# Patient Record
Sex: MALE | Race: WHITE | Employment: OTHER | URBAN - METROPOLITAN AREA
[De-identification: names, ages, dates, MRNs, and addresses within clinical notes are randomized per-mention and may not be internally consistent; named-entity substitution may affect disease eponyms.]

---
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
## 2017-01-04 ENCOUNTER — OFFICE VISIT (OUTPATIENT)
Dept: FAMILY MEDICINE | Facility: OTHER | Age: 47
End: 2017-01-04
Payer: MEDICARE

## 2017-01-04 VITALS
BODY MASS INDEX: 26.57 KG/M2 | WEIGHT: 179.4 LBS | SYSTOLIC BLOOD PRESSURE: 116 MMHG | OXYGEN SATURATION: 96 % | TEMPERATURE: 99.2 F | HEIGHT: 69 IN | HEART RATE: 97 BPM | DIASTOLIC BLOOD PRESSURE: 68 MMHG | RESPIRATION RATE: 16 BRPM

## 2017-01-04 DIAGNOSIS — E55.9 HYPOVITAMINOSIS D: ICD-10-CM

## 2017-01-04 DIAGNOSIS — J96.21 ACUTE ON CHRONIC RESPIRATORY FAILURE WITH HYPOXIA (H): Primary | ICD-10-CM

## 2017-01-04 DIAGNOSIS — B35.3 TINEA PEDIS OF BOTH FEET: ICD-10-CM

## 2017-01-04 DIAGNOSIS — E53.8 VITAMIN B12 DEFICIENCY (NON ANEMIC): ICD-10-CM

## 2017-01-04 DIAGNOSIS — B20 AIDS (H): ICD-10-CM

## 2017-01-04 DIAGNOSIS — F19.10 DRUG ABUSE (H): ICD-10-CM

## 2017-01-04 DIAGNOSIS — F41.9 ANXIETY: ICD-10-CM

## 2017-01-04 PROCEDURE — 99214 OFFICE O/P EST MOD 30 MIN: CPT | Performed by: NURSE PRACTITIONER

## 2017-01-04 RX ORDER — CLOTRIMAZOLE 1 %
CREAM (GRAM) TOPICAL 2 TIMES DAILY
Qty: 14 G | Refills: 3 | Status: SHIPPED | OUTPATIENT
Start: 2017-01-04 | End: 2017-03-07

## 2017-01-04 RX ORDER — ALPRAZOLAM 0.5 MG
TABLET ORAL
Qty: 30 TABLET | Refills: 0 | Status: SHIPPED | OUTPATIENT
Start: 2017-01-04 | End: 2017-03-07

## 2017-01-04 ASSESSMENT — PAIN SCALES - GENERAL: PAINLEVEL: MODERATE PAIN (5)

## 2017-01-04 NOTE — NURSING NOTE
"Chief Complaint   Patient presents with     Anxiety     Panel Management     lipids, phq, hf action plan, phq, urine screen       Initial /68 mmHg  Pulse 97  Temp(Src) 99.2  F (37.3  C) (Temporal)  Resp 16  Ht 5' 8.7\" (1.745 m)  Wt 179 lb 6.4 oz (81.375 kg)  BMI 26.72 kg/m2  SpO2 96% Estimated body mass index is 26.72 kg/(m^2) as calculated from the following:    Height as of this encounter: 5' 8.7\" (1.745 m).    Weight as of this encounter: 179 lb 6.4 oz (81.375 kg).  BP completed using cuff size: regular    "

## 2017-01-12 DIAGNOSIS — B20 HUMAN IMMUNODEFICIENCY VIRUS (HIV) DISEASE (H): Primary | ICD-10-CM

## 2017-01-23 ENCOUNTER — TELEPHONE (OUTPATIENT)
Dept: FAMILY MEDICINE | Facility: OTHER | Age: 47
End: 2017-01-23

## 2017-01-23 DIAGNOSIS — J18.9 PNEUMONIA OF BOTH LUNGS DUE TO INFECTIOUS ORGANISM, UNSPECIFIED PART OF LUNG: ICD-10-CM

## 2017-01-23 DIAGNOSIS — J96.21 ACUTE AND CHRONIC RESPIRATORY FAILURE WITH HYPOXIA (H): Primary | ICD-10-CM

## 2017-01-23 DIAGNOSIS — J98.01 ACUTE BRONCHOSPASM: ICD-10-CM

## 2017-01-23 NOTE — TELEPHONE ENCOUNTER
tiotropium (SPIRIVA RESPIMAT) 2.5 MCG/ACT inhalation       Last Written Prescription Date: 12/23/16  Last Fill Quantity: 4 g, # refills: 0    Last Office Visit with G, P or [facility] prescribing provider:  1/4/17 White   Future Office Visit:       Date of Last Asthma Action Plan Letter:   There are no preventive care reminders to display for this patient.   Asthma Control Test: No flowsheet data found.    Date of Last Spirometry Test:   No results found for this or any previous visit.

## 2017-01-24 NOTE — TELEPHONE ENCOUNTER
Patient informed he states he isn't going to follow up for an inhaler and he's going to report that and hung up on me.

## 2017-01-24 NOTE — TELEPHONE ENCOUNTER
Medication is being filled for 1 time refill only due to:  Patient needs to be seen because per last OV note on 01/04/2017, follow up in 1-2 months.  Please contact patient to schedule follow up in February.  RX sent to [facility] pharmacy  Margaret Webb RN

## 2017-01-25 DIAGNOSIS — Z21 HIV (HUMAN IMMUNODEFICIENCY VIRUS INFECTION) (H): Primary | ICD-10-CM

## 2017-01-25 RX ORDER — EMTRICITABINE AND TENOFOVIR DISOPROXIL FUMARATE 200; 300 MG/1; MG/1
1 TABLET, FILM COATED ORAL DAILY
Qty: 30 TABLET | Refills: 5 | Status: SHIPPED | OUTPATIENT
Start: 2017-01-25 | End: 2017-06-26

## 2017-02-06 ENCOUNTER — TELEPHONE (OUTPATIENT)
Dept: FAMILY MEDICINE | Facility: OTHER | Age: 47
End: 2017-02-06

## 2017-02-06 DIAGNOSIS — J31.0 CHRONIC RHINITIS: Primary | ICD-10-CM

## 2017-02-06 NOTE — TELEPHONE ENCOUNTER
Pt calling. He is getting a bill from [facility] that he states Medicare should be paying for. He was told by them to have you call they so they can get the adequate info to have Medicare cover this. Please call them at [phone]. He did now know what specific info they needed. His customer ID is [id number].   Thank you,  Ara Alan- Pt Rep.

## 2017-02-06 NOTE — TELEPHONE ENCOUNTER
Also, the Allegra that was called in for him was supposed to be Allegra D. He would like new Rx sent to [facility] in [place].   Thank you,  Ara Alan- Pt Rep.

## 2017-02-07 RX ORDER — FEXOFENADINE HCL AND PSEUDOEPHEDRINE HCI 60; 120 MG/1; MG/1
1 TABLET, EXTENDED RELEASE ORAL 2 TIMES DAILY
Qty: 28 TABLET | Refills: 1 | Status: SHIPPED | OUTPATIENT
Start: 2017-02-07 | End: 2017-03-07

## 2017-02-10 DIAGNOSIS — F41.9 ANXIETY: ICD-10-CM

## 2017-02-10 DIAGNOSIS — R11.0 NAUSEA: Primary | ICD-10-CM

## 2017-02-10 NOTE — TELEPHONE ENCOUNTER
Lidocaine      Last Written Prescription Date:  08/17/15  Last Fill Quantity: 30 patches,   # refills: 8  Last Office Visit with [facility], [facility] or [facility] prescribing provider: 01/02/17  Future Office visit:       Routing refill request to provider for review/approval because:  Drug not on the [facility], [facility] or [facility] refill protocol or controlled substance    Patients insurance is requesting that patient be placed on a SHANKAR due to using ventolin more than 2 times weekly. The request is for one of the following:   Qvar 40 1-2 puffs BID,   Flovent HFA 44 2 puffs BID,   Asmanex 220 1puff QD,   Pulmicort 90 1 puff QD,   Flovent Discus 100 1 puff BID

## 2017-02-14 RX ORDER — LIDOCAINE 50 MG/G
PATCH TOPICAL
Qty: 30 PATCH | Refills: 8 | Status: SHIPPED | OUTPATIENT
Start: 2017-02-14 | End: 2017-03-07

## 2017-03-01 ENCOUNTER — TELEPHONE (OUTPATIENT)
Dept: FAMILY MEDICINE | Facility: OTHER | Age: 47
End: 2017-03-01

## 2017-03-01 DIAGNOSIS — J96.21 ACUTE ON CHRONIC RESPIRATORY FAILURE WITH HYPOXIA (H): Primary | ICD-10-CM

## 2017-03-01 NOTE — TELEPHONE ENCOUNTER
"Per fax from [facility]  \"THIRD REQUEST-- Tommy's insurance company wants Tommy on a SHANKAR since he is using his ventolin more than twice a week. Will you please prescribe one of the following to help him get adequate control?  Qvar 40 1-2 puffs BID  Flovent HFA 44 2 puffs BID  Asmanex 220 1 qd  Pulmicort 90 1qd  Flovent Diskus 100 1 BID    Please advise, thanks\"    "

## 2017-03-02 ENCOUNTER — OFFICE VISIT (OUTPATIENT)
Dept: URGENT CARE | Facility: RETAIL CLINIC | Age: 47
End: 2017-03-02
Payer: MEDICARE

## 2017-03-02 ENCOUNTER — TELEPHONE (OUTPATIENT)
Dept: FAMILY MEDICINE | Facility: OTHER | Age: 47
End: 2017-03-02

## 2017-03-02 VITALS
HEART RATE: 100 BPM | OXYGEN SATURATION: 96 % | SYSTOLIC BLOOD PRESSURE: 110 MMHG | TEMPERATURE: 98.3 F | DIASTOLIC BLOOD PRESSURE: 68 MMHG

## 2017-03-02 DIAGNOSIS — J20.9 ACUTE BRONCHITIS WITH COEXISTING CONDITION REQUIRING PROPHYLACTIC TREATMENT: Primary | ICD-10-CM

## 2017-03-02 DIAGNOSIS — R05.9 COUGH: ICD-10-CM

## 2017-03-02 PROCEDURE — 99213 OFFICE O/P EST LOW 20 MIN: CPT | Performed by: NURSE PRACTITIONER

## 2017-03-02 RX ORDER — LEVOFLOXACIN 750 MG/1
750 TABLET, FILM COATED ORAL DAILY
Qty: 14 TABLET | Refills: 0 | Status: ON HOLD | OUTPATIENT
Start: 2017-03-02 | End: 2017-04-24

## 2017-03-02 ASSESSMENT — PAIN SCALES - GENERAL: PAINLEVEL: SEVERE PAIN (7)

## 2017-03-02 NOTE — TELEPHONE ENCOUNTER
LM for pt to return call.   Wondering why pt is requesting medication?   Chu Collins MA  March 2, 2017

## 2017-03-02 NOTE — TELEPHONE ENCOUNTER
Reason for call:  Symptom  Reason for call:  Patient reporting a symptom    Symptom or request: sinus pressure, headaches, fever, chills, cough    Duration (how long have symptoms been present): 3-4 days    Have you been treated for this before? Yes    Additional comments:     Phone Number patient can be reached at:  Home number on file [phone] (home)    Best Time:  asap    Can we leave a detailed message on this number:  YES    Call taken on 3/2/2017 at 11:44 AM by Minal Paredes

## 2017-03-02 NOTE — TELEPHONE ENCOUNTER
Patient returned call to clinic. I am not sure if patient is confused on what medications they need or are looking for. Patient was going through a number of different medications but unsure what ones they really need. Patient is looking for a return call to go over theses.    Mare Bob  Reception/ Scheduling

## 2017-03-02 NOTE — MR AVS SNAPSHOT
"              After Visit Summary   3/2/2017    Tommy Ross    MRN: [id number]           Patient Information     Date Of Birth          1970        Visit Information        Provider Department      3/2/2017 5:50 PM Seferino Cho APRN CNP [facility]        Today's Diagnoses     Acute bronchitis with coexisting condition requiring prophylactic treatment    -  1    Cough           Follow-ups after your visit        Your next 10 appointments already scheduled     2017 11:30 AM CDT   (Arrive by 11:15 AM)   Return Visit with Carlos Enrique Lay MD   [facility] ([facility])    [address] [place] [id number] [phone]              Who to contact     You can reach your care team any time of the day by calling [phone].  Notification of test results:  If you have an abnormal lab result, we will notify you by phone as soon as possible.         Additional Information About Your Visit        MyChart Information     [facility] lets you send messages to your doctor, view your test results, renew your prescriptions, schedule appointments and more. To sign up, go to [email or web address] . Click on \"Log in\" on the left side of the screen, which will take you to the Welcome page. Then click on \"Sign up Now\" on the right side of the page.     You will be asked to enter the access code listed below, as well as some personal information. Please follow the directions to create your username and password.     Your access code is: [email or web address]  Expires: 2017  6:49 PM     Your access code will  in 90 days. If you need help or a new code, please call your [place] clinic or [phone].        Care EveryWhere ID     This is your Care EveryWhere ID. This could be used by other organizations to access your [place] medical records  [id number]        Your Vitals Were     Pulse Temperature " Pulse Oximetry             100 98.3  F (36.8  C) (Oral) 96%          Blood Pressure from Last 3 Encounters:   03/02/17 110/68   01/04/17 116/68   12/21/16 126/66    Weight from Last 3 Encounters:   01/04/17 179 lb 6.4 oz (81.4 kg)   12/21/16 173 lb (78.5 kg)   11/26/16 163 lb 2.3 oz (74 kg)              Today, you had the following     No orders found for display         Today's Medication Changes          These changes are accurate as of: 3/2/17  6:49 PM.  If you have any questions, ask your nurse or doctor.               Start taking these medicines.        Dose/Directions    levofloxacin 750 MG tablet   Commonly known as:  LEVAQUIN   Used for:  Acute bronchitis with coexisting condition requiring prophylactic treatment   Started by:  Seferino Cho APRN CNP        Dose:  750 mg   Take 1 tablet (750 mg) by mouth daily   Quantity:  14 tablet   Refills:  0            Where to get your medicines      These medications were sent [address]  [address] [facility] [address]     Phone:  [phone]     levofloxacin 750 MG tablet                Primary Care Provider Office Phone # Fax #    Antonella Obrien [phone]       [facility] [address] [facility] [address]        Goals        General    I want more information on palliative card/Start Date 4/14/2014 (pt-stated)     Notes - Note created  4/15/2014 10:07 AM by Tania Vasquez MSW    As of today's date 4/15/2014 goal is met at 0 - 25%.   Goal Status:  Active        I want to feel normal again, and be able to address my pain/Start Date 4/14/2014 (pt-stated)     Notes - Note created  4/15/2014 10:06 AM by Tania Vasquez MSW    As of today's date 4/15/2014 goal is met at 0 - 25%.   Goal Status:  Active        I will take my nirto as directed for chest pain. (pt-stated)     I will weigh myself daily and keep a record (pt-stated)       Thank you!     Thank you for choosing [facility]  for your care. Our goal is always to provide you with excellent care. Hearing back from our patients is one way we can continue to improve our services. Please take a few minutes to complete the written survey that you may receive in the mail after your visit with us. Thank you!             Your Updated Medication List - Protect others around you: Learn how to safely use, store and throw away your medicines at www.disposemymeds.org.          This list is accurate as of: 3/2/17  6:49 PM.  Always use your most recent med list.                   Brand Name Dispense Instructions for use    albuterol 108 (90 BASE) MCG/ACT Inhaler    PROAIR HFA/PROVENTIL HFA/VENTOLIN HFA    1 Inhaler    Inhale 2 puffs into the lungs every 4 hours as needed for shortness of breath / dyspnea or wheezing       ALPRAZolam 0.5 MG tablet    XANAX    30 tablet    One or two tabs daily PRN anxiety       beclomethasone 40 MCG/ACT Inhaler    QVAR    1 Inhaler    Inhale 2 puffs into the lungs 2 times daily       BOOST     12 Can    fax # to the county worker fax#[phone] Attn:MIDLRED       * cholecalciferol 400 UNIT Tabs tablet    vitamin D    30 each    Take 1 tablet (400 Units) by mouth daily       * D3-50 05263 UNITS capsule   Generic drug:  cholecalciferol          * cholecalciferol 1000 UNIT tablet    vitamin D    100 tablet    Take 2 tablets (2,000 Units) by mouth daily       clotrimazole 1 % cream    LOTRIMIN    14 g    Apply topically 2 times daily       clotrimazole 10 MG nathalie     70 Nathalie    Place 1 Nathalie (10 mg) inside cheek 5 times daily       cyanocobalamin 1000 MCG Tabs    CVS vitamin  B12    30 tablet    Take 1 tablet by mouth daily       docusate sodium 100 MG tablet    COLACE    60 tablet    Take 100 mg by mouth daily       dolutegravir 50 MG tablet    TIVICAY    30 tablet    Take 1 tablet (50 mg) by mouth daily       dronabinol 2.5 MG capsule    MARINOL         emtricitabine-tenofovir 200-300 MG per  tablet    TRUVADA    30 tablet    Take 1 tablet by mouth daily       fexofenadine 180 MG tablet    ALLEGRA    30 tablet    Take 1 tablet (180 mg) by mouth daily       fexofenadine-pseudoePHEDrine  MG per 12 hr tablet    ALLEGRA-D    28 tablet    Take 1 tablet by mouth 2 times daily       FLUoxetine 20 MG capsule    PROzac    30 capsule    Take 1 capsule (20 mg) by mouth daily Along with 40 mg of fluoxetine       fluticasone 50 MCG/ACT spray    FLONASE    16 g    Spray 1-2 sprays into both nostrils daily       levofloxacin 750 MG tablet    LEVAQUIN    14 tablet    Take 1 tablet (750 mg) by mouth daily       lidocaine 5 % Patch    LIDODERM    30 patch    Apply up to 3 patches to painful area at once for up to 12 h within a 24 h period.  Remove after 12 hours.       loratadine 10 MG tablet    CLARITIN    30 tablet    Take 1 tablet (10 mg) by mouth daily       MORPHINE SULFATE PO      Take 15 mg by mouth       nicotine 14 MG/24HR 24 hr patch    NICODERM CQ    30 patch    Place 1 patch onto the skin every 24 hours       nitroglycerin 0.4 MG sublingual tablet    NITROSTAT    30 tablet    Place 1 tablet (0.4 mg) under the tongue every 5 minutes as needed for chest pain If you are still having symptoms after 3 doses (15 minutes) call 911.       ondansetron 4 MG ODT tab    ZOFRAN ODT    40 tablet    Take 1-2 tablets (4-8 mg) by mouth every 8 hours as needed for nausea       * order for DME     1 Device    Equipment being ordered: Oxygen at 5 liters       * order for DME     1 each    Equipment being ordered: empty Oxygen tanks, oxygen concentrator and oxygen travel concentrator for portability       order for DME     1 Device    Equipment being ordered:  Portable Oxygen       oxyCODONE-acetaminophen  MG per tablet    PERCOCET    30 tablet    Take 1 tablet by mouth every 4 hours as needed for moderate to severe pain (Max of 6 a day.)       pantoprazole 40 MG EC tablet    PROTONIX    30 tablet    Take 1 tablet (40  mg) by mouth daily Take 30-60 minutes before a meal.       predniSONE 20 MG tablet    DELTASONE    6 tablet    Take 1 tablet (20 mg) by mouth daily       Simethicone 125 MG Caps     28 capsule    Take 125 mg by mouth daily as needed       SUMAtriptan 50 MG tablet    IMITREX    30 tablet    Take 1 tablet (50 mg) by mouth at onset of headache for migraine       tiotropium 2.5 MCG/ACT inhalation aerosol    SPIRIVA RESPIMAT    4 g    Inhale 2 puffs into the lungs daily       * Notice:  This list has 5 medication(s) that are the same as other medications prescribed for you. Read the directions carefully, and ask your doctor or other care provider to review them with you.

## 2017-03-02 NOTE — TELEPHONE ENCOUNTER
"Patient has extensive health history.   I spoke with SNEHA Whitt on file.   Patient spiked fever last night - currently 101 orally.   He had tylenol at 10 am.   They are requesting Levaquin ASAP \"if he gets the Levaquin within the first 24-48 hours it usually prevents him from going on to life support\".  \"There should be a plan of care in his chart stating to get Levaquin when he gets a fever. He does so much better if it's started ASAP\".   He is currently on 2L of o2 and does have some shortness of breath.   He has developed a headache, sinus pressure, and cough as well.     Also requesting refill of Xanax    RECOMMENDED DISPOSITION:  To ED due to extensive history. Requested provider review first. Alternate RN to huddle with Heather Rapp in PCP's absence - recommend immediate evaluation in ED.   Will comply with recommendation: yes  If further questions/concerns or if Sx do not improve, worsen or new Sx develop, call your PCP or [facility] as soon as possible.    NOTES:  Disposition was determined by the first positive assessment question, therefore all previous assessment questions were negative.     Guideline used:  RN triage    Sakina Hickman RN, BSN      "

## 2017-03-02 NOTE — TELEPHONE ENCOUNTER
Amoxicillin 875 mg      Last Written Prescription Date:   Last Fill Quantity: ,  # refills:    Last Office Visit with G, P or [facility] prescribing provider: 01/4/2017

## 2017-03-03 ENCOUNTER — TELEPHONE (OUTPATIENT)
Dept: FAMILY MEDICINE | Facility: OTHER | Age: 47
End: 2017-03-03

## 2017-03-03 DIAGNOSIS — J96.21 ACUTE AND CHRONIC RESPIRATORY FAILURE WITH HYPOXIA (H): ICD-10-CM

## 2017-03-03 DIAGNOSIS — J98.01 ACUTE BRONCHOSPASM: ICD-10-CM

## 2017-03-03 DIAGNOSIS — J18.9 PNEUMONIA OF BOTH LUNGS DUE TO INFECTIOUS ORGANISM, UNSPECIFIED PART OF LUNG: ICD-10-CM

## 2017-03-03 NOTE — TELEPHONE ENCOUNTER
Reason for call:  Medication  Reason for Call:  Medication or medication refill:    Do you use a [facility]?  Name of the pharmacy and phone number for the current request:  [facility] - [phone]    Name of the medication requested: Alprazolam    Other request: pt states he is all out of this medication     Can we leave a detailed message on this number? YES    Phone number patient can be reached at: Home number on file [phone] (home)    Best Time: asap    Call taken on 3/3/2017 at 3:57 PM by Minal Paredes

## 2017-03-03 NOTE — TELEPHONE ENCOUNTER
Spiriva 2.5 MCG       Last Written Prescription Date: 01/24/2017  Last Fill Quantity: 4g, # refills: 0    Last Office Visit with G, P or [facility] prescribing provider:  01/04/2017   Future Office Visit:       Date of Last Asthma Action Plan Letter:   There are no preventive care reminders to display for this patient.   Asthma Control Test: No flowsheet data found.    Date of Last Spirometry Test:   No results found for this or any previous visit.

## 2017-03-03 NOTE — TELEPHONE ENCOUNTER
Spoke with patient stated that he takes the amoxicillin for sinus infections while on vacation. He also stated that he needs refills on his xanax, I instructed him to call his pharmacy about that. Stefani Ambriz CMA ([facility])
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
## 2017-03-03 NOTE — TELEPHONE ENCOUNTER
WEN for pt to return our call, will try again in morning. Stefani Ambriz CMA ([facility])

## 2017-03-03 NOTE — TELEPHONE ENCOUNTER
Alprazolam      Last Written Prescription Date: 01/04/2017  Last Fill Quantity: 30,  # refills: 0   Last Office Visit with G, P or [facility] prescribing provider: 01/04/2017-Chiara Daly    Note from OV:  Anxiety  Discussed with risks of xanax and chronic narcotics. Will fill half regular dose until regular provider returns. Did check  database.                                              Margaret Webb RN

## 2017-03-06 NOTE — TELEPHONE ENCOUNTER
Routing refill request to provider for review/approval because:  Medication was prescribed for acute illness.  Please advise if you would like to continue medication  Patient is schedule for OV with KW tomorrow.  Margaret Webb RN
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
## 2017-03-06 NOTE — TELEPHONE ENCOUNTER
Chiara    I called and spoke with patient. Tomorrow he has an appt with Chiara SMITH. He has several questions for Chiara regarding his medications: new inhaler and xanax. Would like to know if occasionally he could  possibly use alternative type visits for medication checks such as phone visits. We talked a bit about these types of visits informing patient the not all things are appropriate for a phone or E-Visit but that he could discuss these options with Chiara. He has difficulty getting to appts due to either health or transportation. I explained the importance of having face to face provider visits as well  - he understands this importance. Sounds like he may be a good candidate for Care Coordination/( previous CC involvement in 2/2015).   Patient agrees to speak with Chiara about his medication concerns tomorrow at the appt.    Neena Ji RN  Patient Care Supervisor  [facility]  Office:[phone]

## 2017-03-06 NOTE — TELEPHONE ENCOUNTER
Called patient a let him know that he will need a OV for his anxiety refill. Patient made OV tomorrow with Chiara Daly.  Ayla Rodrigues MA

## 2017-03-07 ENCOUNTER — OFFICE VISIT (OUTPATIENT)
Dept: FAMILY MEDICINE | Facility: OTHER | Age: 47
End: 2017-03-07
Payer: MEDICARE

## 2017-03-07 VITALS
SYSTOLIC BLOOD PRESSURE: 108 MMHG | OXYGEN SATURATION: 100 % | HEART RATE: 98 BPM | TEMPERATURE: 99.5 F | HEIGHT: 69 IN | BODY MASS INDEX: 25.06 KG/M2 | DIASTOLIC BLOOD PRESSURE: 64 MMHG | WEIGHT: 169.2 LBS | RESPIRATION RATE: 16 BRPM

## 2017-03-07 DIAGNOSIS — B35.3 TINEA PEDIS OF BOTH FEET: ICD-10-CM

## 2017-03-07 DIAGNOSIS — F41.9 ANXIETY: Primary | ICD-10-CM

## 2017-03-07 DIAGNOSIS — J31.0 CHRONIC RHINITIS: ICD-10-CM

## 2017-03-07 DIAGNOSIS — J18.9 PNEUMONIA OF LEFT LOWER LOBE DUE TO INFECTIOUS ORGANISM: ICD-10-CM

## 2017-03-07 DIAGNOSIS — B20 AIDS (H): ICD-10-CM

## 2017-03-07 DIAGNOSIS — F19.20 POLYSUBSTANCE DEPENDENCE (H): ICD-10-CM

## 2017-03-07 PROCEDURE — 99214 OFFICE O/P EST MOD 30 MIN: CPT | Performed by: NURSE PRACTITIONER

## 2017-03-07 RX ORDER — FEXOFENADINE HCL AND PSEUDOEPHEDRINE HCI 60; 120 MG/1; MG/1
1 TABLET, EXTENDED RELEASE ORAL 2 TIMES DAILY
Qty: 28 TABLET | Refills: 1 | Status: ON HOLD | OUTPATIENT
Start: 2017-03-07 | End: 2017-04-24

## 2017-03-07 RX ORDER — CLOTRIMAZOLE 1 %
CREAM (GRAM) TOPICAL 2 TIMES DAILY
Qty: 14 G | Refills: 3 | Status: SHIPPED | OUTPATIENT
Start: 2017-03-07 | End: 2017-07-12

## 2017-03-07 RX ORDER — ALPRAZOLAM 0.5 MG
TABLET ORAL
Qty: 30 TABLET | Refills: 0 | Status: SHIPPED | OUTPATIENT
Start: 2017-03-07 | End: 2017-07-28

## 2017-03-07 RX ORDER — LIDOCAINE 50 MG/G
PATCH TOPICAL
Qty: 30 PATCH | Refills: 8 | Status: ON HOLD | OUTPATIENT
Start: 2017-03-07 | End: 2017-04-24

## 2017-03-07 ASSESSMENT — PAIN SCALES - GENERAL: PAINLEVEL: MODERATE PAIN (4)

## 2017-03-07 NOTE — MR AVS SNAPSHOT
"              After Visit Summary   3/7/2017    Tommy Ross    MRN: [id number]           Patient Information     Date Of Birth          1970        Visit Information        Provider Department      3/7/2017 4:15 PM Chiara Daly NP [facility]        Today's Diagnoses     AIDS (H)    -  1    Nausea        Anxiety        Tinea pedis of both feet        Chronic rhinitis        Polysubstance dependence (H)           Follow-ups after your visit        Your next 10 appointments already scheduled     Apr 07, 2017 11:30 AM CDT   (Arrive by 11:15 AM)   Return Visit with Carlos Enrique Lay MD   [facility] ([facility] [place])    [address] [place] [address]  3rd Floor  [facility] [address] [phone]              Who to contact     If you have questions or need follow up information about today's clinic visit or your schedule please contact [facility] directly at [phone].  Normal or non-critical lab and imaging results will be communicated to you by MyChart, letter or phone within 4 business days after the clinic has received the results. If you do not hear from us within 7 days, please contact the clinic through [employer] or phone. If you have a critical or abnormal lab result, we will notify you by phone as soon as possible.  Submit refill requests through [employer] or call your pharmacy and they will forward the refill request to us. Please allow 3 business days for your refill to be completed.          Additional Information About Your Visit        [employer] Information     [employer] lets you send messages to your doctor, view your test results, renew your prescriptions, schedule appointments and more. To sign up, go to [email or web address] . Click on \"Log in\" on the left side of the screen, which will take you to the Welcome page. Then click on \"Sign up Now\" on the right side of the page.     You will be asked to enter " "the access code listed below, as well as some personal information. Please follow the directions to create your username and password.     Your access code is: [email or web address]  Expires: 2017  6:49 PM     Your access code will  in 90 days. If you need help or a new code, please call your [facility] or [phone].        Care EveryWhere ID     This is your Care EveryWhere ID. This could be used by other organizations to access your [place] medical records  [id number]        Your Vitals Were     Pulse Temperature Respirations Height Pulse Oximetry BMI (Body Mass Index)    98 99.5  F (37.5  C) (Temporal) 16 5' 9\" (1.753 m) 100% 24.99 kg/m2       Blood Pressure from Last 3 Encounters:   17 108/64   17 110/68   17 116/68    Weight from Last 3 Encounters:   17 169 lb 3.2 oz (76.7 kg)   17 179 lb 6.4 oz (81.4 kg)   16 173 lb (78.5 kg)              Today, you had the following     No orders found for display         Where to get your medicines      These medications were sent to [id number] - [place], [place]  [address] [facility]   [address], [facility] [address]     Phone:  [phone]     clotrimazole 1 % cream    lidocaine 5 % Patch         Some of these will need a paper prescription and others can be bought over the counter.  Ask your nurse if you have questions.     Bring a paper prescription for each of these medications     ALPRAZolam 0.5 MG tablet    fexofenadine-pseudoePHEDrine  MG per 12 hr tablet          Primary Care Provider Office Phone # Fax #    Antonella Obrien [phone]       [facility] [address] [facility] [address]        Goals        General    I want more information on palliative card/Start Date 2014 (pt-stated)     Notes - Note created  4/15/2014 10:07 AM by Tania Vasquez MSW    As of today's date 4/15/2014 goal is met at 0 - 25%.   Goal Status:  Active        I want to feel normal again, " and be able to address my pain/Start Date 4/14/2014 (pt-stated)     Notes - Note created  4/15/2014 10:06 AM by Tania Vasquez MSW    As of today's date 4/15/2014 goal is met at 0 - 25%.   Goal Status:  Active        I will take my nirto as directed for chest pain. (pt-stated)     I will weigh myself daily and keep a record (pt-stated)       Thank you!     Thank you for choosing [facility]  for your care. Our goal is always to provide you with excellent care. Hearing back from our patients is one way we can continue to improve our services. Please take a few minutes to complete the written survey that you may receive in the mail after your visit with us. Thank you!             Your Updated Medication List - Protect others around you: Learn how to safely use, store and throw away your medicines at www.disposemymeds.org.          This list is accurate as of: 3/7/17 11:59 PM.  Always use your most recent med list.                   Brand Name Dispense Instructions for use    albuterol 108 (90 BASE) MCG/ACT Inhaler    PROAIR HFA/PROVENTIL HFA/VENTOLIN HFA    1 Inhaler    Inhale 2 puffs into the lungs every 4 hours as needed for shortness of breath / dyspnea or wheezing       ALPRAZolam 0.5 MG tablet    XANAX    30 tablet    One or two tabs daily PRN anxiety       beclomethasone 40 MCG/ACT Inhaler    QVAR    1 Inhaler    Inhale 2 puffs into the lungs 2 times daily       BOOST     12 Can    fax # to the [facility] worker fax#[phone] Attn:MILDRED       * cholecalciferol 400 UNIT Tabs tablet    vitamin D    30 each    Take 400 Units by mouth daily Reported on 3/7/2017       * D3-50 92302 UNITS capsule   Generic drug:  cholecalciferol          * cholecalciferol 1000 UNIT tablet    vitamin D    100 tablet    Take 2 tablets (2,000 Units) by mouth daily       clotrimazole 1 % cream    LOTRIMIN    14 g    Apply topically 2 times daily       clotrimazole 10 MG nathalie     70 Nathalie    Place 1 Nathalie (10 mg) inside  cheek 5 times daily       cyanocobalamin 1000 MCG Tabs    [facility] vitamin  B12    30 tablet    Take 1 tablet by mouth daily       docusate sodium 100 MG tablet    COLACE    60 tablet    Take 100 mg by mouth daily       dolutegravir 50 MG tablet    TIVICAY    30 tablet    Take 1 tablet (50 mg) by mouth daily       dronabinol 2.5 MG capsule    MARINOL     Reported on 3/7/2017       emtricitabine-tenofovir 200-300 MG per tablet    TRUVADA    30 tablet    Take 1 tablet by mouth daily       fexofenadine 180 MG tablet    ALLEGRA    30 tablet    Take 1 tablet (180 mg) by mouth daily       fexofenadine-pseudoePHEDrine  MG per 12 hr tablet    ALLEGRA-D    28 tablet    Take 1 tablet by mouth 2 times daily       FLUoxetine 20 MG capsule    PROzac    30 capsule    Take 1 capsule (20 mg) by mouth daily Along with 40 mg of fluoxetine       fluticasone 50 MCG/ACT spray    FLONASE    16 g    Spray 1-2 sprays into both nostrils daily       levofloxacin 750 MG tablet    LEVAQUIN    14 tablet    Take 1 tablet (750 mg) by mouth daily       lidocaine 5 % Patch    LIDODERM    30 patch    Apply up to 3 patches to painful area at once for up to 12 h within a 24 h period.  Remove after 12 hours.       loratadine 10 MG tablet    CLARITIN    30 tablet    Take 1 tablet (10 mg) by mouth daily       MORPHINE SULFATE PO      Take 15 mg by mouth Reported on 3/7/2017       nicotine 14 MG/24HR 24 hr patch    NICODERM CQ    30 patch    Place 1 patch onto the skin every 24 hours       nitroglycerin 0.4 MG sublingual tablet    NITROSTAT    30 tablet    Place 1 tablet (0.4 mg) under the tongue every 5 minutes as needed for chest pain If you are still having symptoms after 3 doses (15 minutes) call 911.       ondansetron 4 MG ODT tab    ZOFRAN ODT    40 tablet    Take 1-2 tablets (4-8 mg) by mouth every 8 hours as needed for nausea       * order for DME     1 Device    Equipment being ordered: Oxygen at 5 liters       * order for DME     1 each     Equipment being ordered: empty Oxygen tanks, oxygen concentrator and oxygen travel concentrator for portability       order for DME     1 Device    Equipment being ordered:  Portable Oxygen       oxyCODONE-acetaminophen  MG per tablet    PERCOCET    30 tablet    Take 1 tablet by mouth every 4 hours as needed for moderate to severe pain (Max of 6 a day.)       pantoprazole 40 MG EC tablet    PROTONIX    30 tablet    Take 1 tablet (40 mg) by mouth daily Take 30-60 minutes before a meal.       predniSONE 20 MG tablet    DELTASONE    6 tablet    Take 1 tablet (20 mg) by mouth daily       Simethicone 125 MG Caps     28 capsule    Take 125 mg by mouth daily as needed       SUMAtriptan 50 MG tablet    IMITREX    30 tablet    Take 1 tablet (50 mg) by mouth at onset of headache for migraine       tiotropium 2.5 MCG/ACT inhalation aerosol    SPIRIVA RESPIMAT    4 g    Inhale 2 puffs into the lungs daily       * Notice:  This list has 5 medication(s) that are the same as other medications prescribed for you. Read the directions carefully, and ask your doctor or other care provider to review them with you.

## 2017-03-07 NOTE — NURSING NOTE
"Chief Complaint   Patient presents with     Anxiety     Panel Management     HF action plan, LDL, CSA, urine drug screen        Initial /64 (BP Location: Left arm, Patient Position: Chair, Cuff Size: Adult Regular)  Pulse 98  Temp 99.5  F (37.5  C) (Temporal)  Resp 16  Ht 5' 9\" (1.753 m)  Wt 169 lb 3.2 oz (76.7 kg)  SpO2 100%  BMI 24.99 kg/m2 Estimated body mass index is 24.99 kg/(m^2) as calculated from the following:    Height as of this encounter: 5' 9\" (1.753 m).    Weight as of this encounter: 169 lb 3.2 oz (76.7 kg).  Medication Reconciliation: complete    "

## 2017-03-11 ASSESSMENT — PATIENT HEALTH QUESTIONNAIRE - PHQ9: SUM OF ALL RESPONSES TO PHQ QUESTIONS 1-9: 0

## 2017-03-31 ENCOUNTER — OFFICE VISIT (OUTPATIENT)
Dept: URGENT CARE | Facility: RETAIL CLINIC | Age: 47
End: 2017-03-31
Payer: MEDICARE

## 2017-03-31 VITALS
OXYGEN SATURATION: 97 % | BODY MASS INDEX: 25.52 KG/M2 | WEIGHT: 172.8 LBS | HEART RATE: 94 BPM | DIASTOLIC BLOOD PRESSURE: 74 MMHG | SYSTOLIC BLOOD PRESSURE: 129 MMHG | TEMPERATURE: 97.9 F

## 2017-03-31 DIAGNOSIS — A60.02 GENITAL HERPES IN MEN: Primary | ICD-10-CM

## 2017-03-31 PROCEDURE — 99213 OFFICE O/P EST LOW 20 MIN: CPT | Performed by: NURSE PRACTITIONER

## 2017-03-31 RX ORDER — VALACYCLOVIR HYDROCHLORIDE 1 G/1
1000 TABLET, FILM COATED ORAL 2 TIMES DAILY
Qty: 10 TABLET | Refills: 0 | Status: SHIPPED | OUTPATIENT
Start: 2017-03-31 | End: 2019-08-09

## 2017-03-31 RX ORDER — VALACYCLOVIR HYDROCHLORIDE 500 MG/1
500 TABLET, FILM COATED ORAL 2 TIMES DAILY
Qty: 60 TABLET | Refills: 3 | Status: ON HOLD | OUTPATIENT
Start: 2017-03-31 | End: 2017-04-24
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
## 2017-03-31 NOTE — NURSING NOTE
"No chief complaint on file.      Initial /74 (BP Location: Right arm, Patient Position: Chair, Cuff Size: Adult Regular)  Pulse 94  Temp 97.9  F (36.6  C) (Tympanic)  Wt 172 lb 12.8 oz (78.4 kg)  SpO2 97%  BMI 25.52 kg/m2 Estimated body mass index is 25.52 kg/(m^2) as calculated from the following:    Height as of 3/7/17: 5' 9\" (1.753 m).    Weight as of this encounter: 172 lb 12.8 oz (78.4 kg).  Medication Reconciliation: complete   Beth Alatorre CMA (AAMA)      "

## 2017-03-31 NOTE — MR AVS SNAPSHOT
"              After Visit Summary   3/31/2017    Tommy Ross    MRN: [id number]           Patient Information     Date Of Birth          1970        Visit Information        Provider Department      3/31/2017 12:40 PM Seferino Cho APRN [facility]        Today's Diagnoses     Genital herpes in men    -  1       Follow-ups after your visit        Your next 10 appointments already scheduled     2017 11:30 AM CDT   (Arrive by 11:15 AM)   Return Visit with Carlos Enrique Lay MD   [facility] and Infectious Diseases ([facility])    [address] [place] [address]  3rd [facility] [address] [phone]              Who to contact     You can reach your care team any time of the day by calling [phone].  Notification of test results:  If you have an abnormal lab result, we will notify you by phone as soon as possible.         Additional Information About Your Visit        MyChart Information     [facility] [employer] lets you send messages to your doctor, view your test results, renew your prescriptions, schedule appointments and more. To sign up, go to [email or web address] . Click on \"Log in\" on the left side of the screen, which will take you to the Welcome page. Then click on \"Sign up Now\" on the right side of the page.     You will be asked to enter the access code listed below, as well as some personal information. Please follow the directions to create your username and password.     Your access code is: [email or web address]  Expires: 2017  7:49 PM     Your access code will  in 90 days. If you need help or a new code, please call your [place] clinic or [phone].        Care EveryWhere ID     This is your Care EveryWhere ID. This could be used by other organizations to access your [place] medical records  [id number]        Your Vitals Were     Pulse Temperature Pulse Oximetry BMI (Body Mass Index)          94 97.9  F (36.6  C) " (Tympanic) 97% 25.52 kg/m2         Blood Pressure from Last 3 Encounters:   03/31/17 129/74   03/07/17 108/64   03/02/17 110/68    Weight from Last 3 Encounters:   03/31/17 172 lb 12.8 oz (78.4 kg)   03/07/17 169 lb 3.2 oz (76.7 kg)   01/04/17 179 lb 6.4 oz (81.4 kg)              Today, you had the following     No orders found for display         Today's Medication Changes          These changes are accurate as of: 3/31/17  1:05 PM.  If you have any questions, ask your nurse or doctor.               Start taking these medicines.        Dose/Directions    * valACYclovir 1000 mg tablet   Commonly known as:  VALTREX   Used for:  Genital herpes in men   Started by:  Seferino Cho APRN CNP        Dose:  1000 mg   Take 1 tablet (1,000 mg) by mouth 2 times daily for 5 days   Quantity:  10 tablet   Refills:  0       * valACYclovir 500 MG tablet   Commonly known as:  VALTREX   Used for:  Genital herpes in men   Started by:  Seferino Cho APRN CNP        Dose:  500 mg   Take 1 tablet (500 mg) by mouth 2 times daily   Quantity:  60 tablet   Refills:  3       * Notice:  This list has 2 medication(s) that are the same as other medications prescribed for you. Read the directions carefully, and ask your doctor or other care provider to review them with you.         Where to get your medicines      These medications were sent to [address]  [address] [facility] [address]     Phone:  [phone]     valACYclovir 1000 mg tablet    valACYclovir 500 MG tablet                Primary Care Provider Office Phone # Fax #    Antonella Obrien [phone]       [facility] [address] [facility] [address]        Goals        General    I want more information on palliative card/Start Date 4/14/2014 (pt-stated)     Notes - Note created  4/15/2014 10:07 AM by Tania Vasquez MSW    As of today's date 4/15/2014 goal is met at 0 - 25%.   Goal Status:  Active         I want to feel normal again, and be able to address my pain/Start Date 4/14/2014 (pt-stated)     Notes - Note created  4/15/2014 10:06 AM by Tania Vasquez MSW    As of today's date 4/15/2014 goal is met at 0 - 25%.   Goal Status:  Active        I will take my nirto as directed for chest pain. (pt-stated)     I will weigh myself daily and keep a record (pt-stated)       Thank you!     Thank you for choosing [facility]  for your care. Our goal is always to provide you with excellent care. Hearing back from our patients is one way we can continue to improve our services. Please take a few minutes to complete the written survey that you may receive in the mail after your visit with us. Thank you!             Your Updated Medication List - Protect others around you: Learn how to safely use, store and throw away your medicines at www.disposemymeds.org.          This list is accurate as of: 3/31/17  1:05 PM.  Always use your most recent med list.                   Brand Name Dispense Instructions for use    albuterol 108 (90 BASE) MCG/ACT Inhaler    PROAIR HFA/PROVENTIL HFA/VENTOLIN HFA    1 Inhaler    Inhale 2 puffs into the lungs every 4 hours as needed for shortness of breath / dyspnea or wheezing       ALPRAZolam 0.5 MG tablet    XANAX    30 tablet    One or two tabs daily PRN anxiety       beclomethasone 40 MCG/ACT Inhaler    QVAR    1 Inhaler    Inhale 2 puffs into the lungs 2 times daily       BOOST     12 Can    fax # [facility] fax#[phone] Attn:MILDRED       * cholecalciferol 400 UNIT Tabs tablet    vitamin D    30 each    Take 400 Units by mouth daily Reported on 3/7/2017       * D3-50 06430 UNITS capsule   Generic drug:  cholecalciferol          * cholecalciferol 1000 UNIT tablet    vitamin D    100 tablet    Take 2 tablets (2,000 Units) by mouth daily       clotrimazole 1 % cream    LOTRIMIN    14 g    Apply topically 2 times daily       clotrimazole 10 MG jonathan     70  Nathalie    Place 1 Nathalie (10 mg) inside cheek 5 times daily       cyanocobalamin 1000 MCG Tabs    [facility] vitamin  B12    30 tablet    Take 1 tablet by mouth daily       docusate sodium 100 MG tablet    COLACE    60 tablet    Take 100 mg by mouth daily       dolutegravir 50 MG tablet    TIVICAY    30 tablet    Take 1 tablet (50 mg) by mouth daily       dronabinol 2.5 MG capsule    MARINOL     Reported on 3/7/2017       emtricitabine-tenofovir 200-300 MG per tablet    TRUVADA    30 tablet    Take 1 tablet by mouth daily       fexofenadine 180 MG tablet    ALLEGRA    30 tablet    Take 1 tablet (180 mg) by mouth daily       fexofenadine-pseudoePHEDrine  MG per 12 hr tablet    ALLEGRA-D    28 tablet    Take 1 tablet by mouth 2 times daily       FLUoxetine 20 MG capsule    PROzac    30 capsule    Take 1 capsule (20 mg) by mouth daily Along with 40 mg of fluoxetine       fluticasone 50 MCG/ACT spray    FLONASE    16 g    Spray 1-2 sprays into both nostrils daily       levofloxacin 750 MG tablet    LEVAQUIN    14 tablet    Take 1 tablet (750 mg) by mouth daily       lidocaine 5 % Patch    LIDODERM    30 patch    Apply up to 3 patches to painful area at once for up to 12 h within a 24 h period.  Remove after 12 hours.       loratadine 10 MG tablet    CLARITIN    30 tablet    Take 1 tablet (10 mg) by mouth daily       MORPHINE SULFATE PO      Take 15 mg by mouth Reported on 3/7/2017       nicotine 14 MG/24HR 24 hr patch    NICODERM CQ    30 patch    Place 1 patch onto the skin every 24 hours       nitroglycerin 0.4 MG sublingual tablet    NITROSTAT    30 tablet    Place 1 tablet (0.4 mg) under the tongue every 5 minutes as needed for chest pain If you are still having symptoms after 3 doses (15 minutes) call 911.       ondansetron 4 MG ODT tab    ZOFRAN ODT    40 tablet    Take 1-2 tablets (4-8 mg) by mouth every 8 hours as needed for nausea       * order for DME     1 Device    Equipment being ordered: Oxygen at 5 liters        * order for DME     1 each    Equipment being ordered: empty Oxygen tanks, oxygen concentrator and oxygen travel concentrator for portability       order for DME     1 Device    Equipment being ordered:  Portable Oxygen       oxyCODONE-acetaminophen  MG per tablet    PERCOCET    30 tablet    Take 1 tablet by mouth every 4 hours as needed for moderate to severe pain (Max of 6 a day.)       pantoprazole 40 MG EC tablet    PROTONIX    30 tablet    Take 1 tablet (40 mg) by mouth daily Take 30-60 minutes before a meal.       predniSONE 20 MG tablet    DELTASONE    6 tablet    Take 1 tablet (20 mg) by mouth daily       Simethicone 125 MG Caps     28 capsule    Take 125 mg by mouth daily as needed       SUMAtriptan 50 MG tablet    IMITREX    30 tablet    Take 1 tablet (50 mg) by mouth at onset of headache for migraine       tiotropium 2.5 MCG/ACT inhalation aerosol    SPIRIVA RESPIMAT    4 g    Inhale 2 puffs into the lungs daily       * valACYclovir 1000 mg tablet    VALTREX    10 tablet    Take 1 tablet (1,000 mg) by mouth 2 times daily for 5 days       * valACYclovir 500 MG tablet    VALTREX    60 tablet    Take 1 tablet (500 mg) by mouth 2 times daily       * Notice:  This list has 7 medication(s) that are the same as other medications prescribed for you. Read the directions carefully, and ask your doctor or other care provider to review them with you.

## 2017-04-03 ENCOUNTER — TELEPHONE (OUTPATIENT)
Dept: FAMILY MEDICINE | Facility: OTHER | Age: 47
End: 2017-04-03

## 2017-04-04 DIAGNOSIS — J98.01 ACUTE BRONCHOSPASM: ICD-10-CM

## 2017-04-04 DIAGNOSIS — J96.21 ACUTE AND CHRONIC RESPIRATORY FAILURE WITH HYPOXIA (H): ICD-10-CM

## 2017-04-04 DIAGNOSIS — J18.9 PNEUMONIA OF BOTH LUNGS DUE TO INFECTIOUS ORGANISM, UNSPECIFIED PART OF LUNG: ICD-10-CM

## 2017-04-04 NOTE — TELEPHONE ENCOUNTER
josh       Last Written Prescription Date: 03/06/17  Last Fill Quantity: 4g, # refills: 0    Last Office Visit with G, P or [facility] prescribing provider:  03/07/17   Future Office Visit:  NA     Date of Last Asthma Action Plan Letter:   There are no preventive care reminders to display for this patient.   Asthma Control Test: No flowsheet data found.    Date of Last Spirometry Test:   No results found for this or any previous visit.

## 2017-04-06 RX ORDER — TIOTROPIUM BROMIDE INHALATION SPRAY 3.12 UG/1
SPRAY, METERED RESPIRATORY (INHALATION)
Qty: 4 G | Refills: 0 | Status: SHIPPED | OUTPATIENT
Start: 2017-04-06 | End: 2018-02-19

## 2017-04-06 NOTE — TELEPHONE ENCOUNTER
"Routing refill request to provider for review/approval because:  Medication prescribed for \"acute and chronic respiratory failure\" and pneumonia.  Please advise plan  Margaret Webb RN        "

## 2017-04-19 DIAGNOSIS — F41.9 ANXIETY: ICD-10-CM

## 2017-04-19 NOTE — TELEPHONE ENCOUNTER
Fluoxetine 20 mg     Last Written Prescription Date: 04/07/2016  Last Fill Quantity: 30, # refills: 6  Last Office Visit with [facility] primary care provider:  3/7/2017        Last PHQ-9 score on record=   PHQ-9 SCORE 3/10/2017   Total Score -   Total Score 0

## 2017-04-20 NOTE — TELEPHONE ENCOUNTER
Routing refill request to provider for review/approval because:  Medication is reported/historical    Janusz Harmon RN

## 2017-04-23 ENCOUNTER — APPOINTMENT (OUTPATIENT)
Dept: GENERAL RADIOLOGY | Facility: CLINIC | Age: 47
End: 2017-04-23
Attending: FAMILY MEDICINE
Payer: MEDICARE

## 2017-04-23 ENCOUNTER — HOSPITAL ENCOUNTER (OUTPATIENT)
Facility: CLINIC | Age: 47
Setting detail: OBSERVATION
Discharge: HOME | End: 2017-04-24
Attending: FAMILY MEDICINE | Admitting: INTERNAL MEDICINE
Payer: MEDICARE

## 2017-04-23 DIAGNOSIS — B20 HUMAN IMMUNODEFICIENCY VIRUS (HIV) DISEASE (H): ICD-10-CM

## 2017-04-23 DIAGNOSIS — R91.8 PULMONARY INFILTRATE IN RIGHT LUNG ON CHEST X-RAY: ICD-10-CM

## 2017-04-23 DIAGNOSIS — J96.01 ACUTE RESPIRATORY FAILURE WITH HYPOXIA (H): ICD-10-CM

## 2017-04-23 DIAGNOSIS — J31.0 CHRONIC RHINITIS: ICD-10-CM

## 2017-04-23 DIAGNOSIS — J01.01 ACUTE RECURRENT MAXILLARY SINUSITIS: ICD-10-CM

## 2017-04-23 DIAGNOSIS — J18.9 PNEUMONIA OF RIGHT LOWER LOBE DUE TO INFECTIOUS ORGANISM: ICD-10-CM

## 2017-04-23 DIAGNOSIS — F41.9 ANXIETY: ICD-10-CM

## 2017-04-23 DIAGNOSIS — A60.02 GENITAL HERPES IN MEN: ICD-10-CM

## 2017-04-23 DIAGNOSIS — T78.40XA ALLERGIC REACTION, INITIAL ENCOUNTER: ICD-10-CM

## 2017-04-23 LAB
ALBUMIN UR-MCNC: NEGATIVE MG/DL
ANION GAP SERPL CALCULATED.3IONS-SCNC: 10 MMOL/L (ref 3–14)
APPEARANCE UR: CLEAR
BASOPHILS # BLD AUTO: 0 10E9/L (ref 0–0.2)
BASOPHILS NFR BLD AUTO: 0.1 %
BILIRUB UR QL STRIP: NEGATIVE
BUN SERPL-MCNC: 22 MG/DL (ref 7–30)
CALCIUM SERPL-MCNC: 8.6 MG/DL (ref 8.5–10.1)
CHLORIDE SERPL-SCNC: 108 MMOL/L (ref 94–109)
CO2 SERPL-SCNC: 28 MMOL/L (ref 20–32)
COLOR UR AUTO: YELLOW
CREAT SERPL-MCNC: 1.52 MG/DL (ref 0.66–1.25)
DIFFERENTIAL METHOD BLD: ABNORMAL
EOSINOPHIL # BLD AUTO: 0 10E9/L (ref 0–0.7)
EOSINOPHIL NFR BLD AUTO: 0.1 %
ERYTHROCYTE [DISTWIDTH] IN BLOOD BY AUTOMATED COUNT: 14.2 % (ref 10–15)
FLUAV+FLUBV AG SPEC QL: NEGATIVE
FLUAV+FLUBV AG SPEC QL: NORMAL
GFR SERPL CREATININE-BSD FRML MDRD: 49 ML/MIN/1.7M2
GLUCOSE SERPL-MCNC: 100 MG/DL (ref 70–99)
GLUCOSE UR STRIP-MCNC: NEGATIVE MG/DL
HCT VFR BLD AUTO: 50 % (ref 40–53)
HGB BLD-MCNC: 17 G/DL (ref 13.3–17.7)
HGB UR QL STRIP: NEGATIVE
IMM GRANULOCYTES # BLD: 0 10E9/L (ref 0–0.4)
IMM GRANULOCYTES NFR BLD: 0.1 %
KETONES UR STRIP-MCNC: NEGATIVE MG/DL
LACTATE BLD-SCNC: 1 MMOL/L (ref 0.7–2.1)
LEUKOCYTE ESTERASE UR QL STRIP: NEGATIVE
LYMPHOCYTES # BLD AUTO: 2.7 10E9/L (ref 0.8–5.3)
LYMPHOCYTES NFR BLD AUTO: 11.3 %
MCH RBC QN AUTO: 30 PG (ref 26.5–33)
MCHC RBC AUTO-ENTMCNC: 34 G/DL (ref 31.5–36.5)
MCV RBC AUTO: 88 FL (ref 78–100)
MONOCYTES # BLD AUTO: 1.2 10E9/L (ref 0–1.3)
MONOCYTES NFR BLD AUTO: 5.2 %
MUCOUS THREADS #/AREA URNS LPF: PRESENT /LPF
NEUTROPHILS # BLD AUTO: 19.7 10E9/L (ref 1.6–8.3)
NEUTROPHILS NFR BLD AUTO: 83.2 %
NITRATE UR QL: NEGATIVE
PH UR STRIP: 7 PH (ref 5–7)
PLATELET # BLD AUTO: 160 10E9/L (ref 150–450)
POTASSIUM SERPL-SCNC: 3.8 MMOL/L (ref 3.4–5.3)
RBC # BLD AUTO: 5.67 10E12/L (ref 4.4–5.9)
RBC #/AREA URNS AUTO: 6 /HPF (ref 0–2)
SODIUM SERPL-SCNC: 146 MMOL/L (ref 133–144)
SP GR UR STRIP: 1.02 (ref 1–1.03)
SPECIMEN SOURCE: NORMAL
SQUAMOUS #/AREA URNS AUTO: <1 /HPF (ref 0–1)
URN SPEC COLLECT METH UR: ABNORMAL
UROBILINOGEN UR STRIP-MCNC: 2 MG/DL (ref 0–2)
WBC # BLD AUTO: 23.7 10E9/L (ref 4–11)
WBC #/AREA URNS AUTO: 1 /HPF (ref 0–2)

## 2017-04-23 PROCEDURE — A9270 NON-COVERED ITEM OR SERVICE: HCPCS | Mod: GY | Performed by: FAMILY MEDICINE

## 2017-04-23 PROCEDURE — 25000128 H RX IP 250 OP 636: Performed by: FAMILY MEDICINE

## 2017-04-23 PROCEDURE — 87804 INFLUENZA ASSAY W/OPTIC: CPT | Performed by: FAMILY MEDICINE

## 2017-04-23 PROCEDURE — 85025 COMPLETE CBC W/AUTO DIFF WBC: CPT | Performed by: FAMILY MEDICINE

## 2017-04-23 PROCEDURE — 87899 AGENT NOS ASSAY W/OPTIC: CPT | Performed by: FAMILY MEDICINE

## 2017-04-23 PROCEDURE — 80048 BASIC METABOLIC PNL TOTAL CA: CPT | Performed by: FAMILY MEDICINE

## 2017-04-23 PROCEDURE — 83605 ASSAY OF LACTIC ACID: CPT | Performed by: FAMILY MEDICINE

## 2017-04-23 PROCEDURE — 25000132 ZZH RX MED GY IP 250 OP 250 PS 637: Mod: GY | Performed by: FAMILY MEDICINE

## 2017-04-23 PROCEDURE — 96375 TX/PRO/DX INJ NEW DRUG ADDON: CPT | Performed by: FAMILY MEDICINE

## 2017-04-23 PROCEDURE — 83520 IMMUNOASSAY QUANT NOS NONAB: CPT | Performed by: FAMILY MEDICINE

## 2017-04-23 PROCEDURE — 71010 XR CHEST PORT 1 VW: CPT | Mod: TC

## 2017-04-23 PROCEDURE — 81001 URINALYSIS AUTO W/SCOPE: CPT | Performed by: FAMILY MEDICINE

## 2017-04-23 PROCEDURE — 99285 EMERGENCY DEPT VISIT HI MDM: CPT | Mod: Z6 | Performed by: FAMILY MEDICINE

## 2017-04-23 PROCEDURE — 25000125 ZZHC RX 250: Performed by: FAMILY MEDICINE

## 2017-04-23 PROCEDURE — 87040 BLOOD CULTURE FOR BACTERIA: CPT | Mod: 91 | Performed by: FAMILY MEDICINE

## 2017-04-23 PROCEDURE — 96372 THER/PROPH/DIAG INJ SC/IM: CPT | Performed by: FAMILY MEDICINE

## 2017-04-23 PROCEDURE — 87086 URINE CULTURE/COLONY COUNT: CPT | Performed by: FAMILY MEDICINE

## 2017-04-23 PROCEDURE — 99285 EMERGENCY DEPT VISIT HI MDM: CPT | Mod: 25 | Performed by: FAMILY MEDICINE

## 2017-04-23 RX ORDER — FENTANYL CITRATE 50 UG/ML
25-50 INJECTION, SOLUTION INTRAMUSCULAR; INTRAVENOUS
Status: DISCONTINUED | OUTPATIENT
Start: 2017-04-23 | End: 2017-04-24

## 2017-04-23 RX ORDER — CETIRIZINE HYDROCHLORIDE 10 MG/1
10 TABLET ORAL ONCE
Status: COMPLETED | OUTPATIENT
Start: 2017-04-23 | End: 2017-04-23

## 2017-04-23 RX ORDER — LIDOCAINE 40 MG/G
CREAM TOPICAL
Status: DISCONTINUED | OUTPATIENT
Start: 2017-04-23 | End: 2017-04-24

## 2017-04-23 RX ORDER — METHYLPREDNISOLONE SODIUM SUCCINATE 125 MG/2ML
125 INJECTION, POWDER, LYOPHILIZED, FOR SOLUTION INTRAMUSCULAR; INTRAVENOUS ONCE
Status: COMPLETED | OUTPATIENT
Start: 2017-04-23 | End: 2017-04-23

## 2017-04-23 RX ADMIN — FENTANYL CITRATE 50 MCG: 50 INJECTION, SOLUTION INTRAMUSCULAR; INTRAVENOUS at 22:04

## 2017-04-23 RX ADMIN — METHYLPREDNISOLONE SODIUM SUCCINATE 125 MG: 125 INJECTION, POWDER, FOR SOLUTION INTRAMUSCULAR; INTRAVENOUS at 21:11

## 2017-04-23 RX ADMIN — CETIRIZINE HYDROCHLORIDE 10 MG: 10 TABLET, FILM COATED ORAL at 23:17

## 2017-04-23 RX ADMIN — EPINEPHRINE 0.3 MG: 1 INJECTION INTRAMUSCULAR; INTRAVENOUS; SUBCUTANEOUS at 21:27

## 2017-04-23 RX ADMIN — SODIUM CHLORIDE 1000 ML: 9 INJECTION, SOLUTION INTRAVENOUS at 21:16

## 2017-04-23 NOTE — IP AVS SNAPSHOT
MRN:[id number]                      After Visit Summary   4/23/2017    Tommy Ross    MRN: [id number]           Thank you!     Thank you for choosing [facility] for your care. Our goal is always to provide you with excellent care. Hearing back from our patients is one way we can continue to improve our services. Please take a few minutes to complete the written survey that you may receive in the mail after you visit with us. Thank you!        Patient Information     Date Of Birth          1970        Designated Caregiver       Most Recent Value    Caregiver    Will someone help with your care after discharge? no      About your hospital stay     You were admitted on:  April 24, 2017 You last received care in the:  [address] [facility]    You were discharged on:  April 24, 2017        Reason for your hospital stay       Hospitalized for possible pneumonia and improved                  Who to Call     For medical emergencies, please call 911.  For non-urgent questions about your medical care, please call your primary care provider or clinic, [phone]          Attending Provider     Provider Specialty    Manny Mtz MD Emergency Medicine    Seferino Diaz MD Internal Medicine       Primary Care Provider Office Phone # Fax #    Antonella Obrien [phone]       [facility] [address] [facility] [address]        After Care Instructions     Activity       Your activity upon discharge: activity as tolerated            Diet       Follow this diet upon discharge: Orders Placed This Encounter      Combination Diet Regular Diet Adult                  Follow-up Appointments     Follow-up and recommended labs and tests        Follow up with primary care provider, Antonella Obrien, within 2 weeks, for hospital follow- up.                  Your next 10 appointments already scheduled     May 02, 2017 10:40 AM CDT   Office [facility] with Liat  [facility]    [address] [facility] [address] [phone]           Bring a current list of meds and any records pertaining to this visit.  For Physicals, please bring immunization records and any forms needing to be filled out.  Please arrive 10 minutes early to complete paperwork.              Pending Results     Date and Time Order Name Status Description    4/23/2017 2254 Strep Pneumoniae Agn 13 yrs and older In process     4/23/2017 2227 Blood culture ONE site Preliminary     4/23/2017 2227 Urine Culture Preliminary     4/23/2017 2126 Blood culture ONE site Preliminary     4/23/2017 2103 Tryptase In process             Statement of Approval     Ordered          04/24/17 6439  I have reviewed and agree with all the recommendations and orders detailed in this document.  EFFECTIVE NOW     Approved and electronically signed by:  Seferino Diaz MD             Admission Information     Date & Time Provider Department Dept. Phone    4/23/2017 Seferino Diaz MD [address] [phone]      Your Vitals Were     Blood Pressure Pulse Temperature Respirations Height Weight    126/76 (BP Location: Left arm) 98 96.5  F (35.8  C) (Oral) 20 1.753 m (5' 9\") 78.9 kg (174 lb)    Pulse Oximetry BMI (Body Mass Index)                95% 25.7 kg/m2          MyChart Information     [employer] lets you send messages to your doctor, view your test results, renew your prescriptions, schedule appointments and more. To sign up, go to [email or web address] . Click on \"Log in\" on the left side of the screen, which will take you to the Welcome page. Then click on \"Sign up Now\" on the right side of the page.     You will be asked to enter the access code listed below, as well as some personal information. Please follow the directions to create your username and password.     Your access code is: [email or web address]  Expires: 5/31/2017  " 7:49 PM     Your access code will  in 90 days. If you need help or a new code, please call your [place] clinic or [phone].        Care EveryWhere ID     This is your Care EveryWhere ID. This could be used by other organizations to access your [facility] medical records  [id number]           Review of your medicines      START taking        Dose / Directions    azithromycin 250 MG tablet   Commonly known as:  ZITHROMAX   Indication:  Community Acquired Pneumonia   Used for:  Pneumonia of right lower lobe due to infectious organism        Dose:  250 mg   Take 1 tablet (250 mg) by mouth daily for 4 days   Quantity:  4 tablet   Refills:  0       cefdinir 300 MG capsule   Commonly known as:  OMNICEF   Used for:  Pneumonia of right lower lobe due to infectious organism        Dose:  600 mg   Take 2 capsules (600 mg) by mouth daily for 7 days   Quantity:  14 capsule   Refills:  0         CONTINUE these medicines which may have CHANGED, or have new prescriptions. If we are uncertain of the size of tablets/capsules you have at home, strength may be listed as something that might have changed.        Dose / Directions    fexofenadine 180 MG tablet   Commonly known as:  ALLEGRA   This may have changed:    - when to take this  - reasons to take this   Used for:  Chronic rhinitis        Dose:  180 mg   Take 1 tablet (180 mg) by mouth daily as needed for allergies   Quantity:  30 tablet   Refills:  11       fexofenadine-pseudoePHEDrine  MG per 12 hr tablet   Commonly known as:  ALLEGRA-D   This may have changed:    - when to take this  - reasons to take this   Used for:  Chronic rhinitis        Dose:  1 tablet   Take 1 tablet by mouth 2 times daily as needed for allergies   Quantity:  28 tablet   Refills:  1       FLONASE 50 MCG/ACT spray   This may have changed:    - when to take this  - reasons to take this   Used for:  Acute recurrent maxillary sinusitis   Generic drug:  fluticasone        Dose:  1-2 spray    Spray 1-2 sprays into both nostrils daily as needed for rhinitis or allergies   Quantity:  16 g   Refills:  5       lidocaine 5 % Patch   Commonly known as:  LIDODERM   This may have changed:  additional instructions   Used for:  Anxiety        Apply up to 3 patches to painful area at once for up to 12 h within a 24 h period as needed.  Remove after 12 hours.   Quantity:  30 patch   Refills:  8       valACYclovir 500 MG tablet   Commonly known as:  VALTREX   This may have changed:    - when to take this  - reasons to take this  - additional instructions   Used for:  Genital herpes in men        Dose:  500 mg   Take 1 tablet (500 mg) by mouth 2 times daily as needed For shingles flare   Quantity:  60 tablet   Refills:  3         CONTINUE these medicines which have NOT CHANGED        Dose / Directions    albuterol 108 (90 BASE) MCG/ACT Inhaler   Commonly known as:  PROAIR HFA/PROVENTIL HFA/VENTOLIN HFA   Used for:  Acute bronchospasm        Dose:  2 puff   Inhale 2 puffs into the lungs every 4 hours as needed for shortness of breath / dyspnea or wheezing   Quantity:  1 Inhaler   Refills:  11       ALPRAZolam 0.5 MG tablet   Commonly known as:  XANAX   Used for:  Anxiety        One or two tabs daily PRN anxiety   Quantity:  30 tablet   Refills:  0       beclomethasone 40 MCG/ACT Inhaler   Commonly known as:  QVAR   Used for:  Acute on chronic respiratory failure with hypoxia (H)        Dose:  2 puff   Inhale 2 puffs into the lungs 2 times daily   Quantity:  1 Inhaler   Refills:  3       BOOST   Used for:  Nausea        fax # to the county worker fax#[phone] Attn:MILDRED   Quantity:  12 Can   Refills:  12       clotrimazole 1 % cream   Commonly known as:  LOTRIMIN   Used for:  Tinea pedis of both feet        Apply topically 2 times daily   Quantity:  14 g   Refills:  3       cyanocobalamin 1000 MCG Tabs   Commonly known as:  [employer] vitamin  B12   Used for:  Vitamin B12 deficiency (non anemic)        Dose:  1 tablet    Take 1 tablet by mouth daily   Quantity:  30 tablet   Refills:  3       docusate sodium 100 MG tablet   Commonly known as:  COLACE        Dose:  100 mg   Take 100 mg by mouth daily   Quantity:  60 tablet   Refills:  1       dolutegravir 50 MG tablet   Commonly known as:  TIVICAY   Used for:  Human immunodeficiency virus (HIV) disease (H)        Dose:  50 mg   Take 1 tablet (50 mg) by mouth daily   Quantity:  30 tablet   Refills:  5       dronabinol 2.5 MG capsule   Commonly known as:  MARINOL        Reported on 3/7/2017   Refills:  0       emtricitabine-tenofovir 200-300 MG per tablet   Commonly known as:  TRUVADA   Used for:  HIV (human immunodeficiency virus infection) (H)        Dose:  1 tablet   Take 1 tablet by mouth daily   Quantity:  30 tablet   Refills:  5       FLUoxetine 20 MG capsule   Commonly known as:  PROzac   Used for:  Anxiety        Dose:  20 mg   Take 1 capsule (20 mg) by mouth daily Along with 40 mg of fluoxetine   Quantity:  30 capsule   Refills:  6       MORPHINE SULFATE PO        Dose:  15 mg   Take 15 mg by mouth Reported on 3/7/2017   Refills:  0       nicotine 14 MG/24HR 24 hr patch   Commonly known as:  NICODERM CQ   Used for:  Tobacco use disorder        Dose:  1 patch   Place 1 patch onto the skin every 24 hours   Quantity:  30 patch   Refills:  1       nitroglycerin 0.4 MG sublingual tablet   Commonly known as:  NITROSTAT   Used for:  Acute diastolic CHF (congestive heart failure) (H)        Dose:  0.4 mg   Place 1 tablet (0.4 mg) under the tongue every 5 minutes as needed for chest pain If you are still having symptoms after 3 doses (15 minutes) call 911.   Quantity:  30 tablet   Refills:  0       ondansetron 4 MG ODT tab   Commonly known as:  ZOFRAN ODT   Used for:  Nausea        Dose:  4-8 mg   Take 1-2 tablets (4-8 mg) by mouth every 8 hours as needed for nausea   Quantity:  40 tablet   Refills:  7       * order for DME   Used for:  HIV (human immunodeficiency virus infection)  (H), Acute respiratory failure with hypoxia (H), Acute diastolic CHF (congestive heart failure) (H)        Equipment being ordered: Oxygen at 5 liters   Quantity:  1 Device   Refills:  0       * order for DME   Used for:  Pneumonia of both lungs due to infectious organism, unspecified part of lung, Acute respiratory failure with hypoxia (H)        Equipment being ordered: empty Oxygen tanks, oxygen concentrator and oxygen travel concentrator for portability   Quantity:  1 each   Refills:  0       order for DME   Used for:  Pneumonia of both lower lobes due to infectious organism        Equipment being ordered:  Portable Oxygen   Quantity:  1 Device   Refills:  0       oxyCODONE-acetaminophen  MG per tablet   Commonly known as:  PERCOCET   Used for:  Low back pain, Chronic pain        Dose:  1 tablet   Take 1 tablet by mouth every 4 hours as needed for moderate to severe pain (Max of 6 a day.)   Quantity:  30 tablet   Refills:  0       predniSONE 20 MG tablet   Commonly known as:  DELTASONE   Used for:  Pneumonia of right lower lobe due to infectious organism        Dose:  20 mg   Start taking on:  4/25/2017   Take 1 tablet (20 mg) by mouth daily for 7 days   Quantity:  7 tablet   Refills:  0       SPIRIVA RESPIMAT 2.5 MCG/ACT inhalation aerosol   Used for:  Acute and chronic respiratory failure with hypoxia (H), Acute bronchospasm, Pneumonia of both lungs due to infectious organism, unspecified part of lung   Generic drug:  tiotropium        INHALE 2 PUFFS INTO THE LUNGS DAILY   Quantity:  4 g   Refills:  0       SUMAtriptan 50 MG tablet   Commonly known as:  IMITREX   Used for:  Headache(784.0)        Dose:  50 mg   Take 1 tablet (50 mg) by mouth at onset of headache for migraine   Quantity:  30 tablet   Refills:  1       * Notice:  This list has 2 medication(s) that are the same as other medications prescribed for you. Read the directions carefully, and ask your doctor or other care provider to review them  with you.      STOP taking     cholecalciferol 1000 UNIT tablet   Commonly known as:  vitamin D           cholecalciferol 400 UNIT Tabs tablet   Commonly known as:  vitamin D           clotrimazole 10 MG jonathan           D3-50 87595 UNITS capsule   Generic drug:  cholecalciferol           levofloxacin 750 MG tablet   Commonly known as:  LEVAQUIN           loratadine 10 MG tablet   Commonly known as:  CLARITIN           pantoprazole 40 MG EC tablet   Commonly known as:  PROTONIX                Where to get your medicines      These medications were sent to [id number] - [place], [place] - [address] [facility] [address] [facility]   [address] , [facility] [address]     Phone:  [phone]     azithromycin 250 MG tablet    cefdinir 300 MG capsule    predniSONE 20 MG tablet                Protect others around you: Learn how to safely use, store and throw away your medicines at www.disposemymeds.org.             Medication List: This is a list of all your medications and when to take them. Check marks below indicate your daily home schedule. Keep this list as a reference.      Medications           Morning Afternoon Evening Bedtime As Needed    albuterol 108 (90 BASE) MCG/ACT Inhaler   Commonly known as:  PROAIR HFA/PROVENTIL HFA/VENTOLIN HFA   Inhale 2 puffs into the lungs every 4 hours as needed for shortness of breath / dyspnea or wheezing                                ALPRAZolam 0.5 MG tablet   Commonly known as:  XANAX   One or two tabs daily PRN anxiety                                azithromycin 250 MG tablet   Commonly known as:  ZITHROMAX   Take 1 tablet (250 mg) by mouth daily for 4 days                                beclomethasone 40 MCG/ACT Inhaler   Commonly known as:  QVAR   Inhale 2 puffs into the lungs 2 times daily                                BOOST   fax # to the [facility] worker fax#[phone] Attn:MILDRED                                cefdinir 300 MG capsule   Commonly known as:  OMNICEF   Take 2 capsules (600 mg)  by mouth daily for 7 days                                clotrimazole 1 % cream   Commonly known as:  LOTRIMIN   Apply topically 2 times daily                                cyanocobalamin 1000 MCG Tabs   Commonly known as:  [employer] vitamin  B12   Take 1 tablet by mouth daily                                docusate sodium 100 MG tablet   Commonly known as:  COLACE   Take 100 mg by mouth daily                                dolutegravir 50 MG tablet   Commonly known as:  TIVICAY   Take 1 tablet (50 mg) by mouth daily                                dronabinol 2.5 MG capsule   Commonly known as:  MARINOL   Reported on 3/7/2017                                emtricitabine-tenofovir 200-300 MG per tablet   Commonly known as:  TRUVADA   Take 1 tablet by mouth daily                                fexofenadine 180 MG tablet   Commonly known as:  ALLEGRA   Take 1 tablet (180 mg) by mouth daily as needed for allergies                                fexofenadine-pseudoePHEDrine  MG per 12 hr tablet   Commonly known as:  ALLEGRA-D   Take 1 tablet by mouth 2 times daily as needed for allergies                                FLONASE 50 MCG/ACT spray   Spray 1-2 sprays into both nostrils daily as needed for rhinitis or allergies   Generic drug:  fluticasone                                FLUoxetine 20 MG capsule   Commonly known as:  PROzac   Take 1 capsule (20 mg) by mouth daily Along with 40 mg of fluoxetine                                lidocaine 5 % Patch   Commonly known as:  LIDODERM   Apply up to 3 patches to painful area at once for up to 12 h within a 24 h period as needed.  Remove after 12 hours.                                MORPHINE SULFATE PO   Take 15 mg by mouth Reported on 3/7/2017                                nicotine 14 MG/24HR 24 hr patch   Commonly known as:  NICODERM CQ   Place 1 patch onto the skin every 24 hours   Last time this was given:  1 patch on 4/24/2017  8:22 AM                                 nitroglycerin 0.4 MG sublingual tablet   Commonly known as:  NITROSTAT   Place 1 tablet (0.4 mg) under the tongue every 5 minutes as needed for chest pain If you are still having symptoms after 3 doses (15 minutes) call 911.                                ondansetron 4 MG ODT tab   Commonly known as:  ZOFRAN ODT   Take 1-2 tablets (4-8 mg) by mouth every 8 hours as needed for nausea                                * order for DME   Equipment being ordered: Oxygen at 5 liters                                * order for DME   Equipment being ordered: empty Oxygen tanks, oxygen concentrator and oxygen travel concentrator for portability                                order for DME   Equipment being ordered:  Portable Oxygen                                oxyCODONE-acetaminophen  MG per tablet   Commonly known as:  PERCOCET   Take 1 tablet by mouth every 4 hours as needed for moderate to severe pain (Max of 6 a day.)                                predniSONE 20 MG tablet   Commonly known as:  DELTASONE   Take 1 tablet (20 mg) by mouth daily for 7 days   Start taking on:  4/25/2017   Last time this was given:  20 mg on 4/24/2017  8:23 AM                                SPIRIVA RESPIMAT 2.5 MCG/ACT inhalation aerosol   INHALE 2 PUFFS INTO THE LUNGS DAILY   Generic drug:  tiotropium                                SUMAtriptan 50 MG tablet   Commonly known as:  IMITREX   Take 1 tablet (50 mg) by mouth at onset of headache for migraine                                valACYclovir 500 MG tablet   Commonly known as:  VALTREX   Take 1 tablet (500 mg) by mouth 2 times daily as needed For shingles flare                                * Notice:  This list has 2 medication(s) that are the same as other medications prescribed for you. Read the directions carefully, and ask your doctor or other care provider to review them with you.

## 2017-04-23 NOTE — IP AVS SNAPSHOT
[address] [facility]    [address] [place] [address]    Phone:  [phone]                                       After Visit Summary   4/23/2017    Tommy Ross    MRN: [id number]           After Visit Summary Signature Page     I have received my discharge instructions, and my questions have been answered. I have discussed any challenges I see with this plan with the nurse or doctor.    ..........................................................................................................................................  Patient/Patient Representative Signature      ..........................................................................................................................................  Patient Representative Print Name and Relationship to Patient    ..................................................               ................................................  Date                                            Time    ..........................................................................................................................................  Reviewed by Signature/Title    ...................................................              ..............................................  Date                                                            Time

## 2017-04-24 VITALS
WEIGHT: 174 LBS | OXYGEN SATURATION: 95 % | HEART RATE: 98 BPM | DIASTOLIC BLOOD PRESSURE: 76 MMHG | BODY MASS INDEX: 25.77 KG/M2 | TEMPERATURE: 96.5 F | HEIGHT: 69 IN | SYSTOLIC BLOOD PRESSURE: 126 MMHG | RESPIRATION RATE: 20 BRPM

## 2017-04-24 PROBLEM — J18.9 COMMUNITY ACQUIRED PNEUMONIA: Status: ACTIVE | Noted: 2017-04-24

## 2017-04-24 PROBLEM — F19.91 HISTORY OF DRUG USE: Status: ACTIVE | Noted: 2017-04-24

## 2017-04-24 PROBLEM — N17.9 ACUTE KIDNEY INJURY (H): Status: ACTIVE | Noted: 2017-04-24

## 2017-04-24 PROBLEM — L50.9 URTICARIA: Status: ACTIVE | Noted: 2017-04-24

## 2017-04-24 LAB
ANION GAP SERPL CALCULATED.3IONS-SCNC: 7 MMOL/L (ref 3–14)
BUN SERPL-MCNC: 21 MG/DL (ref 7–30)
CALCIUM SERPL-MCNC: 8.3 MG/DL (ref 8.5–10.1)
CHLORIDE SERPL-SCNC: 112 MMOL/L (ref 94–109)
CO2 SERPL-SCNC: 23 MMOL/L (ref 20–32)
CREAT SERPL-MCNC: 1 MG/DL (ref 0.66–1.25)
ERYTHROCYTE [DISTWIDTH] IN BLOOD BY AUTOMATED COUNT: 14 % (ref 10–15)
GFR SERPL CREATININE-BSD FRML MDRD: 80 ML/MIN/1.7M2
GLUCOSE SERPL-MCNC: 154 MG/DL (ref 70–99)
HCT VFR BLD AUTO: 48.4 % (ref 40–53)
HGB BLD-MCNC: 16.2 G/DL (ref 13.3–17.7)
L PNEUMO1 AG UR QL IA: NORMAL
MCH RBC QN AUTO: 29.9 PG (ref 26.5–33)
MCHC RBC AUTO-ENTMCNC: 33.5 G/DL (ref 31.5–36.5)
MCV RBC AUTO: 89 FL (ref 78–100)
MICRO REPORT STATUS: NORMAL
PLATELET # BLD AUTO: 143 10E9/L (ref 150–450)
POTASSIUM SERPL-SCNC: 4.3 MMOL/L (ref 3.4–5.3)
RBC # BLD AUTO: 5.42 10E12/L (ref 4.4–5.9)
S PNEUM AG SPEC QL: NORMAL
S PNEUM AG SPEC QL: NORMAL
SODIUM SERPL-SCNC: 142 MMOL/L (ref 133–144)
SPECIMEN SOURCE: NORMAL
WBC # BLD AUTO: 12.8 10E9/L (ref 4–11)

## 2017-04-24 PROCEDURE — 36415 COLL VENOUS BLD VENIPUNCTURE: CPT | Performed by: PEDIATRICS

## 2017-04-24 PROCEDURE — 80048 BASIC METABOLIC PNL TOTAL CA: CPT | Performed by: PEDIATRICS

## 2017-04-24 PROCEDURE — 25000125 ZZHC RX 250: Performed by: INTERNAL MEDICINE

## 2017-04-24 PROCEDURE — 99220 ZZC INITIAL OBSERVATION CARE,LEVL III: CPT | Performed by: INTERNAL MEDICINE

## 2017-04-24 PROCEDURE — 25000132 ZZH RX MED GY IP 250 OP 250 PS 637: Mod: GY | Performed by: INTERNAL MEDICINE

## 2017-04-24 PROCEDURE — 96368 THER/DIAG CONCURRENT INF: CPT | Performed by: FAMILY MEDICINE

## 2017-04-24 PROCEDURE — 25000128 H RX IP 250 OP 636: Performed by: FAMILY MEDICINE

## 2017-04-24 PROCEDURE — 85027 COMPLETE CBC AUTOMATED: CPT | Performed by: PEDIATRICS

## 2017-04-24 PROCEDURE — A9270 NON-COVERED ITEM OR SERVICE: HCPCS | Mod: GY | Performed by: INTERNAL MEDICINE

## 2017-04-24 PROCEDURE — G0378 HOSPITAL OBSERVATION PER HR: HCPCS

## 2017-04-24 PROCEDURE — 96365 THER/PROPH/DIAG IV INF INIT: CPT | Performed by: FAMILY MEDICINE

## 2017-04-24 PROCEDURE — 25000128 H RX IP 250 OP 636: Performed by: INTERNAL MEDICINE

## 2017-04-24 RX ORDER — AZITHROMYCIN 250 MG/1
250 TABLET, FILM COATED ORAL DAILY
Qty: 4 TABLET | Refills: 0 | Status: ON HOLD | OUTPATIENT
Start: 2017-04-24 | End: 2017-05-02

## 2017-04-24 RX ORDER — CEFTRIAXONE 2 G/1
2 INJECTION, POWDER, FOR SOLUTION INTRAMUSCULAR; INTRAVENOUS DAILY
Status: DISCONTINUED | OUTPATIENT
Start: 2017-04-24 | End: 2017-04-24 | Stop reason: HOSPADM

## 2017-04-24 RX ORDER — NALOXONE HYDROCHLORIDE 0.4 MG/ML
.1-.4 INJECTION, SOLUTION INTRAMUSCULAR; INTRAVENOUS; SUBCUTANEOUS
Status: DISCONTINUED | OUTPATIENT
Start: 2017-04-24 | End: 2017-04-24 | Stop reason: HOSPADM

## 2017-04-24 RX ORDER — SODIUM CHLORIDE 9 MG/ML
INJECTION, SOLUTION INTRAVENOUS CONTINUOUS
Status: DISCONTINUED | OUTPATIENT
Start: 2017-04-24 | End: 2017-04-24 | Stop reason: HOSPADM

## 2017-04-24 RX ORDER — METOCLOPRAMIDE HYDROCHLORIDE 5 MG/ML
10 INJECTION INTRAMUSCULAR; INTRAVENOUS EVERY 6 HOURS PRN
Status: DISCONTINUED | OUTPATIENT
Start: 2017-04-24 | End: 2017-04-24 | Stop reason: HOSPADM

## 2017-04-24 RX ORDER — CEFDINIR 300 MG/1
600 CAPSULE ORAL DAILY
Qty: 14 CAPSULE | Refills: 0 | Status: ON HOLD | OUTPATIENT
Start: 2017-04-24 | End: 2017-05-02

## 2017-04-24 RX ORDER — AZITHROMYCIN 250 MG/1
250 TABLET, FILM COATED ORAL DAILY
Status: DISCONTINUED | OUTPATIENT
Start: 2017-04-24 | End: 2017-04-24 | Stop reason: HOSPADM

## 2017-04-24 RX ORDER — METOCLOPRAMIDE 5 MG/1
10 TABLET ORAL EVERY 6 HOURS PRN
Status: DISCONTINUED | OUTPATIENT
Start: 2017-04-24 | End: 2017-04-24 | Stop reason: HOSPADM

## 2017-04-24 RX ORDER — FLUTICASONE PROPIONATE 50 MCG
1-2 SPRAY, SUSPENSION (ML) NASAL DAILY PRN
Qty: 16 G | Refills: 5 | COMMUNITY
Start: 2017-04-24 | End: 2017-08-08

## 2017-04-24 RX ORDER — ONDANSETRON 4 MG/1
4 TABLET, ORALLY DISINTEGRATING ORAL EVERY 6 HOURS PRN
Status: DISCONTINUED | OUTPATIENT
Start: 2017-04-24 | End: 2017-04-24 | Stop reason: HOSPADM

## 2017-04-24 RX ORDER — FEXOFENADINE HCL 180 MG/1
180 TABLET ORAL DAILY PRN
Qty: 30 TABLET | Refills: 11 | COMMUNITY
Start: 2017-04-24 | End: 2017-05-08

## 2017-04-24 RX ORDER — FEXOFENADINE HCL AND PSEUDOEPHEDRINE HCI 60; 120 MG/1; MG/1
1 TABLET, EXTENDED RELEASE ORAL 2 TIMES DAILY PRN
Qty: 28 TABLET | Refills: 1 | COMMUNITY
Start: 2017-04-24 | End: 2017-06-20

## 2017-04-24 RX ORDER — NICOTINE 21 MG/24HR
1 PATCH, TRANSDERMAL 24 HOURS TRANSDERMAL DAILY
Status: DISCONTINUED | OUTPATIENT
Start: 2017-04-24 | End: 2017-04-24 | Stop reason: HOSPADM

## 2017-04-24 RX ORDER — EMTRICITABINE AND TENOFOVIR DISOPROXIL FUMARATE 200; 300 MG/1; MG/1
1 TABLET, FILM COATED ORAL DAILY
Status: DISCONTINUED | OUTPATIENT
Start: 2017-04-24 | End: 2017-04-24 | Stop reason: HOSPADM

## 2017-04-24 RX ORDER — CETIRIZINE HYDROCHLORIDE 10 MG/1
10 TABLET ORAL DAILY
Status: DISCONTINUED | OUTPATIENT
Start: 2017-04-24 | End: 2017-04-24 | Stop reason: HOSPADM

## 2017-04-24 RX ORDER — LIDOCAINE 50 MG/G
PATCH TOPICAL
Qty: 30 PATCH | Refills: 8 | Status: ON HOLD | COMMUNITY
Start: 2017-04-24 | End: 2017-05-02

## 2017-04-24 RX ORDER — ALBUTEROL SULFATE 90 UG/1
2 AEROSOL, METERED RESPIRATORY (INHALATION) EVERY 4 HOURS PRN
Status: DISCONTINUED | OUTPATIENT
Start: 2017-04-24 | End: 2017-04-24 | Stop reason: HOSPADM

## 2017-04-24 RX ORDER — PREDNISONE 20 MG/1
20 TABLET ORAL DAILY
Status: DISCONTINUED | OUTPATIENT
Start: 2017-04-24 | End: 2017-04-24 | Stop reason: HOSPADM

## 2017-04-24 RX ORDER — ONDANSETRON 2 MG/ML
4 INJECTION INTRAMUSCULAR; INTRAVENOUS EVERY 6 HOURS PRN
Status: DISCONTINUED | OUTPATIENT
Start: 2017-04-24 | End: 2017-04-24 | Stop reason: HOSPADM

## 2017-04-24 RX ORDER — VALACYCLOVIR HYDROCHLORIDE 500 MG/1
500 TABLET, FILM COATED ORAL 2 TIMES DAILY
Status: DISCONTINUED | OUTPATIENT
Start: 2017-04-24 | End: 2017-04-24

## 2017-04-24 RX ORDER — OXYCODONE HYDROCHLORIDE 5 MG/1
5-10 TABLET ORAL EVERY 4 HOURS PRN
Status: DISCONTINUED | OUTPATIENT
Start: 2017-04-24 | End: 2017-04-24 | Stop reason: HOSPADM

## 2017-04-24 RX ORDER — VALACYCLOVIR HYDROCHLORIDE 500 MG/1
500 TABLET, FILM COATED ORAL 2 TIMES DAILY PRN
Qty: 60 TABLET | Refills: 3 | COMMUNITY
Start: 2017-04-24 | End: 2019-07-04

## 2017-04-24 RX ORDER — ACETAMINOPHEN 325 MG/1
650 TABLET ORAL EVERY 4 HOURS PRN
Status: DISCONTINUED | OUTPATIENT
Start: 2017-04-24 | End: 2017-04-24 | Stop reason: HOSPADM

## 2017-04-24 RX ORDER — PANTOPRAZOLE SODIUM 40 MG/1
40 TABLET, DELAYED RELEASE ORAL
Status: DISCONTINUED | OUTPATIENT
Start: 2017-04-24 | End: 2017-04-24 | Stop reason: HOSPADM

## 2017-04-24 RX ORDER — PREDNISONE 20 MG/1
20 TABLET ORAL DAILY
Qty: 7 TABLET | Refills: 0 | Status: ON HOLD | OUTPATIENT
Start: 2017-04-25 | End: 2017-05-02

## 2017-04-24 RX ADMIN — NICOTINE 1 PATCH: 14 PATCH, EXTENDED RELEASE TRANSDERMAL at 08:22

## 2017-04-24 RX ADMIN — AZITHROMYCIN MONOHYDRATE 500 MG: 500 INJECTION, POWDER, LYOPHILIZED, FOR SOLUTION INTRAVENOUS at 00:24

## 2017-04-24 RX ADMIN — SODIUM CHLORIDE: 9 INJECTION, SOLUTION INTRAVENOUS at 04:26

## 2017-04-24 RX ADMIN — UMECLIDINIUM 1 PUFF: 62.5 AEROSOL, POWDER ORAL at 08:23

## 2017-04-24 RX ADMIN — CEFTRIAXONE 2 G: 2 INJECTION, POWDER, FOR SOLUTION INTRAMUSCULAR; INTRAVENOUS at 00:28

## 2017-04-24 RX ADMIN — PANTOPRAZOLE SODIUM 40 MG: 40 TABLET, DELAYED RELEASE ORAL at 06:51

## 2017-04-24 RX ADMIN — OXYCODONE HYDROCHLORIDE 5 MG: 5 TABLET ORAL at 04:27

## 2017-04-24 RX ADMIN — OXYCODONE HYDROCHLORIDE 10 MG: 5 TABLET ORAL at 10:50

## 2017-04-24 RX ADMIN — CETIRIZINE HYDROCHLORIDE 10 MG: 10 TABLET, FILM COATED ORAL at 08:23

## 2017-04-24 RX ADMIN — PREDNISONE 20 MG: 20 TABLET ORAL at 08:23

## 2017-04-24 NOTE — ED NOTES
Patient was working a garden today and noted a Fever of up to 102 F and systemic swelling of face, arms, feet, and hands.  Patient took Benedryl and Tylenol which provided some relief.  Patient denies SOB or throat swelling.
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
## 2017-04-24 NOTE — DISCHARGE SUMMARY
[facility] Observation Discharge Summary    Tommy Ross MRN# [id number]   Age: 46 year old YOB: 1970     Date of Observation:  4/23/2017  Date of Discharge:  4/24/2017   Initial Physician:  Prasanth Lenz MD  Discharge Physician:  Seferino Diaz MD     Home clinic: [facility]  Primary care provider: Antonella Obrien          Admission Diagnoses:   Human immunodeficiency virus (HIV) disease (H) [B20]  Pulmonary infiltrate in right lung on chest x-ray [R91.8]  Allergic reaction, initial encounter [T78.40XA]  Pneumonia of right lower lobe due to infectious organism [J18.9]          Discharge Diagnoses:   Principal diagnosis: Community acquired pneumonia    Secondary diagnoses:    Community acquired pneumonia    HIV (human immunodeficiency virus infection)- dx 2010    Sepsis (H)    Urticaria    Acute kidney injury (H)    History of drug use (H)    * No resolved hospital problems. *            Procedures:   No procedures performed during this hospital stay           Discharge Medications:     Outpatient Prescriptions Marked as Taking for the 4/23/17 encounter (Hospital Encounter)   Medication Sig     valACYclovir (VALTREX) 500 MG tablet Take 1 tablet (500 mg) by mouth 2 times daily as needed For shingles flare     [START ON 4/25/2017] predniSONE (DELTASONE) 20 MG tablet Take 1 tablet (20 mg) by mouth daily for 7 days     fexofenadine (ALLEGRA) 180 MG tablet Take 1 tablet (180 mg) by mouth daily as needed for allergies     fexofenadine-pseudoePHEDrine (ALLEGRA-D)  MG per 12 hr tablet Take 1 tablet by mouth 2 times daily as needed for allergies     fluticasone (FLONASE) 50 MCG/ACT spray Spray 1-2 sprays into both nostrils daily as needed for rhinitis or allergies     lidocaine (LIDODERM) 5 % Patch Apply up to 3 patches to painful area at once for up to 12 h within a 24 h period as needed.  Remove after 12 hours.     azithromycin (ZITHROMAX) 250 MG tablet Take 1 tablet  (250 mg) by mouth daily for 4 days     cefdinir (OMNICEF) 300 MG capsule Take 2 capsules (600 mg) by mouth daily for 7 days     SPIRIVA RESPIMAT 2.5 MCG/ACT inhalation aerosol INHALE 2 PUFFS INTO THE LUNGS DAILY     ALPRAZolam (XANAX) 0.5 MG tablet One or two tabs daily PRN anxiety     clotrimazole (LOTRIMIN) 1 % cream Apply topically 2 times daily     emtricitabine-tenofovir (TRUVADA) 200-300 MG per tablet Take 1 tablet by mouth daily     dolutegravir (TIVICAY) 50 MG tablet Take 1 tablet (50 mg) by mouth daily     cyanocobalamin ([employer] VITAMIN  B12) 1000 MCG TABS Take 1 tablet by mouth daily     albuterol (PROAIR HFA/PROVENTIL HFA/VENTOLIN HFA) 108 (90 BASE) MCG/ACT Inhaler Inhale 2 puffs into the lungs every 4 hours as needed for shortness of breath / dyspnea or wheezing     order for DME Equipment being ordered:  Portable Oxygen     order for DME Equipment being ordered: empty Oxygen tanks, oxygen concentrator and oxygen travel concentrator for portability     nicotine (NICODERM CQ) 14 MG/24HR patch 2h hr Place 1 patch onto the skin every 24 hours     FLUoxetine (PROZAC) 20 MG capsule Take 1 capsule (20 mg) by mouth daily Along with 40 mg of fluoxetine     ondansetron (ZOFRAN ODT) 4 MG disintegrating tablet Take 1-2 tablets (4-8 mg) by mouth every 8 hours as needed for nausea     dronabinol (MARINOL) 2.5 MG capsule Reported on 3/7/2017     docusate sodium (COLACE) 100 MG tablet Take 100 mg by mouth daily     MORPHINE SULFATE PO Take 15 mg by mouth Reported on 3/7/2017     oxyCODONE-acetaminophen (PERCOCET)  MG per tablet Take 1 tablet by mouth every 4 hours as needed for moderate to severe pain (Max of 6 a day.)     ORDER FOR DME Equipment being ordered: Oxygen at 5 liters       Current Discharge Medication List      START taking these medications    Details   azithromycin (ZITHROMAX) 250 MG tablet Take 1 tablet (250 mg) by mouth daily for 4 days  Qty: 4 tablet, Refills: 0    Associated Diagnoses: Pneumonia  of right lower lobe due to infectious organism      cefdinir (OMNICEF) 300 MG capsule Take 2 capsules (600 mg) by mouth daily for 7 days  Qty: 14 capsule, Refills: 0    Associated Diagnoses: Pneumonia of right lower lobe due to infectious organism         CONTINUE these medications which have CHANGED    Details   valACYclovir (VALTREX) 500 MG tablet Take 1 tablet (500 mg) by mouth 2 times daily as needed For shingles flare  Qty: 60 tablet, Refills: 3    Associated Diagnoses: Genital herpes in men      predniSONE (DELTASONE) 20 MG tablet Take 1 tablet (20 mg) by mouth daily for 7 days  Qty: 7 tablet, Refills: 0    Associated Diagnoses: Pneumonia of right lower lobe due to infectious organism      fexofenadine (ALLEGRA) 180 MG tablet Take 1 tablet (180 mg) by mouth daily as needed for allergies  Qty: 30 tablet, Refills: 11    Associated Diagnoses: Chronic rhinitis      fexofenadine-pseudoePHEDrine (ALLEGRA-D)  MG per 12 hr tablet Take 1 tablet by mouth 2 times daily as needed for allergies  Qty: 28 tablet, Refills: 1    Associated Diagnoses: Chronic rhinitis      fluticasone (FLONASE) 50 MCG/ACT spray Spray 1-2 sprays into both nostrils daily as needed for rhinitis or allergies  Qty: 16 g, Refills: 5    Associated Diagnoses: Acute recurrent maxillary sinusitis      lidocaine (LIDODERM) 5 % Patch Apply up to 3 patches to painful area at once for up to 12 h within a 24 h period as needed.  Remove after 12 hours.  Qty: 30 patch, Refills: 8    Associated Diagnoses: Anxiety         CONTINUE these medications which have NOT CHANGED    Details   SPIRIVA RESPIMAT 2.5 MCG/ACT inhalation aerosol INHALE 2 PUFFS INTO THE LUNGS DAILY  Qty: 4 g, Refills: 0    Associated Diagnoses: Acute and chronic respiratory failure with hypoxia (H); Acute bronchospasm; Pneumonia of both lungs due to infectious organism, unspecified part of lung      ALPRAZolam (XANAX) 0.5 MG tablet One or two tabs daily PRN anxiety  Qty: 30 tablet,  Refills: 0    Associated Diagnoses: Anxiety      clotrimazole (LOTRIMIN) 1 % cream Apply topically 2 times daily  Qty: 14 g, Refills: 3    Associated Diagnoses: Tinea pedis of both feet      emtricitabine-tenofovir (TRUVADA) 200-300 MG per tablet Take 1 tablet by mouth daily  Qty: 30 tablet, Refills: 5    Associated Diagnoses: HIV (human immunodeficiency virus infection) (H)      dolutegravir (TIVICAY) 50 MG tablet Take 1 tablet (50 mg) by mouth daily  Qty: 30 tablet, Refills: 5    Associated Diagnoses: Human immunodeficiency virus (HIV) disease (H)      cyanocobalamin ([employer] VITAMIN  B12) 1000 MCG TABS Take 1 tablet by mouth daily  Qty: 30 tablet, Refills: 3    Associated Diagnoses: Vitamin B12 deficiency (non anemic)      albuterol (PROAIR HFA/PROVENTIL HFA/VENTOLIN HFA) 108 (90 BASE) MCG/ACT Inhaler Inhale 2 puffs into the lungs every 4 hours as needed for shortness of breath / dyspnea or wheezing  Qty: 1 Inhaler, Refills: 11    Associated Diagnoses: Acute bronchospasm      !! order for DME Equipment being ordered:  Portable Oxygen  Qty: 1 Device, Refills: 0    Associated Diagnoses: Pneumonia of both lower lobes due to infectious organism      !! order for DME Equipment being ordered: empty Oxygen tanks, oxygen concentrator and oxygen travel concentrator for portability  Qty: 1 each, Refills: 0    Associated Diagnoses: Pneumonia of both lungs due to infectious organism, unspecified part of lung; Acute respiratory failure with hypoxia (H)      nicotine (NICODERM CQ) 14 MG/24HR patch 2h hr Place 1 patch onto the skin every 24 hours  Qty: 30 patch, Refills: 1    Associated Diagnoses: Tobacco use disorder      FLUoxetine (PROZAC) 20 MG capsule Take 1 capsule (20 mg) by mouth daily Along with 40 mg of fluoxetine  Qty: 30 capsule, Refills: 6    Associated Diagnoses: Anxiety      ondansetron (ZOFRAN ODT) 4 MG disintegrating tablet Take 1-2 tablets (4-8 mg) by mouth every 8 hours as needed for nausea  Qty: 40 tablet,  Refills: 7    Associated Diagnoses: Nausea      dronabinol (MARINOL) 2.5 MG capsule Reported on 3/7/2017      docusate sodium (COLACE) 100 MG tablet Take 100 mg by mouth daily  Qty: 60 tablet, Refills: 1      MORPHINE SULFATE PO Take 15 mg by mouth Reported on 3/7/2017      oxyCODONE-acetaminophen (PERCOCET)  MG per tablet Take 1 tablet by mouth every 4 hours as needed for moderate to severe pain (Max of 6 a day.)  Qty: 30 tablet, Refills: 0    Associated Diagnoses: Low back pain; Chronic pain      !! ORDER FOR DME Equipment being ordered: Oxygen at 5 liters  Qty: 1 Device, Refills: 0    Associated Diagnoses: HIV (human immunodeficiency virus infection) (H); Acute respiratory failure with hypoxia (H); Acute diastolic CHF (congestive heart failure) (H)      beclomethasone (QVAR) 40 MCG/ACT Inhaler Inhale 2 puffs into the lungs 2 times daily  Qty: 1 Inhaler, Refills: 3    Associated Diagnoses: Acute on chronic respiratory failure with hypoxia (H)      SUMAtriptan (IMITREX) 50 MG tablet Take 1 tablet (50 mg) by mouth at onset of headache for migraine  Qty: 30 tablet, Refills: 1    Associated Diagnoses: Headache(784.0)      nitroglycerin (NITROSTAT) 0.4 MG SL tablet Place 1 tablet (0.4 mg) under the tongue every 5 minutes as needed for chest pain If you are still having symptoms after 3 doses (15 minutes) call 911.  Qty: 30 tablet, Refills: 0    Associated Diagnoses: Acute diastolic CHF (congestive heart failure) (H)      Nutritional Supplements (BOOST) fax # to the county worker fax#[phone] Attn:MILDRED  Qty: 12 Can, Refills: 12    Associated Diagnoses: Nausea       !! - Potential duplicate medications found. Please discuss with provider.      STOP taking these medications       levofloxacin (LEVAQUIN) 750 MG tablet Comments:   Reason for Stopping:         cholecalciferol (VITAMIN D) 1000 UNIT tablet Comments:   Reason for Stopping:         loratadine (CLARITIN) 10 MG tablet Comments:   Reason for Stopping:          D3-50 94801 UNITS capsule Comments:   Reason for Stopping:         clotrimazole 10 MG jonathan Comments:   Reason for Stopping:         pantoprazole (PROTONIX) 40 MG enteric coated tablet Comments:   Reason for Stopping:         cholecalciferol (VITAMIN D) 400 UNIT TABS Comments:   Reason for Stopping:                     Consultations:   No consultations were requested during this hospital stay          Brief History of Illness:   46 year old male patient with HIV and history of recurring severe pneumonias presented with 1 days history of worsening and rapidly progressive chills, swelling of his hands, urticaria, and fever after he had been working in the garden.   Due to initial concern for possible sepsis, he was hospitalized. See ER note and history and physical for details.          Hospital Course:   Patient was observed in the hospital.  Right basilar pneumonia was treated empirically with ceftriaxone and Zithromax.  Blood and urine cultures were negative during his hospital stay, and urine testing for pneumococcal antigen and Legionella is pending at the time of discharge.  Rapid influenza testing was negative.  Lactic acid level was normal.  Creatinine was initially elevated at 1.52 but improved to 1.0 with good urine output.  Initial platelet count was normal but fell to 143,000.  Fever resolved and leukocytosis improved.  Urticarial and possible angioedema were treated with corticosteroids and antihistamines and rapidly resolved.  Due to initial concern for sepsis, hospitalization been recommended, but the patient improved much more quickly than had initially been anticipated.  Furthermore, he strongly expressed preference to discharge home with close outpatient follow-up.  For those reasons, he was discharged to complete a course of empiric oral antibiotic therapy and corticosteroids for treatment of pneumonia with possible sepsis.    I evaluated this patient on the day of discharge.  He was afebrile  with stable vital signs and normal oxygen saturations in room air.  There were no signs of respiratory distress.  Air movement was good and his lung fields were clear.  Heart rate was regular with regular rhythm with normal capillary refill.            Discharge Instructions and Follow-Up:   Discharge diet: Regular   Discharge activity: Activity as tolerated   Discharge follow-up: Follow up with primary care provider in 2 weeks      Pending test results:   Unresulted Labs Ordered in the Past 30 Days of this Admission     Date and Time Order Name Status Description    4/23/2017 2254 Strep Pneumoniae Agn 13 yrs and older In process     4/23/2017 2227 Blood culture ONE site Preliminary     4/23/2017 2227 Urine Culture Preliminary     4/23/2017 2126 Blood culture ONE site Preliminary     4/23/2017 2103 Tryptase In process                Discharge Disposition:   Discharged to home      Attestation:  I have reviewed today's vital signs, notes, medications, and test results.    Seferino Diaz MD

## 2017-04-24 NOTE — UTILIZATION REVIEW
"  [facility] Utilization Review  Admission Status; Secondary Review Determination     Admission Date: 4/23/2017  8:48 PM      Under the authority of the Utilization Management Committee, the utilization review process indicated a secondary review on the above patient.  The review outcome is based on review of the medical records, discussions with staff, and applying clinical experience noted on the date of the review.        (X) Observation Status Appropriate - This patient does not meet hospital inpatient criteria and is placed in observation status. If this patient's primary payer is Medicare and was admitted as an inpatient, Condition Code 44 should be used and patient status changed to \"observation\".   () Observation Status concurrent Review           RATIONALE FOR DETERMINATION   45 yo M with h/o swelling, chills, fever, with shortness of breath. Fevres upto 100, leukocytosis and tachycardia, with sepsis. Imaging shows Right lower lobe infiltrate. Started on iv rocephin and azithromax with iv fluid hydration. Patient has significant improvement today and wants to be discharged home. Blood and urine cultures negative to date, Patient was switched from inpatient to observation status, which is appropriate.       The severity of illness, intensity of service provided, expected LOS make the care appropriate for observation status at this time.        The information on this document is developed by the utilization review team in order for the business office to ensure compliance.  This only denotes the appropriateness of proper admission status and does not reflect the quality of care rendered.         The definitions of Inpatient Status and Observation Status used in making the determination above are those provided in the CMS Coverage Manual, Chapter 1 and Chapter 6, section 70.4.      Sincerely,       Carlos Hernandez MD  Physician Advisor  Utilization Review-[place]    Phone: [phone]       "
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
## 2017-04-25 ENCOUNTER — OFFICE VISIT (OUTPATIENT)
Dept: FAMILY MEDICINE | Facility: OTHER | Age: 47
End: 2017-04-25
Payer: MEDICARE

## 2017-04-25 ENCOUNTER — APPOINTMENT (OUTPATIENT)
Dept: GENERAL RADIOLOGY | Facility: CLINIC | Age: 47
End: 2017-04-25
Attending: FAMILY MEDICINE
Payer: MEDICARE

## 2017-04-25 ENCOUNTER — HOSPITAL ENCOUNTER (EMERGENCY)
Facility: CLINIC | Age: 47
Discharge: ANOTHER HOSPITAL | End: 2017-04-25
Attending: FAMILY MEDICINE | Admitting: FAMILY MEDICINE
Payer: MEDICARE

## 2017-04-25 ENCOUNTER — TELEPHONE (OUTPATIENT)
Dept: FAMILY MEDICINE | Facility: OTHER | Age: 47
End: 2017-04-25

## 2017-04-25 ENCOUNTER — HOSPITAL ENCOUNTER (INPATIENT)
Facility: CLINIC | Age: 47
LOS: 7 days | Discharge: HOME | DRG: 166 | End: 2017-05-02
Attending: INTERNAL MEDICINE | Admitting: INTERNAL MEDICINE
Payer: MEDICARE

## 2017-04-25 ENCOUNTER — APPOINTMENT (OUTPATIENT)
Dept: CT IMAGING | Facility: CLINIC | Age: 47
End: 2017-04-25
Attending: FAMILY MEDICINE
Payer: MEDICARE

## 2017-04-25 VITALS
WEIGHT: 175 LBS | DIASTOLIC BLOOD PRESSURE: 80 MMHG | TEMPERATURE: 100.5 F | SYSTOLIC BLOOD PRESSURE: 120 MMHG | HEART RATE: 136 BPM | RESPIRATION RATE: 43 BRPM | OXYGEN SATURATION: 98 % | BODY MASS INDEX: 25.84 KG/M2

## 2017-04-25 VITALS
HEART RATE: 134 BPM | SYSTOLIC BLOOD PRESSURE: 125 MMHG | OXYGEN SATURATION: 84 % | DIASTOLIC BLOOD PRESSURE: 71 MMHG | TEMPERATURE: 99.1 F

## 2017-04-25 DIAGNOSIS — R06.03 RESPIRATORY DISTRESS: Primary | ICD-10-CM

## 2017-04-25 DIAGNOSIS — Z21 HIV (HUMAN IMMUNODEFICIENCY VIRUS INFECTION) (H): ICD-10-CM

## 2017-04-25 DIAGNOSIS — J98.4 PNEUMONITIS: Primary | ICD-10-CM

## 2017-04-25 DIAGNOSIS — R91.8 PULMONARY INFILTRATES: ICD-10-CM

## 2017-04-25 DIAGNOSIS — J18.9 PNEUMONIA OF RIGHT LOWER LOBE DUE TO INFECTIOUS ORGANISM: ICD-10-CM

## 2017-04-25 DIAGNOSIS — B20 HUMAN IMMUNODEFICIENCY VIRUS (HIV) DISEASE (H): ICD-10-CM

## 2017-04-25 DIAGNOSIS — R91.8 LUNG MASS: ICD-10-CM

## 2017-04-25 DIAGNOSIS — J96.01 ACUTE RESPIRATORY FAILURE WITH HYPOXIA (H): ICD-10-CM

## 2017-04-25 LAB
ALBUMIN SERPL-MCNC: 3.2 G/DL (ref 3.4–5)
ALBUMIN SERPL-MCNC: 3.3 G/DL (ref 3.4–5)
ALP SERPL-CCNC: 93 U/L (ref 40–150)
ALP SERPL-CCNC: 99 U/L (ref 40–150)
ALT SERPL W P-5'-P-CCNC: 27 U/L (ref 0–70)
ALT SERPL W P-5'-P-CCNC: 30 U/L (ref 0–70)
ANION GAP SERPL CALCULATED.3IONS-SCNC: 7 MMOL/L (ref 3–14)
ANION GAP SERPL CALCULATED.3IONS-SCNC: 8 MMOL/L (ref 3–14)
AST SERPL W P-5'-P-CCNC: 51 U/L (ref 0–45)
AST SERPL W P-5'-P-CCNC: 61 U/L (ref 0–45)
BACTERIA SPEC CULT: NO GROWTH
BASE EXCESS BLDA CALC-SCNC: 1.6 MMOL/L
BASE EXCESS BLDA CALC-SCNC: 2.2 MMOL/L
BASE EXCESS BLDV CALC-SCNC: 0.1 MMOL/L
BASOPHILS # BLD AUTO: 0 10E9/L (ref 0–0.2)
BASOPHILS NFR BLD AUTO: 0.1 %
BILIRUB DIRECT SERPL-MCNC: 0.4 MG/DL (ref 0–0.2)
BILIRUB SERPL-MCNC: 1.9 MG/DL (ref 0.2–1.3)
BILIRUB SERPL-MCNC: 2.2 MG/DL (ref 0.2–1.3)
BUN SERPL-MCNC: 14 MG/DL (ref 7–30)
BUN SERPL-MCNC: 16 MG/DL (ref 7–30)
CALCIUM SERPL-MCNC: 8.2 MG/DL (ref 8.5–10.1)
CALCIUM SERPL-MCNC: 8.8 MG/DL (ref 8.5–10.1)
CHLORIDE SERPL-SCNC: 104 MMOL/L (ref 94–109)
CHLORIDE SERPL-SCNC: 109 MMOL/L (ref 94–109)
CO2 SERPL-SCNC: 25 MMOL/L (ref 20–32)
CO2 SERPL-SCNC: 27 MMOL/L (ref 20–32)
CREAT SERPL-MCNC: 1.15 MG/DL (ref 0.66–1.25)
CREAT SERPL-MCNC: 1.19 MG/DL (ref 0.66–1.25)
DIFFERENTIAL METHOD BLD: ABNORMAL
EOSINOPHIL # BLD AUTO: 0 10E9/L (ref 0–0.7)
EOSINOPHIL NFR BLD AUTO: 0 %
ERYTHROCYTE [DISTWIDTH] IN BLOOD BY AUTOMATED COUNT: 14.2 % (ref 10–15)
ERYTHROCYTE [DISTWIDTH] IN BLOOD BY AUTOMATED COUNT: 14.2 % (ref 10–15)
GFR SERPL CREATININE-BSD FRML MDRD: 65 ML/MIN/1.7M2
GFR SERPL CREATININE-BSD FRML MDRD: 68 ML/MIN/1.7M2
GLUCOSE BLD-MCNC: 156 MG/DL (ref 70–99)
GLUCOSE BLDC GLUCOMTR-MCNC: 111 MG/DL (ref 70–99)
GLUCOSE SERPL-MCNC: 118 MG/DL (ref 70–99)
GLUCOSE SERPL-MCNC: 129 MG/DL (ref 70–99)
HCO3 BLD-SCNC: 27 MMOL/L (ref 21–28)
HCO3 BLD-SCNC: 27 MMOL/L (ref 21–28)
HCO3 BLDV-SCNC: 25 MMOL/L (ref 21–28)
HCT VFR BLD AUTO: 47.3 % (ref 40–53)
HCT VFR BLD AUTO: 48.4 % (ref 40–53)
HGB BLD-MCNC: 15.8 G/DL (ref 13.3–17.7)
HGB BLD-MCNC: 16.7 G/DL (ref 13.3–17.7)
IMM GRANULOCYTES # BLD: 0.1 10E9/L (ref 0–0.4)
IMM GRANULOCYTES NFR BLD: 0.3 %
LACTATE BLD-SCNC: 1.1 MMOL/L (ref 0.7–2.1)
LACTATE BLD-SCNC: 1.3 MMOL/L (ref 0.7–2.1)
LACTATE BLD-SCNC: 1.8 MMOL/L (ref 0.7–2.1)
LDH SERPL L TO P-CCNC: 874 U/L (ref 85–227)
LYMPHOCYTES # BLD AUTO: 2.2 10E9/L (ref 0.8–5.3)
LYMPHOCYTES NFR BLD AUTO: 9.7 %
MCH RBC QN AUTO: 29.6 PG (ref 26.5–33)
MCH RBC QN AUTO: 31 PG (ref 26.5–33)
MCHC RBC AUTO-ENTMCNC: 33.4 G/DL (ref 31.5–36.5)
MCHC RBC AUTO-ENTMCNC: 34.5 G/DL (ref 31.5–36.5)
MCV RBC AUTO: 89 FL (ref 78–100)
MCV RBC AUTO: 90 FL (ref 78–100)
MICRO REPORT STATUS: NORMAL
MONOCYTES # BLD AUTO: 0.5 10E9/L (ref 0–1.3)
MONOCYTES NFR BLD AUTO: 2 %
NEUTROPHILS # BLD AUTO: 19.6 10E9/L (ref 1.6–8.3)
NEUTROPHILS NFR BLD AUTO: 87.9 %
NT-PROBNP SERPL-MCNC: 1777 PG/ML (ref 0–450)
O2/TOTAL GAS SETTING VFR VENT: 100 %
O2/TOTAL GAS SETTING VFR VENT: 80 %
O2/TOTAL GAS SETTING VFR VENT: 80 %
PCO2 BLD: 42 MM HG (ref 35–45)
PCO2 BLD: 44 MM HG (ref 35–45)
PCO2 BLDV: 39 MM HG (ref 40–50)
PH BLD: 7.4 PH (ref 7.35–7.45)
PH BLD: 7.41 PH (ref 7.35–7.45)
PH BLDV: 7.41 PH (ref 7.32–7.43)
PLATELET # BLD AUTO: 133 10E9/L (ref 150–450)
PLATELET # BLD AUTO: 96 10E9/L (ref 150–450)
PO2 BLD: 136 MM HG (ref 80–105)
PO2 BLD: 85 MM HG (ref 80–105)
PO2 BLDV: 181 MM HG (ref 25–47)
POTASSIUM SERPL-SCNC: 3.6 MMOL/L (ref 3.4–5.3)
POTASSIUM SERPL-SCNC: 4 MMOL/L (ref 3.4–5.3)
PROCALCITONIN SERPL-MCNC: 6.36 NG/ML
PROT SERPL-MCNC: 6.4 G/DL (ref 6.8–8.8)
PROT SERPL-MCNC: 6.8 G/DL (ref 6.8–8.8)
RBC # BLD AUTO: 5.33 10E12/L (ref 4.4–5.9)
RBC # BLD AUTO: 5.39 10E12/L (ref 4.4–5.9)
SODIUM SERPL-SCNC: 138 MMOL/L (ref 133–144)
SODIUM SERPL-SCNC: 142 MMOL/L (ref 133–144)
SPECIMEN SOURCE: NORMAL
TROPONIN I SERPL-MCNC: NORMAL UG/L (ref 0–0.04)
WBC # BLD AUTO: 22.3 10E9/L (ref 4–11)
WBC # BLD AUTO: 24.7 10E9/L (ref 4–11)

## 2017-04-25 PROCEDURE — 36415 COLL VENOUS BLD VENIPUNCTURE: CPT | Performed by: INTERNAL MEDICINE

## 2017-04-25 PROCEDURE — 83880 ASSAY OF NATRIURETIC PEPTIDE: CPT | Performed by: FAMILY MEDICINE

## 2017-04-25 PROCEDURE — 83605 ASSAY OF LACTIC ACID: CPT | Performed by: FAMILY MEDICINE

## 2017-04-25 PROCEDURE — 40000809 ZZH STATISTIC NO DOCUMENTATION TO SUPPORT CHARGE

## 2017-04-25 PROCEDURE — 94660 CPAP INITIATION&MGMT: CPT

## 2017-04-25 PROCEDURE — 80053 COMPREHEN METABOLIC PANEL: CPT | Performed by: FAMILY MEDICINE

## 2017-04-25 PROCEDURE — 82803 BLOOD GASES ANY COMBINATION: CPT | Performed by: INTERNAL MEDICINE

## 2017-04-25 PROCEDURE — 87040 BLOOD CULTURE FOR BACTERIA: CPT | Performed by: INTERNAL MEDICINE

## 2017-04-25 PROCEDURE — 84484 ASSAY OF TROPONIN QUANT: CPT | Performed by: FAMILY MEDICINE

## 2017-04-25 PROCEDURE — A9270 NON-COVERED ITEM OR SERVICE: HCPCS | Mod: GY | Performed by: INTERNAL MEDICINE

## 2017-04-25 PROCEDURE — 20000004 ZZH R&B ICU UMMC

## 2017-04-25 PROCEDURE — 25000125 ZZHC RX 250: Performed by: RADIOLOGY

## 2017-04-25 PROCEDURE — 80048 BASIC METABOLIC PNL TOTAL CA: CPT | Performed by: INTERNAL MEDICINE

## 2017-04-25 PROCEDURE — 85025 COMPLETE CBC W/AUTO DIFF WBC: CPT | Performed by: FAMILY MEDICINE

## 2017-04-25 PROCEDURE — 87536 HIV-1 QUANT&REVRSE TRNSCRPJ: CPT | Performed by: INTERNAL MEDICINE

## 2017-04-25 PROCEDURE — 96375 TX/PRO/DX INJ NEW DRUG ADDON: CPT | Mod: 59 | Performed by: FAMILY MEDICINE

## 2017-04-25 PROCEDURE — 25000128 H RX IP 250 OP 636: Performed by: INTERNAL MEDICINE

## 2017-04-25 PROCEDURE — 40000275 ZZH STATISTIC RCP TIME EA 10 MIN

## 2017-04-25 PROCEDURE — 86360 T CELL ABSOLUTE COUNT/RATIO: CPT | Performed by: INTERNAL MEDICINE

## 2017-04-25 PROCEDURE — 40000141 ZZH STATISTIC PERIPHERAL IV START W/O US GUIDANCE

## 2017-04-25 PROCEDURE — 25000128 H RX IP 250 OP 636: Performed by: FAMILY MEDICINE

## 2017-04-25 PROCEDURE — 71010 XR CHEST PORT 1 VW: CPT | Mod: TC

## 2017-04-25 PROCEDURE — 94640 AIRWAY INHALATION TREATMENT: CPT

## 2017-04-25 PROCEDURE — 93005 ELECTROCARDIOGRAM TRACING: CPT

## 2017-04-25 PROCEDURE — 87389 HIV-1 AG W/HIV-1&-2 AB AG IA: CPT | Performed by: INTERNAL MEDICINE

## 2017-04-25 PROCEDURE — 99285 EMERGENCY DEPT VISIT HI MDM: CPT | Mod: 25 | Performed by: FAMILY MEDICINE

## 2017-04-25 PROCEDURE — 93010 ELECTROCARDIOGRAM REPORT: CPT | Performed by: INTERNAL MEDICINE

## 2017-04-25 PROCEDURE — 83605 ASSAY OF LACTIC ACID: CPT | Performed by: INTERNAL MEDICINE

## 2017-04-25 PROCEDURE — 85610 PROTHROMBIN TIME: CPT | Performed by: INTERNAL MEDICINE

## 2017-04-25 PROCEDURE — 99291 CRITICAL CARE FIRST HOUR: CPT | Mod: Z6 | Performed by: FAMILY MEDICINE

## 2017-04-25 PROCEDURE — 96374 THER/PROPH/DIAG INJ IV PUSH: CPT | Mod: 59 | Performed by: FAMILY MEDICINE

## 2017-04-25 PROCEDURE — 36600 WITHDRAWAL OF ARTERIAL BLOOD: CPT

## 2017-04-25 PROCEDURE — 99291 CRITICAL CARE FIRST HOUR: CPT | Mod: 25 | Performed by: FAMILY MEDICINE

## 2017-04-25 PROCEDURE — 25000125 ZZHC RX 250: Performed by: FAMILY MEDICINE

## 2017-04-25 PROCEDURE — 84145 PROCALCITONIN (PCT): CPT | Performed by: INTERNAL MEDICINE

## 2017-04-25 PROCEDURE — 86359 T CELLS TOTAL COUNT: CPT | Performed by: INTERNAL MEDICINE

## 2017-04-25 PROCEDURE — 25000128 H RX IP 250 OP 636

## 2017-04-25 PROCEDURE — 82803 BLOOD GASES ANY COMBINATION: CPT | Performed by: FAMILY MEDICINE

## 2017-04-25 PROCEDURE — 96376 TX/PRO/DX INJ SAME DRUG ADON: CPT | Performed by: FAMILY MEDICINE

## 2017-04-25 PROCEDURE — 71260 CT THORAX DX C+: CPT

## 2017-04-25 PROCEDURE — 25000132 ZZH RX MED GY IP 250 OP 250 PS 637: Mod: GY | Performed by: INTERNAL MEDICINE

## 2017-04-25 PROCEDURE — 00000146 ZZHCL STATISTIC GLUCOSE BY METER IP

## 2017-04-25 PROCEDURE — 80076 HEPATIC FUNCTION PANEL: CPT | Performed by: INTERNAL MEDICINE

## 2017-04-25 PROCEDURE — 99214 OFFICE O/P EST MOD 30 MIN: CPT | Performed by: FAMILY MEDICINE

## 2017-04-25 PROCEDURE — 25000125 ZZHC RX 250: Performed by: INTERNAL MEDICINE

## 2017-04-25 PROCEDURE — 83615 LACTATE (LD) (LDH) ENZYME: CPT | Performed by: INTERNAL MEDICINE

## 2017-04-25 PROCEDURE — 82947 ASSAY GLUCOSE BLOOD QUANT: CPT | Performed by: INTERNAL MEDICINE

## 2017-04-25 PROCEDURE — 87040 BLOOD CULTURE FOR BACTERIA: CPT | Performed by: FAMILY MEDICINE

## 2017-04-25 PROCEDURE — 25500064 ZZH RX 255 OP 636: Performed by: RADIOLOGY

## 2017-04-25 PROCEDURE — 99291 CRITICAL CARE FIRST HOUR: CPT | Mod: GC | Performed by: INTERNAL MEDICINE

## 2017-04-25 PROCEDURE — 85027 COMPLETE CBC AUTOMATED: CPT | Performed by: INTERNAL MEDICINE

## 2017-04-25 RX ORDER — LORAZEPAM 2 MG/ML
0.5 INJECTION INTRAMUSCULAR
Status: COMPLETED | OUTPATIENT
Start: 2017-04-25 | End: 2017-04-25

## 2017-04-25 RX ORDER — ONDANSETRON 2 MG/ML
4 INJECTION INTRAMUSCULAR; INTRAVENOUS EVERY 6 HOURS PRN
Status: DISCONTINUED | OUTPATIENT
Start: 2017-04-25 | End: 2017-05-02 | Stop reason: HOSPADM

## 2017-04-25 RX ORDER — PIPERACILLIN SODIUM, TAZOBACTAM SODIUM 3; .375 G/15ML; G/15ML
3.38 INJECTION, POWDER, LYOPHILIZED, FOR SOLUTION INTRAVENOUS EVERY 6 HOURS
Status: DISCONTINUED | OUTPATIENT
Start: 2017-04-25 | End: 2017-04-28

## 2017-04-25 RX ORDER — METHYLPREDNISOLONE SODIUM SUCCINATE 125 MG/2ML
60 INJECTION, POWDER, LYOPHILIZED, FOR SOLUTION INTRAMUSCULAR; INTRAVENOUS EVERY 24 HOURS
Status: DISCONTINUED | OUTPATIENT
Start: 2017-04-26 | End: 2017-04-28

## 2017-04-25 RX ORDER — FENTANYL CITRATE 50 UG/ML
25-50 INJECTION, SOLUTION INTRAMUSCULAR; INTRAVENOUS
Status: DISCONTINUED | OUTPATIENT
Start: 2017-04-25 | End: 2017-04-25 | Stop reason: HOSPADM

## 2017-04-25 RX ORDER — FUROSEMIDE 10 MG/ML
20 INJECTION INTRAMUSCULAR; INTRAVENOUS ONCE
Status: COMPLETED | OUTPATIENT
Start: 2017-04-25 | End: 2017-04-25

## 2017-04-25 RX ORDER — SODIUM CHLORIDE 9 MG/ML
INJECTION, SOLUTION INTRAVENOUS
Status: COMPLETED
Start: 2017-04-25 | End: 2017-04-25

## 2017-04-25 RX ORDER — IPRATROPIUM BROMIDE AND ALBUTEROL SULFATE 2.5; .5 MG/3ML; MG/3ML
3 SOLUTION RESPIRATORY (INHALATION)
Status: DISCONTINUED | OUTPATIENT
Start: 2017-04-25 | End: 2017-04-25 | Stop reason: HOSPADM

## 2017-04-25 RX ORDER — PROPOFOL 10 MG/ML
5-75 INJECTION, EMULSION INTRAVENOUS CONTINUOUS
Status: DISCONTINUED | OUTPATIENT
Start: 2017-04-25 | End: 2017-04-25

## 2017-04-25 RX ORDER — LEVOFLOXACIN 5 MG/ML
750 INJECTION, SOLUTION INTRAVENOUS ONCE
Status: COMPLETED | OUTPATIENT
Start: 2017-04-25 | End: 2017-04-25

## 2017-04-25 RX ORDER — LIDOCAINE 50 MG/G
1-2 PATCH TOPICAL
Status: DISCONTINUED | OUTPATIENT
Start: 2017-04-25 | End: 2017-05-02 | Stop reason: HOSPADM

## 2017-04-25 RX ORDER — IOPAMIDOL 755 MG/ML
500 INJECTION, SOLUTION INTRAVASCULAR ONCE
Status: COMPLETED | OUTPATIENT
Start: 2017-04-25 | End: 2017-04-25

## 2017-04-25 RX ORDER — PROPOFOL 10 MG/ML
INJECTION, EMULSION INTRAVENOUS
Status: DISPENSED
Start: 2017-04-25 | End: 2017-04-26

## 2017-04-25 RX ORDER — NALOXONE HYDROCHLORIDE 0.4 MG/ML
.1-.4 INJECTION, SOLUTION INTRAMUSCULAR; INTRAVENOUS; SUBCUTANEOUS
Status: DISCONTINUED | OUTPATIENT
Start: 2017-04-25 | End: 2017-05-02 | Stop reason: HOSPADM

## 2017-04-25 RX ORDER — METHYLPREDNISOLONE SODIUM SUCCINATE 125 MG/2ML
125 INJECTION, POWDER, LYOPHILIZED, FOR SOLUTION INTRAMUSCULAR; INTRAVENOUS ONCE
Status: COMPLETED | OUTPATIENT
Start: 2017-04-25 | End: 2017-04-25

## 2017-04-25 RX ADMIN — IPRATROPIUM BROMIDE AND ALBUTEROL SULFATE 3 ML: .5; 3 SOLUTION RESPIRATORY (INHALATION) at 12:39

## 2017-04-25 RX ADMIN — METHYLPREDNISOLONE SODIUM SUCCINATE 125 MG: 125 INJECTION, POWDER, FOR SOLUTION INTRAMUSCULAR; INTRAVENOUS at 13:35

## 2017-04-25 RX ADMIN — FENTANYL CITRATE 50 MCG: 50 INJECTION, SOLUTION INTRAMUSCULAR; INTRAVENOUS at 13:26

## 2017-04-25 RX ADMIN — PIPERACILLIN SODIUM,TAZOBACTAM SODIUM 3.38 G: 3; .375 INJECTION, POWDER, FOR SOLUTION INTRAVENOUS at 21:27

## 2017-04-25 RX ADMIN — FENTANYL CITRATE 50 MCG: 50 INJECTION, SOLUTION INTRAMUSCULAR; INTRAVENOUS at 14:07

## 2017-04-25 RX ADMIN — LIDOCAINE 2 PATCH: 50 PATCH TOPICAL at 22:07

## 2017-04-25 RX ADMIN — IOPAMIDOL 75 ML: 755 INJECTION, SOLUTION INTRAVENOUS at 12:57

## 2017-04-25 RX ADMIN — SODIUM CHLORIDE 500 ML: 9 INJECTION, SOLUTION INTRAVENOUS at 17:48

## 2017-04-25 RX ADMIN — FUROSEMIDE 20 MG: 10 INJECTION, SOLUTION INTRAVENOUS at 13:38

## 2017-04-25 RX ADMIN — LORAZEPAM 0.5 MG: 2 INJECTION INTRAMUSCULAR; INTRAVENOUS at 12:06

## 2017-04-25 RX ADMIN — LORAZEPAM 0.5 MG: 2 INJECTION INTRAMUSCULAR; INTRAVENOUS at 13:26

## 2017-04-25 RX ADMIN — VANCOMYCIN HYDROCHLORIDE 2000 MG: 10 INJECTION, POWDER, LYOPHILIZED, FOR SOLUTION INTRAVENOUS at 17:49

## 2017-04-25 RX ADMIN — LEVOFLOXACIN 750 MG: 5 INJECTION, SOLUTION INTRAVENOUS at 13:14

## 2017-04-25 RX ADMIN — ONDANSETRON 4 MG: 2 INJECTION INTRAMUSCULAR; INTRAVENOUS at 22:07

## 2017-04-25 RX ADMIN — SODIUM CHLORIDE 67 ML: 9 INJECTION, SOLUTION INTRAVENOUS at 12:57

## 2017-04-25 ASSESSMENT — PAIN DESCRIPTION - DESCRIPTORS
DESCRIPTORS: SHOOTING
DESCRIPTORS: CONSTANT;SHOOTING

## 2017-04-25 ASSESSMENT — ACTIVITIES OF DAILY LIVING (ADL)
DRESS: 0-->INDEPENDENT
DRESS: 0-->INDEPENDENT
BATHING: 0-->INDEPENDENT
CHANGE_IN_FUNCTIONAL_STATUS_SINCE_ONSET_OF_CURRENT_ILLNESS/INJURY: NO
AMBULATION: 0-->INDEPENDENT
FALL_HISTORY_WITHIN_LAST_SIX_MONTHS: NO
COMMUNICATION: 0-->UNDERSTANDS/COMMUNICATES WITHOUT DIFFICULTY
RETIRED_COMMUNICATION: 0-->UNDERSTANDS/COMMUNICATES WITHOUT DIFFICULTY
EATING: 0-->INDEPENDENT
PRIOR_FUNCTIONAL_LEVEL_COMMENT: INDEPENDENT
SWALLOWING: 0-->SWALLOWS FOODS/LIQUIDS WITHOUT DIFFICULTY
TOILETING: 0-->INDEPENDENT
COGNITION: 0 - NO COGNITION ISSUES REPORTED
BATHING: 0-->INDEPENDENT
TOILETING: 0-->INDEPENDENT
RETIRED_EATING: 0-->INDEPENDENT
TRANSFERRING: 0-->INDEPENDENT
TRANSFERRING: 0-->INDEPENDENT
AMBULATION: 2-->ASSISTIVE PERSON

## 2017-04-25 NOTE — PROVIDER NOTIFICATION
"Pt making questionable statements regarding DNI vs. Full code status. When asked if pt would want to still be intubated in an emergent situation, pt responded, \"probably not\". MICU resident was made aware and came to pt bedside. At this time resident made aware about pt's RR between 40-50. At pt bedside, pt's wife was called to confirm and pt was asked again about his wishes to be intubated during an emergent situation. Pt then verbalized that he would want to be intubated it it was an emergency, meaning he \"couldn't breathe any longer\". Pt's wife on speaker phone and MICU resident clarified other pt questions, agreeing if arterial blood gas results in the next hour with worsening pCO2, pt's need to be intubated would increase. Pt and wife verbalizes understanding at this time.   "

## 2017-04-25 NOTE — TELEPHONE ENCOUNTER
Reason for follow up call: Tommy Ross appeared on our list for being seen in and recenlty discharge from the Hospital.    Chief Complaint   Patient presents with     Hospital F/U     Allergic Reaction       Encounter routed for Clinic RN to call for follow up

## 2017-04-25 NOTE — PHARMACY-VANCOMYCIN DOSING SERVICE
Pharmacy Vancomycin Initial Note  Date of Service 2017  Patient's  1970  46 year old male    Indication: Possible pneumonia with history of + MRSA nares    Current estimated CrCl = Estimated Creatinine Clearance: 89.2 mL/min (based on Cr of 1.15).    Creatinine for last 3 days  2017:  9:16 PM Creatinine 1.52 mg/dL  2017:  8:50 AM Creatinine 1.00 mg/dL  2017: 12:30 PM Creatinine 1.15 mg/dL    Recent Vancomycin Level(s) for last 3 days  No results found for requested labs within last 72 hours.      Vancomycin IV Administrations (past 72 hours)      No vancomycin orders with administrations in past 72 hours.              Nephrotoxins and other renal medications (Future)    Start     Dose/Rate Route Frequency Ordered Stop    17 0530  vancomycin (VANCOCIN) 1,500 mg in NaCl 0.9 % 250 mL intermittent infusion      1,500 mg Intravenous EVERY 12 HOURS 17 1720      17 1730  vancomycin (VANCOCIN) 2,000 mg in NaCl 0.9 % 500 mL intermittent infusion      2,000 mg Intravenous ONCE 17 1720          Contrast Orders - past 72 hours     None              Plan:  1.  Give 2000mg (25mg/kg) IV vancomycin x1 followed by 1500mg (19mg/kg) IV vancomycin q12h.   2.  Goal Trough Level: 15-20 mg/L   3.  Pharmacy will check trough levels as appropriate in 1-3 Days.    4. Serum creatinine levels will be ordered daily for the first week of therapy and at least twice weekly for subsequent weeks.    5. [place] method utilized to dose vancomycin therapy: Method 2    Desiree Samson, PharmD  PGY-2 Critical Care Pharmacy Resident

## 2017-04-25 NOTE — MR AVS SNAPSHOT
"              After Visit Summary   4/25/2017    Tommy Ross    MRN: [id number]           Patient Information     Date Of Birth          1970        Visit Information        Provider Department      4/25/2017 12:10 PM Lait Llamas MD [facility]        Today's Diagnoses     Respiratory distress    -  1    HIV (human immunodeficiency virus infection)- dx 2010           Follow-ups after your visit        Your next 10 appointments already scheduled     May 02, 2017 10:40 AM CDT   Office Visit with Liat Llamas MD   [facility]    [address] [facility] [facility], [place] [address] [phone]           Bring a current list of meds and any records pertaining to this visit.  For Physicals, please bring immunization records and any forms needing to be filled out.  Please arrive 10 minutes early to complete paperwork.              Who to contact     If you have questions or need follow up information about today's clinic visit or your schedule please contact [facility] directly at [phone].  Normal or non-critical lab and imaging results will be communicated to you by [employer], letter or phone within 4 business days after the clinic has received the results. If you do not hear from us within 7 days, please contact the clinic through [employer] or phone. If you have a critical or abnormal lab result, we will notify you by phone as soon as possible.  Submit refill requests through [employer] or call your pharmacy and they will forward the refill request to us. Please allow 3 business days for your refill to be completed.          Additional Information About Your Visit        [employer] Information     [employer] lets you send messages to your doctor, view your test results, renew your prescriptions, schedule appointments and more. To sign up, go to [email or web address] . Click on \"Log in\" on the left side of the screen, which will " "take you to the Welcome page. Then click on \"Sign up Now\" on the right side of the page.     You will be asked to enter the access code listed below, as well as some personal information. Please follow the directions to create your username and password.     Your access code is: [email or web address]  Expires: 2017  7:49 PM     Your access code will  in 90 days. If you need help or a new code, please call your [place] clinic or [phone].        Care EveryWhere ID     This is your Care EveryWhere ID. This could be used by other organizations to access your [place] medical records  [id number]        Your Vitals Were     Pulse Temperature Pulse Oximetry             134 99.1  F (37.3  C) (Oral) 84%          Blood Pressure from Last 3 Encounters:   17 (!) 82/67   17 120/80   17 125/71    Weight from Last 3 Encounters:   17 181 lb 10.5 oz (82.4 kg)   17 175 lb (79.4 kg)   17 174 lb (78.9 kg)              Today, you had the following     No orders found for display       Primary Care Provider Office Phone # Fax #    Antonella Obrien [phone]       [facility] [address] [facility] [address]        Goals        General    I want more information on palliative card/Start Date 2014 (pt-stated)     Notes - Note created  4/15/2014 10:07 AM by Tania Vasquez MSW    As of today's date 4/15/2014 goal is met at 0 - 25%.   Goal Status:  Active        I want to feel normal again, and be able to address my pain/Start Date 2014 (pt-stated)     Notes - Note created  4/15/2014 10:06 AM by Tania Vasquez MSW    As of today's date 4/15/2014 goal is met at 0 - 25%.   Goal Status:  Active        I will take my nirto as directed for chest pain. (pt-stated)     I will weigh myself daily and keep a record (pt-stated)       Thank you!     Thank you for choosing [facility]  for your care. Our goal is always to provide you with excellent " care. Hearing back from our patients is one way we can continue to improve our services. Please take a few minutes to complete the written survey that you may receive in the mail after your visit with us. Thank you!             Your Updated Medication List - Protect others around you: Learn how to safely use, store and throw away your medicines at www.disposemymeds.org.          This list is accurate as of: 4/25/17 11:56 AM.  Always use your most recent med list.                   Brand Name Dispense Instructions for use    albuterol 108 (90 BASE) MCG/ACT Inhaler    PROAIR HFA/PROVENTIL HFA/VENTOLIN HFA    1 Inhaler    Inhale 2 puffs into the lungs every 4 hours as needed for shortness of breath / dyspnea or wheezing       ALPRAZolam 0.5 MG tablet    XANAX    30 tablet    One or two tabs daily PRN anxiety       azithromycin 250 MG tablet    ZITHROMAX    4 tablet    Take 1 tablet (250 mg) by mouth daily for 4 days       beclomethasone 40 MCG/ACT Inhaler    QVAR    1 Inhaler    Inhale 2 puffs into the lungs 2 times daily       BOOST     12 Can    fax # to the county worker fax#[phone] Attn:MILDRED       cefdinir 300 MG capsule    OMNICEF    14 capsule    Take 2 capsules (600 mg) by mouth daily for 7 days       clotrimazole 1 % cream    LOTRIMIN    14 g    Apply topically 2 times daily       cyanocobalamin 1000 MCG Tabs    [facility] vitamin  B12    30 tablet    Take 1 tablet by mouth daily       docusate sodium 100 MG tablet    COLACE    60 tablet    Take 100 mg by mouth daily       dolutegravir 50 MG tablet    TIVICAY    30 tablet    Take 1 tablet (50 mg) by mouth daily       dronabinol 2.5 MG capsule    MARINOL     Reported on 3/7/2017       emtricitabine-tenofovir 200-300 MG per tablet    TRUVADA    30 tablet    Take 1 tablet by mouth daily       fexofenadine 180 MG tablet    ALLEGRA    30 tablet    Take 1 tablet (180 mg) by mouth daily as needed for allergies       fexofenadine-pseudoePHEDrine  MG per 12 hr tablet     ALLEGRA-D    28 tablet    Take 1 tablet by mouth 2 times daily as needed for allergies       FLONASE 50 MCG/ACT spray   Generic drug:  fluticasone     16 g    Spray 1-2 sprays into both nostrils daily as needed for rhinitis or allergies       FLUoxetine 20 MG capsule    PROzac    30 capsule    Take 1 capsule (20 mg) by mouth daily Along with 40 mg of fluoxetine       lidocaine 5 % Patch    LIDODERM    30 patch    Apply up to 3 patches to painful area at once for up to 12 h within a 24 h period as needed.  Remove after 12 hours.       MORPHINE SULFATE PO      Take 15 mg by mouth Reported on 3/7/2017       nicotine 14 MG/24HR 24 hr patch    NICODERM CQ    30 patch    Place 1 patch onto the skin every 24 hours       nitroglycerin 0.4 MG sublingual tablet    NITROSTAT    30 tablet    Place 1 tablet (0.4 mg) under the tongue every 5 minutes as needed for chest pain If you are still having symptoms after 3 doses (15 minutes) call 911.       ondansetron 4 MG ODT tab    ZOFRAN ODT    40 tablet    Take 1-2 tablets (4-8 mg) by mouth every 8 hours as needed for nausea       * order for DME     1 Device    Equipment being ordered: Oxygen at 5 liters       * order for DME     1 each    Equipment being ordered: empty Oxygen tanks, oxygen concentrator and oxygen travel concentrator for portability       order for DME     1 Device    Equipment being ordered:  Portable Oxygen       oxyCODONE-acetaminophen  MG per tablet    PERCOCET    30 tablet    Take 1 tablet by mouth every 4 hours as needed for moderate to severe pain (Max of 6 a day.)       predniSONE 20 MG tablet    DELTASONE    7 tablet    Take 1 tablet (20 mg) by mouth daily for 7 days       SPIRIVA RESPIMAT 2.5 MCG/ACT inhalation aerosol   Generic drug:  tiotropium     4 g    INHALE 2 PUFFS INTO THE LUNGS DAILY       SUMAtriptan 50 MG tablet    IMITREX    30 tablet    Take 1 tablet (50 mg) by mouth at onset of headache for migraine       valACYclovir 500 MG tablet     VALTREX    60 tablet    Take 1 tablet (500 mg) by mouth 2 times daily as needed For shingles flare       * Notice:  This list has 2 medication(s) that are the same as other medications prescribed for you. Read the directions carefully, and ask your doctor or other care provider to review them with you.

## 2017-04-25 NOTE — IP AVS SNAPSHOT
MRN:[id number]                      After Visit Summary   4/25/2017    Tommy Ross    MRN: [id number]           Thank you!     Thank you for choosing [facility] for your care. Our goal is always to provide you with excellent care. Hearing back from our patients is one way we can continue to improve our services. Please take a few minutes to complete the written survey that you may receive in the mail after you visit with us. Thank you!        Patient Information     Date Of Birth          1970        About your hospital stay     You were admitted on:  April 25, 2017 You last received care in the:  [address] [facility]    You were discharged on:  May 2, 2017        Reason for your hospital stay       Acute respiratory failure                  Who to Call     For medical emergencies, please call 911.  For non-urgent questions about your medical care, please call your primary care provider or clinic, [phone]          Attending Provider     Provider Specialty    Johnny Juárez MD Internal Medicine    Rocky Chau MD Internal Medicine    Con Gautam MD Internal Medicine       Primary Care Provider Office Phone # Fax #    Antonella Obrien [phone]       [facility] [address] [facility] [address]         When to contact your care team       For new or concerning symptoms                  After Care Instructions     Activity       Your activity upon discharge: activity as tolerated            Diet       Follow this diet upon discharge: Orders Placed This Encounter      Regular Diet Adult            Discharge Instructions       Your prednisone taper:   Decrease by 10 mg each week   First week (until 5/6): 60mg daily (3 tablets of 20mg tablets)   5/7 - 5/13: 50mg daily (2.5 tabs)   5/14 - 5/20 : 40mg daily (2 tabs)   5/21 - 5/27: 30mg daily (1.5 tabs)   5/28 - 6/3: 20mg daily (1 tablet)   Further taper or changes to taper should be discussed  with pulmonology            Oxygen Adult       [place] [facility] Order 4-6 liter(s) by nasal cannula continuously with use of portable tank. Expected treatment length is indefinite (99 months).. Test on conserving device as applicable.    Patients who qualify for home O2 coverage under the CMS guidelines require ABG tests or O2 sat readings obtained closest to, but no earlier than 2 days prior to the discharge, as evidence of the need for home oxygen therapy. Testing must be performed while patient is in the chronic stable state. See notes for O2 sats.    I certify that this patient, Tommy Ross has been under my care and that I, or a nurse practitioner or physician's assistant working with me, had a face-to-face encounter that meets the face-to-face encounter requirements with this patient on 5/2/2017. The patient, Tommy Ross was evaluated or treated in whole, or in part, for the following medical condition, which necessitates the use of the ordered oxygen. Treatment Diagnosis: Acute Respiratory Failure and pulmonary infiltrates    Attending Provider: Dr. Con Gautam  Physician signature: See electronic signature associated with these discharge orders  Date of Order: May 2, 2017                  Follow-up Appointments     Follow Up ([facility])       1. As scheduled with Infectious Disease team, routine   2. In next week with primary care provider   3. With pulmonology with PFTs and chest xrays AP and lateral in 2 weeks   4. With allergy     Appointments on [place] and/or [facility] (with [facility] or [facility] provider or service). Call [phone] if you haven't heard regarding these appointments within 7 days of discharge.                  Additional Services     Allergy/Asthma Adult Referral       Patient with hx of recurrent respiratory failure of unknown etiology at this point. Concern for potential allergy given patient's report of allergies at baseline. Allergen panel pending at d/c. See D/c summary for  details. Please schedule within 2 weeks.            PULMONARY REHAB REFERRAL       Acute on chronic respiratory failure with pulmonary infiltrates                  Future tests that were ordered for you     Antineutrophil cytoplasmic Ana IgG           Antinuclear antibody screen by EIA           Cyclic Citrullinated Peptide Antibody IgG           Hypersens Pneumonitis - [facility]           Hypersensitivity pneumonitis           Rheumatoid factor           PFT General Lab Testing       Per pulmonology - would anticipate they desire both routine PFTs and DLCO testing            X-ray Chest 2 vws*       Should be completed prior to Pulmonology follow-up                  Pending Results     Date and Time Order Name Status Description    5/2/2017 1110 Lymphocyte Subset Bronchial In process     5/1/2017 1441 Leukemia Lymphoma Evaluation (Flow Cytometry) - BAL Site 1 In process     5/1/2017 1441 Respiratory Virus Panel by PCR - BAL Site 1 In process     5/1/2017 1441 HSV 1 and 2 DNA by PCR - BAL Site 1 In process     5/1/2017 1441 Legionella culture - BAL Site 1 Preliminary     5/1/2017 1441 Aspergillus Galactomannan Agn Bronchial - BAL Site 1 In process     5/1/2017 1441 Actinomyces rule out - BAL Site 1 Preliminary     5/1/2017 1441 Nocardia culture - BAL Site 1 Preliminary     5/1/2017 1441 CMV DNA quantification - BAL Site 1 In process     5/1/2017 1441 Cytology non gyn - BAL Site 1 In process     5/1/2017 1441 Fungus Culture, non-blood - BAL Site 1 Preliminary     5/1/2017 1441 AFB Stain Non Blood - BAL Site 1 Preliminary     5/1/2017 1441 AFB Culture Non Blood -- BAL Site 1 Preliminary     5/1/2017 1441 Bronchial Culture Aerobic Bacterial - BAL Site 1 Preliminary     5/1/2017 1440 Jhon Barr Virus Quant PCR Non Blood In process     5/1/2017 1440 Send outs misc test In process     5/1/2017 1440 Chlamydia pneumonaiae by PCR BAL In process     5/1/2017 1440 Pneumocystis jirovecil by PCR In process     4/30/2017  "1350 ANTINEUTROPHIL CYTOPLASMIC UBALDO IGG In process     2017 1350 HYPERSENSITIVITY PNEUMONITIS II In process     2017 1350 HYPERSENSITIVITY PNEUMONITIS In process     2017 2200 Allergen Tree IgE Panel In process     2017 2200 Allergy adult food panel In process     2017 2200 Allergy Mold Panel In process     2017 2200 Allergy Grass Panel In process             Statement of Approval     Ordered          17 1347  I have reviewed and agree with all the recommendations and orders detailed in this document.  EFFECTIVE NOW     Approved and electronically signed by:  Roxanne Recinos MD             Admission Information     Date & Time Provider Department Dept. Phone    2017 Con Gautam MD [address] [facility] [phone]      Your Vitals Were     Blood Pressure Temperature Respirations Weight Pulse Oximetry BMI (Body Mass Index)    132/87 97.6  F (36.4  C) (Oral) 20 82.4 kg (181 lb 10.5 oz) 95% 26.83 kg/m2      MyChart Information     [employer] [facility] [employer] lets you send messages to your doctor, view your test results, renew your prescriptions, schedule appointments and more. To sign up, go to [email or web address] . Click on \"Log in\" on the left side of the screen, which will take you to the Welcome page. Then click on \"Sign up Now\" on the right side of the page.     You will be asked to enter the access code listed below, as well as some personal information. Please follow the directions to create your username and password.     Your access code is: [email or web address]  Expires: 2017  7:49 PM     Your access code will  in 90 days. If you need help or a new code, please call your [place] clinic or [phone].        Care EveryWhere ID     This is your Care EveryWhere ID. This could be used by other organizations to access your [place] medical records  [id number]           Review of your medicines      START taking        Dose / Directions    sulfamethoxazole-trimethoprim 800-160 " MG per tablet   Commonly known as:  BACTRIM DS/SEPTRA DS   Indication:  Healthcare-Associated Pneumonia   Used for:  HIV (human immunodeficiency virus infection) (H)        Dose:  1 tablet   Take 1 tablet by mouth daily   Quantity:  60 tablet   Refills:  1         CONTINUE these medicines which may have CHANGED, or have new prescriptions. If we are uncertain of the size of tablets/capsules you have at home, strength may be listed as something that might have changed.        Dose / Directions    predniSONE 20 MG tablet   Commonly known as:  DELTASONE   This may have changed:    - how much to take  - additional instructions   Used for:  Pneumonitis, Pneumonia of right lower lobe due to infectious organism        Dose:  60 mg   Take 3 tablets (60 mg) by mouth daily Decrease by 10mg weekly. Example: Until 5/7 3 tabs daily, then 2.5 tabs daily for one week.   Quantity:  70 tablet   Refills:  0         CONTINUE these medicines which have NOT CHANGED        Dose / Directions    albuterol 108 (90 BASE) MCG/ACT Inhaler   Commonly known as:  PROAIR HFA/PROVENTIL HFA/VENTOLIN HFA   Used for:  Acute bronchospasm        Dose:  2 puff   Inhale 2 puffs into the lungs every 4 hours as needed for shortness of breath / dyspnea or wheezing   Quantity:  1 Inhaler   Refills:  11       ALPRAZolam 0.5 MG tablet   Commonly known as:  XANAX   Used for:  Anxiety        One or two tabs daily PRN anxiety   Quantity:  30 tablet   Refills:  0       beclomethasone 40 MCG/ACT Inhaler   Commonly known as:  QVAR   Used for:  Acute on chronic respiratory failure with hypoxia (H)        Dose:  2 puff   Inhale 2 puffs into the lungs 2 times daily   Quantity:  1 Inhaler   Refills:  3       BOOST   Used for:  Nausea        fax # to the county worker fax#[phone] Attn:MILDRED   Quantity:  12 Can   Refills:  12       clotrimazole 1 % cream   Commonly known as:  LOTRIMIN   Used for:  Tinea pedis of both feet        Apply topically 2 times daily   Quantity:   14 g   Refills:  3       cyanocobalamin 1000 MCG Tabs   Commonly known as:  [employer] vitamin  B12   Used for:  Vitamin B12 deficiency (non anemic)        Dose:  1 tablet   Take 1 tablet by mouth daily   Quantity:  30 tablet   Refills:  3       docusate sodium 100 MG tablet   Commonly known as:  COLACE        Dose:  100 mg   Take 100 mg by mouth daily   Quantity:  60 tablet   Refills:  1       dolutegravir 50 MG tablet   Commonly known as:  TIVICAY   Used for:  Human immunodeficiency virus (HIV) disease (H)        Dose:  50 mg   Take 1 tablet (50 mg) by mouth daily   Quantity:  30 tablet   Refills:  5       emtricitabine-tenofovir 200-300 MG per tablet   Commonly known as:  TRUVADA   Used for:  HIV (human immunodeficiency virus infection) (H)        Dose:  1 tablet   Take 1 tablet by mouth daily   Quantity:  30 tablet   Refills:  5       fexofenadine 180 MG tablet   Commonly known as:  ALLEGRA   Used for:  Chronic rhinitis        Dose:  180 mg   Take 1 tablet (180 mg) by mouth daily as needed for allergies   Quantity:  30 tablet   Refills:  11       fexofenadine-pseudoePHEDrine  MG per 12 hr tablet   Commonly known as:  ALLEGRA-D   Used for:  Chronic rhinitis        Dose:  1 tablet   Take 1 tablet by mouth 2 times daily as needed for allergies   Quantity:  28 tablet   Refills:  1       FLONASE 50 MCG/ACT spray   Used for:  Acute recurrent maxillary sinusitis   Generic drug:  fluticasone        Dose:  1-2 spray   Spray 1-2 sprays into both nostrils daily as needed for rhinitis or allergies   Quantity:  16 g   Refills:  5       FLUoxetine 20 MG capsule   Commonly known as:  PROzac   Used for:  Anxiety        Dose:  20 mg   Take 1 capsule (20 mg) by mouth daily Along with 40 mg of fluoxetine   Quantity:  30 capsule   Refills:  6       MORPHINE SULFATE PO        Dose:  15 mg   Take 15 mg by mouth Reported on 3/7/2017   Refills:  0       nicotine 14 MG/24HR 24 hr patch   Commonly known as:  NICODERM CQ   Used for:   Tobacco use disorder        Dose:  1 patch   Place 1 patch onto the skin every 24 hours   Quantity:  30 patch   Refills:  1       nitroglycerin 0.4 MG sublingual tablet   Commonly known as:  NITROSTAT   Used for:  Acute diastolic CHF (congestive heart failure) (H)        Dose:  0.4 mg   Place 1 tablet (0.4 mg) under the tongue every 5 minutes as needed for chest pain If you are still having symptoms after 3 doses (15 minutes) call 911.   Quantity:  30 tablet   Refills:  0       ondansetron 4 MG ODT tab   Commonly known as:  ZOFRAN ODT   Used for:  Nausea        Dose:  4-8 mg   Take 1-2 tablets (4-8 mg) by mouth every 8 hours as needed for nausea   Quantity:  40 tablet   Refills:  7       * order for DME   Used for:  HIV (human immunodeficiency virus infection) (H), Acute respiratory failure with hypoxia (H), Acute diastolic CHF (congestive heart failure) (H)        Equipment being ordered: Oxygen at 5 liters   Quantity:  1 Device   Refills:  0       * order for DME   Used for:  Pneumonia of both lungs due to infectious organism, unspecified part of lung, Acute respiratory failure with hypoxia (H)        Equipment being ordered: empty Oxygen tanks, oxygen concentrator and oxygen travel concentrator for portability   Quantity:  1 each   Refills:  0       order for DME   Used for:  Pneumonia of both lower lobes due to infectious organism        Equipment being ordered:  Portable Oxygen   Quantity:  1 Device   Refills:  0       oxyCODONE-acetaminophen  MG per tablet   Commonly known as:  PERCOCET   Used for:  Low back pain, Chronic pain        Dose:  1 tablet   Take 1 tablet by mouth every 4 hours as needed for moderate to severe pain (Max of 6 a day.)   Quantity:  30 tablet   Refills:  0       SPIRIVA RESPIMAT 2.5 MCG/ACT inhalation aerosol   Used for:  Acute and chronic respiratory failure with hypoxia (H), Acute bronchospasm, Pneumonia of both lungs due to infectious organism, unspecified part of lung   Generic  drug:  tiotropium        INHALE 2 PUFFS INTO THE LUNGS DAILY   Quantity:  4 g   Refills:  0       SUMAtriptan 50 MG tablet   Commonly known as:  IMITREX   Used for:  Headache(784.0)        Dose:  50 mg   Take 1 tablet (50 mg) by mouth at onset of headache for migraine   Quantity:  30 tablet   Refills:  1       valACYclovir 500 MG tablet   Commonly known as:  VALTREX   Used for:  Genital herpes in men        Dose:  500 mg   Take 1 tablet (500 mg) by mouth 2 times daily as needed For shingles flare   Quantity:  60 tablet   Refills:  3       * Notice:  This list has 2 medication(s) that are the same as other medications prescribed for you. Read the directions carefully, and ask your doctor or other care provider to review them with you.      STOP taking     azithromycin 250 MG tablet   Commonly known as:  ZITHROMAX           cefdinir 300 MG capsule   Commonly known as:  OMNICEF           dronabinol 2.5 MG capsule   Commonly known as:  MARINOL           lidocaine 5 % Patch   Commonly known as:  LIDODERM                Where to get your medicines      These medications were sent to [id number] - [place], [place] [facility]   [address] , [facility] [address]     Phone:  [phone]     predniSONE 20 MG tablet    sulfamethoxazole-trimethoprim 800-160 MG per tablet                Protect others around you: Learn how to safely use, store and throw away your medicines at www.disposemymeds.org.             Medication List: This is a list of all your medications and when to take them. Check marks below indicate your daily home schedule. Keep this list as a reference.      Medications           Morning Afternoon Evening Bedtime As Needed    albuterol 108 (90 BASE) MCG/ACT Inhaler   Commonly known as:  PROAIR HFA/PROVENTIL HFA/VENTOLIN HFA   Inhale 2 puffs into the lungs every 4 hours as needed for shortness of breath / dyspnea or wheezing                                ALPRAZolam 0.5 MG tablet   Commonly known  as:  XANAX   One or two tabs daily PRN anxiety   Last time this was given:  1 mg on 5/1/2017  4:35 PM                                beclomethasone 40 MCG/ACT Inhaler   Commonly known as:  QVAR   Inhale 2 puffs into the lungs 2 times daily                                BOOST   fax # to the [facility] worker fax#[phone] Attn:MILDRED                                clotrimazole 1 % cream   Commonly known as:  LOTRIMIN   Apply topically 2 times daily                                cyanocobalamin 1000 MCG Tabs   Commonly known as:  [employer] vitamin  B12   Take 1 tablet by mouth daily   Last time this was given:  1,000 mcg on 5/2/2017  7:43 AM                                docusate sodium 100 MG tablet   Commonly known as:  COLACE   Take 100 mg by mouth daily                                dolutegravir 50 MG tablet   Commonly known as:  TIVICAY   Take 1 tablet (50 mg) by mouth daily   Last time this was given:  50 mg on 5/2/2017  7:49 AM                                emtricitabine-tenofovir 200-300 MG per tablet   Commonly known as:  TRUVADA   Take 1 tablet by mouth daily   Last time this was given:  1 tablet on 5/2/2017  7:49 AM                                fexofenadine 180 MG tablet   Commonly known as:  ALLEGRA   Take 1 tablet (180 mg) by mouth daily as needed for allergies                                fexofenadine-pseudoePHEDrine  MG per 12 hr tablet   Commonly known as:  ALLEGRA-D   Take 1 tablet by mouth 2 times daily as needed for allergies                                FLONASE 50 MCG/ACT spray   Spray 1-2 sprays into both nostrils daily as needed for rhinitis or allergies   Generic drug:  fluticasone                                FLUoxetine 20 MG capsule   Commonly known as:  PROzac   Take 1 capsule (20 mg) by mouth daily Along with 40 mg of fluoxetine                                MORPHINE SULFATE PO   Take 15 mg by mouth Reported on 3/7/2017   Last time this was given:  15 mg on 5/1/2017  4:08 PM                                 nicotine 14 MG/24HR 24 hr patch   Commonly known as:  NICODERM CQ   Place 1 patch onto the skin every 24 hours   Last time this was given:  1 patch on 5/2/2017  7:49 AM                                nitroglycerin 0.4 MG sublingual tablet   Commonly known as:  NITROSTAT   Place 1 tablet (0.4 mg) under the tongue every 5 minutes as needed for chest pain If you are still having symptoms after 3 doses (15 minutes) call 911.                                ondansetron 4 MG ODT tab   Commonly known as:  ZOFRAN ODT   Take 1-2 tablets (4-8 mg) by mouth every 8 hours as needed for nausea                                * order for DME   Equipment being ordered: Oxygen at 5 liters                                * order for DME   Equipment being ordered: empty Oxygen tanks, oxygen concentrator and oxygen travel concentrator for portability                                order for DME   Equipment being ordered:  Portable Oxygen                                oxyCODONE-acetaminophen  MG per tablet   Commonly known as:  PERCOCET   Take 1 tablet by mouth every 4 hours as needed for moderate to severe pain (Max of 6 a day.)   Last time this was given:  1 tablet on 5/2/2017  7:47 AM                                predniSONE 20 MG tablet   Commonly known as:  DELTASONE   Take 3 tablets (60 mg) by mouth daily Decrease by 10mg weekly. Example: Until 5/7 3 tabs daily, then 2.5 tabs daily for one week.                                SPIRIVA RESPIMAT 2.5 MCG/ACT inhalation aerosol   INHALE 2 PUFFS INTO THE LUNGS DAILY   Generic drug:  tiotropium                                sulfamethoxazole-trimethoprim 800-160 MG per tablet   Commonly known as:  BACTRIM DS/SEPTRA DS   Take 1 tablet by mouth daily   Last time this was given:  2 tablets on 5/2/2017  7:47 AM                                SUMAtriptan 50 MG tablet   Commonly known as:  IMITREX   Take 1 tablet (50 mg) by mouth at onset of headache for migraine                                 valACYclovir 500 MG tablet   Commonly known as:  VALTREX   Take 1 tablet (500 mg) by mouth 2 times daily as needed For shingles flare                                * Notice:  This list has 2 medication(s) that are the same as other medications prescribed for you. Read the directions carefully, and ask your doctor or other care provider to review them with you.

## 2017-04-25 NOTE — IP AVS SNAPSHOT
[address] [address]    Phone:  [phone]                                       After Visit Summary   4/25/2017    Tommy Ross    MRN: [id number]           After Visit Summary Signature Page     I have received my discharge instructions, and my questions have been answered. I have discussed any challenges I see with this plan with the nurse or doctor.    ..........................................................................................................................................  Patient/Patient Representative Signature      ..........................................................................................................................................  Patient Representative Print Name and Relationship to Patient    ..................................................               ................................................  Date                                            Time    ..........................................................................................................................................  Reviewed by Signature/Title    ...................................................              ..............................................  Date                                                            Time

## 2017-04-25 NOTE — ED NOTES
Pt comes in via EMS from [facility] for complaints of SOB. Pt was discharged yesterday for pneumonia. Pt was found to be in 70%'s on 6 LPM at the clinic. EMS administered albuterol nebulizer en-route. Pts ears blue upon arrival. Pt tachypnic and tachycardic.
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
## 2017-04-25 NOTE — LETTER
Transition Communication Hand-off for Care Transitions to Next Level of Care Provider    Name: Tommy Ross  MRN #: [id number]  Primary Care Provider: Antonella Obrien     Primary Clinic: [facility] [address] [facility] [address]     Reason for Hospitalization:  Acute respiratory failure  Pneumonia  Admit Date/Time: 4/25/2017  3:38 PM  Discharge Date: 5/2/17  Payor Source: Payor: MEDICARE / Plan: MEDICARE / Product Type: Medicare /     Reason for Communication Hand-off Referral: Fragility    Discharge Plan: home with continuous oxygen set up with [employer]       Concern for non-adherence with plan of care:   Y/N n  Discharge Needs Assessment:  Needs       Most Recent Value    Equipment Currently Used at Home cane, straight    Transportation Available car, family or friend will provide    Interventions provided -- [Home Oxygen ???'s/inquiry by pt who per CN not discharging.]          Already enrolled in Tele-monitoring program and name of program:    Follow-up specialty is recommended: Yes    Follow-up plan:  Future Appointments  Date Time Provider Department Center   5/3/2017 6:00 AM [facility] PT [facility]       Any outstanding tests or procedures:    Procedures     Future Labs/Procedures    PFT General Lab Testing     Comments:    Per pulmonology - would anticipate they desire both routine PFTs and DLCO testing    Oxygen Adult     Comments:    [facility] [facility] Order 4-6 liter(s) by nasal cannula continuously with use of portable tank. Expected treatment length is indefinite (99 months).. Test on conserving device as applicable.    Patients who qualify for home O2 coverage under the CMS guidelines require ABG tests or O2 sat readings obtained closest to, but no earlier than 2 days prior to the discharge, as evidence of the need for home oxygen therapy. Testing must be performed while patient is in the chronic stable state. See notes for O2 sats.    I certify that this patient, Tommy Ross has  been under my care and that I, or a nurse practitioner or physician's assistant working with me, had a face-to-face encounter that meets the face-to-face encounter requirements with this patient on 5/2/2017. The patientTommy was evaluated or treated in whole, or in part, for the following medical condition, which necessitates the use of the ordered oxygen. Treatment Diagnosis: Acute Respiratory Failure and pulmonary infiltrates    Attending Provider: Dr. Con Gautam  Physician signature: See electronic signature associated with these discharge orders  Date of Order: May 2, 2017        Radiology & Cardiology Orders     Future Labs/Procedures Complete By Expires    X-ray Chest 2 vws*  5/15/2017 (Approximate) 7/31/2017    Comments:    Should be completed prior to Pulmonology follow-up        Referrals     Future Labs/Procedures    Allergy/Asthma Adult Referral     Comments:    Patient with hx of recurrent respiratory failure of unknown etiology at this point. Concern for potential allergy given patient's report of allergies at baseline. Allergen panel pending at d/c. See D/c summary for details. Please schedule within 2 weeks.    PULMONARY REHAB REFERRAL     Comments:    Acute on chronic respiratory failure with pulmonary infiltrates            Key Recommendations:      Kanika Hogan    AVS/Discharge Summary is the source of truth; this is a helpful guide for improved communication of patient story

## 2017-04-25 NOTE — PROVIDER NOTIFICATION
Provider notified regarding pt's RR in 40-50s. Pt on Bipap @ 80% at 12/6 with oxygen saturations at 97%

## 2017-04-25 NOTE — PLAN OF CARE
Problem: Goal Outcome Summary  Goal: Goal Outcome Summary  Outcome: Declining  47yo M p/w acute resp failure from OSH     Neuro- AOx3, lethargic, follows commands, pupils 3mm bilaterally, equally round/reactive/brisk  CV- tmax 100.1, 90-100bpm, NSR with, SBPs 100-110's  Resp- bipap @ 80% 12/6, O2 saturations 96%+, 30-50RR (MD aware), CXR completed @ OSH, Solumedrol/Lasix given @ OSH prior to admission  GI- NPO, c/o recent constipation  - per urinal  Skin- in tact, warm, diaphoretic, two L arm PIV  Gtt- None, TKO     Pt raking in yard and had increased SOB, presented to OSH ED with spO2 70% @ 6L per OSH RN report, tx to [facility] for further w/u and c/o intubation r/t tachynea  Pt presented to this unit, required Bipap @ 80% and 12/6 to keep oxygen saturations above 95%. Per MICU team concern for intubation was verbalized by MDs to patient. Pt refusing intubation at this time. Plan for serial ABGs. Pt's respirations continued to be between 30-50RR, MICU resident made aware. DEANGELO.

## 2017-04-26 ENCOUNTER — APPOINTMENT (OUTPATIENT)
Dept: CARDIOLOGY | Facility: CLINIC | Age: 47
DRG: 166 | End: 2017-04-26
Attending: INTERNAL MEDICINE
Payer: MEDICARE

## 2017-04-26 ENCOUNTER — APPOINTMENT (OUTPATIENT)
Dept: PHYSICAL THERAPY | Facility: CLINIC | Age: 47
DRG: 166 | End: 2017-04-26
Attending: INTERNAL MEDICINE
Payer: MEDICARE

## 2017-04-26 ENCOUNTER — APPOINTMENT (OUTPATIENT)
Dept: GENERAL RADIOLOGY | Facility: CLINIC | Age: 47
DRG: 166 | End: 2017-04-26
Attending: INTERNAL MEDICINE
Payer: MEDICARE

## 2017-04-26 LAB
ALBUMIN SERPL-MCNC: 2.9 G/DL (ref 3.4–5)
ALP SERPL-CCNC: 86 U/L (ref 40–150)
ALT SERPL W P-5'-P-CCNC: 22 U/L (ref 0–70)
ANION GAP SERPL CALCULATED.3IONS-SCNC: 6 MMOL/L (ref 3–14)
AST SERPL W P-5'-P-CCNC: 40 U/L (ref 0–45)
BILIRUB DIRECT SERPL-MCNC: 0.3 MG/DL (ref 0–0.2)
BILIRUB SERPL-MCNC: 2.1 MG/DL (ref 0.2–1.3)
BUN SERPL-MCNC: 18 MG/DL (ref 7–30)
CALCIUM SERPL-MCNC: 9.1 MG/DL (ref 8.5–10.1)
CD3 CELLS # BLD: 306 CELLS/UL (ref 603–2990)
CD3 CELLS NFR BLD: 39 % (ref 49–84)
CD3+CD4+ CELLS # BLD: 61 CELLS/UL (ref 441–2156)
CD3+CD4+ CELLS NFR BLD: 8 % (ref 28–63)
CD3+CD4+ CELLS/CD3+CD8+ CLL BLD: 0.26 % (ref 1.4–2.6)
CD3+CD8+ CELLS # BLD: 242 CELLS/UL (ref 125–1312)
CD3+CD8+ CELLS NFR BLD: 31 % (ref 10–40)
CHLORIDE SERPL-SCNC: 106 MMOL/L (ref 94–109)
CO2 SERPL-SCNC: 28 MMOL/L (ref 20–32)
CREAT SERPL-MCNC: 1 MG/DL (ref 0.66–1.25)
CRP SERPL-MCNC: 360 MG/L (ref 0–8)
ERYTHROCYTE [DISTWIDTH] IN BLOOD BY AUTOMATED COUNT: 13.9 % (ref 10–15)
GFR SERPL CREATININE-BSD FRML MDRD: 80 ML/MIN/1.7M2
GLUCOSE BLDC GLUCOMTR-MCNC: 113 MG/DL (ref 70–99)
GLUCOSE BLDC GLUCOMTR-MCNC: 113 MG/DL (ref 70–99)
GLUCOSE BLDC GLUCOMTR-MCNC: 127 MG/DL (ref 70–99)
GLUCOSE SERPL-MCNC: 126 MG/DL (ref 70–99)
HCT VFR BLD AUTO: 46.5 % (ref 40–53)
HGB BLD-MCNC: 15.6 G/DL (ref 13.3–17.7)
IFC SPECIMEN: ABNORMAL
IMMUNODEFICIENCY MARKERS SPEC-IMP: ABNORMAL
INR PPP: 1.36 (ref 0.86–1.14)
INTERPRETATION ECG - MUSE: NORMAL
LACTATE BLD-SCNC: 1.6 MMOL/L (ref 0.7–2.1)
MCH RBC QN AUTO: 30.2 PG (ref 26.5–33)
MCHC RBC AUTO-ENTMCNC: 33.5 G/DL (ref 31.5–36.5)
MCV RBC AUTO: 90 FL (ref 78–100)
PLATELET # BLD AUTO: 113 10E9/L (ref 150–450)
POTASSIUM SERPL-SCNC: 4.6 MMOL/L (ref 3.4–5.3)
PROCALCITONIN SERPL-MCNC: 5.43 NG/ML
PROT SERPL-MCNC: 6.4 G/DL (ref 6.8–8.8)
RBC # BLD AUTO: 5.16 10E12/L (ref 4.4–5.9)
SODIUM SERPL-SCNC: 140 MMOL/L (ref 133–144)
TRYPTASE SERPL-MCNC: 4.3 NG/ML
WBC # BLD AUTO: 19.7 10E9/L (ref 4–11)

## 2017-04-26 PROCEDURE — 20000004 ZZH R&B ICU UMMC

## 2017-04-26 PROCEDURE — 97162 PT EVAL MOD COMPLEX 30 MIN: CPT | Mod: GP

## 2017-04-26 PROCEDURE — 99291 CRITICAL CARE FIRST HOUR: CPT | Mod: GC | Performed by: INTERNAL MEDICINE

## 2017-04-26 PROCEDURE — 27211040 ZZH CONTINUOUS NEBULIZER MICRO PUMP

## 2017-04-26 PROCEDURE — 85027 COMPLETE CBC AUTOMATED: CPT | Performed by: INTERNAL MEDICINE

## 2017-04-26 PROCEDURE — 84145 PROCALCITONIN (PCT): CPT | Performed by: INTERNAL MEDICINE

## 2017-04-26 PROCEDURE — 25000125 ZZHC RX 250: Performed by: INTERNAL MEDICINE

## 2017-04-26 PROCEDURE — 40000264 ECHO COMPLETE WITH OPTISON

## 2017-04-26 PROCEDURE — 40000983 ZZH STATISTIC HFNC ADULT NON-CPAP

## 2017-04-26 PROCEDURE — 93306 TTE W/DOPPLER COMPLETE: CPT | Mod: 26 | Performed by: INTERNAL MEDICINE

## 2017-04-26 PROCEDURE — 25000128 H RX IP 250 OP 636: Performed by: INTERNAL MEDICINE

## 2017-04-26 PROCEDURE — 40000894 ZZH STATISTIC OT IP EVAL DEFER: Performed by: OCCUPATIONAL THERAPIST

## 2017-04-26 PROCEDURE — 71010 XR CHEST PORT 1 VW: CPT

## 2017-04-26 PROCEDURE — 83605 ASSAY OF LACTIC ACID: CPT | Performed by: INTERNAL MEDICINE

## 2017-04-26 PROCEDURE — 36415 COLL VENOUS BLD VENIPUNCTURE: CPT | Performed by: INTERNAL MEDICINE

## 2017-04-26 PROCEDURE — 27210300 ZZH CANNULA HIGH FLOW, ADULT

## 2017-04-26 PROCEDURE — 25000132 ZZH RX MED GY IP 250 OP 250 PS 637: Mod: GY | Performed by: INTERNAL MEDICINE

## 2017-04-26 PROCEDURE — A9270 NON-COVERED ITEM OR SERVICE: HCPCS | Mod: GY | Performed by: INTERNAL MEDICINE

## 2017-04-26 PROCEDURE — 86140 C-REACTIVE PROTEIN: CPT | Performed by: INTERNAL MEDICINE

## 2017-04-26 PROCEDURE — 80048 BASIC METABOLIC PNL TOTAL CA: CPT | Performed by: INTERNAL MEDICINE

## 2017-04-26 PROCEDURE — 25500064 ZZH RX 255 OP 636: Performed by: INTERNAL MEDICINE

## 2017-04-26 PROCEDURE — 94640 AIRWAY INHALATION TREATMENT: CPT

## 2017-04-26 PROCEDURE — 40000556 ZZH STATISTIC PERIPHERAL IV START W US GUIDANCE

## 2017-04-26 PROCEDURE — 85610 PROTHROMBIN TIME: CPT | Performed by: INTERNAL MEDICINE

## 2017-04-26 PROCEDURE — 40000275 ZZH STATISTIC RCP TIME EA 10 MIN

## 2017-04-26 PROCEDURE — 97116 GAIT TRAINING THERAPY: CPT | Mod: GP

## 2017-04-26 PROCEDURE — 40000193 ZZH STATISTIC PT WARD VISIT

## 2017-04-26 PROCEDURE — 87205 SMEAR GRAM STAIN: CPT | Performed by: INTERNAL MEDICINE

## 2017-04-26 PROCEDURE — 80076 HEPATIC FUNCTION PANEL: CPT | Performed by: INTERNAL MEDICINE

## 2017-04-26 PROCEDURE — 94660 CPAP INITIATION&MGMT: CPT

## 2017-04-26 PROCEDURE — 87070 CULTURE OTHR SPECIMN AEROBIC: CPT | Performed by: INTERNAL MEDICINE

## 2017-04-26 PROCEDURE — 94640 AIRWAY INHALATION TREATMENT: CPT | Mod: 76

## 2017-04-26 PROCEDURE — 97530 THERAPEUTIC ACTIVITIES: CPT | Mod: GP

## 2017-04-26 PROCEDURE — 40000982 ZZH STATISTICAL HAIKU-CANTO PHOTO

## 2017-04-26 PROCEDURE — 00000146 ZZHCL STATISTIC GLUCOSE BY METER IP

## 2017-04-26 RX ORDER — SENNOSIDES 8.6 MG
1-2 TABLET ORAL 2 TIMES DAILY PRN
Status: DISCONTINUED | OUTPATIENT
Start: 2017-04-26 | End: 2017-05-02 | Stop reason: HOSPADM

## 2017-04-26 RX ORDER — MORPHINE SULFATE 15 MG/1
15 TABLET ORAL EVERY 8 HOURS PRN
Status: DISCONTINUED | OUTPATIENT
Start: 2017-04-26 | End: 2017-04-26

## 2017-04-26 RX ORDER — MORPHINE SULFATE 2 MG/ML
2 INJECTION, SOLUTION INTRAMUSCULAR; INTRAVENOUS ONCE
Status: COMPLETED | OUTPATIENT
Start: 2017-04-26 | End: 2017-04-26

## 2017-04-26 RX ORDER — LANOLIN ALCOHOL/MO/W.PET/CERES
1000 CREAM (GRAM) TOPICAL DAILY
Status: DISCONTINUED | OUTPATIENT
Start: 2017-04-26 | End: 2017-05-02 | Stop reason: HOSPADM

## 2017-04-26 RX ORDER — ALBUTEROL SULFATE 0.83 MG/ML
2.5 SOLUTION RESPIRATORY (INHALATION) EVERY 6 HOURS PRN
Status: DISCONTINUED | OUTPATIENT
Start: 2017-04-26 | End: 2017-05-02 | Stop reason: HOSPADM

## 2017-04-26 RX ORDER — EMTRICITABINE AND TENOFOVIR DISOPROXIL FUMARATE 200; 300 MG/1; MG/1
1 TABLET, FILM COATED ORAL DAILY
Status: DISCONTINUED | OUTPATIENT
Start: 2017-04-26 | End: 2017-05-02 | Stop reason: HOSPADM

## 2017-04-26 RX ORDER — LIDOCAINE 50 MG/G
1 PATCH TOPICAL
Status: DISCONTINUED | OUTPATIENT
Start: 2017-04-27 | End: 2017-04-27

## 2017-04-26 RX ORDER — ALBUTEROL SULFATE 2 MG/5ML
2 SYRUP ORAL 3 TIMES DAILY
Status: DISCONTINUED | OUTPATIENT
Start: 2017-04-26 | End: 2017-04-26

## 2017-04-26 RX ORDER — ALPRAZOLAM 0.5 MG
1 TABLET ORAL 2 TIMES DAILY PRN
Status: DISCONTINUED | OUTPATIENT
Start: 2017-04-26 | End: 2017-05-02 | Stop reason: HOSPADM

## 2017-04-26 RX ORDER — OXYCODONE AND ACETAMINOPHEN 10; 325 MG/1; MG/1
1 TABLET ORAL EVERY 4 HOURS PRN
Status: DISCONTINUED | OUTPATIENT
Start: 2017-04-26 | End: 2017-05-02 | Stop reason: HOSPADM

## 2017-04-26 RX ORDER — IPRATROPIUM BROMIDE AND ALBUTEROL SULFATE 2.5; .5 MG/3ML; MG/3ML
3 SOLUTION RESPIRATORY (INHALATION)
Status: DISCONTINUED | OUTPATIENT
Start: 2017-04-26 | End: 2017-05-02 | Stop reason: HOSPADM

## 2017-04-26 RX ORDER — DOCUSATE SODIUM 100 MG/1
100 CAPSULE, LIQUID FILLED ORAL DAILY
Status: DISCONTINUED | OUTPATIENT
Start: 2017-04-26 | End: 2017-05-02 | Stop reason: HOSPADM

## 2017-04-26 RX ADMIN — MORPHINE SULFATE 15 MG: 15 TABLET ORAL at 13:47

## 2017-04-26 RX ADMIN — ALPRAZOLAM 1 MG: 0.5 TABLET ORAL at 21:36

## 2017-04-26 RX ADMIN — VANCOMYCIN HYDROCHLORIDE 1500 MG: 10 INJECTION, POWDER, LYOPHILIZED, FOR SOLUTION INTRAVENOUS at 18:13

## 2017-04-26 RX ADMIN — PIPERACILLIN SODIUM,TAZOBACTAM SODIUM 3.38 G: 3; .375 INJECTION, POWDER, FOR SOLUTION INTRAVENOUS at 14:02

## 2017-04-26 RX ADMIN — DOLUTEGRAVIR SODIUM 50 MG: 50 TABLET, FILM COATED ORAL at 20:48

## 2017-04-26 RX ADMIN — PIPERACILLIN SODIUM,TAZOBACTAM SODIUM 3.38 G: 3; .375 INJECTION, POWDER, FOR SOLUTION INTRAVENOUS at 03:23

## 2017-04-26 RX ADMIN — DOCUSATE SODIUM 100 MG: 100 CAPSULE, LIQUID FILLED ORAL at 13:47

## 2017-04-26 RX ADMIN — METHYLPREDNISOLONE SODIUM SUCCINATE 62.5 MG: 125 INJECTION, POWDER, LYOPHILIZED, FOR SOLUTION INTRAMUSCULAR; INTRAVENOUS at 05:36

## 2017-04-26 RX ADMIN — OXYCODONE HYDROCHLORIDE AND ACETAMINOPHEN 1 TABLET: 10; 325 TABLET ORAL at 16:34

## 2017-04-26 RX ADMIN — LIDOCAINE 2 PATCH: 50 PATCH TOPICAL at 20:32

## 2017-04-26 RX ADMIN — MORPHINE SULFATE 2 MG: 2 INJECTION, SOLUTION INTRAMUSCULAR; INTRAVENOUS at 04:39

## 2017-04-26 RX ADMIN — PIPERACILLIN SODIUM,TAZOBACTAM SODIUM 3.38 G: 3; .375 INJECTION, POWDER, FOR SOLUTION INTRAVENOUS at 20:34

## 2017-04-26 RX ADMIN — HUMAN ALBUMIN MICROSPHERES AND PERFLUTREN 6 ML: 10; .22 INJECTION, SOLUTION INTRAVENOUS at 07:45

## 2017-04-26 RX ADMIN — IPRATROPIUM BROMIDE AND ALBUTEROL SULFATE 3 ML: .5; 3 SOLUTION RESPIRATORY (INHALATION) at 15:17

## 2017-04-26 RX ADMIN — PIPERACILLIN SODIUM,TAZOBACTAM SODIUM 3.38 G: 3; .375 INJECTION, POWDER, FOR SOLUTION INTRAVENOUS at 08:25

## 2017-04-26 RX ADMIN — SENNOSIDES 1 TABLET: 8.6 TABLET, FILM COATED ORAL at 21:36

## 2017-04-26 RX ADMIN — OXYCODONE HYDROCHLORIDE AND ACETAMINOPHEN 1 TABLET: 10; 325 TABLET ORAL at 20:33

## 2017-04-26 RX ADMIN — VANCOMYCIN HYDROCHLORIDE 1500 MG: 10 INJECTION, POWDER, LYOPHILIZED, FOR SOLUTION INTRAVENOUS at 05:45

## 2017-04-26 RX ADMIN — IPRATROPIUM BROMIDE AND ALBUTEROL SULFATE 3 ML: .5; 3 SOLUTION RESPIRATORY (INHALATION) at 20:36

## 2017-04-26 RX ADMIN — EMTRICITABINE AND TENOFOVIR DISOPROXIL FUMARATE 1 TABLET: 200; 300 TABLET, FILM COATED ORAL at 20:48

## 2017-04-26 RX ADMIN — CYANOCOBALAMIN TAB 1000 MCG 1000 MCG: 1000 TAB at 16:34

## 2017-04-26 ASSESSMENT — PAIN DESCRIPTION - DESCRIPTORS
DESCRIPTORS: ACHING
DESCRIPTORS: ACHING;TIGHTNESS
DESCRIPTORS: ACHING;SORE
DESCRIPTORS: ACHING
DESCRIPTORS: TINGLING
DESCRIPTORS: ACHING;TIGHTNESS
DESCRIPTORS: ACHING;SORE

## 2017-04-26 NOTE — PROGRESS NOTES
04/26/17 1500   Quick Adds   Type of Visit Initial PT Evaluation   Living Environment   Lives With spouse;child(rocio), dependent   Living Arrangements house   Home Accessibility bed and bath on same level;grab bars present (bathtub);grab bars present (toilet)   Number of Stairs to Enter Home 0   Number of Stairs Within Home 0   Stair Railings at Home none  (NA)   Transportation Available car;family or friend will provide   Living Environment Comment Pt lives in house with wife and highschool aged son. Pt does not work currently, but did work in [employer] in the past. His wife works from home most of the week as a .   Self-Care   Dominant Hand right   Usual Activity Tolerance moderate   Current Activity Tolerance moderate   Regular Exercise no   Equipment Currently Used at Home cane, straight   Activity/Exercise/Self-Care Comment Pt reporting IND with self-cares, however, intermittent assistance from wife as needed when more ill. Pt has shower chair, grab bars, FWW, SPC at home but does not generally use.    Functional Level Prior   Ambulation 1-->assistive equipment   Transferring 0-->independent   Toileting 0-->independent   Bathing 0-->independent   Dressing 0-->independent   Eating 0-->independent   Communication 0-->understands/communicates without difficulty   Swallowing 0-->swallows foods/liquids without difficulty   Cognition 0 - no cognition issues reported   Fall history within last six months no   Which of the above functional risks had a recent onset or change? ambulation   Prior Functional Level Comment Pt was IND in all ADLs and IADLs prior to admission. Pt reports use of SPC for community mobility. Pt also reporting intermittent use of home O2. Pt reporting ability to walk ~10 intermittent minutes at baseline and is limited most by TREADWELL.    General Information   Onset of Illness/Injury or Date of Surgery - Date 04/25/17  (date of PT orders)   Referring Physician Tico Thrasher MD    Patient/Family Goals Statement to go home   Pertinent History of Current Problem (include personal factors and/or comorbidities that impact the POC) Pt is a 47 y/o man with PMHx significant for HIV (followed by HIV clinic here), recurrent hypoxic respiratory failure requiring multiple intubations in the past, and recent hospitalization 4/23-4/24 at OSH for CAP who now presents with acute hypoxic respiratory failure secondary to underlying pneumonia versus hypersensitivity pneumonitis. Patient currently on BiPAP/HFNC continuously but at risk for needing urgent intubation.  - per resident note   Precautions/Limitations fall precautions;oxygen therapy device and L/min  (HF NC 80% FiO2)   General Observations Pt sitting up in bed, wearing high flow NC at 80% FiO2, alert and pleasant   General Info Comments Activity: up ad go   Cognitive Status Examination   Orientation orientation to person, place and time   Level of Consciousness alert   Follows Commands and Answers Questions 100% of the time;able to follow multistep instructions   Personal Safety and Judgment intact   Memory intact   Pain Assessment   Patient Currently in Pain No   Integumentary/Edema   Integumentary/Edema no deficits were identifed   Posture    Posture Forward head position;Protracted shoulders   Range of Motion (ROM)   ROM Comment B UE/LE WFL   Strength   Strength Comments B UE/LE 4/5 to 5/5   Bed Mobility   Bed Mobility Comments Pt performing bed mobility IND.    Transfer Skills   Transfer Comments Pt demonstrating sit <> stand SBA   Gait   Gait Comments Pt ambulating forward/backwards 3 ft at SBA - limited by O2.    Balance   Balance Comments Fair standing dynamic balance - SBA for dynamic standing tasks.    Sensory Examination   Sensory Perception Comments Pt denies N/T in B LE/UE   General Therapy Interventions   Planned Therapy Interventions balance training;gait training;neuromuscular re-education;transfer training;home program  "guidelines;risk factor education;progressive activity/exercise   Clinical Impression   Criteria for Skilled Therapeutic Intervention yes, treatment indicated   PT Diagnosis Impaired functional mobility   Influenced by the following impairments Impaired activity tolerance, dynamic standing balance, respiratory status   Functional limitations due to impairments Decreased IND with mobility and decreased participation within the community.    Clinical Presentation Evolving/Changing   Clinical Presentation Rationale continued high O2 needs, below baseline functioning, recent hospitilizations   Clinical Decision Making (Complexity) Moderate complexity   Therapy Frequency` other (see comments)  (1-2 more sessions)   Predicted Duration of Therapy Intervention (days/wks) 3 days   Anticipated Equipment Needs at Discharge (none anticipated at this time)   Anticipated Discharge Disposition Home with Assist   Risk & Benefits of therapy have been explained Yes   Patient, Family & other staff in agreement with plan of care Yes   [facility] TM \"6 Clicks\"   2016, Trustees of [facility], under license to [employer].  All rights reserved.   6 Clicks Short Forms Basic Mobility Inpatient Short Form   [facility]  \"6 Clicks\" V.2 Basic Mobility Inpatient Short Form   1. Turning from your back to your side while in a flat bed without using bedrails? 4 - None   2. Moving from lying on your back to sitting on the side of a flat bed without using bedrails? 4 - None   3. Moving to and from a bed to a chair (including a wheelchair)? 3 - A Little   4. Standing up from a chair using your arms (e.g., wheelchair, or bedside chair)? 4 - None   5. To walk in hospital room? 3 - A Little   6. Climbing 3-5 steps with a railing? 3 - A Little   Basic Mobility Raw Score (Score out of 24.Lower scores equate to lower levels of function) 21   Total Evaluation Time   Total Evaluation Time (Minutes) 8     "

## 2017-04-26 NOTE — PROGRESS NOTES
[facility]    MICU Progress Note  Patient: Tommy Ross   Admission date: 2017  MRN: [id number]  : 1970      ______________________________________________________________________________  ASSESSMENT AND PLAN  Tommy Ross is a 47 y/o man with PMHx significant for HIV (followed by HIV clinic here), recurrent hypoxic respiratory failure requiring multiple intubations in the past, and recent hospitalization - at OSH for CAP who now presents with acute hypoxic respiratory failure secondary to underlying pneumonia versus hypersensitivity pneumonitis. Patient currently on BiPAP/HFNC continuously but at risk for needing urgent intubation.     CHANGES TODAY  --He has improved since yesterday. Now doing well on HFNC.   --Home oral meds have been ordered  --Will continue current abx and steroids  --ID consulted    NEURO/PSYCH  # Sedation  # Pain  --Continued home oxycodone PRN     CARDIOVASCULAR  # Sinus Tachycardia  EKG on admission normal. BNP elevated at 1777, but patient's presentation of respiratory failure is unlikely secondary to heart failure. ECHO  normal.   --Cardiac Monitor     PULMONARY  # Acute Hypoxic Respiratory Failure:  Differential includes infection vs heart failure vs pulmonary embolism vs hypersensitivity pneumonitis. Unclear etiology at this point but high suspicion for underlying infection. He has a history of multiple hospitalizations for pneumonia and ARDS requiring intubation. Of note, patient has HIV but has been well controlled with normal CD4 count in 10/2016. CD4 checked on admission and low at 61, but this was discussed with ID and ID felt it is likely low due to acute illness and his current presentation is not related to HIV.   --Continue solumedrol 60mg IV daily for possible Hypersentsitivity Pneumonitis.   --Continue broad abx  --Holding home albuterol and spiriva inhalers, Duonebs q4h  --BiPAP/HFNC, high risk for intubation  --Patient  currently wants to avoid intubation  --Unable to collect sputum    GI  No Acute issues.     # Nutrition: NPO while on continuous bipap.     RENAL  # Mild Acute Kidney Injury-Improving:  Creatinine 1.2 on admission. Overall downtrending. His baseline recently is about 0.9-1.2.   --Strict I/Os  --Monitor     HEME/ONC  # Leukocytosis  Response to underlying infection vs Steroids.  --Trend infection as below  --Monitor     # Mild Thrombocytopenia  --Monitor     ENDOCRINE  No acute issues.     INFECTIOUS DISEASE  # Possible Community Acquired Pneumonia:  Procalcitionin significantly elevated at 6.36 with leukocytosis to 24k. Patient's chest CT 4/25 significant for extensive ground-glass infiltrates throughout the bilateral lungs concerning for possible infectious process, pneumonitis or pulmonary edema.  PCP infection considered in the setting of HIV positive patient, but he has been compliant with medications and recent CD4 count normal as outpatient. Discussed with ID and no need to cover for MAC or PCP at this time.   --Follow blood cultures  --Will continue broad abx with vanc and zosyn    # History of HIV Infection:  Patient follows with Dr. Alegria here at the [place] and has been very compliant with all of his medications. Recent CD4 count in 10/2016 normal, but now low on admission at 61 which could be related to acute illness. HIV viral load is still pending.    --Continue Truvada and Doltegravir    Antimicrobials Summary  Vancomycin (4/25-present)  Zosyn (4/25-present)    SKIN/MSK  # Macular Rash on Chest- Improving   --Monitor  --Hydrocortisone cream  --Currently on steroids    Lines: PIV   Prophylaxis:      - DVT: mechanical     - GI: n/a  Family: Not at bedside  Disposition: Currently high risk for needing intubation   Code Status: Full     The patient was examined with Dr. Juárez who agrees with the plan.    Calin Dalal MD, MPH  Medicine-Pediatrics  PGY3  [phone]    =========================================================================  SUBJECTIVE:    Nursing notes reviewed. No acute issues overnight. Patient felt a bit anxious and claustrophobic with bipap mask. He denies chest pain, nausea, vomiting, or abdominal pain. He continues to want to avoid intubation. He feels hungry and wants to eat. States that he hasn't eaten in days.     OBJECTIVE:  Blood pressure 120/76, temperature 98.3  F (36.8  C), temperature source Axillary, resp. rate (!) 45, SpO2 99 %.    Ranges:   Temperatures:  Current - Temp: 98.3  F (36.8  C); Max - Temp  Av.4  F (37.4  C)  Min: 98.3  F (36.8  C)  Max: 100.5  F (38.1  C)  Respiration range: Resp  Av.5  Min: 28  Max: 49  Pulse oximetry range: SpO2  Av.6 %  Min: 48 %  Max: 100 %  Blood pressure range: Systolic (24hrs), Av , Min:107 , Max:131   ; Diastolic (24hrs), Av, Min:70, Max:94     Wt Readings from Last 5 Encounters:   17 79.4 kg (175 lb)   17 78.9 kg (174 lb)   17 78.4 kg (172 lb 12.8 oz)   17 76.7 kg (169 lb 3.2 oz)   17 81.4 kg (179 lb 6.4 oz)       FiO2 (%): 80 %  Resp: 45  BP - Mean:  [] 90    General: NAD, pleasant and cooperative with interview and exam  HEENT: Sclera anicteric, oral mucosa moist and without lesions appreciated; posterior oropharnyx without edema, erythema or exudates  CV: RRR, no murmurs, rubs, gallops or extra heart sounds  Resp: Intermittent crackles throughout, no wheezing, bipap on, moving air well, symmetric chest rise, mild increased work of breathing with rates in low 20s   Abdomen: +bs, soft, nontender, nondistended, no organomegaly appreciated   Extremities: No clubbing or cyanosis, no LE edema bilaterally, peripheral pulses full   MSK: no active synovitis or joint deformity appreciated  Skin: Warm and dry, no jaundice, rash or concerning lesions  Neurological: Alert and oriented, following commands, strength  symmetric    DIAGNOSTIC STUDIES  ROUTINE ICU LABS  CMP    Recent Labs  Lab 04/26/17  0503 04/25/17  2100 04/25/17  1732 04/25/17  1230 04/24/17  0850     --  138 142 142   POTASSIUM 4.6  --  4.0 3.6 4.3   CHLORIDE 106  --  104 109 112*   CO2 28  --  27 25 23   ANIONGAP 6  --  7 8 7   * 156* 129* 118* 154*   BUN 18  --  14 16 21   CR 1.00  --  1.19 1.15 1.00   GFRESTIMATED 80  --  65 68 80   GFRESTBLACK >90African American GFR Calc  --  79 82 >90African American GFR Calc   AUSTIN 9.1  --  8.8 8.2* 8.3*   PROTTOTAL 6.4*  --  6.8 6.4*  --    ALBUMIN 2.9*  --  3.3* 3.2*  --    BILITOTAL 2.1*  --  2.2* 1.9*  --    ALKPHOS 86  --  99 93  --    AST 40  --  51* 61*  --    ALT 22  --  27 30  --      CBC    Recent Labs  Lab 04/26/17  0503 04/25/17  1732 04/25/17  1230 04/24/17  0850   WBC 19.7* 24.7* 22.3* 12.8*   RBC 5.16 5.39 5.33 5.42   HGB 15.6 16.7 15.8 16.2   HCT 46.5 48.4 47.3 48.4   MCV 90 90 89 89   MCH 30.2 31.0 29.6 29.9   MCHC 33.5 34.5 33.4 33.5   RDW 13.9 14.2 14.2 14.0   * 96* 133* 143*     INRNo lab results found in last 7 days.  Lactic Acid    Recent Labs  Lab 04/26/17  0503 04/25/17  1951 04/25/17  1611 04/25/17  1214   LACT 1.6 1.8 1.3 1.1     Arterial Blood Gas    Recent Labs  Lab 04/25/17  2100 04/25/17  1611 04/25/17  1214   PH 7.40 7.41  --    PCO2 44 42  --    PO2 85 136*  --    HCO3 27 27  --    O2PER 80 80 100     Venous Blood Gas     Recent Labs  Lab 04/25/17  2100 04/25/17  1611 04/25/17  1214   PHV  --   --  7.41   PCO2V  --   --  39*   PO2V  --   --  181*   HCO3V  --   --  25   AI  --   --  0.1   O2PER 80 80 100       MICROBIOLOGY  Significant culture results:  Blood Culture 4/25/17- No growth   Influenza 4/23/17- Negative  Urine Culture 4/23/17- No growth, final   Urine Strep Ag 4/23/17- Negative  Blood Culture 4/23/17- No growth    IMAGING  CXR 4/26/17  IMPRESSION:   Again seen bilateral diffuse pulmonary opacities, slightly decreased  in the left midlung. Differentials  pulmonary edema, infection or  inflammation.    CT Chest 4/25/17  IMPRESSION:  1. Extensive ground-glass infiltrates throughout the bilateral lungs  could represent pulmonary edema, infectious, or inflammatory process.  No pleural effusion or cardiomegaly to suggest congestive heart  failure.  2. No definite pulmonary artery embolism is identified, however, study  is significantly limited due to extensive motion artifact. Small  pulmonary artery emboli could be missed.  3. Multiple mildly enlarged mediastinal lymph nodes are noted and are  fairly similar to the prior study dated 12/13/2014. These are  difficult to evaluate due to the motion artifact.

## 2017-04-26 NOTE — PLAN OF CARE
Problem: Goal Outcome Summary  Goal: Goal Outcome Summary  PT 4AB: Evaluation complete and treatment indicated. Pt limited most by poor activity tolerance and impaired dynamic balance. Pt demonstrating bed mobility MOD I, sit <> stand CGA, pivot transfer CGA, walking forward backwards 3ft x 5 reps CGA (limited by O2 length). Pt on high flow NC at 80% FiO2 with HR  during session, and sats >90% throughout session. PT anticipating discharge to home when medically appropriate, pt may need supplemental O2 - will continue to assess needs.
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
## 2017-04-26 NOTE — PLAN OF CARE
Problem: Goal Outcome Summary  Goal: Goal Outcome Summary  OT 4AB: Cancel and Defer.  Acknowledge order placed for OT eval and treat.  Upon chart review and discussion with PT/pt, no acute OT needs identified.  Pt able to complete bed mobility, LE dressing and bedside transfers with SBA.  Reports no concerns with completion of basic self cares once respiratory status is properly managed.  Pt has AE available at home for self cares including shower chair, etc. But has not been needing to use them. Wife works from home and is able to assist prn.  Primary deficit is impaired respiratory status and high O2 needs.  Defer to PT to ensure proper activity tolerance, progression of mobility and any strengthening needs.  Will discontinue OT orders at this time.

## 2017-04-26 NOTE — PLAN OF CARE
Problem: Pneumonia (Adult)  Goal: Signs and Symptoms of Listed Potential Problems Will be Absent or Manageable (Pneumonia)  Signs and symptoms of listed potential problems will be absent or manageable by discharge/transition of care (reference Pneumonia (Adult) CPG).   Outcome: Improving  Neuro status intact and unchanged - patient remains forgetful. HR tachy w/ activity, otherwise NSR. BP stable. Lungs diminished, clear to coarse, strong and productive cough. Patient removed BiPAP this AM, was immediately transitioned to HFNC - tolerating, sats remain >92% on FIO2 80%. Dyspneic w/ any activity. ABD is rounded, (-)BM, (+)flatus. Voiding, using urinal, adequate urine output. Tolerating regular diet, taking adequate PO food and fluids. Up to chair x1 w/ assist of 1. Plan for ongoing monitoring and sputum collection.

## 2017-04-26 NOTE — PLAN OF CARE
Problem: Goal Outcome Summary  Goal: Goal Outcome Summary  Outcome: No Change  Pt on 80% biPAP overnight. RR 30s-40s, OVSS. Decreased FiO2 to 75% at MN, but had to increase back to 80% around 0100 2/2 SpO2 dropping to 85-88%. SpO2 91-99% most of night on FiO2 80%. Pt restless in the evening, very concerned about eating and repeatedly asking for pain and anxiety meds. Pt NPO and PTA pain/anxiety meds not ordered 2/2 resp status. Lidoderm patches ordered and applied to back for back pain. He also verbalized some concern about taking HIV meds. Team to address medications today. After establishing rapport with pt and explaining reason for holding off on meds being avoiding intubation, pt became more calm and cooperative. He slept between care from approximately [id number]. Around 0425 pt reported feeling anxious and claustrophobic with the biPAP mask. MD notified. One-time 2 mg IV morphine dose ordered and administered with resultant decrease in anxiety. AM labs pending.      Continue with plan of care. Notify MICU team with any change in condition. See charting for detailed assessments and interventions.

## 2017-04-27 ENCOUNTER — APPOINTMENT (OUTPATIENT)
Dept: GENERAL RADIOLOGY | Facility: CLINIC | Age: 47
DRG: 166 | End: 2017-04-27
Attending: INTERNAL MEDICINE
Payer: MEDICARE

## 2017-04-27 LAB
ANION GAP SERPL CALCULATED.3IONS-SCNC: 12 MMOL/L (ref 3–14)
ANION GAP SERPL CALCULATED.3IONS-SCNC: 6 MMOL/L (ref 3–14)
BASE EXCESS BLDA CALC-SCNC: 0.9 MMOL/L
BASOPHILS # BLD AUTO: 0 10E9/L (ref 0–0.2)
BASOPHILS NFR BLD AUTO: 0 %
BUN SERPL-MCNC: 22 MG/DL (ref 7–30)
BUN SERPL-MCNC: 24 MG/DL (ref 7–30)
CALCIUM SERPL-MCNC: 8.2 MG/DL (ref 8.5–10.1)
CALCIUM SERPL-MCNC: 8.4 MG/DL (ref 8.5–10.1)
CHLORIDE SERPL-SCNC: 109 MMOL/L (ref 94–109)
CHLORIDE SERPL-SCNC: 112 MMOL/L (ref 94–109)
CO2 SERPL-SCNC: 23 MMOL/L (ref 20–32)
CO2 SERPL-SCNC: 27 MMOL/L (ref 20–32)
CREAT SERPL-MCNC: 0.99 MG/DL (ref 0.66–1.25)
CREAT SERPL-MCNC: 1.11 MG/DL (ref 0.66–1.25)
CRP SERPL-MCNC: 150 MG/L (ref 0–8)
DIFFERENTIAL METHOD BLD: ABNORMAL
EOSINOPHIL # BLD AUTO: 0 10E9/L (ref 0–0.7)
EOSINOPHIL NFR BLD AUTO: 0.1 %
ERYTHROCYTE [DISTWIDTH] IN BLOOD BY AUTOMATED COUNT: 13.8 % (ref 10–15)
ERYTHROCYTE [DISTWIDTH] IN BLOOD BY AUTOMATED COUNT: 13.9 % (ref 10–15)
GFR SERPL CREATININE-BSD FRML MDRD: 71 ML/MIN/1.7M2
GFR SERPL CREATININE-BSD FRML MDRD: 81 ML/MIN/1.7M2
GLUCOSE SERPL-MCNC: 174 MG/DL (ref 70–99)
GLUCOSE SERPL-MCNC: 196 MG/DL (ref 70–99)
GRAM STN SPEC: ABNORMAL
HCO3 BLD-SCNC: 26 MMOL/L (ref 21–28)
HCT VFR BLD AUTO: 43.6 % (ref 40–53)
HCT VFR BLD AUTO: 43.7 % (ref 40–53)
HGB BLD-MCNC: 14.3 G/DL (ref 13.3–17.7)
HGB BLD-MCNC: 14.8 G/DL (ref 13.3–17.7)
IMM GRANULOCYTES # BLD: 0.1 10E9/L (ref 0–0.4)
IMM GRANULOCYTES NFR BLD: 0.4 %
LYMPHOCYTES # BLD AUTO: 1.5 10E9/L (ref 0.8–5.3)
LYMPHOCYTES NFR BLD AUTO: 6.5 %
Lab: ABNORMAL
MAGNESIUM SERPL-MCNC: 2.3 MG/DL (ref 1.6–2.3)
MAGNESIUM SERPL-MCNC: 2.3 MG/DL (ref 1.6–2.3)
MCH RBC QN AUTO: 29.7 PG (ref 26.5–33)
MCH RBC QN AUTO: 30.3 PG (ref 26.5–33)
MCHC RBC AUTO-ENTMCNC: 32.8 G/DL (ref 31.5–36.5)
MCHC RBC AUTO-ENTMCNC: 33.9 G/DL (ref 31.5–36.5)
MCV RBC AUTO: 89 FL (ref 78–100)
MCV RBC AUTO: 91 FL (ref 78–100)
MICRO REPORT STATUS: ABNORMAL
MONOCYTES # BLD AUTO: 0.7 10E9/L (ref 0–1.3)
MONOCYTES NFR BLD AUTO: 3.1 %
NEUTROPHILS # BLD AUTO: 21.2 10E9/L (ref 1.6–8.3)
NEUTROPHILS NFR BLD AUTO: 89.9 %
NRBC # BLD AUTO: 0 10*3/UL
NRBC BLD AUTO-RTO: 0 /100
O2/TOTAL GAS SETTING VFR VENT: 80 %
PCO2 BLD: 43 MM HG (ref 35–45)
PH BLD: 7.39 PH (ref 7.35–7.45)
PHOSPHATE SERPL-MCNC: 1.4 MG/DL (ref 2.5–4.5)
PHOSPHATE SERPL-MCNC: 1.8 MG/DL (ref 2.5–4.5)
PLATELET # BLD AUTO: 128 10E9/L (ref 150–450)
PLATELET # BLD AUTO: 134 10E9/L (ref 150–450)
PO2 BLD: 80 MM HG (ref 80–105)
POTASSIUM SERPL-SCNC: 3.6 MMOL/L (ref 3.4–5.3)
POTASSIUM SERPL-SCNC: 4.1 MMOL/L (ref 3.4–5.3)
RBC # BLD AUTO: 4.81 10E12/L (ref 4.4–5.9)
RBC # BLD AUTO: 4.89 10E12/L (ref 4.4–5.9)
SODIUM SERPL-SCNC: 142 MMOL/L (ref 133–144)
SODIUM SERPL-SCNC: 147 MMOL/L (ref 133–144)
SPECIMEN SOURCE: ABNORMAL
VANCOMYCIN SERPL-MCNC: 11.1 MG/L
WBC # BLD AUTO: 18.4 10E9/L (ref 4–11)
WBC # BLD AUTO: 23.6 10E9/L (ref 4–11)

## 2017-04-27 PROCEDURE — 99291 CRITICAL CARE FIRST HOUR: CPT | Mod: GC | Performed by: INTERNAL MEDICINE

## 2017-04-27 PROCEDURE — 93005 ELECTROCARDIOGRAM TRACING: CPT

## 2017-04-27 PROCEDURE — 25000125 ZZHC RX 250: Performed by: INTERNAL MEDICINE

## 2017-04-27 PROCEDURE — 36600 WITHDRAWAL OF ARTERIAL BLOOD: CPT

## 2017-04-27 PROCEDURE — 80202 ASSAY OF VANCOMYCIN: CPT | Performed by: INTERNAL MEDICINE

## 2017-04-27 PROCEDURE — 84100 ASSAY OF PHOSPHORUS: CPT | Performed by: INTERNAL MEDICINE

## 2017-04-27 PROCEDURE — A9270 NON-COVERED ITEM OR SERVICE: HCPCS | Mod: GY | Performed by: INTERNAL MEDICINE

## 2017-04-27 PROCEDURE — 25000132 ZZH RX MED GY IP 250 OP 250 PS 637: Mod: GY | Performed by: INTERNAL MEDICINE

## 2017-04-27 PROCEDURE — 40000047 ZZH STATISTIC CTO2 CONT OXYGEN TECH TIME EA 90 MIN

## 2017-04-27 PROCEDURE — 36415 COLL VENOUS BLD VENIPUNCTURE: CPT | Performed by: INTERNAL MEDICINE

## 2017-04-27 PROCEDURE — 83735 ASSAY OF MAGNESIUM: CPT | Performed by: INTERNAL MEDICINE

## 2017-04-27 PROCEDURE — 82785 ASSAY OF IGE: CPT | Performed by: INTERNAL MEDICINE

## 2017-04-27 PROCEDURE — 71010 XR CHEST PORT 1 VW: CPT

## 2017-04-27 PROCEDURE — 85025 COMPLETE CBC W/AUTO DIFF WBC: CPT | Performed by: INTERNAL MEDICINE

## 2017-04-27 PROCEDURE — 82803 BLOOD GASES ANY COMBINATION: CPT | Performed by: INTERNAL MEDICINE

## 2017-04-27 PROCEDURE — 93010 ELECTROCARDIOGRAM REPORT: CPT | Performed by: INTERNAL MEDICINE

## 2017-04-27 PROCEDURE — 40000894 ZZH STATISTIC OT IP EVAL DEFER: Performed by: OCCUPATIONAL THERAPIST

## 2017-04-27 PROCEDURE — 85027 COMPLETE CBC AUTOMATED: CPT | Performed by: INTERNAL MEDICINE

## 2017-04-27 PROCEDURE — 94640 AIRWAY INHALATION TREATMENT: CPT | Mod: 76

## 2017-04-27 PROCEDURE — 94640 AIRWAY INHALATION TREATMENT: CPT

## 2017-04-27 PROCEDURE — 86140 C-REACTIVE PROTEIN: CPT | Performed by: INTERNAL MEDICINE

## 2017-04-27 PROCEDURE — 25000128 H RX IP 250 OP 636: Performed by: INTERNAL MEDICINE

## 2017-04-27 PROCEDURE — 20000004 ZZH R&B ICU UMMC

## 2017-04-27 PROCEDURE — 80048 BASIC METABOLIC PNL TOTAL CA: CPT | Performed by: INTERNAL MEDICINE

## 2017-04-27 PROCEDURE — 40000275 ZZH STATISTIC RCP TIME EA 10 MIN

## 2017-04-27 PROCEDURE — 40000983 ZZH STATISTIC HFNC ADULT NON-CPAP

## 2017-04-27 RX ORDER — POTASSIUM CHLORIDE 29.8 MG/ML
20 INJECTION INTRAVENOUS
Status: DISCONTINUED | OUTPATIENT
Start: 2017-04-27 | End: 2017-05-02 | Stop reason: HOSPADM

## 2017-04-27 RX ORDER — LIDOCAINE 40 MG/G
CREAM TOPICAL
Status: DISCONTINUED | OUTPATIENT
Start: 2017-04-27 | End: 2017-05-02 | Stop reason: HOSPADM

## 2017-04-27 RX ORDER — POTASSIUM CL/LIDO/0.9 % NACL 10MEQ/0.1L
10 INTRAVENOUS SOLUTION, PIGGYBACK (ML) INTRAVENOUS
Status: DISCONTINUED | OUTPATIENT
Start: 2017-04-27 | End: 2017-05-02 | Stop reason: HOSPADM

## 2017-04-27 RX ORDER — MAGNESIUM SULFATE HEPTAHYDRATE 40 MG/ML
4 INJECTION, SOLUTION INTRAVENOUS EVERY 4 HOURS PRN
Status: DISCONTINUED | OUTPATIENT
Start: 2017-04-27 | End: 2017-05-02 | Stop reason: HOSPADM

## 2017-04-27 RX ORDER — MORPHINE SULFATE 15 MG/1
15 TABLET ORAL EVERY 8 HOURS PRN
Status: DISCONTINUED | OUTPATIENT
Start: 2017-04-27 | End: 2017-05-02 | Stop reason: HOSPADM

## 2017-04-27 RX ORDER — POTASSIUM CHLORIDE 7.45 MG/ML
10 INJECTION INTRAVENOUS
Status: DISCONTINUED | OUTPATIENT
Start: 2017-04-27 | End: 2017-05-02 | Stop reason: HOSPADM

## 2017-04-27 RX ORDER — LORATADINE 10 MG/1
10 TABLET ORAL DAILY
Status: DISCONTINUED | OUTPATIENT
Start: 2017-04-27 | End: 2017-05-02 | Stop reason: HOSPADM

## 2017-04-27 RX ORDER — MORPHINE SULFATE 15 MG/1
15 TABLET ORAL EVERY 8 HOURS PRN
Status: DISCONTINUED | OUTPATIENT
Start: 2017-04-27 | End: 2017-04-27

## 2017-04-27 RX ORDER — POTASSIUM CHLORIDE 750 MG/1
20-40 TABLET, EXTENDED RELEASE ORAL
Status: DISCONTINUED | OUTPATIENT
Start: 2017-04-27 | End: 2017-05-02 | Stop reason: HOSPADM

## 2017-04-27 RX ORDER — HEPARIN SODIUM,PORCINE 10 UNIT/ML
2-5 VIAL (ML) INTRAVENOUS
Status: DISCONTINUED | OUTPATIENT
Start: 2017-04-27 | End: 2017-05-02 | Stop reason: HOSPADM

## 2017-04-27 RX ORDER — POTASSIUM CHLORIDE 1.5 G/1.58G
20-40 POWDER, FOR SOLUTION ORAL
Status: DISCONTINUED | OUTPATIENT
Start: 2017-04-27 | End: 2017-05-02 | Stop reason: HOSPADM

## 2017-04-27 RX ADMIN — DOCUSATE SODIUM 100 MG: 100 CAPSULE, LIQUID FILLED ORAL at 07:29

## 2017-04-27 RX ADMIN — ALPRAZOLAM 1 MG: 0.5 TABLET ORAL at 23:41

## 2017-04-27 RX ADMIN — PIPERACILLIN SODIUM,TAZOBACTAM SODIUM 3.38 G: 3; .375 INJECTION, POWDER, FOR SOLUTION INTRAVENOUS at 16:05

## 2017-04-27 RX ADMIN — OXYCODONE HYDROCHLORIDE AND ACETAMINOPHEN 1 TABLET: 10; 325 TABLET ORAL at 03:02

## 2017-04-27 RX ADMIN — ENOXAPARIN SODIUM 40 MG: 40 INJECTION SUBCUTANEOUS at 16:06

## 2017-04-27 RX ADMIN — IPRATROPIUM BROMIDE AND ALBUTEROL SULFATE 3 ML: .5; 3 SOLUTION RESPIRATORY (INHALATION) at 13:20

## 2017-04-27 RX ADMIN — IPRATROPIUM BROMIDE AND ALBUTEROL SULFATE 3 ML: .5; 3 SOLUTION RESPIRATORY (INHALATION) at 17:37

## 2017-04-27 RX ADMIN — ALPRAZOLAM 1 MG: 0.5 TABLET ORAL at 06:57

## 2017-04-27 RX ADMIN — EMTRICITABINE AND TENOFOVIR DISOPROXIL FUMARATE 1 TABLET: 200; 300 TABLET, FILM COATED ORAL at 07:30

## 2017-04-27 RX ADMIN — OXYCODONE HYDROCHLORIDE AND ACETAMINOPHEN 1 TABLET: 10; 325 TABLET ORAL at 07:29

## 2017-04-27 RX ADMIN — IPRATROPIUM BROMIDE AND ALBUTEROL SULFATE 3 ML: .5; 3 SOLUTION RESPIRATORY (INHALATION) at 21:26

## 2017-04-27 RX ADMIN — METHYLPREDNISOLONE SODIUM SUCCINATE 62.5 MG: 125 INJECTION, POWDER, LYOPHILIZED, FOR SOLUTION INTRAMUSCULAR; INTRAVENOUS at 05:18

## 2017-04-27 RX ADMIN — IPRATROPIUM BROMIDE AND ALBUTEROL SULFATE 3 ML: .5; 3 SOLUTION RESPIRATORY (INHALATION) at 08:31

## 2017-04-27 RX ADMIN — PIPERACILLIN SODIUM,TAZOBACTAM SODIUM 3.38 G: 3; .375 INJECTION, POWDER, FOR SOLUTION INTRAVENOUS at 02:59

## 2017-04-27 RX ADMIN — PIPERACILLIN SODIUM,TAZOBACTAM SODIUM 3.38 G: 3; .375 INJECTION, POWDER, FOR SOLUTION INTRAVENOUS at 08:45

## 2017-04-27 RX ADMIN — POTASSIUM CHLORIDE 10 MEQ: 14.9 INJECTION, SOLUTION, CONCENTRATE PARENTERAL at 16:06

## 2017-04-27 RX ADMIN — CYANOCOBALAMIN TAB 1000 MCG 1000 MCG: 1000 TAB at 07:30

## 2017-04-27 RX ADMIN — MORPHINE SULFATE 15 MG: 15 TABLET ORAL at 20:51

## 2017-04-27 RX ADMIN — PIPERACILLIN SODIUM,TAZOBACTAM SODIUM 3.38 G: 3; .375 INJECTION, POWDER, FOR SOLUTION INTRAVENOUS at 23:09

## 2017-04-27 RX ADMIN — VANCOMYCIN HYDROCHLORIDE 1500 MG: 10 INJECTION, POWDER, LYOPHILIZED, FOR SOLUTION INTRAVENOUS at 20:34

## 2017-04-27 RX ADMIN — LIDOCAINE 2 PATCH: 50 PATCH TOPICAL at 20:37

## 2017-04-27 RX ADMIN — LORATADINE 10 MG: 10 TABLET ORAL at 11:06

## 2017-04-27 RX ADMIN — POTASSIUM PHOSPHATE, MONOBASIC AND POTASSIUM PHOSPHATE, DIBASIC 20 MMOL: 224; 236 INJECTION, SOLUTION INTRAVENOUS at 18:17

## 2017-04-27 RX ADMIN — OXYCODONE HYDROCHLORIDE AND ACETAMINOPHEN 1 TABLET: 10; 325 TABLET ORAL at 17:48

## 2017-04-27 RX ADMIN — POTASSIUM CHLORIDE 10 MEQ: 14.9 INJECTION, SOLUTION, CONCENTRATE PARENTERAL at 18:17

## 2017-04-27 RX ADMIN — VANCOMYCIN HYDROCHLORIDE 1500 MG: 10 INJECTION, POWDER, LYOPHILIZED, FOR SOLUTION INTRAVENOUS at 05:28

## 2017-04-27 RX ADMIN — SENNOSIDES 2 TABLET: 8.6 TABLET, FILM COATED ORAL at 22:17

## 2017-04-27 RX ADMIN — DOLUTEGRAVIR SODIUM 50 MG: 50 TABLET, FILM COATED ORAL at 07:30

## 2017-04-27 ASSESSMENT — PAIN DESCRIPTION - DESCRIPTORS
DESCRIPTORS: ACHING;SORE
DESCRIPTORS: ACHING;SORE
DESCRIPTORS: ACHING
DESCRIPTORS: ACHING

## 2017-04-27 NOTE — SIGNIFICANT EVENT
"Pt called RN into the room, requested percoset, xanax, and morphine to be given all at once. RN checked MAR and stated he could only have xanax at this time and morphine is not ordered. Pt stated \"I will just have my wife bring it.\" RN advised him against this, as pt are not allowed to take outside narcotics. Pt then started to threaten to leave AMA. Pt stated \"tell my nurse that I am going to be putting my clothes on, I will be ready to leave sometime after breakfast.\"   "

## 2017-04-27 NOTE — PLAN OF CARE
Problem: Goal Outcome Summary  Goal: Goal Outcome Summary  Outcome: No Change  Pt stable overnight. Remains on HFNC 80%. Tachypneic with RR upper 20s-40s. Productive cough, sputum culture sent. He c/o chronic back pain with some relief in discomfort from PRN percocet given x2. Pt generally anxious, c/o increased anxiety around 2130. Xanax given with subsequent decrease in anxiety.      Pt c/o pain at left lower forearm IV site while vanco infusing at start of shift. IV noted to be infiltrated. Skin red and swollen superior to IV site. IV dc'd and ice pack applied per extravasation policy. Photo uploaded to EPIC.      Continue with plan of care. See charting for detailed assessments and interventions.

## 2017-04-27 NOTE — PROGRESS NOTES
[facility]    MICU Progress Note  Patient: Tommy Ross   Admission date: 2017  MRN: [id number]  : 1970      ______________________________________________________________________________  ASSESSMENT AND PLAN  Tommy Ross is a 45 y/o man with PMHx significant for HIV (followed by HIV clinic here), recurrent hypoxic respiratory failure requiring multiple intubations in the past, and recent hospitalization - at OSH for CAP who now presents with acute hypoxic respiratory failure secondary to underlying pneumonia versus hypersensitivity pneumonitis. Patient currently on BiPAP/HFNC continuously but at risk for needing urgent intubation.     CHANGES TODAY  --Stable alternating between high flow/bipap.    --Home oral meds have been ordered  --Will continue current abx and steroids  --Monitor respiratory status - watch for signs of tiring out.      NEURO/PSYCH  # Sedation  # Pain  --Continued home Percocet PRN     CARDIOVASCULAR  # Sinus Tachycardia(improved)  EKG on admission normal. BNP elevated at 1777, but patient's presentation of respiratory failure is unlikely secondary to heart failure. ECHO  normal.   --Cardiac Monitor     PULMONARY  # Acute Hypoxic Respiratory Failure:  Differential includes infection vs heart failure vs pulmonary embolism vs hypersensitivity pneumonitis. Unclear etiology at this point but high suspicion for underlying infection. He has a history of multiple hospitalizations for pneumonia and ARDS requiring intubation. Of note, patient has HIV but has been well controlled with normal CD4 count in 10/2016. CD4 checked on admission and low at 61, but this was discussed with ID and ID felt it is likely low due to acute illness and his current presentation is not related to HIV.   --Continue solumedrol 60mg IV daily for possible Hypersentsitivity Pneumonitis.   --Continue broad abx  --Holding home albuterol and spiriva inhalers, Duonebs  q4h  --BiPAP/HFNC, high risk for intubation  --Patient currently wants to avoid intubation  --sputum culture if able.      GI  No Acute issues.     # Nutrition: NPO while on continuous bipap - pt intermittently tolerating diet while on high flow.  .     RENAL  # Mild Acute Kidney Injury-Improving:  Creatinine 1.2 on admission. Overall downtrending. His baseline recently is about 0.9-1.2.   --Strict I/Os  --Monitor     HEME/ONC  # Leukocytosis  Response to underlying infection vs Steroids.  --Trend infection as below  --Monitor     # Mild Thrombocytopenia  --Monitor     ENDOCRINE  No acute issues.     INFECTIOUS DISEASE  # Possible Community Acquired Pneumonia:  Procalcitionin significantly elevated at 6.36 with leukocytosis to 24k. Patient's chest CT 4/25 significant for extensive ground-glass infiltrates throughout the bilateral lungs concerning for possible infectious process, pneumonitis or pulmonary edema.  PCP infection considered in the setting of HIV positive patient, but he has been compliant with medications and recent CD4 count normal as outpatient. Discussed with ID and no need to cover for MAC or PCP at this time.   --Follow blood cultures  --Will continue broad abx with vanc and zosyn  --Procal and CRP elevated    # History of HIV Infection:  Patient follows with Dr. Alegria here at the [facility] and has been very compliant with all of his medications. Recent CD4 count in 10/2016 normal, but now low on admission at 61 which could be related to acute illness. HIV viral load is still pending.    --Continue Truvada and Doltegravir    Antimicrobials Summary  Vancomycin (4/25-present)  Zosyn (4/25-present)    SKIN/MSK  # Macular Rash on Chest- Improving   --Monitor  --Hydrocortisone cream  --Currently on steroids    Lines: PIV x2  Prophylaxis:      - DVT: mechanical, Lovenox - monitor platelet count.      - GI: n/a  Family: Updated wife this AM.    Disposition: Currently high risk for needing intubation    Code Status: Full     The patient was examined with Dr. Juárez who agrees with the plan.    Tico Thrasehr MD  Internal Medicine PGY2        =========================================================================  SUBJECTIVE:    Nursing notes reviewed. No acute issues overnight. Feels more short of breath while sleeping, otherwise feels his breathing is a little more improved.  No fevers.  Stable vitals.  Tolerating diet on high flow nasal canula this AM.      OBJECTIVE:  Blood pressure 123/80, temperature 97.9  F (36.6  C), temperature source Oral, resp. rate 25, weight 79.9 kg (176 lb 2.4 oz), SpO2 91 %.      I/O last 3 completed shifts:  In: 3230 [P.O.:2120; I.V.:1110]  Out: 1310 [Urine:1310]  Wt Readings from Last 5 Encounters:   04/27/17 79.9 kg (176 lb 2.4 oz)   04/25/17 79.4 kg (175 lb)   04/24/17 78.9 kg (174 lb)   03/31/17 78.4 kg (172 lb 12.8 oz)   03/07/17 76.7 kg (169 lb 3.2 oz)       FiO2 (%): 80 %  Resp: 25  BP - Mean:  [] 93    General: NAD, pleasant and cooperative with interview and exam  HEENT: Sclera anicteric, oral mucosa moist and without lesions appreciated;   CV: RRR, no murmurs, rubs, gallops or extra heart sounds  Resp: Intermittent crackles throughout, no wheezing, bipap on, moving air well, symmetric chest rise  Abdomen: +bs, soft, nontender, nondistended, no organomegaly appreciated   Extremities: No clubbing or cyanosis, no LE edema bilaterally, peripheral pulses full   MSK: no active synovitis or joint deformity appreciated  Skin: Warm and dry, no jaundice, rash or concerning lesions  Neurological: Alert and oriented, following commands, strength symmetric    DIAGNOSTIC STUDIES  ROUTINE ICU LABS  CMP    Recent Labs  Lab 04/27/17  0416 04/26/17  0503 04/25/17  2100 04/25/17  1732 04/25/17  1230    140  --  138 142   POTASSIUM 3.6 4.6  --  4.0 3.6   CHLORIDE 109 106  --  104 109   CO2 27 28  --  27 25   ANIONGAP 6 6  --  7 8   * 126* 156* 129* 118*   BUN  24 18  --  14 16   CR 1.11 1.00  --  1.19 1.15   GFRESTIMATED 71 80  --  65 68   GFRESTBLACK 86 >90African American GFR Calc  --  79 82   AUSTIN 8.4* 9.1  --  8.8 8.2*   PROTTOTAL  --  6.4*  --  6.8 6.4*   ALBUMIN  --  2.9*  --  3.3* 3.2*   BILITOTAL  --  2.1*  --  2.2* 1.9*   ALKPHOS  --  86  --  99 93   AST  --  40  --  51* 61*   ALT  --  22  --  27 30     CBC    Recent Labs  Lab 04/27/17  0416 04/26/17  0503 04/25/17  1732 04/25/17  1230   WBC 23.6* 19.7* 24.7* 22.3*   RBC 4.81 5.16 5.39 5.33   HGB 14.3 15.6 16.7 15.8   HCT 43.6 46.5 48.4 47.3   MCV 91 90 90 89   MCH 29.7 30.2 31.0 29.6   MCHC 32.8 33.5 34.5 33.4   RDW 13.8 13.9 14.2 14.2   * 113* 96* 133*     INR    Recent Labs  Lab 04/26/17  0503   INR 1.36*     Lactic Acid    Recent Labs  Lab 04/26/17  0503 04/25/17  1951 04/25/17  1611 04/25/17  1214   LACT 1.6 1.8 1.3 1.1     Arterial Blood Gas    Recent Labs  Lab 04/27/17  0909 04/25/17  2100 04/25/17  1611 04/25/17  1214   PH 7.39 7.40 7.41  --    PCO2 43 44 42  --    PO2 80 85 136*  --    HCO3 26 27 27  --    O2PER 80 80 80 100     Venous Blood Gas     Recent Labs  Lab 04/27/17  0909 04/25/17  2100 04/25/17  1611 04/25/17  1214   PHV  --   --   --  7.41   PCO2V  --   --   --  39*   PO2V  --   --   --  181*   HCO3V  --   --   --  25   AI  --   --   --  0.1   O2PER 80 80 80 100       MICROBIOLOGY  Significant culture results:  Blood Culture 4/25/17- No growth   Influenza 4/23/17- Negative  Urine Culture 4/23/17- No growth, final   Urine Strep Ag 4/23/17- Negative  Blood Culture 4/23/17- No growth    IMAGING  CXR 4/26/17  IMPRESSION:   Again seen bilateral diffuse pulmonary opacities, slightly decreased  in the left midlung. Differentials pulmonary edema, infection or  inflammation.    CT Chest 4/25/17  IMPRESSION:  1. Extensive ground-glass infiltrates throughout the bilateral lungs  could represent pulmonary edema, infectious, or inflammatory process.  No pleural effusion or cardiomegaly to suggest  congestive heart  failure.  2. No definite pulmonary artery embolism is identified, however, study  is significantly limited due to extensive motion artifact. Small  pulmonary artery emboli could be missed.  3. Multiple mildly enlarged mediastinal lymph nodes are noted and are  fairly similar to the prior study dated 12/13/2014. These are  difficult to evaluate due to the motion artifact.

## 2017-04-27 NOTE — PLAN OF CARE
Problem: Goal Outcome Summary  Goal: Goal Outcome Summary  OT 4A: Acknowledge new OT orders, per chart review there is no change in status requiring IP OT at this time. PT continues to follow for endurance within respiratory tolerance. Will complete OT order.

## 2017-04-28 ENCOUNTER — APPOINTMENT (OUTPATIENT)
Dept: PHYSICAL THERAPY | Facility: CLINIC | Age: 47
DRG: 166 | End: 2017-04-28
Attending: INTERNAL MEDICINE
Payer: MEDICARE

## 2017-04-28 ENCOUNTER — APPOINTMENT (OUTPATIENT)
Dept: GENERAL RADIOLOGY | Facility: CLINIC | Age: 47
DRG: 166 | End: 2017-04-28
Attending: INTERNAL MEDICINE
Payer: MEDICARE

## 2017-04-28 LAB
BACTERIA SPEC CULT: NORMAL
CREAT SERPL-MCNC: 0.98 MG/DL (ref 0.66–1.25)
CRP SERPL-MCNC: 55 MG/L (ref 0–8)
GFR SERPL CREATININE-BSD FRML MDRD: 81 ML/MIN/1.7M2
GLUCOSE BLDC GLUCOMTR-MCNC: 207 MG/DL (ref 70–99)
GLUCOSE BLDC GLUCOMTR-MCNC: 217 MG/DL (ref 70–99)
INTERPRETATION ECG - MUSE: NORMAL
MICRO REPORT STATUS: NORMAL
PHOSPHATE SERPL-MCNC: 2.6 MG/DL (ref 2.5–4.5)
PLATELET # BLD AUTO: 126 10E9/L (ref 150–450)
SPECIMEN SOURCE: NORMAL

## 2017-04-28 PROCEDURE — 25000132 ZZH RX MED GY IP 250 OP 250 PS 637: Mod: GY | Performed by: INTERNAL MEDICINE

## 2017-04-28 PROCEDURE — 25000125 ZZHC RX 250: Performed by: INTERNAL MEDICINE

## 2017-04-28 PROCEDURE — 25000128 H RX IP 250 OP 636: Performed by: INTERNAL MEDICINE

## 2017-04-28 PROCEDURE — 40000193 ZZH STATISTIC PT WARD VISIT

## 2017-04-28 PROCEDURE — 20000004 ZZH R&B ICU UMMC

## 2017-04-28 PROCEDURE — 84100 ASSAY OF PHOSPHORUS: CPT | Performed by: INTERNAL MEDICINE

## 2017-04-28 PROCEDURE — 36415 COLL VENOUS BLD VENIPUNCTURE: CPT | Performed by: INTERNAL MEDICINE

## 2017-04-28 PROCEDURE — 00000146 ZZHCL STATISTIC GLUCOSE BY METER IP

## 2017-04-28 PROCEDURE — 99291 CRITICAL CARE FIRST HOUR: CPT | Mod: GC | Performed by: INTERNAL MEDICINE

## 2017-04-28 PROCEDURE — 25000131 ZZH RX MED GY IP 250 OP 636 PS 637: Mod: GY | Performed by: INTERNAL MEDICINE

## 2017-04-28 PROCEDURE — 94640 AIRWAY INHALATION TREATMENT: CPT

## 2017-04-28 PROCEDURE — 40000275 ZZH STATISTIC RCP TIME EA 10 MIN

## 2017-04-28 PROCEDURE — A9270 NON-COVERED ITEM OR SERVICE: HCPCS | Mod: GY | Performed by: INTERNAL MEDICINE

## 2017-04-28 PROCEDURE — 82565 ASSAY OF CREATININE: CPT | Performed by: INTERNAL MEDICINE

## 2017-04-28 PROCEDURE — 85049 AUTOMATED PLATELET COUNT: CPT | Performed by: INTERNAL MEDICINE

## 2017-04-28 PROCEDURE — 86140 C-REACTIVE PROTEIN: CPT | Performed by: INTERNAL MEDICINE

## 2017-04-28 PROCEDURE — 71010 XR CHEST PORT 1 VW: CPT

## 2017-04-28 PROCEDURE — 94640 AIRWAY INHALATION TREATMENT: CPT | Mod: 76

## 2017-04-28 PROCEDURE — 97530 THERAPEUTIC ACTIVITIES: CPT | Mod: GP

## 2017-04-28 RX ORDER — MINOCYCLINE HYDROCHLORIDE 100 MG/1
100 CAPSULE ORAL EVERY 12 HOURS SCHEDULED
Status: DISCONTINUED | OUTPATIENT
Start: 2017-04-28 | End: 2017-04-29

## 2017-04-28 RX ORDER — DEXTROSE MONOHYDRATE 25 G/50ML
25-50 INJECTION, SOLUTION INTRAVENOUS
Status: DISCONTINUED | OUTPATIENT
Start: 2017-04-28 | End: 2017-05-02 | Stop reason: HOSPADM

## 2017-04-28 RX ORDER — NICOTINE POLACRILEX 4 MG
15-30 LOZENGE BUCCAL
Status: DISCONTINUED | OUTPATIENT
Start: 2017-04-28 | End: 2017-05-02 | Stop reason: HOSPADM

## 2017-04-28 RX ORDER — CEFTRIAXONE 1 G/1
1 INJECTION, POWDER, FOR SOLUTION INTRAMUSCULAR; INTRAVENOUS EVERY 24 HOURS
Status: DISCONTINUED | OUTPATIENT
Start: 2017-04-28 | End: 2017-05-02 | Stop reason: HOSPADM

## 2017-04-28 RX ADMIN — IPRATROPIUM BROMIDE AND ALBUTEROL SULFATE 3 ML: .5; 3 SOLUTION RESPIRATORY (INHALATION) at 09:30

## 2017-04-28 RX ADMIN — OXYCODONE HYDROCHLORIDE AND ACETAMINOPHEN 1 TABLET: 10; 325 TABLET ORAL at 05:32

## 2017-04-28 RX ADMIN — LIDOCAINE 2 PATCH: 50 PATCH TOPICAL at 20:52

## 2017-04-28 RX ADMIN — EMTRICITABINE AND TENOFOVIR DISOPROXIL FUMARATE 1 TABLET: 200; 300 TABLET, FILM COATED ORAL at 07:57

## 2017-04-28 RX ADMIN — METHYLPREDNISOLONE SODIUM SUCCINATE 62.5 MG: 125 INJECTION, POWDER, LYOPHILIZED, FOR SOLUTION INTRAMUSCULAR; INTRAVENOUS at 05:33

## 2017-04-28 RX ADMIN — MORPHINE SULFATE 15 MG: 15 TABLET ORAL at 07:57

## 2017-04-28 RX ADMIN — ENOXAPARIN SODIUM 40 MG: 40 INJECTION SUBCUTANEOUS at 17:12

## 2017-04-28 RX ADMIN — LORATADINE 10 MG: 10 TABLET ORAL at 07:49

## 2017-04-28 RX ADMIN — ALBUTEROL SULFATE 2.5 MG: 2.5 SOLUTION RESPIRATORY (INHALATION) at 05:07

## 2017-04-28 RX ADMIN — OXYCODONE HYDROCHLORIDE AND ACETAMINOPHEN 1 TABLET: 10; 325 TABLET ORAL at 17:18

## 2017-04-28 RX ADMIN — PIPERACILLIN SODIUM,TAZOBACTAM SODIUM 3.38 G: 3; .375 INJECTION, POWDER, FOR SOLUTION INTRAVENOUS at 10:59

## 2017-04-28 RX ADMIN — OXYCODONE HYDROCHLORIDE AND ACETAMINOPHEN 1 TABLET: 10; 325 TABLET ORAL at 21:36

## 2017-04-28 RX ADMIN — MINOCYCLINE HYDROCHLORIDE 100 MG: 100 CAPSULE ORAL at 20:50

## 2017-04-28 RX ADMIN — PIPERACILLIN SODIUM,TAZOBACTAM SODIUM 3.38 G: 3; .375 INJECTION, POWDER, FOR SOLUTION INTRAVENOUS at 05:39

## 2017-04-28 RX ADMIN — IPRATROPIUM BROMIDE AND ALBUTEROL SULFATE 3 ML: .5; 3 SOLUTION RESPIRATORY (INHALATION) at 12:40

## 2017-04-28 RX ADMIN — IPRATROPIUM BROMIDE AND ALBUTEROL SULFATE 3 ML: .5; 3 SOLUTION RESPIRATORY (INHALATION) at 16:00

## 2017-04-28 RX ADMIN — CEFTRIAXONE 1 G: 1 INJECTION, POWDER, FOR SOLUTION INTRAMUSCULAR; INTRAVENOUS at 17:12

## 2017-04-28 RX ADMIN — SODIUM CHLORIDE 500 MG: 9 INJECTION, SOLUTION INTRAVENOUS at 12:42

## 2017-04-28 RX ADMIN — VANCOMYCIN HYDROCHLORIDE 1750 MG: 1 INJECTION, POWDER, LYOPHILIZED, FOR SOLUTION INTRAVENOUS at 07:57

## 2017-04-28 RX ADMIN — MORPHINE SULFATE 15 MG: 15 TABLET ORAL at 20:49

## 2017-04-28 RX ADMIN — DOCUSATE SODIUM 100 MG: 100 CAPSULE, LIQUID FILLED ORAL at 07:49

## 2017-04-28 RX ADMIN — DOLUTEGRAVIR SODIUM 50 MG: 50 TABLET, FILM COATED ORAL at 07:57

## 2017-04-28 RX ADMIN — INSULIN ASPART 2 UNITS: 100 INJECTION, SOLUTION INTRAVENOUS; SUBCUTANEOUS at 17:50

## 2017-04-28 RX ADMIN — CYANOCOBALAMIN TAB 1000 MCG 1000 MCG: 1000 TAB at 07:49

## 2017-04-28 RX ADMIN — IPRATROPIUM BROMIDE AND ALBUTEROL SULFATE 3 ML: .5; 3 SOLUTION RESPIRATORY (INHALATION) at 20:53

## 2017-04-28 ASSESSMENT — PAIN DESCRIPTION - DESCRIPTORS
DESCRIPTORS: ACHING
DESCRIPTORS: ACHING

## 2017-04-28 NOTE — PROGRESS NOTES
General Infectious Disease Service Progress Note     Patient:  Tommy Ross  Date of birth 1970, Medical record number [id number]  Date of Visit:  04/28/2017  Attending Physician: Johnny Juárez*         Assessment and Recommendations:       Tommy Rsos is a 46 year old male with HIV admitted for acute hypoxic respiratory failure 2/2 to pneumonia / hypersensitivity pneumonitis.     Assessment:  1. Pneumonitis / pneumonia - bilateral involvement and abrupt onset, also associated with Hives, suggests this is allergy mediated.  -- This recurrent history of respiratory failure either makes Mr. Ross incredibly unlucky with recurrent infections (a B-cell defect would predispose to infection with encapsulated organisms)  2. Elevated CRP - responding to antibiotics and/or steroids 360 -> 55 mg/L suggests the steriods are more the responsive element. The degree of inflammation and rapidness of CRP decline suggest non-infectious etiology.   3. HIV - prior CD4 in 700s.   View this CD4 as spuriously low due to acute illness.  Does not have an opportunistic infection      Recommendations:  1. Consider switching vancomycin to minocycline 100mg PO BID x 7 days for MRSA and Strep pneumoniae coverage. Also will have atypical coverage.  2. Could switch Zosyn to ceftriaxone 1g IV Q24Hr to simplify community acquired pneumonia therapy.   The anti-pseudomonal activity isn't needed here.  3. Send screening with: allergy tree, mold, and grass panel allergy panel. I sent total IgE yesterday. (ordered)   An allergy to something on the tree panel would make sense by history as he was raking leaves before this happened.   4. Send total IgG, IgM, and IgA to exclude concomitant B-cell immunodeficiency (e.g. CVID) as cause for recurrent pulmonary events. (ordered)     [employer]  ID Staff  [id number]          Interval History:     HPI:  The interval history was reviewed.  Slowly improving.  WBC trending downward. CRP down from  360 -> 55 mg/L in 48 hours. On vanc+ZOysn+steroids.        Pertinent cultures include:  Culture Micro   Date Value Ref Range Status   04/26/2017 Culture negative < 24 hours, reincubate  Final   04/25/2017 No growth after 3 days  Final   04/25/2017 No growth after 3 days  Final   04/25/2017 No growth after 3 days  Final   04/25/2017 No growth after 3 days  Final       CRP Trend:  Recent Labs   Lab Test  04/28/17   0447  04/27/17   0416  04/26/17   0503  02/16/16   1113  03/19/15   2047  06/02/14   0736   CRP  55.0*  150.0*  360.0*  11.8*  7.4  44.2*            Review of Systems:   CONSTITUTIONAL:    Fever (neg)  ; no chills.      EYES: negative for icterus  ENT:  negative for hearing loss, tinnitus and sore throat  RESPIRATORY:  See HPI.  CARDIOVASCULAR:  negative for chest pain, dyspnea  GASTROINTESTINAL:  negative for nausea, vomiting, diarrhea and constipation  GENITOURINARY:  negative for dysuria  HEME:  +HIV, +viral suppression.  CD4 = 700s in 2016.  INTEGUMENT:  negative for rash and pruritus  NEURO:  Negative for headache         Current Medications (antimicrobials listed in bold):       loratadine  10 mg Oral Daily     enoxaparin  40 mg Subcutaneous Q24H     vancomycin (VANCOCIN) IV  1,750 mg Intravenous Q12H     cyanocobalamin  1,000 mcg Oral Daily     docusate sodium  100 mg Oral Daily     FLUoxetine  20 mg Oral Daily     ipratropium - albuterol 0.5 mg/2.5 mg/3 mL  3 mL Nebulization 4x daily     emtricitabine-tenofovir  1 tablet Oral Daily     dolutegravir  50 mg Oral Daily     piperacillin-tazobactam  3.375 g Intravenous Q6H     methylPREDNISolone  62.5 mg Intravenous Q24H     influenza quadrivalent (PF) vacc age 3 yrs and older  0.5 mL Intramuscular Prior to discharge     lidocaine  1-2 patch Transdermal Q24h    And     lidocaine   Transdermal Q24H    And     lidocaine   Transdermal Q8H              Allergies:     Allergies   Allergen Reactions     Diphenhydramine Citrate Anaphylaxis     Estradiol  Shortness Of Breath     CMV-TMPDS     Ibuprofen [Ibuprofen/Ibuprofen Lysinate] Shortness Of Breath     Nortriptyline Shortness Of Breath     Prochlorperazine Shortness Of Breath     Ranitidine Shortness Of Breath     Cytomegalovirus Immune Globulin Unknown     SOB     Demerol [Meperidine]      anxiety     Gabapentin Hives     Icy Hot [Analgesic Rosie]      anxiety     Lyrica      Methocarbamol      anxiety     Naratriptan      Pregabalin Unknown     Anxiety and nightmares     Tegretol [Carbamazepine] Hives     Topamax [Topiramate]      anxiety     Tramadol           Physical Exam:   Vitals were reviewed  Patient Vitals for the past 24 hrs:   BP Temp Temp src Heart Rate Resp SpO2 Weight   04/28/17 0900 120/83 - - 96 (!) 42 91 % -   04/28/17 0845 - - - 98 (!) 32 94 % -   04/28/17 0820 - - - 112 (!) 49 94 % -   04/28/17 0800 117/79 99.3  F (37.4  C) Oral 100 (!) 40 94 % -   04/28/17 0757 - - - 100 (!) 44 94 % -   04/28/17 0700 120/78 - - 113 30 95 % -   04/28/17 0600 124/77 - - 118 (!) 44 96 % -   04/28/17 0500 129/80 98.8  F (37.1  C) Axillary 110 (!) 43 97 % -   04/28/17 0400 - - - 115 14 98 % -   04/28/17 0300 112/75 - - 95 29 94 % -   04/28/17 0200 103/62 - - 115 23 94 % -   04/28/17 0100 118/76 - - 105 (!) 33 97 % -   04/28/17 0000 113/75 98.8  F (37.1  C) Oral 104 28 95 % 81.6 kg (179 lb 14.3 oz)   04/27/17 2300 149/83 - - - - 93 % -   04/27/17 2200 114/69 - - 126 (!) 39 93 % -   04/27/17 2126 - - - - - 96 % -   04/27/17 2100 125/74 - - 117 26 90 % -   04/27/17 2000 126/83 97.5  F (36.4  C) Oral 137 (!) 39 95 % -   04/27/17 1900 131/79 - - 124 (!) 38 98 % -   04/27/17 1800 (!) 142/93 - - 105 23 98 % -   04/27/17 1700 137/87 - - 102 - 98 % -   04/27/17 1600 106/72 98.3  F (36.8  C) Oral 78 29 96 % -   04/27/17 1500 109/69 - - 85 (!) 31 96 % -   04/27/17 1400 107/68 - - 95 (!) 32 93 % -   04/27/17 1300 111/76 - - 76 30 92 % -   04/27/17 1200 106/69 97.9  F (36.6  C) Oral 72 (!) 32 92 % -   04/27/17 1100 136/70 - - -  - - -   04/27/17 1000 123/80 - - 106 25 91 % -       Physical Examination:  GENERAL:  In ICU. reamins in mod respiratory distress, improved.   HEENT:  Head is normocephalic, atraumatic   HEENT: Sclera anicteric, oral mucosa moist and without lesions appreciated;   CV: RRR, no murmurs, rubs, gallops or extra heart sounds  Resp: Intermittent crackles throughout, no wheezing, bipap on, moving air well, symmetric chest rise  Abdomen: +bs, soft, nontender, nondistended, no organomegaly appreciated   Extremities: No clubbing or cyanosis, no LE edema bilaterally, peripheral pulses full   MSK: no active synovitis or joint deformity appreciated  Skin: Warm and dry, no jaundice, rash or concerning lesions  Neurological: Alert and oriented, following commands, strength symmetric         Laboratory Data:   ID Labs:  Recent Labs   Lab Test  04/28/17   0447  04/27/17   0416  04/26/17   0503  02/16/16   1113  03/19/15   2047  06/02/14   0736   CRP  55.0*  150.0*  360.0*  11.8*  7.4  44.2*     Recent Labs   Lab Test  04/27/17   1647  04/27/17   0416  04/26/17   0503  04/25/17   1732  04/25/17   1230  04/24/17   0850   WBC  18.4*  23.6*  19.7*  24.7*  22.3*  12.8*     Recent Labs   Lab Test  04/28/17   0447  04/27/17   1836  04/27/17   0416  04/26/17   0503   CR  0.98  0.99  1.11  1.00   GFRESTIMATED  81  81  71  80       Hematology Studies  Recent Labs   Lab Test  04/28/17   0447  04/27/17   1647  04/27/17   0416  04/26/17   0503  04/25/17   1732  04/25/17   1230  04/24/17   0850  04/23/17   2116   11/21/16   1450  10/14/16   1253  09/12/16   1335   WBC   --   18.4*  23.6*  19.7*  24.7*  22.3*  12.8*  23.7*   < >  19.1*  10.2  10.9   ANEU   --    --   21.2*   --    --   19.6*   --   19.7*   --   17.6*  6.5  7.1   AEOS   --    --   0.0   --    --   0.0   --   0.0   --   0.0  0.1  0.1   HCT   --   43.7  43.6  46.5  48.4  47.3  48.4  50.0   < >  46.3  54.0*  52.2   PLT  126*  134*  128*  113*  96*  133*  143*  160   < >  128*  160   144*    < > = values in this interval not displayed.       Metabolic  Recent Labs   Lab Test  04/28/17   0447  04/27/17   1836  04/27/17   0416  04/26/17   0503   NA   --   147*  142  140   BUN   --   22  24  18   CO2   --   23  27  28   CR  0.98  0.99  1.11  1.00   GFRESTIMATED  81  81  71  80       Hepatic Studies  Recent Labs   Lab Test  04/26/17   0503  04/25/17   1732  04/25/17   1230   BILITOTAL  2.1*  2.2*  1.9*   ALKPHOS  86  99  93   ALBUMIN  2.9*  3.3*  3.2*   AST  40  51*  61*   ALT  22  27  30       Immunologlobulins  Recent Labs   Lab Test  07/03/13   0827  08/01/10   0930   SED  3  24*         Attending Physician Attestation:  I have reviewed today's vital signs, medications, labs and imaging. I have reviewed and edited the note as above which reflects my assessment and plan. If a medical student is involved they are acting in a capacity as a scribe reflecting my findings.   Floor time: 25 minutes, Face-to-face time: 15 minutes, Total time: 40 minutes  Joe Cabrera MD, MPH, [facility]   Pager [phone]  Email: [email or web address]            Tommy Ross  46 years/male  MRN: [id number]        Attending Physician Attestation:  I have seen and evaluated Tommy Ross. I have discussed with the house staff team or resident(s), and I agree with the above documented findings and plan in this note. I have reviewed today's vital signs, medications, labs and imaging.  If a medical student was involved in this note, they were serving in a capacity as a scribe.  Floor time: 30 minutes  Face-to-face time: 15 minutes  Total time: 45 minutes     Joe Cabrera MD MPH  [facility]   Division of Infectious Diseases & International Medicine  Department of Medicine

## 2017-04-28 NOTE — PHARMACY-VANCOMYCIN DOSING SERVICE
Pharmacy Vancomycin Note  Date of Service 2017  Patient's  1970   46 year old male    Indication: Possible pneumonia with history of + MRSA nares  Goal Trough Level: 15-20 mg/L  Day of Therapy: 3 (started 17)  Current Vancomycin regimen:  1500 mg IV q12h    Current estimated CrCl = Estimated Creatinine Clearance: 104.2 mL/min (based on Cr of 0.99).    Creatinine for last 3 days  2017: 12:30 PM Creatinine 1.15 mg/dL;  5:32 PM Creatinine 1.19 mg/dL  2017:  5:03 AM Creatinine 1.00 mg/dL  2017:  4:16 AM Creatinine 1.11 mg/dL;  6:36 PM Creatinine 0.99 mg/dL    Recent Vancomycin Levels (past 3 days)  2017:  6:36 PM Vancomycin Level 11.1 mg/L    Vancomycin IV Administrations (past 72 hours)                   vancomycin (VANCOCIN) 1,500 mg in NaCl 0.9 % 250 mL intermittent infusion (mg) 1,500 mg New Bag 17     1,500 mg New Bag  0528     1,500 mg New Bag 17 1813     1,500 mg New Bag  0545              Nephrotoxins and other renal medications (Future)    Start     Dose/Rate Route Frequency Ordered Stop    17 0800  vancomycin (VANCOCIN) 1,750 mg in NaCl 0.9 % 500 mL intermittent infusion      1,750 mg Intravenous EVERY 12 HOURS 17 20417  piperacillin-tazobactam (ZOSYN) 3.375 g vial to attach to  mL bag      3.375 g  over 1 Hours Intravenous EVERY 6 HOURS 17 1837          Contrast Orders - past 72 hours (72h ago through future)    Start     Dose/Rate Route Frequency Ordered Stop    17 0745  perflutren diluted 1mL to 2mL with saline (OPTISON) diluted injection 6 mL      6 mL Intravenous ONCE 17 0738 17 0745        Interpretation of levels and current regimen:  Level of 11.1mg/L approximately 13 hours post-dose is below goal range. This is likely in part due to patient receiving only a partial dose with infiltration of infusion last night, and due to late trough level today. Will increase dose slightly.     Has  serum creatinine changed > 50% in last 72 hours: No    Urine output:  Adequate, although unclear if total output is able to be accurately measured.     Renal Function: Appears stable    Plan:  1.  Increase vancomycin dose to 1750mg IV q12h starting tomorrow morning.   2.  Pharmacy will check trough levels as appropriate in 1-3 Days.    3. Serum creatinine levels will be ordered daily for the first week of therapy and at least twice weekly for subsequent weeks.      Desiree Samson, PharmD  PGY-2 Critical Care Pharmacy Resident

## 2017-04-28 NOTE — PLAN OF CARE
Problem: Goal Outcome Summary  Goal: Goal Outcome Summary  PT 4A: pt tolerated session well. Safety unaware at times but able to amb around entirety of 4A with multiple standing rest breaks. Questionable signal from sats probe but demonstrated desat to low-mid 80s with activity on 15L oxyplus mask. Recovered quickly, however. Pt will likely be able to d/c home with assist and possibly outpatient pulmonary rehab.

## 2017-04-28 NOTE — PROVIDER NOTIFICATION
Notified Resident at 0100 AM regarding code status question and care refusal.      Spoke with: MICU resident    Orders were not obtained.    Comments: Pt and family have repeated stated that pt is DNI, however code status in the computer is FULL. MD will come to verify when able. Also notified that pt is refusing bipap tonight. Will continue to monitor.

## 2017-04-28 NOTE — PROGRESS NOTES
"While assisting patient on the commode, pt insisted that nurse leave the room. RN explained & educated patient as to concern for fall risk due to patient's high oxygen needs (60% FiO2) and generalized weakness upon standing. RN asked that patient allow him to stand next to him and only assist if patient became unstable. Patient continued to insist the RN leave the room, stating \"I do not let woman help me have a sh**\". Patient then removed Hi-Flow Nasal Cannula and said \"I will not put my oxygen on until you leave the room\". RN again explained concern for fall risk and stayed directly outside patient room monitoring vital signs. RN went into room upon pt standing without informing nurse & assisted patient safetly to bed.   "

## 2017-04-28 NOTE — PLAN OF CARE
Problem: Goal Outcome Summary  Goal: Goal Outcome Summary  Outcome: No Change  Alert and oriented. At 0400, appeared to be disoriented x3 but unable to tell if pt was purposefully answering orientation questions incorrectly or actually disoriented. Sexually inappropriate at times. At 0400 after being woken up for assessment, pt became agitated, ripping off telemetry leads, high flow nasal cannula, and blood pressure cuff, trying to get out of bed. He was yelling and desatted to the upper 70s.Security, nursing supervisor, and several personnel were three doors down for a code and came to assist writer. Pt was coaxed into reapplying nasal cannula and calming down. He apologized. Charge nurse informed.   BP stable, tachycardic up to 120s. Respiratory rate upper 20s-upper 30s. Pt adamantly refused bipap overnight. MD aware. Breathing labored while asleep. Frequent cough with thin, yellow brown sputum. High flow nasal cannula % FiO2. Voiding adequately into urinal. No BM. Distended abdomen, prn dose of senna given last night. Rash on chest and back unchanged. Extravasation site marked, unchanged. Up with assist of 1. Pain controlled with percocet and morphine. Wife, Peri, at bedside.   PIV. TKO.

## 2017-04-28 NOTE — PROGRESS NOTES
[facility]    MICU Progress Note  Patient: Tommy Ross   Admission date: 2017  MRN: [id number]  : 1970      ______________________________________________________________________________  ASSESSMENT AND PLAN  Tommy Ross is a 47 y/o man with PMHx significant for HIV (followed by HIV clinic here), recurrent hypoxic respiratory failure requiring multiple intubations in the past, and recent hospitalization - at OSH for CAP who now presents with acute hypoxic respiratory failure secondary to underlying pneumonia versus hypersensitivity pneumonitis. Patient currently on BiPAP/HFNC continuously but at risk for needing urgent intubation.     CHANGES TODAY  --Stable alternating between high flow/bipap.    --D/c'd Vanc; started minocycline for coverage  --D/c'd Zosyn; started ceftriaxone  --Increased steroids to 500mg IV methylpred  --Monitor respiratory status - watch for signs of tiring out  --Patient is only okay for intubated if arrests      NEURO/PSYCH  # Sedation  # Pain  --Continued home Percocet PRN     CARDIOVASCULAR  # Sinus Tachycardia(improved)  EKG on admission normal. BNP elevated at 1777, but patient's presentation of respiratory failure is unlikely secondary to heart failure. ECHO  normal.   --Cardiac Monitor     PULMONARY  # Acute Hypoxic Respiratory Failure:  Differential includes infection vs heart failure vs pulmonary embolism vs hypersensitivity pneumonitis. Unclear etiology at this point but high suspicion for underlying infection. He has a history of multiple hospitalizations for pneumonia and ARDS requiring intubation. Of note, patient has HIV but has been well controlled with normal CD4 count in 10/2016. CD4 checked on admission and low at 61, but this was discussed with ID and ID felt it is likely low due to acute illness and his current presentation is not related to HIV.   --Increased steroids to 500mg IV methylpred for possible  Hypersentsitivity Pneumonitis.   --Continue broad abx  --Holding home albuterol and spiriva inhalers, Duonebs q4h  --BiPAP/HFNC, high risk for intubation  --Patient currently wants to avoid intubation  --sputum culture if able.      GI  No Acute issues.     # Nutrition: NPO while on continuous bipap - pt intermittently tolerating diet while on high flow.  .     RENAL  # Mild Acute Kidney Injury-Improving:  Creatinine 1.2 on admission. Overall downtrending. His baseline recently is about 0.9-1.2.   --Strict I/Os  --Monitor     HEME/ONC  # Leukocytosis  Response to underlying infection vs Steroids.  --Trend infection as below  --Monitor     # Mild Thrombocytopenia  --Monitor     ENDOCRINE  No acute issues.     INFECTIOUS DISEASE  # Possible Community Acquired Pneumonia:  Procalcitionin significantly elevated at 6.36 with leukocytosis to 24k. Patient's chest CT 4/25 significant for extensive ground-glass infiltrates throughout the bilateral lungs concerning for possible infectious process, pneumonitis or pulmonary edema.  PCP infection considered in the setting of HIV positive patient, but he has been compliant with medications and recent CD4 count normal as outpatient. Discussed with ID and no need to cover for MAC or PCP at this time.   --Follow blood cultures  --Will continue broad abx with vanc and zosyn  --Procal and CRP elevated    # History of HIV Infection:  Patient follows with Dr. Alegria here at the [place] and has been very compliant with all of his medications. Recent CD4 count in 10/2016 normal, but now low on admission at 61 which could be related to acute illness. HIV viral load is still pending.    --Continue Truvada and Doltegravir    Antimicrobials Summary  Vancomycin (4/25-present)  Zosyn (4/25-present)    SKIN/MSK  # Macular Rash on Chest- Improving   --Monitor  --Hydrocortisone cream  --Currently on steroids    Lines: PIV x2  Prophylaxis:      - DVT: mechanical, Lovenox - monitor platelet  count.      - GI: n/a  Family: Updated   Disposition: Currently high risk for needing intubation   Code Status: Full     The patient was examined with Dr. Juárez who agrees with the plan.    Kishan Mao MD  Internal Medicine Resident, PGY1  [phone]      =========================================================================  SUBJECTIVE:    -Nursing notes reviewed  -Chart reviewed  -No acute events overnight  -Switches between BiPAP and High Flow Nasal Cannula; prefers High Flow  -Continues to have intermittent respiratory distress;     OBJECTIVE:  Blood pressure 126/81, temperature 98.6  F (37  C), temperature source Oral, resp. rate (!) 37, weight 81.6 kg (179 lb 14.3 oz), SpO2 92 %.      I/O last 3 completed shifts:  In: 1840 [P.O.:540; I.V.:1300]  Out: 1390 [Urine:1390]  Wt Readings from Last 5 Encounters:   04/28/17 81.6 kg (179 lb 14.3 oz)   04/25/17 79.4 kg (175 lb)   04/24/17 78.9 kg (174 lb)   03/31/17 78.4 kg (172 lb 12.8 oz)   03/07/17 76.7 kg (169 lb 3.2 oz)       FiO2 (%): 60 %  Resp: 37  BP - Mean:  [] 96    General: In respiratory distress; cannot complete sentences, diaphoretic  HEENT: Sclera anicteric, oral mucosa moist and without lesions appreciated;   CV: Tachycardic, RR, no murmurs, rubs, gallops or extra heart sounds  Resp: Tachypneic. High flow NC on. Intermittent crackles throughout, no wheezing, bipap on, moving air well, symmetric chest rise  Abdomen: +bs, soft, nontender, nondistended, no organomegaly appreciated   Extremities: No clubbing or cyanosis, no LE edema bilaterally, peripheral pulses full   Skin: Warm and diaphoretic, no jaundice, rash or concerning lesions  Neurological: Alert and oriented, following commands, strength symmetric    DIAGNOSTIC STUDIES  ROUTINE ICU LABS  CMP    Recent Labs  Lab 04/28/17  0447 04/27/17  1836 04/27/17  0416 04/26/17  0503 04/25/17  2100 04/25/17  1732 04/25/17  1230   NA  --  147* 142 140  --  138 142   POTASSIUM  --  4.1 3.6 4.6  --   4.0 3.6   CHLORIDE  --  112* 109 106  --  104 109   CO2  --  23 27 28  --  27 25   ANIONGAP  --  12 6 6  --  7 8   GLC  --  196* 174* 126* 156* 129* 118*   BUN  --  22 24 18  --  14 16   CR 0.98 0.99 1.11 1.00  --  1.19 1.15   GFRESTIMATED 81 81 71 80  --  65 68   GFRESTBLACK >90African American GFR Calc >90African American GFR Calc 86 >90African American GFR Calc  --  79 82   AUSTIN  --  8.2* 8.4* 9.1  --  8.8 8.2*   MAG  --  2.3 2.3  --   --   --   --    PHOS 2.6 1.8* 1.4*  --   --   --   --    PROTTOTAL  --   --   --  6.4*  --  6.8 6.4*   ALBUMIN  --   --   --  2.9*  --  3.3* 3.2*   BILITOTAL  --   --   --  2.1*  --  2.2* 1.9*   ALKPHOS  --   --   --  86  --  99 93   AST  --   --   --  40  --  51* 61*   ALT  --   --   --  22  --  27 30     CBC    Recent Labs  Lab 04/28/17  0447 04/27/17  1647 04/27/17  0416 04/26/17  0503 04/25/17  1732   WBC  --  18.4* 23.6* 19.7* 24.7*   RBC  --  4.89 4.81 5.16 5.39   HGB  --  14.8 14.3 15.6 16.7   HCT  --  43.7 43.6 46.5 48.4   MCV  --  89 91 90 90   MCH  --  30.3 29.7 30.2 31.0   MCHC  --  33.9 32.8 33.5 34.5   RDW  --  13.9 13.8 13.9 14.2   * 134* 128* 113* 96*     INR    Recent Labs  Lab 04/26/17  0503   INR 1.36*     Lactic Acid    Recent Labs  Lab 04/26/17  0503 04/25/17  1951 04/25/17  1611 04/25/17  1214   LACT 1.6 1.8 1.3 1.1     Arterial Blood Gas    Recent Labs  Lab 04/27/17  0909 04/25/17  2100 04/25/17  1611 04/25/17  1214   PH 7.39 7.40 7.41  --    PCO2 43 44 42  --    PO2 80 85 136*  --    HCO3 26 27 27  --    O2PER 80 80 80 100     Venous Blood Gas     Recent Labs  Lab 04/27/17  0909 04/25/17  2100 04/25/17  1611 04/25/17  1214   PHV  --   --   --  7.41   PCO2V  --   --   --  39*   PO2V  --   --   --  181*   HCO3V  --   --   --  25   AI  --   --   --  0.1   O2PER 80 80 80 100       MICROBIOLOGY  Significant culture results:  Blood Culture 4/25/17- No growth   Influenza 4/23/17- Negative  Urine Culture 4/23/17- No growth, final   Urine Strep Ag 4/23/17-  Negative  Blood Culture 4/23/17- No growth  Gram stain sputum 4/26/17- Moderate Gram positive cocci      IMAGING  All relevant Imaging reviewed

## 2017-04-28 NOTE — PROGRESS NOTES
"Pt adamatly refusing bipap despite multiple education attempts. RT present to place bipap but pt saying he will only use it if \"the alarms go off\" referring to the alarms on the vitals monitor. Currently on high flow nasal cannula FiO2 80%, respiratory rate upper 20s-upper 30s, labored breathing. Will continue to monitor.   "

## 2017-04-28 NOTE — PLAN OF CARE
Problem: Pneumonia (Adult)  Goal: Signs and Symptoms of Listed Potential Problems Will be Absent or Manageable (Pneumonia)  Signs and symptoms of listed potential problems will be absent or manageable by discharge/transition of care (reference Pneumonia (Adult) CPG).   Outcome: Declining  Patient w/ drastic neuro status change this AM; patient became severely confused and lethargic - MD aware, BiPAD was placed on patient. Patient remained confused and somnolent till around 1630 at which point he awoke and did not remember any of the events this AM; he pulled off his BiPAP and pullout an IV, stated he was leaving AMA. RN spent 30min face to face time attempting to calm patient, MD was notified and quickly responded to patient needs. Currently patient is alert, oriented, calm, and slightly confused about events of the day. HR tachy w/ ectopy - MD aware. BP stable. Lungs diminished w/ fine crackles, strong productive cough, dyspneic w/ exertion, HFNC at 100%. Voiding, tolerating regualr diet, (-)BM today.

## 2017-04-29 ENCOUNTER — APPOINTMENT (OUTPATIENT)
Dept: GENERAL RADIOLOGY | Facility: CLINIC | Age: 47
DRG: 166 | End: 2017-04-29
Attending: INTERNAL MEDICINE
Payer: MEDICARE

## 2017-04-29 ENCOUNTER — APPOINTMENT (OUTPATIENT)
Dept: CT IMAGING | Facility: CLINIC | Age: 47
DRG: 166 | End: 2017-04-29
Attending: INTERNAL MEDICINE
Payer: MEDICARE

## 2017-04-29 LAB
ANION GAP SERPL CALCULATED.3IONS-SCNC: 8 MMOL/L (ref 3–14)
BACTERIA SPEC CULT: NORMAL
BACTERIA SPEC CULT: NORMAL
BUN SERPL-MCNC: 19 MG/DL (ref 7–30)
CALCIUM SERPL-MCNC: 8.4 MG/DL (ref 8.5–10.1)
CHLORIDE SERPL-SCNC: 109 MMOL/L (ref 94–109)
CO2 SERPL-SCNC: 28 MMOL/L (ref 20–32)
CREAT SERPL-MCNC: 0.87 MG/DL (ref 0.66–1.25)
CRP SERPL-MCNC: 54 MG/L (ref 0–8)
GFR SERPL CREATININE-BSD FRML MDRD: ABNORMAL ML/MIN/1.7M2
GLUCOSE BLDC GLUCOMTR-MCNC: 154 MG/DL (ref 70–99)
GLUCOSE BLDC GLUCOMTR-MCNC: 172 MG/DL (ref 70–99)
GLUCOSE BLDC GLUCOMTR-MCNC: 173 MG/DL (ref 70–99)
GLUCOSE BLDC GLUCOMTR-MCNC: 225 MG/DL (ref 70–99)
GLUCOSE SERPL-MCNC: 189 MG/DL (ref 70–99)
HIV1 RNA # PLAS NAA DL=20: NORMAL {COPIES}/ML
HIV1 RNA SERPL NAA+PROBE-LOG#: NORMAL {LOG_COPIES}/ML
Lab: NORMAL
MAGNESIUM SERPL-MCNC: 2.4 MG/DL (ref 1.6–2.3)
MICRO REPORT STATUS: NORMAL
MICRO REPORT STATUS: NORMAL
PHOSPHATE SERPL-MCNC: 2.4 MG/DL (ref 2.5–4.5)
POTASSIUM SERPL-SCNC: 3.9 MMOL/L (ref 3.4–5.3)
SODIUM SERPL-SCNC: 145 MMOL/L (ref 133–144)
SPECIMEN SOURCE: NORMAL
SPECIMEN SOURCE: NORMAL

## 2017-04-29 PROCEDURE — 25000128 H RX IP 250 OP 636: Performed by: INTERNAL MEDICINE

## 2017-04-29 PROCEDURE — 80048 BASIC METABOLIC PNL TOTAL CA: CPT | Performed by: INTERNAL MEDICINE

## 2017-04-29 PROCEDURE — 40000275 ZZH STATISTIC RCP TIME EA 10 MIN

## 2017-04-29 PROCEDURE — 25000132 ZZH RX MED GY IP 250 OP 250 PS 637: Mod: GY | Performed by: INTERNAL MEDICINE

## 2017-04-29 PROCEDURE — A9270 NON-COVERED ITEM OR SERVICE: HCPCS | Mod: GY | Performed by: INTERNAL MEDICINE

## 2017-04-29 PROCEDURE — S0039 INJECTION, SULFAMETHOXAZOLE: HCPCS | Performed by: INTERNAL MEDICINE

## 2017-04-29 PROCEDURE — 94640 AIRWAY INHALATION TREATMENT: CPT | Mod: 76

## 2017-04-29 PROCEDURE — 82784 ASSAY IGA/IGD/IGG/IGM EACH: CPT | Performed by: INTERNAL MEDICINE

## 2017-04-29 PROCEDURE — 71010 XR CHEST PORT 1 VW: CPT

## 2017-04-29 PROCEDURE — 84100 ASSAY OF PHOSPHORUS: CPT | Performed by: INTERNAL MEDICINE

## 2017-04-29 PROCEDURE — 86003 ALLG SPEC IGE CRUDE XTRC EA: CPT | Performed by: INTERNAL MEDICINE

## 2017-04-29 PROCEDURE — 20000004 ZZH R&B ICU UMMC

## 2017-04-29 PROCEDURE — 40000983 ZZH STATISTIC HFNC ADULT NON-CPAP

## 2017-04-29 PROCEDURE — 99291 CRITICAL CARE FIRST HOUR: CPT | Mod: GC | Performed by: INTERNAL MEDICINE

## 2017-04-29 PROCEDURE — 40000047 ZZH STATISTIC CTO2 CONT OXYGEN TECH TIME EA 90 MIN

## 2017-04-29 PROCEDURE — 25000125 ZZHC RX 250: Performed by: INTERNAL MEDICINE

## 2017-04-29 PROCEDURE — 36415 COLL VENOUS BLD VENIPUNCTURE: CPT | Performed by: INTERNAL MEDICINE

## 2017-04-29 PROCEDURE — 83735 ASSAY OF MAGNESIUM: CPT | Performed by: INTERNAL MEDICINE

## 2017-04-29 PROCEDURE — 00000146 ZZHCL STATISTIC GLUCOSE BY METER IP

## 2017-04-29 PROCEDURE — 71250 CT THORAX DX C-: CPT

## 2017-04-29 PROCEDURE — 94640 AIRWAY INHALATION TREATMENT: CPT

## 2017-04-29 PROCEDURE — 86140 C-REACTIVE PROTEIN: CPT | Performed by: INTERNAL MEDICINE

## 2017-04-29 PROCEDURE — 87449 NOS EACH ORGANISM AG IA: CPT | Performed by: INTERNAL MEDICINE

## 2017-04-29 RX ORDER — NICOTINE 21 MG/24HR
1 PATCH, TRANSDERMAL 24 HOURS TRANSDERMAL DAILY
Status: DISCONTINUED | OUTPATIENT
Start: 2017-04-29 | End: 2017-05-02 | Stop reason: HOSPADM

## 2017-04-29 RX ADMIN — MORPHINE SULFATE 15 MG: 15 TABLET ORAL at 22:00

## 2017-04-29 RX ADMIN — IPRATROPIUM BROMIDE AND ALBUTEROL SULFATE 3 ML: .5; 3 SOLUTION RESPIRATORY (INHALATION) at 16:16

## 2017-04-29 RX ADMIN — INSULIN ASPART 1 UNITS: 100 INJECTION, SOLUTION INTRAVENOUS; SUBCUTANEOUS at 18:39

## 2017-04-29 RX ADMIN — MINOCYCLINE HYDROCHLORIDE 100 MG: 100 CAPSULE ORAL at 08:49

## 2017-04-29 RX ADMIN — LIDOCAINE 1 PATCH: 50 PATCH TOPICAL at 20:20

## 2017-04-29 RX ADMIN — IPRATROPIUM BROMIDE AND ALBUTEROL SULFATE 3 ML: .5; 3 SOLUTION RESPIRATORY (INHALATION) at 12:47

## 2017-04-29 RX ADMIN — MORPHINE SULFATE 15 MG: 15 TABLET ORAL at 14:01

## 2017-04-29 RX ADMIN — INSULIN ASPART 1 UNITS: 100 INJECTION, SOLUTION INTRAVENOUS; SUBCUTANEOUS at 13:38

## 2017-04-29 RX ADMIN — IPRATROPIUM BROMIDE AND ALBUTEROL SULFATE 3 ML: .5; 3 SOLUTION RESPIRATORY (INHALATION) at 07:53

## 2017-04-29 RX ADMIN — OXYCODONE HYDROCHLORIDE AND ACETAMINOPHEN 1 TABLET: 10; 325 TABLET ORAL at 11:51

## 2017-04-29 RX ADMIN — OXYCODONE HYDROCHLORIDE AND ACETAMINOPHEN 1 TABLET: 10; 325 TABLET ORAL at 15:59

## 2017-04-29 RX ADMIN — POTASSIUM PHOSPHATE, MONOBASIC AND POTASSIUM PHOSPHATE, DIBASIC 15 MMOL: 224; 236 INJECTION, SOLUTION INTRAVENOUS at 11:18

## 2017-04-29 RX ADMIN — SULFAMETHOXAZOLE AND TRIMETHOPRIM 400 MG: 80; 16 INJECTION, SOLUTION, CONCENTRATE INTRAVENOUS at 20:20

## 2017-04-29 RX ADMIN — CYANOCOBALAMIN TAB 1000 MCG 1000 MCG: 1000 TAB at 08:49

## 2017-04-29 RX ADMIN — OXYCODONE HYDROCHLORIDE AND ACETAMINOPHEN 1 TABLET: 10; 325 TABLET ORAL at 20:20

## 2017-04-29 RX ADMIN — SODIUM CHLORIDE 500 MG: 9 INJECTION, SOLUTION INTRAVENOUS at 08:49

## 2017-04-29 RX ADMIN — INSULIN ASPART 1 UNITS: 100 INJECTION, SOLUTION INTRAVENOUS; SUBCUTANEOUS at 08:49

## 2017-04-29 RX ADMIN — EMTRICITABINE AND TENOFOVIR DISOPROXIL FUMARATE 1 TABLET: 200; 300 TABLET, FILM COATED ORAL at 08:49

## 2017-04-29 RX ADMIN — CEFTRIAXONE 1 G: 1 INJECTION, POWDER, FOR SOLUTION INTRAMUSCULAR; INTRAVENOUS at 18:44

## 2017-04-29 RX ADMIN — ENOXAPARIN SODIUM 40 MG: 40 INJECTION SUBCUTANEOUS at 15:59

## 2017-04-29 RX ADMIN — LORATADINE 10 MG: 10 TABLET ORAL at 08:49

## 2017-04-29 RX ADMIN — IPRATROPIUM BROMIDE AND ALBUTEROL SULFATE 3 ML: .5; 3 SOLUTION RESPIRATORY (INHALATION) at 20:44

## 2017-04-29 RX ADMIN — SULFAMETHOXAZOLE AND TRIMETHOPRIM 400 MG: 80; 16 INJECTION, SOLUTION, CONCENTRATE INTRAVENOUS at 13:14

## 2017-04-29 RX ADMIN — DOLUTEGRAVIR SODIUM 50 MG: 50 TABLET, FILM COATED ORAL at 08:50

## 2017-04-29 RX ADMIN — NICOTINE 1 PATCH: 14 PATCH, EXTENDED RELEASE TRANSDERMAL at 18:43

## 2017-04-29 ASSESSMENT — PAIN DESCRIPTION - DESCRIPTORS
DESCRIPTORS: CONSTANT
DESCRIPTORS: CONSTANT
DESCRIPTORS: HEADACHE

## 2017-04-29 NOTE — PLAN OF CARE
Problem: Goal Outcome Summary  Goal: Goal Outcome Summary  Outcome: Improving  D: Patient continues in ICU on high flow NC for close monitoring of respiratory status.      I/A:     Neuro: Forgetful at times, otherwise oriented. Patient calm through most of day, though did become frustrated with staff x2 when curtain to room not pulled completely or white tote bag not near him. Explained reasoning for being at arms length and need for monitoring when patient standing due to high oxygen needs. More agreeable when wife at bedside.   Cardiac: HR in 100-120s depending on level of activity. Bigeminy noted x3 BPs from 130-140s/80-90s.   Respiratory: Weaned to 45% high flow today, though does require increased FiO2 when sleeping. Lungs coarse with crackles. Encouraged IS use and ambulation.   GI: Tolerating regular diet. Insulin at sliding scale.   : Using urinal to void  ID: Afebrile. Bactrim added today. Repeat chest CT completed, awaiting results.         P: Continue to monitor. Encourage ambulation and pulm toileting. Wean O2 as tolerated. Notify MD of any acute events

## 2017-04-29 NOTE — PLAN OF CARE
Problem: Goal Outcome Summary  Goal: Goal Outcome Summary  D/I: Pt more pleasant overnight, no episodes of inappropriateness and using call light appropriately to get up to commode. SR/ST, no BP issues. Sats all over the place, FiO2 between 40%-70%. Urinal voiding. 1x large BM overnight. No further skin issues.   A/P: Wean FiO2 as able. Promote ambulation.

## 2017-04-29 NOTE — PROGRESS NOTES
[facility]    MICU Progress Note  Patient: Tommy Ross   Admission date: 2017  MRN: [id number]  : 1970    ASSESSMENT AND PLAN  Tommy Ross is a 45 y/o man with PMHx significant for HIV (followed by HIV clinic here), recurrent hypoxic respiratory failure requiring multiple intubations in the past, and recent hospitalization - at OSH for CAP who now presents with acute hypoxic respiratory failure secondary to underlying pneumonia versus hypersensitivity pneumonitis. Patient currently on BiPAP/HFNC continuously but at risk for needing urgent intubation.     CHANGES TODAY  --Stable alternating between high flow/bipap.    --Start Bactrim IV.   --D/C Minocycline.    --Continue Ceftriaxone  --Continue steroids to 500mg IV methylpred  --Monitor respiratory status - watch for signs of tiring out. Patient is okay for intubation if he were to decompensate.      NEURO/PSYCH  # Sedation  # Pain  --Continued home Percocet PRN     CARDIOVASCULAR  # Sinus Tachycardia(improved)  EKG on admission normal. BNP elevated at 1777, but patient's presentation of respiratory failure is unlikely secondary to heart failure. ECHO  normal.   --Cardiac Monitor     PULMONARY  # Acute Hypoxic Respiratory Failure:  Differential includes infection vs pulmonary embolism vs hypersensitivity pneumonitis. He has a history of multiple hospitalizations for pneumonia and ARDS requiring intubation. Of note, patient has HIV but has been well controlled with normal CD4 count in 10/2016. CD4 checked on admission and low at 61, but this was discussed with ID and ID felt it is likely low due to acute illness and his current presentation is not related to HIV.  PCP remains on the differential. Due to no clinical improvement with broad spectrum abx.   - Started Bactrim IV .    --Solumedrol 500mg IV for possible Hypersentsitivity Pneumonitis.   --Continue Ceftriaxone per ID  --Holding home albuterol and  spiriva inhalers, Duonebs q4h  --BiPAP/HFNC, high risk for intubation  --Patient currently wants to avoid intubation  --Sputum culture if able.    --Hypersensitivity panel pending.      GI  No Acute issues.     # Nutrition: NPO while on continuous bipap - pt intermittently tolerating diet while on high flow.  .     RENAL  # Mild Acute Kidney Injury-resolved:  Creatinine 1.2 on admission. Overall downtrending. His baseline recently is about 0.9-1.2.   --Strict I/Os  --Monitor     HEME/ONC  # Leukocytosis  Response to underlying infection vs Steroids.  --Trend infection as below  --Monitor     # Mild Thrombocytopenia  --Monitor     ENDOCRINE  No acute issues.     INFECTIOUS DISEASE  # Possible Community Acquired Pneumonia vs PCP pneumonia  Procalcitionin significantly elevated at 6.36 with leukocytosis to 24k. Patient's chest CT 4/25 significant for extensive ground-glass infiltrates throughout the bilateral lungs concerning for possible infectious process, pneumonitis or pulmonary edema.  PCP infection considered in the setting of HIV positive patient, but he has been compliant with medications and recent CD4 count normal as outpatient. Discussed with ID and no need to cover for MAC or PCP at this time.   --Follow blood cultures  --abx as above  --Procal and CRP elevated - now down trending.    # History of HIV Infection:  Patient follows with Dr. Alegria here at the [place] and has been very compliant with all of his medications. Recent CD4 count in 10/2016 normal, but now low on admission at 61 which could be related to acute illness. HIV viral load is not detectable.    --Continue Truvada and Doltegravir    Antimicrobials Summary  Vancomycin (4/25-4/28)  Zosyn (4/25-4/28)  Ceftriaxone (4/28 - present)    Bactrim (4/28 - present)        SKIN/MSK  # Macular Rash on Chest- Improving   --Monitor  --Hydrocortisone cream  --Currently on steroids    Lines: PIV x2  Prophylaxis:      - DVT: mechanical, Lovenox -  monitor platelet count.      - GI: n/a  Family: Updated wife at bedside on 4/28.    Disposition: Currently high risk for needing intubation   Code Status: Full     The patient was examined with Dr. Buchanan who agrees with the plan.    Tico Thrasher MD  Internal Medicine PGY2  [phone]        =========================================================================  SUBJECTIVE:    -Nursing notes reviewed  -Chart reviewed  -No acute events overnight  -Switches between BiPAP and High Flow Nasal Cannula; prefers High Flow  -Continues to have intermittent respiratory distress when sleeping.      OBJECTIVE:  Blood pressure 134/88, temperature 98.4  F (36.9  C), temperature source Oral, resp. rate 18, weight 81.6 kg (179 lb 14.3 oz), SpO2 94 %.      I/O last 3 completed shifts:  In: 1255 [P.O.:240; I.V.:1015]  Out: 1295 [Urine:1295]  Wt Readings from Last 5 Encounters:   04/28/17 81.6 kg (179 lb 14.3 oz)   04/25/17 79.4 kg (175 lb)   04/24/17 78.9 kg (174 lb)   03/31/17 78.4 kg (172 lb 12.8 oz)   03/07/17 76.7 kg (169 lb 3.2 oz)       FiO2 (%): 60 %  Resp: 18  BP - Mean:  [] 104    General: Speaking in full sentences, tachypnic with no acute respiratory distress.    HEENT: Sclera anicteric, oral mucosa moist and without lesions appreciated;   CV: Tachycardic, RR, no murmurs, rubs, gallops or extra heart sounds  Resp: Tachypneic. High flow NC on. Intermittent crackles throughout, no wheezing, bipap on, moving air well, symmetric chest rise  Abdomen: +bs, soft, nontender, nondistended, no organomegaly appreciated   Extremities: No clubbing or cyanosis, no LE edema bilaterally, peripheral pulses full   Skin: Warm and diaphoretic, no jaundice, rash or concerning lesions  Neurological: Alert and oriented, following commands, strength symmetric    DIAGNOSTIC STUDIES  ROUTINE ICU LABS  CMP    Recent Labs  Lab 04/29/17  0419 04/28/17  0447 04/27/17  1836 04/27/17  0416 04/26/17  0503  04/25/17  1732 04/25/17  1230   NA  145*  --  147* 142 140  --  138 142   POTASSIUM 3.9  --  4.1 3.6 4.6  --  4.0 3.6   CHLORIDE 109  --  112* 109 106  --  104 109   CO2 28  --  23 27 28  --  27 25   ANIONGAP 8  --  12 6 6  --  7 8   *  --  196* 174* 126*  < > 129* 118*   BUN 19  --  22 24 18  --  14 16   CR 0.87 0.98 0.99 1.11 1.00  --  1.19 1.15   GFRESTIMATED >90Non  GFR Calc 81 81 71 80  --  65 68   GFRESTBLACK >90African American GFR Calc >90African American GFR Calc >90African American GFR Calc 86 >90African American GFR Calc  --  79 82   AUSTIN 8.4*  --  8.2* 8.4* 9.1  --  8.8 8.2*   MAG 2.4*  --  2.3 2.3  --   --   --   --    PHOS 2.4* 2.6 1.8* 1.4*  --   --   --   --    PROTTOTAL  --   --   --   --  6.4*  --  6.8 6.4*   ALBUMIN  --   --   --   --  2.9*  --  3.3* 3.2*   BILITOTAL  --   --   --   --  2.1*  --  2.2* 1.9*   ALKPHOS  --   --   --   --  86  --  99 93   AST  --   --   --   --  40  --  51* 61*   ALT  --   --   --   --  22  --  27 30   < > = values in this interval not displayed.  CBC    Recent Labs  Lab 04/28/17  0447 04/27/17  1647 04/27/17  0416 04/26/17  0503 04/25/17  1732   WBC  --  18.4* 23.6* 19.7* 24.7*   RBC  --  4.89 4.81 5.16 5.39   HGB  --  14.8 14.3 15.6 16.7   HCT  --  43.7 43.6 46.5 48.4   MCV  --  89 91 90 90   MCH  --  30.3 29.7 30.2 31.0   MCHC  --  33.9 32.8 33.5 34.5   RDW  --  13.9 13.8 13.9 14.2   * 134* 128* 113* 96*     INR    Recent Labs  Lab 04/26/17  0503   INR 1.36*     Lactic Acid    Recent Labs  Lab 04/26/17  0503 04/25/17  1951 04/25/17  1611 04/25/17  1214   LACT 1.6 1.8 1.3 1.1     Arterial Blood Gas    Recent Labs  Lab 04/27/17  0909 04/25/17  2100 04/25/17 1611 04/25/17  1214   PH 7.39 7.40 7.41  --    PCO2 43 44 42  --    PO2 80 85 136*  --    HCO3 26 27 27  --    O2PER 80 80 80 100     Venous Blood Gas     Recent Labs  Lab 04/27/17  0909 04/25/17  2100 04/25/17  1611 04/25/17  1214   PHV  --   --   --  7.41   PCO2V  --   --   --  39*   PO2V  --   --   --  181*   HCO3V   --   --   --  25   AI  --   --   --  0.1   O2PER 80 80 80 100       MICROBIOLOGY  Significant culture results:  Blood Culture 4/25/17- No growth   Influenza 4/23/17- Negative  Urine Culture 4/23/17- No growth, final   Urine Strep Ag 4/23/17- Negative  Blood Culture 4/23/17- No growth  Gram stain sputum 4/26/17- Moderate Gram positive cocci      IMAGING  All relevant Imaging reviewed

## 2017-04-29 NOTE — PROGRESS NOTES
Patient refused arterial blood gas draw and voiced frustrations about frequent blood draws. ABG not done at this time.

## 2017-04-29 NOTE — SIGNIFICANT EVENT
"Charge nurse called to room to talk to pt wanting to \"fire\" his nurse. Pt was adamant about firing his nurse and belligerent. Pt stated \"his tray table was very dirty at 0800 and nurse did not clean it off in time for his meal tray\". This writer listened to concerns and was unable to accommodate his request for new nurse. Pt became more upset and became very verbally aggressive and hostile. This writer offered pt relations and pt declined. ANS paged for further consultation.   "

## 2017-04-29 NOTE — PLAN OF CARE
Problem: Goal Outcome Summary  Goal: Goal Outcome Summary  Outcome: No Change  D: patient admitted to ICU for acute respiratory distress. Has declined intubation and refusing BiPap. Continues today on high flow NC from 60-80% FiO2. Continues to be intermittently agitated and impulsive. Education and reorientation provided.      I/A:     Neuro: Calm this morning, but becomes frustrated with cares at times especially when desiring privacy to do things per self. Unfortunately, patient is impulsive at times, attempting to get self back to bed or adjust lines. Explained concerns for safety and need for monitoring through the day. Lethargic at times but awakes and able to answer orientation questions. At times forgetful and repeating self or stories. Overall remains labile. Pain controlled with PRN percocet and morphine.   Cardiac: Sinus tachycardia with ventricular beats noted in bigemini pattern x2, but self resolved. BPs from 110-130s/70s.   Respiratory: Severe shortness of breath through the day. Continues with RR in 30-40s. Flushed and diaphoretic. Destats when removes high flow or with exertion. Able to walk in hallway but very tired post exercise. Lungs coarse and diminished throughout. Using cup for secretions  GI: Tolerating regular diet. Steroid dose increased today and started on sliding scale insulin. Large BM x2  : Using urinal at bedside.   ID: Abx continued. Afebrile         P: Continue close monitoring. Encourage out of bed activity and pulm toileting. Notify MD of any acute events

## 2017-04-30 ENCOUNTER — APPOINTMENT (OUTPATIENT)
Dept: GENERAL RADIOLOGY | Facility: CLINIC | Age: 47
DRG: 166 | End: 2017-04-30
Attending: INTERNAL MEDICINE
Payer: MEDICARE

## 2017-04-30 LAB
1,3 BETA GLUCAN SER-MCNC: NORMAL NG/ML
ANION GAP SERPL CALCULATED.3IONS-SCNC: 9 MMOL/L (ref 3–14)
B-D GLUCAN INTERPRETATION (1,3): NORMAL
BUN SERPL-MCNC: 23 MG/DL (ref 7–30)
CALCIUM SERPL-MCNC: 8 MG/DL (ref 8.5–10.1)
CHLORIDE SERPL-SCNC: 107 MMOL/L (ref 94–109)
CO2 SERPL-SCNC: 25 MMOL/L (ref 20–32)
CREAT SERPL-MCNC: 1.01 MG/DL (ref 0.66–1.25)
CRP SERPL-MCNC: 20 MG/L (ref 0–8)
ERYTHROCYTE [DISTWIDTH] IN BLOOD BY AUTOMATED COUNT: 13.7 % (ref 10–15)
GFR SERPL CREATININE-BSD FRML MDRD: 79 ML/MIN/1.7M2
GLUCOSE BLDC GLUCOMTR-MCNC: 150 MG/DL (ref 70–99)
GLUCOSE BLDC GLUCOMTR-MCNC: 199 MG/DL (ref 70–99)
GLUCOSE BLDC GLUCOMTR-MCNC: 203 MG/DL (ref 70–99)
GLUCOSE BLDC GLUCOMTR-MCNC: 273 MG/DL (ref 70–99)
GLUCOSE SERPL-MCNC: 190 MG/DL (ref 70–99)
HCT VFR BLD AUTO: 42.5 % (ref 40–53)
HGB BLD-MCNC: 14 G/DL (ref 13.3–17.7)
IGE SERPL-ACNC: 24 KIU/L (ref 0–114)
MAGNESIUM SERPL-MCNC: 2.2 MG/DL (ref 1.6–2.3)
MCH RBC QN AUTO: 29.4 PG (ref 26.5–33)
MCHC RBC AUTO-ENTMCNC: 32.9 G/DL (ref 31.5–36.5)
MCV RBC AUTO: 89 FL (ref 78–100)
PHOSPHATE SERPL-MCNC: 3.9 MG/DL (ref 2.5–4.5)
PLATELET # BLD AUTO: 143 10E9/L (ref 150–450)
POTASSIUM SERPL-SCNC: 3.5 MMOL/L (ref 3.4–5.3)
RBC # BLD AUTO: 4.77 10E12/L (ref 4.4–5.9)
SODIUM SERPL-SCNC: 140 MMOL/L (ref 133–144)
WBC # BLD AUTO: 14.9 10E9/L (ref 4–11)

## 2017-04-30 PROCEDURE — 86606 ASPERGILLUS ANTIBODY: CPT | Performed by: INTERNAL MEDICINE

## 2017-04-30 PROCEDURE — 86331 IMMUNODIFFUSION OUCHTERLONY: CPT | Performed by: INTERNAL MEDICINE

## 2017-04-30 PROCEDURE — 25000128 H RX IP 250 OP 636: Performed by: INTERNAL MEDICINE

## 2017-04-30 PROCEDURE — 86431 RHEUMATOID FACTOR QUANT: CPT | Performed by: INTERNAL MEDICINE

## 2017-04-30 PROCEDURE — 80048 BASIC METABOLIC PNL TOTAL CA: CPT | Performed by: INTERNAL MEDICINE

## 2017-04-30 PROCEDURE — 94640 AIRWAY INHALATION TREATMENT: CPT | Mod: 76

## 2017-04-30 PROCEDURE — 25000132 ZZH RX MED GY IP 250 OP 250 PS 637: Mod: GY | Performed by: INTERNAL MEDICINE

## 2017-04-30 PROCEDURE — A9270 NON-COVERED ITEM OR SERVICE: HCPCS | Mod: GY | Performed by: INTERNAL MEDICINE

## 2017-04-30 PROCEDURE — S0039 INJECTION, SULFAMETHOXAZOLE: HCPCS | Performed by: INTERNAL MEDICINE

## 2017-04-30 PROCEDURE — 25000125 ZZHC RX 250: Performed by: INTERNAL MEDICINE

## 2017-04-30 PROCEDURE — 99291 CRITICAL CARE FIRST HOUR: CPT | Mod: GC | Performed by: INTERNAL MEDICINE

## 2017-04-30 PROCEDURE — 85027 COMPLETE CBC AUTOMATED: CPT | Performed by: INTERNAL MEDICINE

## 2017-04-30 PROCEDURE — 40000983 ZZH STATISTIC HFNC ADULT NON-CPAP

## 2017-04-30 PROCEDURE — 36415 COLL VENOUS BLD VENIPUNCTURE: CPT | Performed by: INTERNAL MEDICINE

## 2017-04-30 PROCEDURE — 94640 AIRWAY INHALATION TREATMENT: CPT

## 2017-04-30 PROCEDURE — 40000275 ZZH STATISTIC RCP TIME EA 10 MIN

## 2017-04-30 PROCEDURE — 86038 ANTINUCLEAR ANTIBODIES: CPT | Performed by: INTERNAL MEDICINE

## 2017-04-30 PROCEDURE — 20000004 ZZH R&B ICU UMMC

## 2017-04-30 PROCEDURE — 84100 ASSAY OF PHOSPHORUS: CPT | Performed by: INTERNAL MEDICINE

## 2017-04-30 PROCEDURE — 83735 ASSAY OF MAGNESIUM: CPT | Performed by: INTERNAL MEDICINE

## 2017-04-30 PROCEDURE — 40000047 ZZH STATISTIC CTO2 CONT OXYGEN TECH TIME EA 90 MIN

## 2017-04-30 PROCEDURE — 86140 C-REACTIVE PROTEIN: CPT | Performed by: INTERNAL MEDICINE

## 2017-04-30 PROCEDURE — 86255 FLUORESCENT ANTIBODY SCREEN: CPT | Performed by: INTERNAL MEDICINE

## 2017-04-30 PROCEDURE — 00000146 ZZHCL STATISTIC GLUCOSE BY METER IP

## 2017-04-30 PROCEDURE — 86200 CCP ANTIBODY: CPT | Performed by: INTERNAL MEDICINE

## 2017-04-30 PROCEDURE — 99233 SBSQ HOSP IP/OBS HIGH 50: CPT | Mod: GC | Performed by: INTERNAL MEDICINE

## 2017-04-30 PROCEDURE — 71010 XR CHEST PORT 1 VW: CPT

## 2017-04-30 RX ORDER — DIPHENHYDRAMINE HCL 25 MG
25 CAPSULE ORAL
Status: DISCONTINUED | OUTPATIENT
Start: 2017-04-30 | End: 2017-04-30

## 2017-04-30 RX ORDER — METHYLPREDNISOLONE SODIUM SUCCINATE 125 MG/2ML
60 INJECTION, POWDER, LYOPHILIZED, FOR SOLUTION INTRAMUSCULAR; INTRAVENOUS DAILY
Status: DISCONTINUED | OUTPATIENT
Start: 2017-05-01 | End: 2017-05-02 | Stop reason: HOSPADM

## 2017-04-30 RX ADMIN — IPRATROPIUM BROMIDE AND ALBUTEROL SULFATE 3 ML: .5; 3 SOLUTION RESPIRATORY (INHALATION) at 17:20

## 2017-04-30 RX ADMIN — OXYCODONE HYDROCHLORIDE AND ACETAMINOPHEN 1 TABLET: 10; 325 TABLET ORAL at 22:10

## 2017-04-30 RX ADMIN — POTASSIUM CHLORIDE 20 MEQ: 750 TABLET, EXTENDED RELEASE ORAL at 06:54

## 2017-04-30 RX ADMIN — LIDOCAINE 1 PATCH: 50 PATCH TOPICAL at 19:55

## 2017-04-30 RX ADMIN — MORPHINE SULFATE 15 MG: 15 TABLET ORAL at 11:49

## 2017-04-30 RX ADMIN — ENOXAPARIN SODIUM 40 MG: 40 INJECTION SUBCUTANEOUS at 16:16

## 2017-04-30 RX ADMIN — IPRATROPIUM BROMIDE AND ALBUTEROL SULFATE 3 ML: .5; 3 SOLUTION RESPIRATORY (INHALATION) at 07:57

## 2017-04-30 RX ADMIN — IPRATROPIUM BROMIDE AND ALBUTEROL SULFATE 3 ML: .5; 3 SOLUTION RESPIRATORY (INHALATION) at 12:03

## 2017-04-30 RX ADMIN — SODIUM CHLORIDE 500 MG: 9 INJECTION, SOLUTION INTRAVENOUS at 09:19

## 2017-04-30 RX ADMIN — CEFTRIAXONE 1 G: 1 INJECTION, POWDER, FOR SOLUTION INTRAMUSCULAR; INTRAVENOUS at 16:23

## 2017-04-30 RX ADMIN — NICOTINE 1 PATCH: 14 PATCH, EXTENDED RELEASE TRANSDERMAL at 09:15

## 2017-04-30 RX ADMIN — INSULIN ASPART 2 UNITS: 100 INJECTION, SOLUTION INTRAVENOUS; SUBCUTANEOUS at 16:19

## 2017-04-30 RX ADMIN — CYANOCOBALAMIN TAB 1000 MCG 1000 MCG: 1000 TAB at 09:18

## 2017-04-30 RX ADMIN — SULFAMETHOXAZOLE AND TRIMETHOPRIM 400 MG: 80; 16 INJECTION, SOLUTION, CONCENTRATE INTRAVENOUS at 19:55

## 2017-04-30 RX ADMIN — ALPRAZOLAM 1 MG: 0.5 TABLET ORAL at 14:48

## 2017-04-30 RX ADMIN — OXYCODONE HYDROCHLORIDE AND ACETAMINOPHEN 1 TABLET: 10; 325 TABLET ORAL at 09:32

## 2017-04-30 RX ADMIN — SULFAMETHOXAZOLE AND TRIMETHOPRIM 400 MG: 80; 16 INJECTION, SOLUTION, CONCENTRATE INTRAVENOUS at 03:43

## 2017-04-30 RX ADMIN — ONDANSETRON 4 MG: 2 INJECTION INTRAMUSCULAR; INTRAVENOUS at 09:32

## 2017-04-30 RX ADMIN — INSULIN ASPART 2 UNITS: 100 INJECTION, SOLUTION INTRAVENOUS; SUBCUTANEOUS at 11:52

## 2017-04-30 RX ADMIN — INSULIN ASPART 1 UNITS: 100 INJECTION, SOLUTION INTRAVENOUS; SUBCUTANEOUS at 09:14

## 2017-04-30 RX ADMIN — OXYCODONE HYDROCHLORIDE AND ACETAMINOPHEN 1 TABLET: 10; 325 TABLET ORAL at 16:30

## 2017-04-30 RX ADMIN — SULFAMETHOXAZOLE AND TRIMETHOPRIM 400 MG: 80; 16 INJECTION, SOLUTION, CONCENTRATE INTRAVENOUS at 11:49

## 2017-04-30 ASSESSMENT — PAIN DESCRIPTION - DESCRIPTORS: DESCRIPTORS: ACHING

## 2017-04-30 NOTE — PROGRESS NOTES
MICU Progress Note 4-30-17    MICU Staff     This patient has been seen and evaluated by me. I have discussed care with Dr. Thrasher and agree with the findings and plan in this note. Critically ill 46 year old with HIV - well controlled admitted for hypoxic resp failure with bilat infiltrates on CXR. Patient on BiPAP/Hi Flow 60% - decreasing O2 requirements.  Continue abxs and steroids (Prednisone 60 mg). Chest Ct looks improved (although scans have significant motion artifact). Transfer out of ICU.        CCT 30 minutes separate from procedures    Carlos Buchanan  Pulmonary/Critical Care  April 30, 2017 1:47 PM

## 2017-04-30 NOTE — PROGRESS NOTES
[facility]    MICU Transfer Summary  Patient: Tommy Ross   Admission date: 2017  Transfer date: 2017  MRN: [id number]  : 1970    Brief Hospital Course:     Tommy Ross is a 46 year old male who presents with history as stated above. He presented to the emergency room 2-3 days prior to admission with acute respiratory failure along with acute allergic reaction with urticarial rash on face, neck, and hands. He was noted to have an elevated white count and an infiltrate RLL. Started on steroids and ABx- he was noted to be doing better the next days and discharged to the hospital. He returned again to the ED just prior to admission due to worsening hypoxia last night. Transferred to the [facility] for further work up. On presentation pt on bipap 60-80% fio2 with respiratory rates in the 40s with concern for tiring out initially.  Pt given his prior experiences with requiring intubation/ventilator - declined intubation unless it was an emergency situation. We counseled him on the dangers of waiting for decompensation - but he requested to wait.  He was started on empiric treatment with broad spectrum abx and empiric steroids for a hypersensitivity pneumonitis vs infection (see below).  He is now improved with empiric therapy and his oxygen requirements have now been improving.  Vitals are otherwise stable. He is speaking in full sentences today and denies any worsening symptoms.      ASSESSMENT AND PLAN    CHANGES TODAY  --Decreasing High flow oxygen requirements.  Work of breathing has improved this morning.    --Transfer to Medicine/intermediate care.  --Continue Ceftriaxone and Bactrim pending clinical improvement.     --Continue steroids to 500mg IV methylpred  - decrease to Solumedrol 60 mg daily.      NEURO/PSYCH  Sedation  Pain  --Continued home Percocet PRN     CARDIOVASCULAR  Sinus Tachycardia(improved)  EKG on admission normal. BNP elevated at 1777, but  patient's presentation of respiratory failure is unlikely secondary to heart failure. ECHO 4/26 normal.   --Cardiac Monitor     PULMONARY  Acute Hypoxic Respiratory Failure:  Differential includes infection vs pulmonary embolism vs hypersensitivity pneumonitis. He has a history of multiple hospitalizations for pneumonia and ARDS requiring intubation. Of note, patient has HIV but has been well controlled with normal CD4 count in 10/2016. CD4 checked on admission and low at 61, but this was discussed with ID -  felt it is likely low due to acute illness and his current presentation is not related to HIV.  PCP remains on the differential.  - Ceftriaxone, Bactrim IV 4/29 for empiric PCP treatment.    --Solumedrol 500mg IV for possible hypersensitivity pneumonitis - decrease to 60 mg q daily on 5/1.   --Holding home albuterol and spiriva inhalers, Duonebs q4h  --BiPAP/HFNC - decreasing requirements.  Pt was reluctant for intubation on admission given his prior experiences but seems to be improving now on current therapies.     --Sputum culture if able   --Hypersensitivity panel pending.      GI  No Acute issues.     Nutrition: NPO while on continuous bipap - pt intermittently tolerating diet while on high flow.  .     RENAL  Mild Acute Kidney Injury-resolved:  Creatinine 1.2 on admission. Overall downtrending. His baseline recently is about 0.9-1.2.   --Strict I/Os  --Monitor     HEME/ONC  Leukocytosis  Response to underlying infection vs Steroids.  --Trend infection as below  --Monitor     Mild Thrombocytopenia  --Monitor     ENDOCRINE  No acute issues.     INFECTIOUS DISEASE  # Possible Community Acquired Pneumonia vs PCP pneumonia  Procalcitionin significantly elevated at 6.36 with leukocytosis to 24k. Patient's chest CT 4/25 significant for extensive ground-glass infiltrates throughout the bilateral lungs concerning for possible infectious process, pneumonitis or pulmonary edema.  PCP infection considered in the  setting of HIV positive patient, but he has been compliant with medications and recent CD4 count normal as outpatient.   --Follow blood cultures  --abx as above  --Procal and CRP elevated - now down trending.  --Infectious disease following - appreciate recs.      History of HIV Infection:  Patient follows with Dr. Alegria here at the [facility] and has been very compliant with all of his medications. Recent CD4 count in 10/2016 normal, but now low on admission at 61 which could be related to acute illness. HIV viral load is not detectable.    --Continue Truvada and Doltegravir    Antimicrobials Summary  Vancomycin (4/25-4/28)  Zosyn (4/25-4/28)  Ceftriaxone (4/28 - present)    Bactrim (4/28 - present)        SKIN/MSK  Macular Rash on Chest- Improving   --Monitor  --Hydrocortisone cream  --Currently on steroids    Lines: PIV x2  Prophylaxis:      - DVT: mechanical, Lovenox - monitor platelet count.      - GI: n/a  Family: Updated wife at bedside on 4/28.    Disposition: Now improved, stable, transferring to the medicine team on 6B  Code Status: Full     The patient was examined with Dr. Buchanan who agrees with the plan.    Tico Thrasher MD  Internal Medicine PGY2      =========================================================================  SUBJECTIVE:    -Nursing notes reviewed  -Chart reviewed  -No acute events overnight  -Decreasing high flow oxygen needs now.    -Tolerating diet, normal bowel movements.      OBJECTIVE:  Blood pressure 137/87, temperature 98.1  F (36.7  C), temperature source Oral, resp. rate 24, weight 81.6 kg (179 lb 14.3 oz), SpO2 95 %.      I/O last 3 completed shifts:  In: 2267.5 [P.O.:800; I.V.:1467.5]  Out: 2345 [Urine:2345]  Wt Readings from Last 5 Encounters:   04/28/17 81.6 kg (179 lb 14.3 oz)   04/25/17 79.4 kg (175 lb)   04/24/17 78.9 kg (174 lb)   03/31/17 78.4 kg (172 lb 12.8 oz)   03/07/17 76.7 kg (169 lb 3.2 oz)       FiO2 (%): 45 %  Resp: 27  BP - Mean:  []  100    General: Speaking in full sentences, no acute respiratory distress.    HEENT: Sclera anicteric, oral mucosa moist and without lesions appreciated;   CV: RRR, no murmurs, rubs, gallops or extra heart sounds  Resp: .High flow NC. Intermittent crackles throughout, no wheezing, bipap on, moving air well, symmetric chest rise  Abdomen: +bs, soft, nontender, nondistended, no organomegaly appreciated   Extremities: No clubbing or cyanosis, no LE edema bilaterally, peripheral pulses full   Skin: Warm and diaphoretic, no jaundice, rash or concerning lesions  Neurological: Alert and oriented, following commands, strength symmetric    DIAGNOSTIC STUDIES  ROUTINE ICU LABS  CMP    Recent Labs  Lab 04/30/17  0428 04/29/17  0419 04/28/17  0447 04/27/17  1836 04/27/17  0416  04/26/17  0503  04/25/17  1732 04/25/17  1230    145*  --  147* 142  --  140  --  138 142   POTASSIUM 3.5 3.9  --  4.1 3.6  --  4.6  --  4.0 3.6   CHLORIDE 107 109  --  112* 109  --  106  --  104 109   CO2 25 28  --  23 27  --  28  --  27 25   ANIONGAP 9 8  --  12 6  --  6  --  7 8   * 189*  --  196* 174*  --  126*  < > 129* 118*   BUN 23 19  --  22 24  --  18  --  14 16   CR 1.01 0.87 0.98 0.99 1.11  --  1.00  --  1.19 1.15   GFRESTIMATED 79 >90Non  GFR Calc 81 81 71  --  80  --  65 68   GFRESTBLACK >90African American GFR Calc >90African American GFR Calc >90African American GFR Calc >90African American GFR Calc 86  --  >90African American GFR Calc  --  79 82   AUSTIN 8.0* 8.4*  --  8.2* 8.4*  --  9.1  --  8.8 8.2*   MAG 2.2 2.4*  --  2.3 2.3  --   --   --   --   --    PHOS 3.9 2.4* 2.6 1.8* 1.4*  < >  --   --   --   --    PROTTOTAL  --   --   --   --   --   --  6.4*  --  6.8 6.4*   ALBUMIN  --   --   --   --   --   --  2.9*  --  3.3* 3.2*   BILITOTAL  --   --   --   --   --   --  2.1*  --  2.2* 1.9*   ALKPHOS  --   --   --   --   --   --  86  --  99 93   AST  --   --   --   --   --   --  40  --  51* 61*   ALT  --   --   --    --   --   --  22  --  27 30   < > = values in this interval not displayed.  CBC    Recent Labs  Lab 04/30/17  0428 04/28/17  0447 04/27/17  1647 04/27/17  0416 04/26/17  0503   WBC 14.9*  --  18.4* 23.6* 19.7*   RBC 4.77  --  4.89 4.81 5.16   HGB 14.0  --  14.8 14.3 15.6   HCT 42.5  --  43.7 43.6 46.5   MCV 89  --  89 91 90   MCH 29.4  --  30.3 29.7 30.2   MCHC 32.9  --  33.9 32.8 33.5   RDW 13.7  --  13.9 13.8 13.9   * 126* 134* 128* 113*     INR    Recent Labs  Lab 04/26/17  0503   INR 1.36*     Lactic Acid    Recent Labs  Lab 04/26/17  0503 04/25/17  1951 04/25/17  1611 04/25/17  1214   LACT 1.6 1.8 1.3 1.1     Arterial Blood Gas    Recent Labs  Lab 04/27/17  0909 04/25/17  2100 04/25/17  1611 04/25/17  1214   PH 7.39 7.40 7.41  --    PCO2 43 44 42  --    PO2 80 85 136*  --    HCO3 26 27 27  --    O2PER 80 80 80 100     Venous Blood Gas     Recent Labs  Lab 04/27/17  0909 04/25/17  2100 04/25/17  1611 04/25/17  1214   PHV  --   --   --  7.41   PCO2V  --   --   --  39*   PO2V  --   --   --  181*   HCO3V  --   --   --  25   AI  --   --   --  0.1   O2PER 80 80 80 100       MICROBIOLOGY  Significant culture results:  Blood Culture 4/25/17- No growth   Influenza 4/23/17- Negative  Urine Culture 4/23/17- No growth, final   Urine Strep Ag 4/23/17- Negative  Blood Culture 4/23/17- No growth  Gram stain sputum 4/26/17- Moderate Gram positive cocci      IMAGING  All relevant Imaging reviewed

## 2017-04-30 NOTE — PROGRESS NOTES
[facility] Internal Medicine Progress  Note  Date of Service: April 30, 2017            Assessment and Plan:   Patient is a 46 year old year old male HIV, recurrent episodes of difficulty with breathing/ hypoxic respiratory failure intermittently on O2 supplement at home, who was recently admitted at [facility] 4/23-4/24 for allergic reaction and CAP and admitted to [facility] MICU for acute hypoxic respiratory failure, now clinical improve and transferred to medicine for further evaluation.    ## Recurrent acute hypoxic respiratory failure  4/23 presented with 1 days of rapidly progressive chills, swelling of his hands, urticaria, and fever after he had been working in the garden. Seen at [facility] ED, CXR showed Right basilar pneumonia, treated empirically with ceftriaxone and Zithromax. Urticarial and possible angioedema were treated with corticosteroids and antihistamines and rapidly resolved. After discharge, worsening SOB, had outpatient oxygen level checked for qualification for portable oxygen, found to have O2 sat 76% -->Sat 92% on 6 L. Pt got transferred to [facility].  Procal elevated, initially treated for HCAP with Vanc/ Levo/ Zosyn and de-escalated to Ceftriaxone for CAP. CT scan showed bilateral GGO. TTE showed normal EF 60-65%. Pt continue to need high flow oxygen, given low CD 4, bactrim was started 4/29 for possible PCP pneumonia.    Regarding recurrent hypoxic episodes, pt intermittently required oxygen at home for 3 years. He sometimes need it for a month and did well with no oxygen for several months. Noticed that symptoms occur the similar times of the year (April/ May). Previous evaluation include  - 5/2012: Lung biopsy showed benign endobronchial mucosa with chronic inflammation and interstitial fibrosis (5/2012) and BAL with numerous WBC, neg cryptococcus Ag/ bacteria/ aspergillous Ag (5/2012),   Serum ACEI/ PR3, SSA, SSB, Sm, U1RNP, Scl 70 and Jo1 negative. Very low KENDRA positive 0.3  - 7/2013: RF, KENDRA  neg  - 5/2014: BAL with Positive for Candida, no PCP/ viral cytopathic changes (5/2014)     DDx includes hypersensitivity pneumonitis vs infection (severe CAP, less likely PCP given good compliance of antiretroviral therapy).  -- KENDRA, RF, anti CCP, hypersensitivity pneumonitis and Brown lungs panel pending  -- ID consult, appreciated recs   -- Allergen panel and IgE pending   -- beta D glucan pending   -- Ig level pending  -- Pulmonary consult, appreciated recs   NPO AMN for possible procedure  -- titrate oxygen keep sat > 92%  -- continue Ceftriaxone for CAP  -- continue Bactrim for possible PCP  -- continue solumedrol (decrease dose from 500 IV --> 60 mg IV)  -- bronchodilator     ## HIV  Compliance with HIV antiviral therapy. CD 4 has been in 600s-700s range. VL undetectable this admission.  CD4 this admission in 60s, which likely 2/2 acute illness  -- ID consult, appreciated recs  -- cont PTA Dolutegravir and Truvada  -- repeat CD 4    ##Other  - PPx: Lovenox for DVT/PPI for GI  - FEN: regular diet  - IVF: PIV/ no fluids  - Code status: full  - Disposition:   Likely home after bronchoscopy and diagnosis evaluation    Pt seen and discussed with Dr. Chau.    Saul Robison MD  PGY-3  Internal Medicine  [phone]  -----------------------------------------------------------------------------------------------------------------------------------------------      Interval History    No acute events overnight. SOB improve.   He intermittently required oxygen at home for 3 years. He sometimes need it for a month and did well with no oxygen for several months. Noticed that symptoms occur the similar times of the year (April/ May).   Denies Chest pain Denies any Nausea &Vomiting,No abdominal pain Denies urinary problems , Denies  fever/chills/rigor    Review of Systems:   A 4 point review of systems was negative except as noted above.    OBJECTIVE:  VS: Vital signs:  Temp: 98.1  F (36.7  C) Temp src: Oral BP:  "137/87   Heart Rate: 90 Resp: 24 SpO2: 95 % O2 Device: Oxymizer cannula Oxygen Delivery: 7 LPM   Weight: 81.6 kg (179 lb 14.3 oz)  Estimated body mass index is 26.57 kg/(m^2) as calculated from the following:    Height as of 4/24/17: 1.753 m (5' 9\").    Weight as of this encounter: 81.6 kg (179 lb 14.3 oz).         Intake/Output Summary (Last 24 hours) at 04/30/17 1411  Last data filed at 04/30/17 1200   Gross per 24 hour   Intake           2527.5 ml   Output             2350 ml   Net            177.5 ml       GENERAL APPEARANCE: alert and no distress, breathing comfortably on 7L canula  Pulmonary: bilateral crackles throughout (less at apex)  CV: regular rhythm, normal rate, no murmur, no rub  GI: soft, nontender  Ext: no edema  NEURO: mentation intact and speech normal, equally move    Labs:   All labs reviewed by me  Electrolytes/Renal - Recent Labs   Lab Test  04/30/17   0428  04/29/17   0419  04/28/17   0447  04/27/17   1836   NA  140  145*   --   147*   POTASSIUM  3.5  3.9   --   4.1   CHLORIDE  107  109   --   112*   CO2  25  28   --   23   BUN  23  19   --   22   CR  1.01  0.87  0.98  0.99   GLC  190*  189*   --   196*   AUSTIN  8.0*  8.4*   --   8.2*   MAG  2.2  2.4*   --   2.3   PHOS  3.9  2.4*  2.6  1.8*     CBC -   Recent Labs   Lab Test  04/30/17   0428  04/28/17   0447  04/27/17   1647  04/27/17   0416   WBC  14.9*   --   18.4*  23.6*   HGB  14.0   --   14.8  14.3   PLT  143*  126*  134*  128*     LFTs Recent Labs   Lab Test  04/26/17   0503  04/25/17   1732  04/25/17   1230   ALKPHOS  86  99  93   BILITOTAL  2.1*  2.2*  1.9*   ALT  22  27  30   AST  40  51*  61*   PROTTOTAL  6.4*  6.8  6.4*   ALBUMIN  2.9*  3.3*  3.2*     Current Medications:    [START ON 5/1/2017] methylPREDNISolone  62.5 mg Intravenous Daily     sulfamethoxazole-trimethoprim (BACTRIM/SEPTRA) intermittent infusion  400 mg Intravenous Q8H     nicotine   Transdermal Q8H     nicotine   Transdermal Daily     nicotine  1 patch Transdermal " Daily     cefTRIAXone  1 g Intravenous Q24H     insulin aspart  1-7 Units Subcutaneous TID AC     insulin aspart  1-5 Units Subcutaneous At Bedtime     loratadine  10 mg Oral Daily     enoxaparin  40 mg Subcutaneous Q24H     cyanocobalamin  1,000 mcg Oral Daily     docusate sodium  100 mg Oral Daily     FLUoxetine  20 mg Oral Daily     ipratropium - albuterol 0.5 mg/2.5 mg/3 mL  3 mL Nebulization 4x daily     emtricitabine-tenofovir  1 tablet Oral Daily     dolutegravir  50 mg Oral Daily     influenza quadrivalent (PF) vacc age 3 yrs and older  0.5 mL Intramuscular Prior to discharge     lidocaine  1-2 patch Transdermal Q24h    And     lidocaine   Transdermal Q24H    And     lidocaine   Transdermal Q8H        Saul Robison MD     Physician Attestation   I, Rocky Chau, saw this patient with the resident and agree with the resident s findings and plan of care as documented in the resident s note.      I personally reviewed vital signs, medications, labs and imaging.      Rocky Chau  Date of Service (when I saw the patient): 4/30/17

## 2017-05-01 ENCOUNTER — APPOINTMENT (OUTPATIENT)
Dept: GENERAL RADIOLOGY | Facility: CLINIC | Age: 47
DRG: 166 | End: 2017-05-01
Attending: INTERNAL MEDICINE
Payer: MEDICARE

## 2017-05-01 ENCOUNTER — APPOINTMENT (OUTPATIENT)
Dept: PHYSICAL THERAPY | Facility: CLINIC | Age: 47
DRG: 166 | End: 2017-05-01
Attending: INTERNAL MEDICINE
Payer: MEDICARE

## 2017-05-01 LAB
ANA SER QL IA: NORMAL
ANION GAP SERPL CALCULATED.3IONS-SCNC: 8 MMOL/L (ref 3–14)
APPEARANCE FLD: NORMAL
BACTERIA SPEC CULT: NO GROWTH
BACTERIA SPEC CULT: NO GROWTH
BACTERIA SPEC CULT: NORMAL
BACTERIA SPEC CULT: NORMAL
BUN SERPL-MCNC: 22 MG/DL (ref 7–30)
CALCIUM SERPL-MCNC: 8.2 MG/DL (ref 8.5–10.1)
CD3 CELLS # BLD: 756 CELLS/UL (ref 603–2990)
CD3 CELLS NFR BLD: 50 % (ref 49–84)
CD3+CD4+ CELLS # BLD: 228 CELLS/UL (ref 441–2156)
CD3+CD4+ CELLS NFR BLD: 15 % (ref 28–63)
CD3+CD4+ CELLS/CD3+CD8+ CLL BLD: 0.43 % (ref 1.4–2.6)
CD3+CD8+ CELLS # BLD: 527 CELLS/UL (ref 125–1312)
CD3+CD8+ CELLS NFR BLD: 35 % (ref 10–40)
CHLORIDE SERPL-SCNC: 106 MMOL/L (ref 94–109)
CO2 SERPL-SCNC: 23 MMOL/L (ref 20–32)
COLOR FLD: COLORLESS
CREAT SERPL-MCNC: 0.93 MG/DL (ref 0.66–1.25)
CRP SERPL-MCNC: 10 MG/L (ref 0–8)
ERYTHROCYTE [DISTWIDTH] IN BLOOD BY AUTOMATED COUNT: 13.8 % (ref 10–15)
GFR SERPL CREATININE-BSD FRML MDRD: 87 ML/MIN/1.7M2
GLUCOSE BLDC GLUCOMTR-MCNC: 121 MG/DL (ref 70–99)
GLUCOSE BLDC GLUCOMTR-MCNC: 124 MG/DL (ref 70–99)
GLUCOSE BLDC GLUCOMTR-MCNC: 148 MG/DL (ref 70–99)
GLUCOSE BLDC GLUCOMTR-MCNC: 219 MG/DL (ref 70–99)
GLUCOSE SERPL-MCNC: 130 MG/DL (ref 70–99)
GRAM STN SPEC: NORMAL
HCT VFR BLD AUTO: 49.4 % (ref 40–53)
HGB BLD-MCNC: 16.3 G/DL (ref 13.3–17.7)
IFC SPECIMEN: ABNORMAL
IGA SERPL-MCNC: 113 MG/DL (ref 70–380)
IGG SERPL-MCNC: 471 MG/DL (ref 695–1620)
IGM SERPL-MCNC: 56 MG/DL (ref 60–265)
IMMUNODEFICIENCY MARKERS SPEC-IMP: ABNORMAL
INR PPP: 0.92 (ref 0.86–1.14)
KOH PREP SPEC: NORMAL
LYMPHOCYTES NFR FLD MANUAL: 3 %
MAGNESIUM SERPL-MCNC: 2.5 MG/DL (ref 1.6–2.3)
MCH RBC QN AUTO: 29.7 PG (ref 26.5–33)
MCHC RBC AUTO-ENTMCNC: 33 G/DL (ref 31.5–36.5)
MCV RBC AUTO: 90 FL (ref 78–100)
MICRO REPORT STATUS: NORMAL
MISCELLANEOUS TEST: NORMAL
MONOS+MACROS NFR FLD MANUAL: 82 %
NEUTS BAND NFR FLD MANUAL: 15 %
PHOSPHATE SERPL-MCNC: 3.1 MG/DL (ref 2.5–4.5)
PLATELET # BLD AUTO: 148 10E9/L (ref 150–450)
POTASSIUM SERPL-SCNC: 4.1 MMOL/L (ref 3.4–5.3)
RBC # BLD AUTO: 5.48 10E12/L (ref 4.4–5.9)
RBC # FLD: NORMAL /UL
SODIUM SERPL-SCNC: 137 MMOL/L (ref 133–144)
SPECIMEN SOURCE FLD: NORMAL
SPECIMEN SOURCE: NORMAL
WBC # BLD AUTO: 16 10E9/L (ref 4–11)
WBC # FLD AUTO: 284 /UL

## 2017-05-01 PROCEDURE — 87486 CHLMYD PNEUM DNA AMP PROBE: CPT | Performed by: INTERNAL MEDICINE

## 2017-05-01 PROCEDURE — 25000125 ZZHC RX 250: Performed by: INTERNAL MEDICINE

## 2017-05-01 PROCEDURE — 36415 COLL VENOUS BLD VENIPUNCTURE: CPT | Performed by: INTERNAL MEDICINE

## 2017-05-01 PROCEDURE — 80048 BASIC METABOLIC PNL TOTAL CA: CPT | Performed by: INTERNAL MEDICINE

## 2017-05-01 PROCEDURE — 87533 HHV-6 DNA QUANT: CPT | Performed by: INTERNAL MEDICINE

## 2017-05-01 PROCEDURE — 87799 DETECT AGENT NOS DNA QUANT: CPT | Performed by: INTERNAL MEDICINE

## 2017-05-01 PROCEDURE — 40000275 ZZH STATISTIC RCP TIME EA 10 MIN

## 2017-05-01 PROCEDURE — 94640 AIRWAY INHALATION TREATMENT: CPT | Mod: 76

## 2017-05-01 PROCEDURE — 87529 HSV DNA AMP PROBE: CPT | Performed by: INTERNAL MEDICINE

## 2017-05-01 PROCEDURE — A9270 NON-COVERED ITEM OR SERVICE: HCPCS | Mod: GY | Performed by: PEDIATRICS

## 2017-05-01 PROCEDURE — 25000132 ZZH RX MED GY IP 250 OP 250 PS 637: Mod: GY | Performed by: PEDIATRICS

## 2017-05-01 PROCEDURE — 83735 ASSAY OF MAGNESIUM: CPT | Performed by: INTERNAL MEDICINE

## 2017-05-01 PROCEDURE — 86356 MONONUCLEAR CELL ANTIGEN: CPT | Performed by: INTERNAL MEDICINE

## 2017-05-01 PROCEDURE — 94640 AIRWAY INHALATION TREATMENT: CPT

## 2017-05-01 PROCEDURE — A9270 NON-COVERED ITEM OR SERVICE: HCPCS | Mod: GY | Performed by: INTERNAL MEDICINE

## 2017-05-01 PROCEDURE — 87305 ASPERGILLUS AG IA: CPT | Performed by: INTERNAL MEDICINE

## 2017-05-01 PROCEDURE — 25000132 ZZH RX MED GY IP 250 OP 250 PS 637: Mod: GY | Performed by: INTERNAL MEDICINE

## 2017-05-01 PROCEDURE — 00000146 ZZHCL STATISTIC GLUCOSE BY METER IP

## 2017-05-01 PROCEDURE — 86360 T CELL ABSOLUTE COUNT/RATIO: CPT | Performed by: INTERNAL MEDICINE

## 2017-05-01 PROCEDURE — 86359 T CELLS TOTAL COUNT: CPT | Performed by: INTERNAL MEDICINE

## 2017-05-01 PROCEDURE — S0039 INJECTION, SULFAMETHOXAZOLE: HCPCS | Performed by: INTERNAL MEDICINE

## 2017-05-01 PROCEDURE — 87015 SPECIMEN INFECT AGNT CONCNTJ: CPT | Performed by: INTERNAL MEDICINE

## 2017-05-01 PROCEDURE — 25000128 H RX IP 250 OP 636: Performed by: INTERNAL MEDICINE

## 2017-05-01 PROCEDURE — 99233 SBSQ HOSP IP/OBS HIGH 50: CPT | Mod: GC | Performed by: INTERNAL MEDICINE

## 2017-05-01 PROCEDURE — 87633 RESP VIRUS 12-25 TARGETS: CPT | Performed by: INTERNAL MEDICINE

## 2017-05-01 PROCEDURE — 71010 XR CHEST PORT 1 VW: CPT

## 2017-05-01 PROCEDURE — 31624 DX BRONCHOSCOPE/LAVAGE: CPT

## 2017-05-01 PROCEDURE — 87081 CULTURE SCREEN ONLY: CPT | Performed by: INTERNAL MEDICINE

## 2017-05-01 PROCEDURE — 12000001 ZZH R&B MED SURG/OB UMMC

## 2017-05-01 PROCEDURE — 25000128 H RX IP 250 OP 636

## 2017-05-01 PROCEDURE — 87116 MYCOBACTERIA CULTURE: CPT | Performed by: INTERNAL MEDICINE

## 2017-05-01 PROCEDURE — 0B9C8ZX DRAINAGE OF RIGHT UPPER LUNG LOBE, VIA NATURAL OR ARTIFICIAL OPENING ENDOSCOPIC, DIAGNOSTIC: ICD-10-PCS | Performed by: INTERNAL MEDICINE

## 2017-05-01 PROCEDURE — 86140 C-REACTIVE PROTEIN: CPT | Performed by: INTERNAL MEDICINE

## 2017-05-01 PROCEDURE — 87206 SMEAR FLUORESCENT/ACID STAI: CPT | Performed by: INTERNAL MEDICINE

## 2017-05-01 PROCEDURE — 84100 ASSAY OF PHOSPHORUS: CPT | Performed by: INTERNAL MEDICINE

## 2017-05-01 PROCEDURE — 85610 PROTHROMBIN TIME: CPT | Performed by: INTERNAL MEDICINE

## 2017-05-01 PROCEDURE — 87102 FUNGUS ISOLATION CULTURE: CPT | Performed by: INTERNAL MEDICINE

## 2017-05-01 PROCEDURE — 88312 SPECIAL STAINS GROUP 1: CPT | Performed by: INTERNAL MEDICINE

## 2017-05-01 PROCEDURE — 87070 CULTURE OTHR SPECIMN AEROBIC: CPT | Performed by: INTERNAL MEDICINE

## 2017-05-01 PROCEDURE — 97110 THERAPEUTIC EXERCISES: CPT | Mod: GP

## 2017-05-01 PROCEDURE — 84999 UNLISTED CHEMISTRY PROCEDURE: CPT | Performed by: INTERNAL MEDICINE

## 2017-05-01 PROCEDURE — 25000125 ZZHC RX 250

## 2017-05-01 PROCEDURE — 40000193 ZZH STATISTIC PT WARD VISIT

## 2017-05-01 PROCEDURE — 87210 SMEAR WET MOUNT SALINE/INK: CPT | Performed by: INTERNAL MEDICINE

## 2017-05-01 PROCEDURE — 88108 CYTOPATH CONCENTRATE TECH: CPT | Performed by: INTERNAL MEDICINE

## 2017-05-01 PROCEDURE — 85027 COMPLETE CBC AUTOMATED: CPT | Performed by: INTERNAL MEDICINE

## 2017-05-01 PROCEDURE — 87798 DETECT AGENT NOS DNA AMP: CPT | Performed by: INTERNAL MEDICINE

## 2017-05-01 PROCEDURE — 87205 SMEAR GRAM STAIN: CPT | Performed by: INTERNAL MEDICINE

## 2017-05-01 PROCEDURE — 89051 BODY FLUID CELL COUNT: CPT | Performed by: INTERNAL MEDICINE

## 2017-05-01 RX ORDER — SULFAMETHOXAZOLE/TRIMETHOPRIM 800-160 MG
2 TABLET ORAL 2 TIMES DAILY
Status: DISCONTINUED | OUTPATIENT
Start: 2017-05-01 | End: 2017-05-02 | Stop reason: HOSPADM

## 2017-05-01 RX ORDER — FENTANYL CITRATE 50 UG/ML
25-50 INJECTION, SOLUTION INTRAMUSCULAR; INTRAVENOUS EVERY 5 MIN PRN
Status: ACTIVE | OUTPATIENT
Start: 2017-05-01 | End: 2017-05-02

## 2017-05-01 RX ORDER — FENTANYL CITRATE 50 UG/ML
INJECTION, SOLUTION INTRAMUSCULAR; INTRAVENOUS
Status: COMPLETED
Start: 2017-05-01 | End: 2017-05-01

## 2017-05-01 RX ORDER — SODIUM CHLORIDE 9 MG/ML
INJECTION, SOLUTION INTRAVENOUS
Status: COMPLETED
Start: 2017-05-01 | End: 2017-05-01

## 2017-05-01 RX ORDER — FENTANYL CITRATE 50 UG/ML
50 INJECTION, SOLUTION INTRAMUSCULAR; INTRAVENOUS
Status: COMPLETED | OUTPATIENT
Start: 2017-05-01 | End: 2017-05-01

## 2017-05-01 RX ORDER — LIDOCAINE HYDROCHLORIDE 10 MG/ML
INJECTION, SOLUTION EPIDURAL; INFILTRATION; INTRACAUDAL; PERINEURAL
Status: COMPLETED
Start: 2017-05-01 | End: 2017-05-01

## 2017-05-01 RX ADMIN — OXYCODONE HYDROCHLORIDE AND ACETAMINOPHEN 1 TABLET: 10; 325 TABLET ORAL at 08:36

## 2017-05-01 RX ADMIN — MORPHINE SULFATE 15 MG: 15 TABLET ORAL at 07:49

## 2017-05-01 RX ADMIN — IPRATROPIUM BROMIDE AND ALBUTEROL SULFATE 3 ML: .5; 3 SOLUTION RESPIRATORY (INHALATION) at 08:14

## 2017-05-01 RX ADMIN — LORATADINE 10 MG: 10 TABLET ORAL at 07:22

## 2017-05-01 RX ADMIN — LIDOCAINE 2 PATCH: 50 PATCH TOPICAL at 20:41

## 2017-05-01 RX ADMIN — MORPHINE SULFATE 15 MG: 15 TABLET ORAL at 16:08

## 2017-05-01 RX ADMIN — INSULIN ASPART 1 UNITS: 100 INJECTION, SOLUTION INTRAVENOUS; SUBCUTANEOUS at 07:50

## 2017-05-01 RX ADMIN — SENNOSIDES 2 TABLET: 8.6 TABLET, FILM COATED ORAL at 23:12

## 2017-05-01 RX ADMIN — EMTRICITABINE AND TENOFOVIR DISOPROXIL FUMARATE 1 TABLET: 200; 300 TABLET, FILM COATED ORAL at 07:24

## 2017-05-01 RX ADMIN — IPRATROPIUM BROMIDE AND ALBUTEROL SULFATE 3 ML: .5; 3 SOLUTION RESPIRATORY (INHALATION) at 20:31

## 2017-05-01 RX ADMIN — SULFAMETHOXAZOLE AND TRIMETHOPRIM 2 TABLET: 800; 160 TABLET ORAL at 20:42

## 2017-05-01 RX ADMIN — FENTANYL CITRATE 100 MCG: 50 INJECTION, SOLUTION INTRAMUSCULAR; INTRAVENOUS at 14:36

## 2017-05-01 RX ADMIN — SODIUM CHLORIDE 500 ML: 9 INJECTION, SOLUTION INTRAVENOUS at 06:52

## 2017-05-01 RX ADMIN — CYANOCOBALAMIN TAB 1000 MCG 1000 MCG: 1000 TAB at 07:23

## 2017-05-01 RX ADMIN — ENOXAPARIN SODIUM 40 MG: 40 INJECTION SUBCUTANEOUS at 16:08

## 2017-05-01 RX ADMIN — SULFAMETHOXAZOLE AND TRIMETHOPRIM 400 MG: 80; 16 INJECTION, SOLUTION, CONCENTRATE INTRAVENOUS at 04:06

## 2017-05-01 RX ADMIN — MIDAZOLAM 2 MG: 1 INJECTION INTRAMUSCULAR; INTRAVENOUS at 14:37

## 2017-05-01 RX ADMIN — CEFTRIAXONE 1 G: 1 INJECTION, POWDER, FOR SOLUTION INTRAMUSCULAR; INTRAVENOUS at 16:36

## 2017-05-01 RX ADMIN — NICOTINE 1 PATCH: 14 PATCH, EXTENDED RELEASE TRANSDERMAL at 07:39

## 2017-05-01 RX ADMIN — OXYCODONE HYDROCHLORIDE AND ACETAMINOPHEN 1 TABLET: 10; 325 TABLET ORAL at 23:12

## 2017-05-01 RX ADMIN — IPRATROPIUM BROMIDE AND ALBUTEROL SULFATE 3 ML: .5; 3 SOLUTION RESPIRATORY (INHALATION) at 12:13

## 2017-05-01 RX ADMIN — IPRATROPIUM BROMIDE AND ALBUTEROL SULFATE 3 ML: .5; 3 SOLUTION RESPIRATORY (INHALATION) at 16:41

## 2017-05-01 RX ADMIN — LIDOCAINE HYDROCHLORIDE 9 ML: 10 INJECTION, SOLUTION EPIDURAL; INFILTRATION; INTRACAUDAL; PERINEURAL at 14:38

## 2017-05-01 RX ADMIN — OXYCODONE HYDROCHLORIDE AND ACETAMINOPHEN 1 TABLET: 10; 325 TABLET ORAL at 12:52

## 2017-05-01 RX ADMIN — METHYLPREDNISOLONE SODIUM SUCCINATE 62.5 MG: 125 INJECTION, POWDER, LYOPHILIZED, FOR SOLUTION INTRAMUSCULAR; INTRAVENOUS at 07:27

## 2017-05-01 RX ADMIN — OXYCODONE HYDROCHLORIDE AND ACETAMINOPHEN 1 TABLET: 10; 325 TABLET ORAL at 17:58

## 2017-05-01 RX ADMIN — SULFAMETHOXAZOLE AND TRIMETHOPRIM 400 MG: 80; 16 INJECTION, SOLUTION, CONCENTRATE INTRAVENOUS at 12:05

## 2017-05-01 RX ADMIN — ALPRAZOLAM 1 MG: 0.5 TABLET ORAL at 16:35

## 2017-05-01 RX ADMIN — DOLUTEGRAVIR SODIUM 50 MG: 50 TABLET, FILM COATED ORAL at 07:24

## 2017-05-01 ASSESSMENT — PAIN DESCRIPTION - DESCRIPTORS
DESCRIPTORS: ACHING
DESCRIPTORS: ACHING
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
## 2017-05-01 NOTE — PROCEDURES
Procedure(s):    Bronchalveolar Lavage (1 sites, see below for details)    Indication:   Acute on chronic hypoxic respiratory failure    Diffuse pulmonary infiltrates    Attending of Record:     SYBIL Nick MD    Trainees Present:   Lainey Segura MD    Medications:    2 mg versed  100 mcg fentanyl    Sedation Time:  Total sedation time was 15 minutes of continuous bedside 1:1 monitoring.     Time Out:  Performed    The patient's medical record has been reviewed.  The indication for the procedure was reviewed.  The necessary history and physical examination was performed and reviewed.  The risks, benefits and alternatives of the procedure were discussed with the the patient in detail and he had the opportunity to ask questions.  I discussed in particular the potential complications including risks of minor or life-threatening bleeding and/or infection, respiratory failure, vocal cord trauma / paralysis, pneumothorax, and discomfort. Sedation risks were also discussed including abnormal heart rhythms, low blood pressure, and respiratory failure. All questions were answered to the best of my ability.  Verbal and written informed consent was obtained.  The proposed procedure and the patient's identification were verified prior to the procedure by the physician and the nurse, respiratory therapist, resident physician (resident / fellow).    The patient was assessed for the adequacy for the procedure and to receive medications.   Mental Status:  Alert and oriented x 3  Airway examination:  Class II (Complete visualization of uvula)  Pulmonary:  Clear to ausculation bilaterally  CV:  RRR, no murmurs or gallops  ASA Grade:  (II)  Mild systemic disease    After clinical evaluation and reviewing the indication, risks, alternatives and benefits of the procedure the patient was deemed to be in satisfactory condition to undergo the procedure.      Immediately before administration of medications the patient was  re-assessed for adequacy to receive sedatives including the heart rate, respiratory rate, mental status, oxygen saturation, blood pressure and adequacy of pulmonary ventilation. These same parameters were continuously monitored throughout the procedure.    Maneuvers / Procedure:     The bronchoscope was inserted through the right Nare, the cords were anesthetized with lidocaine. Upper airway structures, vocal cords (anatomy and function) appear to be normal.    BAL:  A bronchoalveolar lavage was performed.  The scope was wedged in the RUL Apico-Posterior and then 120 ml of normal saline was instilled.  Gentle suction was then applied with a total return of 40 ml of cloudy, cellular fluid. Sample was sent for immunocompromised host panel, including Aspergillus galactomannan and CD4:CD8 ratio.           Any disposable equipment was visually inspected and deemed to be intact immediately post procedure.      Recommendations:   1. Follow-up BAL results.    Dr. Nick was present during the entire procedure.     Lainey Segura MD PGY-5  Pulmonary & Critical Care Fellow

## 2017-05-01 NOTE — PLAN OF CARE
"Problem: Goal Outcome Summary  Goal: Goal Outcome Summary  Outcome: Improving  D: Pt remained on 8L/NC overnight, sating in the low 90's. Refused labs this morning, wanted to be left alone. Stated, \"I will do them at noon\".  I: Meds given only. Refused most cares.  A: Slept between cares.  P: Awaiting bed on 6B      "

## 2017-05-01 NOTE — PROGRESS NOTES
[facility] Medicine Progress Note        Assessment and Plan:   Patient is a 46 year old year old male HIV, recurrent episodes of difficulty with breathing/ hypoxic respiratory failure intermittently on O2 supplement at home, who was recently admitted at [facility] 4/23-4/24 for allergic reaction and CAP and admitted to [facility] MICU for acute hypoxic respiratory failure, now improving and transferred to medicine service from ICU on 4/30.     5/1 changes    - Bronch with BAL  - Requesting slides sent from [place], [facility], YOBANI signed     ## Recurrent acute hypoxic respiratory failure   #Elevated CRP (360 -> 55 -> 10 mg/L)  Consultants: Pulm, ID  History   4/23 presented with 1 days of rapidly progressive chills, swelling of his hands, urticaria, and fever after he had been working in the garden. Seen at [facility] ED, CXR showed Right basilar pneumonia, treated empirically with ceftriaxone and Zithromax. Urticarial and possible angioedema were treated with corticosteroids and antihistamines and rapidly resolved. After discharge, worsening SOB, had outpatient oxygen level checked for qualification for portable oxygen, found to have O2 sat 76% -->Sat 92% on 6 L. Pt transferred to [facility].  Procal elevated, initially treated for HCAP with Vanc/ Levo/ Zosyn and de-escalated to Ceftriaxone for CAP. CT scan showed bilateral GGO. TTE showed normal EF 60-65% & peak velocity of tricuspid regurgitant jet not obtainable. Pt continue to need high flow oxygen. Given low CD 4, bactrim was started 4/29 for possible PCP pneumonia.     Regarding recurrent hypoxic episodes, pt intermittently required oxygen at home for 3 years. He sometimes need it for a month and did well with no oxygen for several months. Noticed that symptoms occur the similar times of the year (April/ May).     Previous evaluation included  - 5/2012: Lung biopsy showed benign endobronchial mucosa with chronic inflammation and interstitial fibrosis (5/2012) and BAL  "with numerous WBC, neg cryptococcus Ag/ bacteria/ aspergillous Ag (5/2012),  Serum ACEI/ PR3, SSA, SSB, Sm, U1RNP, Scl 70 and Jo1 negative. Very low KENDRA positive 0.3  - 7/2013: RF, KENDRA neg  - 5/2014: BAL with Positive for Candida, no PCP/ viral cytopathic changes (5/2014)      DDx includes: Hypersensitivity pneumonitis vs infection (severe CAP. Noted by ID - B cell defect would predispose to infection with encapsulated organisms. CRP down-trend rapidly suggests responsive to steroids instead of antibiotics potentially. Pneumocystis infection not in differential due to time course acute is not consistent and 1,3-beta-D-glucan is sensitive marker of PCP infection and has been negative) vs allergy (IgE normal, no eosinophilia) vs injection of substances noted to be in differential (such as tac - denies to ID).     Dr. Cabrera of ID did note a rare case of a genetic predisposition to High Altitude Pulmonary Edema (HAPE) - see 5/1 note. In this instance hypoxia instead of altitude induces pulmonary vasoconstriction and edema. This is a non-cardiogenic pulmonary edema 2/2 hypoxic pulmonary vasoconstriction and abnormally high pulm artery pressure and capillary pressures.     Work-up  -- KENDRA, RF, anti CCP, hypersensitivity pneumonitis and Brown lungs panel pending  -- Allergen panel and IgE pending  --4/29 Sent total IgG, IgM, and IgA to exclude concomitant B-cell immunodeficiency (e.g. CVID) as cause for recurrent pulmonary events  - 5/1 Bronch with BAL - results pending    Treatment  -- titrate oxygen keep sat > 92%  -- continue Ceftriaxone for CAP x 3 days  -- Bactrim 10 day course, stop date in.  for coverage on steroids, MRSA coverage. NOT for PCP.   -- continue solumedrol (decrease dose from 500 IV (x3 days) --> 60 mg IV (re-started 5/1). Discuss wean with pulm, wean as tolerated.   -- bronchodilator   -- Per ID: \"Consider possibility of High Altitude Pulmonary Edema as a causative factor in recurrent hypoxic " "respiratory failures. Home O2 tank would be ideal. Nifedipine prn use might be an interesting trial. 5/1\"     ## HIV  Compliance with HIV antiviral therapy. CD 4 has been in 600s-700s range. VL undetectable this admission.  CD4 this admission in 60s, which likely 2/2 acute illness  -- ID consult, appreciated recs  -- cont PTA Dolutegravir and Truvada  -- repeat CD 4     - PPx: Lovenox for DVT/PPI for GI  - FEN: regular diet  - IVF: PIV/ no fluids  - Code status: full  - Disposition: Likely home after bronchoscopy and diagnosis evaluation.    - PT 5/1: ok to d/c home with assist and outpatient pulm rehab    Staffed and seen with Dr. Chau.   Dr. Roxanne Recinos   PGY3 Med Peds. Pager [phone]        Interval History:   Overnight, on 8L NC sating in low 90s. Did refuse morning labs, doing them at noon he says. Feeling well today, not feeling short of breath, still on O2.          Physical Exam:   Vitals were reviewed  Blood pressure 127/90, temperature 97.4  F (36.3  C), temperature source Oral, resp. rate 19, weight 82.4 kg (181 lb 10.5 oz), SpO2 93 %.    Physical Exam:  General:  NAD, conversant  HEENT: Oral mucosa moist , EOMI  Neck - No cervical lymphadenopathy.   Skin - lesia complexion on face and neck. Non-blanching lesions throughout chest - appear capillary lesions/prominence.   CV: RRR, normal S1S2, no murmur, clicks, rubs  Resp: Crackles diffusely.   Abd: Soft, non-tender, BS+, no masses appreciated  Extremities: No pedal edema  Neuro: No lateralizing symptoms or focal neurologic deficits         Data:   ROUTINE LABS (Last four results)  CMP  Recent Labs  Lab 04/30/17  0428 04/29/17  0419 04/28/17  0447 04/27/17  1836 04/27/17  0416  04/26/17  0503  04/25/17  1732 04/25/17  1230    145*  --  147* 142  --  140  --  138 142   POTASSIUM 3.5 3.9  --  4.1 3.6  --  4.6  --  4.0 3.6   CHLORIDE 107 109  --  112* 109  --  106  --  104 109   CO2 25 28  --  23 27  --  28  --  27 25   ANIONGAP 9 8  --  12 6  -- "  6  --  7 8   * 189*  --  196* 174*  --  126*  < > 129* 118*   BUN 23 19  --  22 24  --  18  --  14 16   CR 1.01 0.87 0.98 0.99 1.11  --  1.00  --  1.19 1.15   GFRESTIMATED 79 >90Non  GFR Calc 81 81 71  --  80  --  65 68   GFRESTBLACK >90African American GFR Calc >90African American GFR Calc >90African American GFR Calc >90African American GFR Calc 86  --  >90African American GFR Calc  --  79 82   AUSTIN 8.0* 8.4*  --  8.2* 8.4*  --  9.1  --  8.8 8.2*   MAG 2.2 2.4*  --  2.3 2.3  --   --   --   --   --    PHOS 3.9 2.4* 2.6 1.8* 1.4*  < >  --   --   --   --    PROTTOTAL  --   --   --   --   --   --  6.4*  --  6.8 6.4*   ALBUMIN  --   --   --   --   --   --  2.9*  --  3.3* 3.2*   BILITOTAL  --   --   --   --   --   --  2.1*  --  2.2* 1.9*   ALKPHOS  --   --   --   --   --   --  86  --  99 93   AST  --   --   --   --   --   --  40  --  51* 61*   ALT  --   --   --   --   --   --  22  --  27 30   < > = values in this interval not displayed.  CBC  Recent Labs  Lab 04/30/17  0428 04/28/17  0447 04/27/17  1647 04/27/17  0416 04/26/17  0503   WBC 14.9*  --  18.4* 23.6* 19.7*   RBC 4.77  --  4.89 4.81 5.16   HGB 14.0  --  14.8 14.3 15.6   HCT 42.5  --  43.7 43.6 46.5   MCV 89  --  89 91 90   MCH 29.4  --  30.3 29.7 30.2   MCHC 32.9  --  33.9 32.8 33.5   RDW 13.7  --  13.9 13.8 13.9   * 126* 134* 128* 113*     INR  Recent Labs  Lab 04/26/17  0503   INR 1.36*     Arterial Blood Gas  Recent Labs  Lab 04/27/17  0909 04/25/17  2100 04/25/17  1611 04/25/17  1214   PH 7.39 7.40 7.41  --    PCO2 43 44 42  --    PO2 80 85 136*  --    HCO3 26 27 27  --    O2PER 80 80 80 100       Imaging:  - None    Procedures:  - None    Peripheral IV 04/26/17 Right Lower forearm (Active)   Site Assessment WDL 5/1/2017  8:00 AM   Line Status Infusing 5/1/2017  8:00 AM   Phlebitis Scale 0-->no symptoms 5/1/2017  8:00 AM   Infiltration Scale 0 5/1/2017  8:00 AM   Infiltration Site Treatment Method  None 5/1/2017  4:00 AM    Extravasation? No 5/1/2017  4:00 AM   Number of days:5       Physician Attestation   I, Rocky Chau, saw this patient with the resident and agree with the resident s findings and plan of care as documented in the resident s note.      I personally reviewed vital signs, medications, labs and imaging.  Rocky Chau  Date of Service (when I saw the patient): 05/01/17

## 2017-05-01 NOTE — PLAN OF CARE
Problem: Goal Outcome Summary  Goal: Goal Outcome Summary  PT 4A: pt tolerated session very well. Amb lengthy amount of time around 4th and 2nd floors indep. On 4L O2, pt desats to 80% with activity. On 6L O2, pt desats to 83%. Takes less than a minute to recover >90% and no symptoms of desaturation noted other than SOB. OK to d/c home with assist and outpatient pulm rehab.

## 2017-05-02 ENCOUNTER — DOCUMENTATION ONLY (OUTPATIENT)
Dept: INTENSIVE CARE | Facility: CLINIC | Age: 47
End: 2017-05-02

## 2017-05-02 ENCOUNTER — APPOINTMENT (OUTPATIENT)
Dept: GENERAL RADIOLOGY | Facility: CLINIC | Age: 47
DRG: 166 | End: 2017-05-02
Attending: INTERNAL MEDICINE
Payer: MEDICARE

## 2017-05-02 VITALS
OXYGEN SATURATION: 95 % | RESPIRATION RATE: 20 BRPM | SYSTOLIC BLOOD PRESSURE: 132 MMHG | BODY MASS INDEX: 26.83 KG/M2 | TEMPERATURE: 97.6 F | WEIGHT: 181.66 LBS | DIASTOLIC BLOOD PRESSURE: 87 MMHG

## 2017-05-02 LAB
ANION GAP SERPL CALCULATED.3IONS-SCNC: 9 MMOL/L (ref 3–14)
BUN SERPL-MCNC: 31 MG/DL (ref 7–30)
CALCIUM SERPL-MCNC: 8.1 MG/DL (ref 8.5–10.1)
CCP AB SER IA-ACNC: 1 U/ML
CD19 CELLS NFR BRONCH: NORMAL %
CD3 CELLS NFR BRONCH: NORMAL %
CD3+CD4+ CELLS NFR BRONCH: NORMAL %
CD3+CD4+ CELLS/CD3+CD8+ CLL BRONCH: NORMAL %
CD3+CD8+ CELLS NFR BRONCH: NORMAL %
CD3-CD16+CD56+ CELLS NFR SPEC: NORMAL %
CHLORIDE SERPL-SCNC: 106 MMOL/L (ref 94–109)
CMV DNA SPEC NAA+PROBE-ACNC: NORMAL [IU]/ML
CMV DNA SPEC NAA+PROBE-LOG#: NORMAL {LOG_IU}/ML
CO2 SERPL-SCNC: 25 MMOL/L (ref 20–32)
COPATH REPORT: NORMAL
CREAT SERPL-MCNC: 1.19 MG/DL (ref 0.66–1.25)
CRP SERPL-MCNC: 6.1 MG/L (ref 0–8)
ERYTHROCYTE [DISTWIDTH] IN BLOOD BY AUTOMATED COUNT: 13.9 % (ref 10–15)
FLUAV H1 2009 PAND RNA SPEC QL NAA+PROBE: NEGATIVE
FLUAV H1 RNA SPEC QL NAA+PROBE: NEGATIVE
FLUAV H3 RNA SPEC QL NAA+PROBE: NEGATIVE
FLUAV RNA SPEC QL NAA+PROBE: NEGATIVE
FLUBV RNA SPEC QL NAA+PROBE: NEGATIVE
GFR SERPL CREATININE-BSD FRML MDRD: 65 ML/MIN/1.7M2
GLUCOSE BLDC GLUCOMTR-MCNC: 126 MG/DL (ref 70–99)
GLUCOSE SERPL-MCNC: 128 MG/DL (ref 70–99)
HADV DNA SPEC QL NAA+PROBE: NEGATIVE
HADV DNA SPEC QL NAA+PROBE: NEGATIVE
HCT VFR BLD AUTO: 45.1 % (ref 40–53)
HGB BLD-MCNC: 14.9 G/DL (ref 13.3–17.7)
HMPV RNA SPEC QL NAA+PROBE: NEGATIVE
HPIV1 RNA SPEC QL NAA+PROBE: NEGATIVE
HPIV2 RNA SPEC QL NAA+PROBE: NEGATIVE
HPIV3 RNA SPEC QL NAA+PROBE: NEGATIVE
IFC SPECIMEN: NORMAL
MAGNESIUM SERPL-MCNC: 2.6 MG/DL (ref 1.6–2.3)
MCH RBC QN AUTO: 30 PG (ref 26.5–33)
MCHC RBC AUTO-ENTMCNC: 33 G/DL (ref 31.5–36.5)
MCV RBC AUTO: 91 FL (ref 78–100)
MICROBIOLOGIST REVIEW: NORMAL
PHOSPHATE SERPL-MCNC: 3.6 MG/DL (ref 2.5–4.5)
PLATELET # BLD AUTO: 171 10E9/L (ref 150–450)
POTASSIUM SERPL-SCNC: 3.8 MMOL/L (ref 3.4–5.3)
RBC # BLD AUTO: 4.97 10E12/L (ref 4.4–5.9)
RHEUMATOID FACT SER NEPH-ACNC: <20 IU/ML (ref 0–20)
RHINOVIRUS RNA SPEC QL NAA+PROBE: NEGATIVE
RSV RNA SPEC QL NAA+PROBE: NEGATIVE
RSV RNA SPEC QL NAA+PROBE: NEGATIVE
SODIUM SERPL-SCNC: 140 MMOL/L (ref 133–144)
SPECIMEN SOURCE: NORMAL
SPECIMEN SOURCE: NORMAL
T-CELL SUBSET INTERPRETATION: NORMAL
WBC # BLD AUTO: 12.5 10E9/L (ref 4–11)

## 2017-05-02 PROCEDURE — 94640 AIRWAY INHALATION TREATMENT: CPT | Mod: 76

## 2017-05-02 PROCEDURE — 25000125 ZZHC RX 250: Performed by: INTERNAL MEDICINE

## 2017-05-02 PROCEDURE — 71010 XR CHEST PORT 1 VW: CPT

## 2017-05-02 PROCEDURE — A9270 NON-COVERED ITEM OR SERVICE: HCPCS | Mod: GY | Performed by: INTERNAL MEDICINE

## 2017-05-02 PROCEDURE — 40000275 ZZH STATISTIC RCP TIME EA 10 MIN

## 2017-05-02 PROCEDURE — 86140 C-REACTIVE PROTEIN: CPT | Performed by: INTERNAL MEDICINE

## 2017-05-02 PROCEDURE — 25000132 ZZH RX MED GY IP 250 OP 250 PS 637: Mod: GY | Performed by: INTERNAL MEDICINE

## 2017-05-02 PROCEDURE — 99239 HOSP IP/OBS DSCHRG MGMT >30: CPT | Mod: GC | Performed by: INTERNAL MEDICINE

## 2017-05-02 PROCEDURE — 83735 ASSAY OF MAGNESIUM: CPT | Performed by: INTERNAL MEDICINE

## 2017-05-02 PROCEDURE — 36415 COLL VENOUS BLD VENIPUNCTURE: CPT | Performed by: INTERNAL MEDICINE

## 2017-05-02 PROCEDURE — 85027 COMPLETE CBC AUTOMATED: CPT | Performed by: INTERNAL MEDICINE

## 2017-05-02 PROCEDURE — 25000128 H RX IP 250 OP 636: Performed by: INTERNAL MEDICINE

## 2017-05-02 PROCEDURE — 80048 BASIC METABOLIC PNL TOTAL CA: CPT | Performed by: INTERNAL MEDICINE

## 2017-05-02 PROCEDURE — 84100 ASSAY OF PHOSPHORUS: CPT | Performed by: INTERNAL MEDICINE

## 2017-05-02 PROCEDURE — 94640 AIRWAY INHALATION TREATMENT: CPT

## 2017-05-02 PROCEDURE — 25000132 ZZH RX MED GY IP 250 OP 250 PS 637: Mod: GY | Performed by: PEDIATRICS

## 2017-05-02 PROCEDURE — A9270 NON-COVERED ITEM OR SERVICE: HCPCS | Mod: GY | Performed by: PEDIATRICS

## 2017-05-02 PROCEDURE — 00000146 ZZHCL STATISTIC GLUCOSE BY METER IP

## 2017-05-02 RX ORDER — PREDNISONE 20 MG/1
60 TABLET ORAL DAILY
Qty: 70 TABLET | Refills: 0 | Status: SHIPPED | OUTPATIENT
Start: 2017-05-02 | End: 2017-05-02

## 2017-05-02 RX ORDER — PREDNISONE 20 MG/1
60 TABLET ORAL DAILY
Qty: 70 TABLET | Refills: 0 | Status: SHIPPED | OUTPATIENT
Start: 2017-05-02 | End: 2019-07-04

## 2017-05-02 RX ORDER — SULFAMETHOXAZOLE/TRIMETHOPRIM 800-160 MG
1 TABLET ORAL DAILY
Qty: 60 TABLET | Refills: 1 | Status: SHIPPED | OUTPATIENT
Start: 2017-05-02 | End: 2019-08-09

## 2017-05-02 RX ADMIN — IPRATROPIUM BROMIDE AND ALBUTEROL SULFATE 3 ML: .5; 3 SOLUTION RESPIRATORY (INHALATION) at 12:19

## 2017-05-02 RX ADMIN — DOLUTEGRAVIR SODIUM 50 MG: 50 TABLET, FILM COATED ORAL at 07:49

## 2017-05-02 RX ADMIN — IPRATROPIUM BROMIDE AND ALBUTEROL SULFATE 3 ML: .5; 3 SOLUTION RESPIRATORY (INHALATION) at 09:08

## 2017-05-02 RX ADMIN — NICOTINE 1 PATCH: 14 PATCH, EXTENDED RELEASE TRANSDERMAL at 07:49

## 2017-05-02 RX ADMIN — LORATADINE 10 MG: 10 TABLET ORAL at 07:47

## 2017-05-02 RX ADMIN — CYANOCOBALAMIN TAB 1000 MCG 1000 MCG: 1000 TAB at 07:43

## 2017-05-02 RX ADMIN — POTASSIUM CHLORIDE 20 MEQ: 750 TABLET, EXTENDED RELEASE ORAL at 07:46

## 2017-05-02 RX ADMIN — METHYLPREDNISOLONE SODIUM SUCCINATE 62.5 MG: 125 INJECTION, POWDER, LYOPHILIZED, FOR SOLUTION INTRAMUSCULAR; INTRAVENOUS at 07:47

## 2017-05-02 RX ADMIN — EMTRICITABINE AND TENOFOVIR DISOPROXIL FUMARATE 1 TABLET: 200; 300 TABLET, FILM COATED ORAL at 07:49

## 2017-05-02 RX ADMIN — SULFAMETHOXAZOLE AND TRIMETHOPRIM 2 TABLET: 800; 160 TABLET ORAL at 07:47

## 2017-05-02 RX ADMIN — OXYCODONE HYDROCHLORIDE AND ACETAMINOPHEN 1 TABLET: 10; 325 TABLET ORAL at 07:47

## 2017-05-02 NOTE — PROGRESS NOTES
Brief Progress Note  Date: 5/2/2017    Per team, patient has been wanting to leave [facility].    Preliminary bronchoscopy cytology results negative for PCP, fungus, and virus.   High suspicion for hypersensitivity pneumonitis, lower suspicion for infection.     RECOMMENDATIONS:  1. Switch from methylpred to prednisone 60 mg X 1 week, then 50 mg X 1 week, then 40 X 1 week. Pulmonary Clinic follow-up will determine prednisone taper.    2. Bactrim for PCP prophylaxis.  3. Patient needs to follow-up in the Pulmonary Clinic in 2 weeks with CXR and PFTs.  4. We are obtaining slides from his VATS wedge biopsy GARCIA and LLL from [facility].  5. I will discuss his case in the ILD conference on Monday, hopefully, we have his slides at that time.   6. Recommend allergy clinic follow-up.   7. Oxygen supplementation at discharge.     Discussed with Dr. Nick.     Lainey Segura MD PGY-5  Pulmonary & Critical Care Fellow

## 2017-05-02 NOTE — SUMMARY OF CARE
Pt left in stable condition with home 02 and all belongings on his person.  Pt refused to allow RN to take vitals or assess him.  Pt indicated he understood all discharge instructions.

## 2017-05-02 NOTE — PLAN OF CARE
Problem: Goal Outcome Summary  Goal: Goal Outcome Summary  Outcome: No Change     D/I: Pt admitted for acute hypoxic respiratory failure and concern for pneumonia vs. hypersensitivity pneumonitis     Neuro: WNL. C/o chronic lower back pain. Lido patches in place, PO pain meds given.    CV: Tachycardic, . BP wnl.   Pulm: On 4L NC, sats 89-95%. Slightly dyspneic with continuous activity, but overall denies SOB. Lungs with crackles in the bases. Encouraging use of IS  GI/: Urinal voids. Regular diet, good appetite. BS+, abd soft/non tender.   Skin: Upper chest rash mild, small/round red spots; no drainage.   ID: Sputum cultures/specimins pending following yesterdays bronch. Afebrile.  Activity: Up with SBA. Moves indpt     A: Resp status unchanged, stable. Around 0600 pt requested to shower; it was explained to him that we usually do not use the shower in the room for pts or families, and that he could wash up in the chair now and possibly shower when he when to the general floor. He refused this and insisted he was going home. Paperwork for AMA was started, charge was notified, and susan GARCES team paged. Pt took off monitoring and went into shower. Waiting for return call from team.       Plan: Tx to 5A/B. Monitor Resp status. Notify team with changes.

## 2017-05-02 NOTE — SUMMARY OF CARE
Pt awaiting transport to home and home 02 delivery to hospital.  Pt informed by MD team and this RN that he shouldn't leave without home 02.  Pt refusing assessment and vitals.  Pt informed that while he has a right to leave when he pleases, he shouldn't leave without home 02.

## 2017-05-02 NOTE — DISCHARGE SUMMARY
[facility], [place]  Discharge Summary: Medicine Service    Date of Admission:  4/25/2017  Date of Discharge:  5/2/2017  3:31 PM  Discharging Provider: Roxanne Montiel    Discharge Diagnoses   Acute Hypoxic respiratory failure in setting of recurrent acute respiratory failure episodes  HIV, well controlled   Elevated CRP- resolving     History of Present Illness   Tommy Ross is a 46 year old male who presents with history as stated above. He presented to the emergency room 2-3 days ago with acute respiratory failure along with acute allergic reaction with urticarial rash on face, neck, and hands. He was noted to have an elevated white count and an infiltrate RLL. Started on steroids and ABx- he was noted to be doing better the next days and discharged to the hospital. He returned again to the ED this AM due to worsening hypoxia last night. Transferred to the [facility] now for further work up. On presentation pt on bipap 60% fio2 with respiratory rates in the 40s.     Regarding recurrent hypoxic episodes, pt intermittently required oxygen at home for 3 years. He sometimes need it for a month and did well with no oxygen for several months. Noticed that symptoms occur the similar times of the year (April/ May).    See H & P for further details.     Hospital Course   Tommy Ross was admitted on 4/25/2017.  The following problems were addressed during his hospitalization:    ## Recurrent acute hypoxic respiratory failure   #Elevated CRP (360 -> 55 -> 10 mg/L)  On admission, patient was initially treated for HCAP (elevated CRP and procal on admit) with Vanc/Levo/Zosyn and this was de-escalated to ceftriaxone for community acquired pneumonia. CT scan showed bilateral GGO. TTE showed normal EF 60-65% & peak velocity of tricuspid regurgitant jet not obtainable. Pt was transitioned from initial BiPAP to need high flow oxygen, and was eventually transitioned to NC O2.      Previous evaluation  included  - 5/2012: Lung biopsy showed benign endobronchial mucosa with chronic inflammation and interstitial fibrosis (5/2012) and BAL with numerous WBC, neg cryptococcus Ag/ bacteria/ aspergillous Ag (5/2012),  Serum ACEI/ PR3, SSA, SSB, Sm, U1RNP, Scl 70 and Jo1 negative. Very low KENDRA positive 0.3  - 7/2013: RF, KENDRA neg  - 5/2014: BAL with Positive for Candida, no PCP/ viral cytopathic changes (5/2014)       DDx includes: Hypersensitivity pneumonitis vs infection (severe CAP. Noted by ID - B cell defect would predispose to infection with encapsulated organisms. CRP down-trend rapidly suggests responsive to steroids instead of antibiotics potentially. Pneumocystis infection not in differential due to time course acute is not consistent and 1,3-beta-D-glucan is sensitive marker of PCP infection and has been negative) vs allergy (IgE normal, no eosinophilia) vs injection of substances noted to be in differential (such as tac - denies to ID).       Dr. Cabrera of ID did note a rare case of a genetic predisposition to High Altitude Pulmonary Edema (HAPE) - see 5/1 note. In this instance hypoxia instead of altitude induces pulmonary vasoconstriction and edema. This is a non-cardiogenic pulmonary edema 2/2 hypoxic pulmonary vasoconstriction and abnormally high pulm artery pressure and capillary pressures.     Patient completed treatment for CAP (antibiotics 4/25 - 5/1). Discharged on slow wean of steroids after initial high dose steroids of methylprednisone (500mg x 3 days) --> methylpred 62mg --> prolonged prednisone taper on discharge. Weaning 10mg prednisone each week, starting dose 60mg qday prednisone. He was discharged on prophylactic dosing of bactrim for PCP prophylaxis. He also was discharged with home oxygen for use as needed.     Patient did have bronchoscopy with BAL on 5/2. Cytology negative for PCP. Numerous studies from BAL pending at discharge (see below), as well as allergen panel, KENDRA, RF, anti CCP,  immunoglobulins levels in consideration of CVID as cause of recurrent events. Follow-up with pulmonology with Cxray and PFTs ordered.     I requested slides (path slides for lung biopsies two in total) from [facility]. Fax sent to facility with request, mailing information, Fed Ex code number. I also sent corresponding consult form to pathology department at [facility]. Dr. Gandhi is to review path slides for next Monday's Pulmonology ILD conference.     #HIV - Compliance with HIV antiviral therapy. CD 4 has been in 600s-700s range. VL undetectable this admission.  CD4 this admission in 60s - 200s, which likely 2/2 acute illness. ID was consulted and followed throughout. He continued home Dolutegravir and Truvada.     Roxanne Montiel, PGY3 Med Peds [phone]   Seen and staffed with Dr. Con Gautam    Significant Results and Procedures   5/1/17: Bronchoscopy with BAL     Exam: High-resolution Chest CT without contrast 4/29/2017 12:57 PM.  Impression:   1. Evolution of extensive groundglass opacities since recent CT, which  have progressed since 2014. Primary differential considerations are  cellular NSIP and hypersensitivity pneumonitis, though there is little  to no air trapping, a finding which is typically seen with  hypersensitivity pneumonitis.  2. Mildly enlarged mediastinal lymph nodes unchanged since 2014.  3. Paraseptal emphysema, not significantly changed compared to prior  exam in 2014.  4. No focal consolidation.    4/26/17 ECHO   Interpretation Summary  Left ventricular function, chamber size, wall motion, and wall thickness are  normal.The EF is 60-65%.  Normal left ventricular filling for age.  Right ventricular function, chamber size, wall motion, and thickness are  normal.  The inferior vena cava was normal in size with preserved respiratory  variability.  No pericardial effusion is present.  No change from prior.    EXAM day of discharge   Blood pressure 121/73, temperature 97.9   F (36.6  C), temperature source Oral, resp. rate 20, weight 82.4 kg (181 lb 10.5 oz), SpO2 94 %.     Physical Exam:  General: NAD, conversant, sitting in street clothes in chair  HEENT: Oral mucosa moist , EOMI  Skin - lesia complexion on face and neck. Non-blanching lesions throughout chest - appear capillary lesions/prominence. Same as yesterday  CV: RRR, normal S1S2, no murmur, clicks, rubs  Resp:coarse mildly, but clearer than yesterday. No increased work of breathing at rest.   Abd: Soft, non-tender, BS+, no masses appreciated   Neuro: No lateralizing symptoms or focal neurologic deficits    Pending Results   These results will be followed up by Pulmonology, Infectious Disease, and Allergy. Appointments in next 1-2 months.  Unresulted Labs Ordered in the Past 30 Days of this Admission     Date and Time Order Name Status Description    5/1/2017 1441 Leukemia Lymphoma Evaluation (Flow Cytometry) - BAL Site 1 In process     5/1/2017 1441 HSV 1 and 2 DNA by PCR - BAL Site 1 In process     5/1/2017 1441 Legionella culture - BAL Site 1 Preliminary     5/1/2017 1441 Aspergillus Galactomannan Agn Bronchial - BAL Site 1 In process     5/1/2017 1441 Actinomyces rule out - BAL Site 1 Preliminary     5/1/2017 1441 Nocardia culture - BAL Site 1 Preliminary     5/1/2017 1441 Fungus Culture, non-blood - BAL Site 1 Preliminary     5/1/2017 1441 AFB Stain Non Blood - BAL Site 1 Preliminary     5/1/2017 1441 AFB Culture Non Blood -- BAL Site 1 Preliminary     5/1/2017 1441 Bronchial Culture Aerobic Bacterial - BAL Site 1 Preliminary     5/1/2017 1440 Jhon Barr Virus Quant PCR Non Blood In process     5/1/2017 1440 Send outs misc test In process     5/1/2017 1440 Chlamydia pneumonaiae by PCR BAL In process     5/1/2017 1440 Pneumocystis jirovecil by PCR In process     4/30/2017 1350 ANTINEUTROPHIL CYTOPLASMIC UBALDO IGG In process     4/30/2017 1350 HYPERSENSITIVITY PNEUMONITIS II In process     4/30/2017 1350 HYPERSENSITIVITY  PNEUMONITIS In process     4/28/2017 2200 Allergen Tree IgE Panel In process     4/28/2017 2200 Allergy adult food panel In process     4/28/2017 2200 Allergy Mold Panel In process     4/28/2017 2200 Allergy Grass Panel In process           Primary Care Physician   Antonella Obrien    Time Spent on this Encounter   I, Roxanne Montiel, personally saw the patient today and spent greater than 30 minutes discharging this patient.    Discharge Disposition   Discharged to home  Condition at discharge: Stable    Consultations This Hospital Stay   VASCULAR ACCESS CARE ADULT IP CONSULT  PHYSICAL THERAPY ADULT IP CONSULT  OCCUPATIONAL THERAPY ADULT IP CONSULT  PHARMACY TO DOSE VANCO  INFECTIOUS DISEASE GENERAL ADULT IP CONSULT  VASCULAR ACCESS CARE ADULT IP CONSULT  VASCULAR ACCESS ADULT IP CONSULT  VASCULAR ACCESS ADULT IP CONSULT  PHYSICAL THERAPY ADULT IP CONSULT  OCCUPATIONAL THERAPY ADULT IP CONSULT  VASCULAR ACCESS ADULT IP CONSULT  PULMONARY GENERAL ADULT IP CONSULT    Discharge Orders     X-ray Chest 2 vws*   Should be completed prior to Pulmonology follow-up     Antinuclear antibody screen by EIA     Rheumatoid factor     Hypersensitivity pneumonitis     Hypersens Pneumonitis - Farmer's Lung     Cyclic Citrullinated Peptide Antibody IgG     Antineutrophil cytoplasmic Ana IgG     PULMONARY REHAB REFERRAL     Allergy/Asthma Adult Referral     Follow Up ([facility])   1. As scheduled with Infectious Disease team, routine   2. In next week with primary care provider   3. With pulmonology with PFTs and chest xrays AP and lateral in 2 weeks   4. With allergy     Appointments on [place] and/or [facility] (with [facility] or [facility] provider or service). Call [phone] if you haven't heard regarding these appointments within 7 days of discharge.     Reason for your hospital stay   Acute respiratory failure     Activity   Your activity upon discharge: activity as tolerated     When to contact your care team   For new or  concerning symptoms     Discharge Instructions   Your prednisone taper:   Decrease by 10 mg each week   First week (until 5/6): 60mg daily (3 tablets of 20mg tablets)   5/7 - 5/13: 50mg daily (2.5 tabs)   5/14 - 5/20 : 40mg daily (2 tabs)   5/21 - 5/27: 30mg daily (1.5 tabs)   5/28 - 6/3: 20mg daily (1 tablet)   Further taper or changes to taper should be discussed with pulmonology     Full Code     Oxygen Adult   [place] [facility] Order 4-6 liter(s) by nasal cannula continuously with use of portable tank. Expected treatment length is indefinite (99 months).. Test on conserving device as applicable.    Patients who qualify for home O2 coverage under the CMS guidelines require ABG tests or O2 sat readings obtained closest to, but no earlier than 2 days prior to the discharge, as evidence of the need for home oxygen therapy. Testing must be performed while patient is in the chronic stable state. See notes for O2 sats.    I certify that this patient, Tommy Ross has been under my care and that I, or a nurse practitioner or physician's assistant working with me, had a face-to-face encounter that meets the face-to-face encounter requirements with this patient on 5/2/2017. The patient, Tommy Ross was evaluated or treated in whole, or in part, for the following medical condition, which necessitates the use of the ordered oxygen. Treatment Diagnosis: Acute Respiratory Failure and pulmonary infiltrates    Attending Provider: Dr. Con Gautam  Physician signature: See electronic signature associated with these discharge orders  Date of Order: May 2, 2017     PFT General Lab Testing   Per pulmonology - would anticipate they desire both routine PFTs and DLCO testing     Diet   Follow this diet upon discharge: Orders Placed This Encounter     Regular Diet Adult       Discharge Medications   Discharge Medication List as of 5/2/2017  1:48 PM      START taking these medications    Details   sulfamethoxazole-trimethoprim (BACTRIM  DS/SEPTRA DS) 800-160 MG per tablet Take 1 tablet by mouth daily, Disp-60 tablet, R-1, E-PrescribeFor PCP infection prophylaxis, daily dosing per Infectious Disease team, while on steroids.         CONTINUE these medications which have CHANGED    Details   predniSONE (DELTASONE) 20 MG tablet Take 3 tablets (60 mg) by mouth daily Decrease by 10mg weekly. Example: Until 5/7 3 tabs daily, then 2.5 tabs daily for one week., Disp-70 tablet, R-0, E-Prescribe         CONTINUE these medications which have NOT CHANGED    Details   valACYclovir (VALTREX) 500 MG tablet Take 1 tablet (500 mg) by mouth 2 times daily as needed For shingles flare, Disp-60 tablet, R-3, Historical      fexofenadine-pseudoePHEDrine (ALLEGRA-D)  MG per 12 hr tablet Take 1 tablet by mouth 2 times daily as needed for allergies, Disp-28 tablet, R-1, Historical      fluticasone (FLONASE) 50 MCG/ACT spray Spray 1-2 sprays into both nostrils daily as needed for rhinitis or allergies, Disp-16 g, R-5, Historical      SPIRIVA RESPIMAT 2.5 MCG/ACT inhalation aerosol INHALE 2 PUFFS INTO THE LUNGS DAILY, Disp-4 g, R-0, E-Prescribe      ALPRAZolam (XANAX) 0.5 MG tablet One or two tabs daily PRN anxiety, Disp-30 tablet, R-0, Local Print      clotrimazole (LOTRIMIN) 1 % cream Apply topically 2 times dailyDisp-14 g, [id number]      beclomethasone (QVAR) 40 MCG/ACT Inhaler Inhale 2 puffs into the lungs 2 times daily, Disp-1 Inhaler, R-3, E-Prescribe      emtricitabine-tenofovir (TRUVADA) 200-300 MG per tablet Take 1 tablet by mouth daily, Disp-30 tablet, R-5, E-Prescribe      dolutegravir (TIVICAY) 50 MG tablet Take 1 tablet (50 mg) by mouth daily, Disp-30 tablet, R-5, E-Prescribe      cyanocobalamin ([employer] VITAMIN  B12) 1000 MCG TABS Take 1 tablet by mouth daily, Disp-30 tablet, R-3, E-Prescribe      albuterol (PROAIR HFA/PROVENTIL HFA/VENTOLIN HFA) 108 (90 BASE) MCG/ACT Inhaler Inhale 2 puffs into the lungs every 4 hours as needed for shortness of breath /  dyspnea or wheezing, Disp-1 Inhaler, R-11, E-Prescribe      nicotine (NICODERM CQ) 14 MG/24HR patch 2h hr Place 1 patch onto the skin every 24 hours, Disp-30 patch, R-1, E-Prescribe      FLUoxetine (PROZAC) 20 MG capsule Take 1 capsule (20 mg) by mouth daily Along with 40 mg of fluoxetine, Disp-30 capsule, R-6, E-Prescribe      ondansetron (ZOFRAN ODT) 4 MG disintegrating tablet Take 1-2 tablets (4-8 mg) by mouth every 8 hours as needed for nausea, Disp-40 tablet, R-7, E-Prescribe      docusate sodium (COLACE) 100 MG tablet Take 100 mg by mouth daily, Disp-60 tablet, R-1, E-Prescribe      MORPHINE SULFATE PO Take 15 mg by mouth Reported on 3/7/2017, Historical      SUMAtriptan (IMITREX) 50 MG tablet Take 1 tablet (50 mg) by mouth at onset of headache for migraine, Disp-30 tablet, R-1, E-Prescribe      oxyCODONE-acetaminophen (PERCOCET)  MG per tablet Take 1 tablet by mouth every 4 hours as needed for moderate to severe pain (Max of 6 a day.), Disp-30 tablet, R-0, Normal      Nutritional Supplements (BOOST) fax # to the [facility] worker fax#[phone] Attn:MILDRED Disp-12 Can, R-12, E-Prescribe      fexofenadine (ALLEGRA) 180 MG tablet Take 1 tablet (180 mg) by mouth daily as needed for allergies, Disp-30 tablet, R-11, Historical      !! order for DME Equipment being ordered:  Portable OxygenDisp-1 Device, R-0, Local Print      !! order for DME Equipment being ordered: empty Oxygen tanks, oxygen concentrator and oxygen travel concentrator for portabilityDisp-1 each, R-0, Local Print      !! ORDER FOR DME Equipment being ordered: Oxygen at 5 litersDisp-1 Device, R-0, Normal      nitroglycerin (NITROSTAT) 0.4 MG SL tablet Place 1 tablet (0.4 mg) under the tongue every 5 minutes as needed for chest pain If you are still having symptoms after 3 doses (15 minutes) call 911., Disp-30 tablet, R-0, Fax       !! - Potential duplicate medications found. Please discuss with provider.      STOP taking these medications        lidocaine (LIDODERM) 5 % Patch Comments:   Reason for Stopping:         azithromycin (ZITHROMAX) 250 MG tablet Comments:   Reason for Stopping:         cefdinir (OMNICEF) 300 MG capsule Comments:   Reason for Stopping:         dronabinol (MARINOL) 2.5 MG capsule Comments:   Reason for Stopping:             Allergies   Allergies   Allergen Reactions     Diphenhydramine Citrate Anaphylaxis     Estradiol Shortness Of Breath     CMV-TMPDS     Ibuprofen [Ibuprofen/Ibuprofen Lysinate] Shortness Of Breath     Nortriptyline Shortness Of Breath     Prochlorperazine Shortness Of Breath     Ranitidine Shortness Of Breath     Cytomegalovirus Immune Globulin Unknown     SOB     Demerol [Meperidine]      anxiety     Gabapentin Hives     Icy Hot [Analgesic Freeman]      anxiety     Lyrica      Methocarbamol      anxiety     Naratriptan      Pregabalin Unknown     Anxiety and nightmares     Tegretol [Carbamazepine] Hives     Topamax [Topiramate]      anxiety     Tramadol

## 2017-05-02 NOTE — SUMMARY OF CARE
Pt refusing continuous monitoring, threatening to leave AMA.  Pt informed of risks involved of leaving AMA and of not being on continuous monitoring or oxygen.

## 2017-05-02 NOTE — PROGRESS NOTES
Care Coordinator Progress Note     Admission Date/Time:  4/25/2017  Attending MD:  Con Gautam MD     Data  Chart reviewed, discussed with interdisciplinary team.   Patient was admitted for: Pneumonitis.    Concerns with insurance coverage for discharge needs: None.  Current Living Situation: Patient lives with spouse.  Support System: Supportive and Involved  Services Involved:    Transportation: Family or Friend will provide  Barriers to Discharge:     Coordination of Care and Referrals: Provided patient/family with options for DME.        Assessment  Per MD in morning rounds, patient requiring oxygen and will need it for home use. Patient threatening to leave AMA. Walk test completed by bedside RN and order placed. Spoke with [facility] ([phone]) for completion of set up and delivery.     Addendum 1023: Notified by [facility] that patient is already open to [facility] for home oxygen. Verified this with patient and the [facility] office ([phone]). Patients order is only for nighttime use. Walk test and new order for continuous use faxed to [facility]. Portable tanks to be delivered to hospital.     Plan  Anticipated Discharge Date:  today  Anticipated Discharge Plan:  Home with new home oxygen    Kanika Hogan RN

## 2017-05-02 NOTE — DISCHARGE INSTRUCTIONS
You will be receiving a call from the Pulmonary clinic to confirm the time and date of your follow up appointment. This appointment should be within 2 weeks of discharge. If you do not hear from them within 2 business days, please call them at [phone].

## 2017-05-02 NOTE — TREATMENT PLAN
Patient has been assessed for Home Oxygen needs.  Oxygen readings:   *   RA - at rest  Pulse oximetry SPO2 88 %  *   RA - during activity/with exercise SPO2 84 %  *   O2 at 4 liters/minute (at rest) ...SPO2 94 %  *   O2 at  6 liters/minute (during activity/with exercise) ...SPO2 92 %

## 2017-05-02 NOTE — PROGRESS NOTES
[facility] received Oxygen referral for patient.  Confirmed with [facility] patient currently has concentrator and portability and is in 3rd year of billing with [facility].  Spoke with KINGSTON Boudreaux and let her know this and gave her contact number for [facility] # [phone] to coordinate delivery portability to bedside.

## 2017-05-03 ENCOUNTER — CARE COORDINATION (OUTPATIENT)
Dept: CARE COORDINATION | Facility: CLINIC | Age: 47
End: 2017-05-03

## 2017-05-03 LAB
A ALTERNATA IGE QN: NORMAL KU(A)/L
A FUMIGATUS IGE QN: NORMAL KU(A)/L
ACID FAST STN SPEC QL: NORMAL
ASPERGILLUS GALACTOMANNAN ANTIGEN BAL: NORMAL
BACTERIA SPEC CULT: NO GROWTH
C ALBICANS IGE QN: NORMAL KU(A)/L
C HERBARUM IGE QN: NORMAL KU(A)/L
CLAM IGE QN: NORMAL KU(A)/L
CODFISH IGE QN: NORMAL KU(A)/L
COPATH REPORT: NORMAL
CORN IGE QN: NORMAL KU(A)/L
COW MILK IGE QN: NORMAL KU/L
EGG WHITE IGE QN: NORMAL KU(A)/L
GALACTOMANNAN AG SERPL-ACNC: 0.08
HSV1 DNA SPEC QL NAA+PROBE: NEGATIVE
HSV2 DNA SPEC QL NAA+PROBE: NORMAL
KENT BLUE GRASS IGE QN: NORMAL KU(A)/L
MAPLE IGE QN: NORMAL KU(A)/L
MICRO REPORT STATUS: NORMAL
MICRO REPORT STATUS: NORMAL
P NOTATUM IGE QN: NORMAL KU(A)/L
PEANUT IGE QN: NORMAL KU(A)/L
SCALLOP IGE QN: NORMAL KU(A)/L
SHRIMP IGE QN: NORMAL KU(A)/L
SILVER BIRCH IGE QN: NORMAL KU(A)/L
SOYBEAN IGE QN: NORMAL KU(A)/L
SPECIMEN SOURCE: NORMAL
TIMOTHY IGE QN: NORMAL KU(A)/L
WALNUT IGE QN: NORMAL KU(A)/L
WHEAT IGE QN: NORMAL KU(A)/L
WHITE ELM IGE QN: NORMAL KU(A)/L
WHITE OAK IGE QN: NORMAL KU(A)/L

## 2017-05-03 PROCEDURE — 00000346 ZZHCL STATISTIC REVIEW OUTSIDE SLIDES TC 88321: Performed by: INTERNAL MEDICINE

## 2017-05-03 NOTE — LETTER
[facility] CARE COORDINATION  [address] [facility] [address]  Phone: [phone]      May 3, 2017      Tommy Fielder  [address] [place] [id number]    Dear Tommy,  I am the Clinic Care Coordinator that works with your primary care provider's clinic. I recently tried to call and was unable to reach you. Below is a description of what Clinic Care Coordination is and how I can further assist you.     The Clinic Care Coordinator role is a Registered Nurse and/or  who understands the health care system. The goal of Clinic Care Coordination is to help you manage your health and improve access to the [facility] system in the most efficient manner.  The Registered Nurse can assist you in meeting your health care goals by providing education, coordinating services, and strengthening the communication among your providers. The  can assist you with financial, behavioral, psychosocial, and chemical dependency and counseling/psychiatric resources.    Please feel free to keep this letter and contact information to contact me at [phone] with any further questions or concerns that may arise. We at [facility] are focused on providing you with the highest-quality healthcare experience possible and that all starts with you.       Sincerely,     Sea Romero RN

## 2017-05-03 NOTE — PROGRESS NOTES
Clinic Care Coordination Contact  [facility]/Voicemail       Clinical Data: Care Coordinator Outreach    Transition Communication Hand-off for Care Transitions to Next Level of Care Provider     Name: Tommy Ross  MRN #: [id number]  Primary Care Provider: Antonella Obrien      Primary Clinic: [facility] [address] [facility] [address]      Reason for Hospitalization:  Acute respiratory failure  Pneumonia  Admit Date/Time: 4/25/2017 3:38 PM  Discharge Date: 5/2/17  Payor Source: Payor: MEDICARE / Plan: MEDICARE / Product Type: Medicare /      Reason for Communication Hand-off Referral: Fragility     Discharge Plan: home with continuous oxygen set up with [employer]         Concern for non-adherence with plan of care:  Y/N n  Discharge Needs Assessment:  Needs        Most Recent Value     Equipment Currently Used at Home cane, straight     Transportation Available car, family or friend will provide     Interventions provided -- [Home Oxygen ???'s/inquiry by pt who per CN not discharging.]            Already enrolled in Tele-monitoring program and name of program:    Follow-up specialty is recommended: Yes    Follow-up plan:  Future Appointments  Date Time Provider Department Center   5/3/2017 6:00 AM [facility] PT OVERFLOW [facility] O         Any outstanding tests or procedures:    Procedures     Future Labs/Procedures     PFT General Lab Testing      Comments:     Per pulmonology - would anticipate they desire both routine PFTs and DLCO testing     Oxygen Adult      Comments:     [place] [facility] Order 4-6 liter(s) by nasal cannula continuously with use of portable tank. Expected treatment length is indefinite (99 months).. Test on conserving device as applicable.        Radiology & Cardiology Orders     Future Labs/Procedures Complete By Expires     X-ray Chest 2 vws*  5/15/2017 (Approximate) 7/31/2017     Comments:     Should be completed prior to Pulmonology follow-up         Referrals     Future  Labs/Procedures     Allergy/Asthma Adult Referral      Comments:     Patient with hx of recurrent respiratory failure of unknown etiology at this point. Concern for potential allergy given patient's report of allergies at baseline. Allergen panel pending at d/c. See D/c summary for details. Please schedule within 2 weeks.     PULMONARY REHAB REFERRAL      Comments:     Acute on chronic respiratory failure with pulmonary infiltrates     5/5/2017 10:40 AM Liat Llamas MD [facility]       Outreach attempted x 1.  Left message on voicemail with call back information and requested return call.    Plan: Care Coordinator will mail out care coordination introduction letter with care coordinator contact information and explanation of care coordination services. Care Coordinator will try to reach patient again in 1-2 business days.Included in message appointment reminder for 5/5/ Dr Llamas appointment    Sea Romero RN  Clinic Care Coordinator  [facility]  [phone]

## 2017-05-03 NOTE — PROGRESS NOTES
Physical Therapy Discharge Summary    Reason for therapy discharge:    Discharged to home.    Progress towards therapy goal(s). See goals on Care Plan in [facility] electronic health record for goal details.  Goals met    Therapy recommendation(s):    Continued therapy is recommended.  Rationale/Recommendations:  OP pulmonary rehab to maximize tolerance for activity and improve QOL. .

## 2017-05-04 ENCOUNTER — TELEPHONE (OUTPATIENT)
Dept: FAMILY MEDICINE | Facility: CLINIC | Age: 47
End: 2017-05-04

## 2017-05-04 ENCOUNTER — TELEPHONE (OUTPATIENT)
Dept: FAMILY MEDICINE | Facility: OTHER | Age: 47
End: 2017-05-04

## 2017-05-04 DIAGNOSIS — K64.4 EXTERNAL HEMORRHOIDS: Primary | ICD-10-CM

## 2017-05-04 DIAGNOSIS — R11.0 NAUSEA: ICD-10-CM

## 2017-05-04 LAB
ANCA IGG TITR SER IF: NORMAL {TITER}
C PNEUM DNA SPEC QL NAA+PROBE: NORMAL
COPATH REPORT: NORMAL
LAB SCANNED RESULT: NORMAL
P JIROVECII DNA SPEC QL NAA+PROBE: NORMAL
SPECIMEN SOURCE: NORMAL
SPECIMEN SOURCE: NORMAL

## 2017-05-04 RX ORDER — ONDANSETRON 4 MG/1
4-8 TABLET, ORALLY DISINTEGRATING ORAL EVERY 8 HOURS PRN
Qty: 40 TABLET | Refills: 7 | Status: CANCELLED | OUTPATIENT
Start: 2017-05-04

## 2017-05-04 NOTE — TELEPHONE ENCOUNTER
Patient has appointment with Dr. Llamas tomorrow 05/05/17 Routing encounter to her [facility].   Roberta Kumar CMA ([facility])

## 2017-05-04 NOTE — TELEPHONE ENCOUNTER
ICD-10-CM    1. External hemorrhoids K64.4 hydrocortisone (ANUSOL-HC) 2.5 % cream     If symptoms not better or other concerns, needs to follow up in clinic.  Please request patient to  information on hemorrhoids form clinic, I will print them and place them in MA basket.  If any questions, I can call.    Electronically signed:  Antonella Obrien M.D.

## 2017-05-04 NOTE — TELEPHONE ENCOUNTER
Called medication into preferred pharmacy of [facility] in [place]. Mailed information to patient.  Left detailed message on patient phone per note below.  Roberta Kumar CMA ([facility])

## 2017-05-04 NOTE — PATIENT INSTRUCTIONS
Treating Hemorrhoids: Self-Care  Follow your doctor s advice about caring for your hemorrhoids at home. Some treatments help relieve symptoms right away. Others involve making changes in your diet and exercise habits. These can help ease constipation and prevent hemorrhoid symptoms from coming back.    Relieving Symptoms  Your doctor may prescribe anti-inflammatory medication to help ease your symptoms. The following tips will also help relieve pain and swelling.    Take sitz baths. Taking a sitz bath means sitting in a few inches of warm bath water. Soaking for 10 minutes twice a day can provide welcome relief from painful hemorrhoids. It can also help the area stay clean.    Develop good bowel habits. Use the bathroom when you need to. Don t ignore the urge to move your bowels. This can lead to constipation, hard stools, and straining. Also, don t read while on the toilet. Sit only as long as needed. Wipe gently with soft, unscented toilet tissue or baby wipes.    Use ice packs. Placing an ice pack on a thrombosed external hemorrhoid can help relieve pain right away. It will also help reduce the blood clot. Use the ice for 15-20 minutes at a time. Keep a cloth between the ice and your skin to prevent skin damage.    Use other measures. Laxatives and enemas can help ease constipation. But use them only on your doctor s advice. For symptom relief, try using cotton pads soaked in witch hazel. These are available at most drugstores. Over-the-counter hemorrhoid ointments and petroleum jelly can also provide relief.  Add Fiber to Your Diet  Adding fiber to your diet can help relieve constipation by making stools softer and easier to pass. To increase your fiber intake, your doctor may recommend a bulking agent, such as psyllium. This is a high-fiber supplement available at most grocery stores and drugstores. Eating more fiber-rich foods will also help. There are two types of fiber:    Insoluble fiber is the main  ingredient in bulking agents. It s also found in foods such as wheat bran, whole-grain breads, fresh fruits, and vegetables.    Soluble fiber is found in foods such as oat bran. Although soluble fiber is good for you, it may not ease constipation as much as foods high in insoluble fiber.  Drink More Water  Along with a high-fiber diet, drinking more water can help ease constipation. This is because insoluble fiber absorbs water, making stools soft and bulky. Be sure to drink plenty of water throughout the day. Drinking fruit juices, such as prune juice or apple juice, can also help prevent constipation.  Get More Exercise  Regular exercise aids digestion and helps prevent constipation. It s also great for your health. So talk with your doctor about starting an exercise program. Low-impact activities, such as swimming or walking, are good places to start. Take it easy at first. And remember to drink plenty of water when you exercise.  High-Fiber Foods  High-fiber foods offer many benefits. By making your stools softer, they help heal and prevent swollen hemorrhoids. They may also help reduce the risk of colon and rectal cancer. Best of all, they re usually low in calories and taste great. Here are some examples of fiber-rich foods.    Whole grains, such as wheat bran, corn bran, and brown rice.    Vegetables, especially carrots, broccoli, cabbage, and peas.    Fruits, such as apples, bananas, raisins, peaches, and pears.    Nuts and legumes, especially peanuts, lentils, and kidney beans.  Easy Ways to Add Fiber  The tips below offer some simple ways to add more high-fiber foods to your meals.    Start your day with a high-fiber breakfast. Eat a wheat bran cereal along with a sliced banana. Or, try peanut butter on whole-wheat toast.    Eat carrot sticks for snacks. They re easy to prepare, taste great, and are low in calories.    Use whole-grain breads instead of white bread for sandwiches.    Eat fruits for treats.  Try an apple and some raisins instead of a candy bar.     7825-8423 [employer]). [address] [place] [address], [place] [address]. All rights reserved. This information is not intended as a substitute for professional medical care. Always follow your healthcare professional's instructions.        Understanding Hemorrhoids  Hemorrhoid tissues are  cushions  of blood vessels that swell slightly during bowel movements. Too much pressure on the anal canal can make these tissues remain enlarged and cause symptoms. This can happen both inside and outside the anal canal.    Parts of the Anal Canal    Internal hemorrhoid tissue is in the upper area of the anal canal.    The rectum is the last several inches of the colon. This is where stool is stored prior to bowel movements.    Anal sphincters are ring-shaped muscles that expand and contract to control the anal opening.    External hemorrhoid tissue lies under the anal skin.    The anus is the passage between the rectum and the outside of the body.  Normal Hemorrhoid Tissue  Hemorrhoid tissues play an important role in helping your body eliminate waste. Food passes from the stomach through the intestines. The waste (stool) then travels through the colon to the rectum. It is stored in the rectum until it s ready to be passed from the anus. During bowel movements, hemorrhoids swell with blood and become slightly larger. This swelling helps protect and cushion the anal canal as stool passes from the body. Once the stool has passed, the tissues stop swelling and return to normal.    Problem Hemorrhoids  Pressure due to straining or other factors can cause hemorrhoid tissues to remain swollen. When this happens to the hemorrhoid tissues in the anal canal they re called internal hemorrhoids. Swollen tissues around the anal opening are called external hemorrhoids. Depending on the location, your symptoms can differ.    Internal hemorrhoids often occur in clusters around  the wall of the anal canal. They are usually painless. But they may prolapse (protrude out of the anus) due to straining or pressure from hard stool. After the bowel movement is over, they may then reduce (return inside the body). Internal hemorrhoids often bleed. They can also discharge mucus.    External hemorrhoids are located at the anal opening, just beneath the skin. These tissues rarely cause problems unless they thrombose (form a blood clot). When this occurs, a hard, bluish lump may appear. A thrombosed hemorrhoid also causes sudden, severe pain. In time, the clot may go away on its own. This sometimes leaves a  skin tag  of tissue stretched by the clot.  Hemorrhoid Symptoms  Hemorrhoid symptoms may include:    Pain or a burning sensation    Bleeding during bowel movements    Protrusion of tissue from the anus    Itching around the anus  Causes of Hemorrhoids  There s no single cause of hemorrhoids. Most often, though, they are caused by too much pressure on the anal canal. This can be due to:    Chronic (ongoing) constipation    Straining during bowel movements    Sitting too long on the toilet    Strenuous exercise or heavy lifting   Pregnancy and childbirth    Aging    Diarrhea     0484-5995 [employer]. [address] [place] [address], [place] [address]. All rights reserved. This information is not intended as a substitute for professional medical care. Always follow your healthcare professional's instructions.

## 2017-05-04 NOTE — TELEPHONE ENCOUNTER
Reason for call:  Medication  Reason for Call:  Medication or medication refill:    Do you use a [facility]?  Name of the pharmacy and phone number for the current request:  [facility] - [phone]    Name of the medication requested: Medication that will help relieve hemorrhoid pain and reduce the hemorrhoids    Other request: none    Can we leave a detailed message on this number? YES    Phone number patient can be reached at: Home number on file [phone] (home)    Best Time: anytime    Call taken on 5/4/2017 at 9:24 AM by Barber Stovall

## 2017-05-04 NOTE — TELEPHONE ENCOUNTER
Reason for Call:  Form, our goal is to have forms completed with 72 hours, however, some forms may require a visit or additional information.    Type of letter, form or note:  medical    Who is the form from?: Aaron  (if other please explain)    Where did the form come from: form was faxed in    What clinic location was the form placed at?: [facility] - [phone]    Where the form was placed: 's Box    What number is listed as a contact on the form?: [phone]       Additional comments: none     Call taken on 5/4/2017 at 12:57 PM by Ara Alan

## 2017-05-04 NOTE — TELEPHONE ENCOUNTER
Ondansetron 4 mg      Last Written Prescription Date: 1/4/2016  Last Fill Quantity: 40,  # refills: 7   Last Office Visit with [facility], P or  Health prescribing provider: 4/25/2017                                         Next 5 appointments (look out 90 days)     May 05, 2017 10:40 AM CDT   [facility]    [address]  [facility] [address]   [phone]                  Ranitidine 150 mg    Did not see on current med list      Last Written Prescription Date:   Last Fill Quantity: ,  # refills:    Last Office Visit with [facility], P or  Health prescribing provider: 4/25/2017                                         Next 5 appointments (look out 90 days)     May 05, 2017 10:40 AM CDT   [facility]    [address]  [facility] [address]   [phone]

## 2017-05-05 LAB
A FLAVUS AB SER QL ID: NORMAL
A FUMIGATUS1 AB SER QL ID: NORMAL
A FUMIGATUS2 AB SER QL: NORMAL
A FUMIGATUS3 AB SER QL ID: NORMAL
A FUMIGATUS6 AB SER QL ID: NORMAL
A PULLULANS AB SER QL ID: NORMAL
LACEYELLA SACCHARI AB SER QL: NORMAL
PIGEON DROP IGE QN: NORMAL
S RECTIVIRGULA AB SER QL ID: NORMAL
S VIRIDIS AB SER QL: NORMAL
T CANDIDUS AB SER QL: NORMAL
T VULGARIS AB SER QL ID: NORMAL

## 2017-05-05 NOTE — TELEPHONE ENCOUNTER
Patient cancelled appointment with FF. Will need appointment to complete forms. Patient aware.     Hiwot Stovall MA

## 2017-05-05 NOTE — PROGRESS NOTES
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 hair, and change your clothes afterwards.    Use products with bleach to clean the shower or tub. Also clean the shower curtain.    Fix leaky faucets or leaks in the roof right away.    While bathing or showering, leave a window open or use a fan.    If your house is damp, use a dehumidifier. Empty once a day.    9087-2334 [employer]. [address] [place] [address], [place] [address]. All rights reserved. This information is not intended as a substitute for professional medical care. Always follow your healthcare professional's instructions.        First Aid: Allergic Reactions  Limited Reaction  A limited (localized) reaction affects only the area of contact. Some reactions may not show up for days. Others can occur almost immediately.  Step 1  Stop the Source    If the person has been stung, scrape the stinger away with the edge of a credit card or the dull edge of a knife. DON'T use fingers or tweezers to remove a stinger. If pinched, the stinger may empty its venom into the skin.    If the reaction is caused by eating a specific food or taking a medication, the victim should not eat or take the substance  again.  Step 2  Treat Skin Irritation    Wash insect bites with soap and water.    Remove and wash in hot water all clothing that may have plant oils (or any other substance that has caused a reaction) on them. Shower with plenty of soap to wash remaining plant oils (or other allergens) off the skin.    Control itching by making a thick paste of baking soda and water. Apply the paste directly to the skin.  Severe Reaction  A severe (systemic) reaction affects the entire body. In extreme cases, the airways from mouth to lungs may swell (anaphylaxis). The reaction may be immediate or develop over several hours.  Step 1  Calm the Person    Help the victim into a comfortable position. Prop up the head to aid breathing.    Tell the victim to remain still and limit talking.    If the person carries medication (epinephrine) to control anaphylaxis, help him or her use it.    Prevent any further contact with or exposure to allergen.   Step 2  Monitor Breathing    Watch for signs of airway swelling such as wheezing or swollen lips. With an extreme reaction, the victim may have trouble getting any breath.    Perform rescue breathing, if needed. In extreme cases, you may not be able to get air into the lungs.  Call 911 immediately if the victim has any of the following:    Trouble breathing    A history of airway swelling (anaphylaxis)  While you wait for help:  1. Reassure the person.  2. Treat for shock or provide rescue breathing or CPR, if needed.   An allergic reaction may become more serious over time. Seek medical help if any of the following is true:    A rash or hives covers the face, genitals, or most of the body.    An entire body part, such as an arm or leg, swells.    The tongue or lips begin to swell.     2844-3300 [employer]. [address] [place] [address]. All rights reserved. This information is not intended as a substitute for professional medical care. Always follow your healthcare  professional's instructions.            Antonella Obrien MD  [facility]

## 2017-05-08 ENCOUNTER — TELEPHONE (OUTPATIENT)
Dept: OTHER | Facility: CLINIC | Age: 47
End: 2017-05-08

## 2017-05-08 ENCOUNTER — OFFICE VISIT (OUTPATIENT)
Dept: FAMILY MEDICINE | Facility: OTHER | Age: 47
End: 2017-05-08
Payer: MEDICARE

## 2017-05-08 ENCOUNTER — DOCUMENTATION ONLY (OUTPATIENT)
Dept: OTHER | Facility: CLINIC | Age: 47
End: 2017-05-08

## 2017-05-08 ENCOUNTER — MEDICAL CORRESPONDENCE (OUTPATIENT)
Dept: HEALTH INFORMATION MANAGEMENT | Facility: CLINIC | Age: 47
End: 2017-05-08

## 2017-05-08 VITALS
SYSTOLIC BLOOD PRESSURE: 120 MMHG | TEMPERATURE: 98.7 F | HEIGHT: 69 IN | DIASTOLIC BLOOD PRESSURE: 82 MMHG | HEART RATE: 92 BPM | RESPIRATION RATE: 16 BRPM | OXYGEN SATURATION: 96 % | WEIGHT: 170.8 LBS | BODY MASS INDEX: 25.3 KG/M2

## 2017-05-08 DIAGNOSIS — F41.9 ANXIETY: ICD-10-CM

## 2017-05-08 DIAGNOSIS — I50.31 ACUTE DIASTOLIC CHF (CONGESTIVE HEART FAILURE) (H): ICD-10-CM

## 2017-05-08 DIAGNOSIS — J18.9 PNEUMONIA OF RIGHT LOWER LOBE DUE TO INFECTIOUS ORGANISM: ICD-10-CM

## 2017-05-08 DIAGNOSIS — R11.0 NAUSEA: ICD-10-CM

## 2017-05-08 DIAGNOSIS — R07.89 CHEST TIGHTNESS: ICD-10-CM

## 2017-05-08 DIAGNOSIS — K59.03 DRUG-INDUCED CONSTIPATION: ICD-10-CM

## 2017-05-08 DIAGNOSIS — Z79.891 CHRONIC PRESCRIPTION OPIATE USE: Primary | ICD-10-CM

## 2017-05-08 DIAGNOSIS — K64.4 EXTERNAL HEMORRHOIDS: ICD-10-CM

## 2017-05-08 DIAGNOSIS — J98.4 PNEUMONITIS: ICD-10-CM

## 2017-05-08 DIAGNOSIS — Z87.09 H/O ACUTE BRONCHITIS WITH BRONCHOSPASM: ICD-10-CM

## 2017-05-08 DIAGNOSIS — Z09 HOSPITAL DISCHARGE FOLLOW-UP: ICD-10-CM

## 2017-05-08 DIAGNOSIS — J98.01 ACUTE BRONCHOSPASM: ICD-10-CM

## 2017-05-08 DIAGNOSIS — R07.9 CHEST PAIN ON EXERTION: ICD-10-CM

## 2017-05-08 PROCEDURE — 99495 TRANSJ CARE MGMT MOD F2F 14D: CPT | Performed by: FAMILY MEDICINE

## 2017-05-08 RX ORDER — ALBUTEROL SULFATE 90 UG/1
2 AEROSOL, METERED RESPIRATORY (INHALATION) EVERY 4 HOURS PRN
Qty: 1 INHALER | Refills: 11 | Status: SHIPPED | OUTPATIENT
Start: 2017-05-08 | End: 2018-02-19

## 2017-05-08 RX ORDER — ONDANSETRON 4 MG/1
4-8 TABLET, ORALLY DISINTEGRATING ORAL EVERY 8 HOURS PRN
Qty: 40 TABLET | Refills: 7 | Status: SHIPPED | OUTPATIENT
Start: 2017-05-08 | End: 2017-09-12

## 2017-05-08 RX ORDER — NITROGLYCERIN 0.4 MG/1
0.4 TABLET SUBLINGUAL EVERY 5 MIN PRN
Qty: 30 TABLET | Refills: 0 | Status: SHIPPED | OUTPATIENT
Start: 2017-05-08 | End: 2017-05-31

## 2017-05-08 RX ORDER — PREDNISONE 20 MG/1
60 TABLET ORAL DAILY
Qty: 70 TABLET | Refills: 0 | Status: CANCELLED | OUTPATIENT
Start: 2017-05-08

## 2017-05-08 ASSESSMENT — PAIN SCALES - GENERAL: PAINLEVEL: MILD PAIN (3)

## 2017-05-08 NOTE — NURSING NOTE
"Chief Complaint   Patient presents with     Hospital F/U     Panel Management     HF AP, Lipid, Urine Drug Screen, Honoring Choices       Initial /82 (BP Location: Right arm, Patient Position: Fowlers, Cuff Size: Adult Regular)  Pulse 92  Temp 98.7  F (37.1  C) (Temporal)  Resp 16  Ht 5' 9\" (1.753 m)  Wt 170 lb 12.8 oz (77.5 kg)  SpO2 96%  BMI 25.22 kg/m2 Estimated body mass index is 25.22 kg/(m^2) as calculated from the following:    Height as of this encounter: 5' 9\" (1.753 m).    Weight as of this encounter: 170 lb 12.8 oz (77.5 kg).  Medication Reconciliation: complete    "

## 2017-05-08 NOTE — TELEPHONE ENCOUNTER
Form has been faxed to [facility] [place], sent to scanning and copy placed in fax folder  Closing encounter  Lois Cannon RT (R)

## 2017-05-08 NOTE — MR AVS SNAPSHOT
"              After Visit Summary   5/8/2017    Tommy Ross    MRN: [id number]           Patient Information     Date Of Birth          1970        Visit Information        Provider Department      5/8/2017 11:30 AM Antonella Obrien MD [facility]        Today's Diagnoses     Nausea        Acute diastolic CHF (congestive heart failure) (H)        Acute bronchospasm (WHEEZING)          Care Instructions      Living with a Chronic Health Condition: Reducing Stress  Dealing with stress isn't easy. You may worry about all the things you have to do, but asking for help might make you upset. And being tired or in pain can make stress worse. Learning to control stress does take effort. Yet reducing stress can help you stay healthy.    Learn to relax  Listening to music, spending some quiet time alone, or taking a warm bath can all be relaxing. But there are many other things that can help you feel calm. Choose activities you enjoy and make them part of your daily life. You may also want to try:    Deep breathing. When you feel tense, take a few deep breaths.    Meditation, visualization, alisa chi, or yoga. You might want to take a class in these techniques. Or check the [employer] for books and tapes to help you get started.  Set priorities  Try not to worry about things you can't do. Instead, set goals you know you can achieve. Do the things you think are most important first. Also, look for ways to do things with less effort. Ask yourself:    Do I really need to do this today? If the answer is \"yes,\" take care of that task first. But keep in mind the answer can be \"no.\"    What do I need help with? Is there someone else who can do it?    Can I change this appointment or social event to another time?  Finding help  Knowing you can count on others can be a relief. Try these tips:    Be willing to accept help when it's offered.    If you need extra help, there are likely to be services nearby. " These may include home health aides, and visiting nurses. A  can put you in touch with the help you need.  Prepare for down days  Plan ahead for the times when you need to rest more or limit what you do. Freeze prepared meals so you won't have to cook. Keep a phone and a list of important phone numbers by your bed.    7426-9020 [employer]. [address] [place] [address], [place] [address]. All rights reserved. This information is not intended as a substitute for professional medical care. Always follow your healthcare professional's instructions.        Living with a Chronic Health Condition  Sometimes change brings unexpected rewards. This is a good time to look for all the ways you can be involved in life. Challenge yourself. Don't be afraid, or think of yourself as limited. As you begin to see what you can do, you'll realize just how much you have to offer.    Thinking  At times, you may want to be alone with your thoughts. At other times, you'll want to explore new interests. Try some of the following:    If you don't already know how, learn to use a computer. Computers can connect you to a vast amount of information. Many [employer] have systems you can use free of charge.    Read just for pleasure. Try novels, magazines, humor, even cookbooks.    Play board or computer games.    Plan a trip, listen to music, or start a journal.  Doing  Think about what you enjoy doing. Then find ways to make it happen. Your doctor, nurse, or occupational therapist may be able to help you get started. Consider the following:    Take a class in healthy cooking. What you learn can help, whether or not you're on a special diet.    Ask someone to take you to a ball game or on a picnic.    Go on a nature walk or work in the garden.  Relating to others  Over time, your bonds with some people may grow stronger while some may not last. As you move forward, keep these tips in mind:    Try volunteering. It's a  good way to be involved with others.    Be open to new people you meet.    Find ways to maintain friendships you value. If you have to cancel plans, try to reschedule. If you can access a computer, use e-mail to stay in touch. Call or text.  Resources    American Diabetes Association at 823-474-2896, or www.diabetes.org    American Heart Association at 102-509-4100, or www.heart.org    American Lung Association at 781-623-7901, or www.lung.org    Arthritis Foundation at 406-754-4285, or www.arthritis.org    Medicare Hotline at 248-572-3678, or www.medicare.gov    Phone book listings in the Government section and the yellow pages    To find out about disability benefits and your rights under the Americans with Disabilities Act (ADA) at 397-929-4427     0826-5674 [employer]. [address] [place] [address]. All rights reserved. This information is not intended as a substitute for professional medical care. Always follow your healthcare professional's instructions.        Hemorrhoids    Hemorrhoids are swollen and inflamed veins inside the rectum and near the anus. The rectum is the last several inches of the colon. The anus is the passage between the rectum and the outside of the body.  Causes   The veins can become swollen due to increased pressure in them. This is most often caused by:    Chronic constipation or diarrhea    Straining when having a bowel movement    Sitting too long on the toilet    A low-fiber diet    Pregnancy  Symptoms     Bleeding from the rectum (this may be noticeable after bowel movements)    Lump near the anus    Itching around the anus    Pain around the anus  There are different types of hemorrhoids. Depending on the type you have and the severity, you may be able to treat yourself at home. In some cases, a procedure may be the best treatment option. Your healthcare provider can tell you more about this, if needed.  Home care  General care    To get relief from pain  or itching, try:    Topical products. Your healthcare provider may prescribe or recommend creams, ointments, or pads that can be applied to the hemorrhoid. Use these exactly as directed.    Medicines. Your healthcare provider may recommend stool softeners, suppositories, or laxatives to help manage constipation. Use these exactly as directed.    Sitz baths. A sitz bath involves sitting in a few inches of warm bath water. Be careful not to make the water so hot that you burn yourself--test it before sitting in it. Soak for about 10 to 15 minutes a few times a day. This may help relieve pain.  Tips to help prevent hemorrhoids    Eat more fiber. Fiber adds bulk to stool and absorbs water as it moves through your colon. This makes stool softer and easier to pass.    Increase the fiber in your diet with more fiber-rich foods. These include fresh fruit, vegetables, and whole grains.    Take a fiber supplement or bulking agent, if advised to by your provider. These include products such as psyllium or methylcellulose.    Drink plenty of water, if directed to by your provider. This can help keep stool soft.    Be more active. Frequent exercise aids digestion and helps prevent constipation. It may also help make bowel movements more regular.    Don t strain during bowel movements. This can make hemorrhoids more likely. Also, don t sit on the toilet for long periods of time.  Follow-up care  Follow up with your healthcare provider, or as advised. If a culture or imaging tests were done, you will be notified of the results when they are ready. This may take a few days or longer.  When to seek medical advice  Call your healthcare provider right away if any of these occur:    Increased bleeding from the rectum    Increased pain around the rectum or anus    Weakness or dizziness   Call 911   Call 911 or return to the emergency department right away if any of these occur:    Trouble breathing or swallowing    Fainting or loss of  consciousness    Unusually fast heart rate    Vomiting blood    Large amounts of blood in stool      9376-2199 [employer]. [address] [place] [address], [place] [address]. All rights reserved. This information is not intended as a substitute for professional medical care. Always follow your healthcare professional's instructions.        Constipation (Adult)  Constipation means that you have bowel movements that are less frequent than usual. Stools often become very hard and difficult to pass.  Constipation is very common. At some point in life it affects almost everyone. Since everyone's bowel habits are different, what is constipation to one person may not be to another. Your healthcare provider may do tests to diagnose constipation. It depends on what he or she finds when evaluating you.    Symptoms of constipation include:    Abdominal pain    Bloating    Vomiting    Painful bowel movements    Itching, swelling, bleeding, or pain around the anus  Causes  Constipation can have many causes. These include:    Diet low in fiber    Too much dairy    Not drinking enough liquids    Lack of exercise or physical activity. This is especially true for older adults.    Changes in lifestyle or daily routine, including pregnancy, aging, work, and travel    Frequent use or misuse of laxatives    Ignoring the urge to have a bowel movement or delaying it until later    Medicines, such as certain prescription pain medicines, iron supplements, antacids, certain antidepressants, and calcium supplements    Diseases like irritable bowel syndrome, bowel obstructions, stroke, diabetes, thyroid disease, Parkinson disease, hemorrhoids, and colon cancer  Complications  Potential complications of constipation can include:    Hemorrhoids    Rectal bleeding from hemorrhoids or anal fissures (skin tears)    Hernias    Dependency on laxatives    Chronic constipation    Fecal impaction    Bowel obstruction or perforation  Home care  All  treatment should be done after talking with your healthcare provider. This is especially true if you have another medical problems, are taking prescription medicines, or are an older adult. Treatment most often involves lifestyle changes. You may also need medicines. Your healthcare provider will tell you which will work best for you. Follow the advice below to help avoid this problem in the future.  Lifestyle changes  These lifestyle changes can help prevent constipation:    Diet. Eat a high-fiber diet, with fresh fruit and vegetables, and reduce dairy intake, meats, and processed foods    Fluids. It's important to get enough fluids each day. Drink plenty of water when you eat more fiber. If you are on diet that limits the amount of fluid you can have, talk about this with your healthcare provider.    Regular exercise. Check with your healthcare provider first.  Medications  Take any medicines as directed. Some laxatives are safe to use only every now and then. Others can be taken on a regular basis. Talk with your doctor or pharmacist if you have questions.  Prescription pain medicines can cause constipation. If you are taking this kind of medicine, ask your healthcare provider if you should also take a stool softener.  Medicines you may take to treat constipation include:    Fiber supplements    Stool softeners    Laxatives    Enemas    Rectal suppositories  Follow-up care  Follow up with your healthcare provider if symptoms don't get better in the next few days. You may need to have more tests or see a specialist.  Call 911  Call 911 if any of these occur:    Trouble breathing    Stiff, rigid abdomen that is severely painful to touch    Confusion    Fainting or loss of consciousness    Rapid heart rate    Chest pain  When to seek medical advice  Call your healthcare provider right away if any of these occur:    Fever over 100.4 F (38 C)    Failure to resume normal bowel movements    Pain in your abdomen or back  gets worse    Nausea or vomiting    Swelling in your abdomen    Blood in the stool    Black, tarry stool    Involuntary weight loss    Weakness    7744-6560 [employer]. [address] [place] [address]. All rights reserved. This information is not intended as a substitute for professional medical care. Always follow your healthcare professional's instructions.        Controlling Allergens: In the Home  Even a clean home can be full of allergens, so take a moment to see what you can do to cut down on allergens in each room of your home. Try to avoid things like cigarette smoke and perfume. They can irritate your eyes, nose, thorat, and lungs and make your allergies worse.    Avoid yard work and pulling weeds. These and other outdoor activities increase your exposure to pollen. If that s not possible, wear a filter mask. When you re done, bathe, wash your hair, and change your clothes.       1604-6149 [employer]. [address] [place] [address]. All rights reserved. This information is not intended as a substitute for professional medical care. Always follow your healthcare professional's instructions.        Controlling Allergens: Mold  Constant exposure to allergens means constant allergy symptoms. That's why controlling or avoiding the allergens that cause your symptoms is an important part of your treatment. If you are allergic to mold, the tips below may help lessen your exposure.     Keep damp areas clean to help prevent mold from growing.   Mold allergy  Mold allergy is actually from the spores that the molds release. Spores are microscopic seed particles that travel through the air. Molds grow best in dark, damp places. Outside, mold grows on rotting logs, wet leaves, as well as on certain grasses and weeds. In the home, mold commonly grows in or on the following:    Damp basements and closets    Bathrooms (especially shower stalls)    Places where fresh food is  stored    Refrigerator drip trays    House plants    Air conditioners    Humidifiers    Garbage cans or waste baskets    Mattresses    Upholstered furniture    Old foam rubber pillows  Controlling mold  Try the following:    Watch the newspaper or local news for mold counts. When they are high, stay inside as much as possible.    Drain wet areas of your yard, and clean up leaves and weeds before they begin to rot. If you compost, keep compost piles away from the house. Ask someone else to do these things for you.    If you are outside when mold counts are high, bathe, wash your hair, and change your clothes afterwards.    Use products with bleach to clean the shower or tub. Also clean the shower curtain.    Fix leaky faucets or leaks in the roof right away.    While bathing or showering, leave a window open or use a fan.    If your house is damp, use a dehumidifier. Empty once a day.    9267-5110 [employer]. [address] [place] [address]. All rights reserved. This information is not intended as a substitute for professional medical care. Always follow your healthcare professional's instructions.        First Aid: Allergic Reactions  Limited Reaction  A limited (localized) reaction affects only the area of contact. Some reactions may not show up for days. Others can occur almost immediately.  Step 1  Stop the Source    If the person has been stung, scrape the stinger away with the edge of a credit card or the dull edge of a knife. DON'T use fingers or tweezers to remove a stinger. If pinched, the stinger may empty its venom into the skin.    If the reaction is caused by eating a specific food or taking a medication, the victim should not eat or take the substance again.  Step 2  Treat Skin Irritation    Wash insect bites with soap and water.    Remove and wash in hot water all clothing that may have plant oils (or any other substance that has caused a reaction) on them. Shower with plenty  of soap to wash remaining plant oils (or other allergens) off the skin.    Control itching by making a thick paste of baking soda and water. Apply the paste directly to the skin.  Severe Reaction  A severe (systemic) reaction affects the entire body. In extreme cases, the airways from mouth to lungs may swell (anaphylaxis). The reaction may be immediate or develop over several hours.  Step 1  Calm the Person    Help the victim into a comfortable position. Prop up the head to aid breathing.    Tell the victim to remain still and limit talking.    If the person carries medication (epinephrine) to control anaphylaxis, help him or her use it.    Prevent any further contact with or exposure to allergen.   Step 2  Monitor Breathing    Watch for signs of airway swelling such as wheezing or swollen lips. With an extreme reaction, the victim may have trouble getting any breath.    Perform rescue breathing, if needed. In extreme cases, you may not be able to get air into the lungs.  Call 911 immediately if the victim has any of the following:    Trouble breathing    A history of airway swelling (anaphylaxis)  While you wait for help:  1. Reassure the person.  2. Treat for shock or provide rescue breathing or CPR, if needed.   An allergic reaction may become more serious over time. Seek medical help if any of the following is true:    A rash or hives covers the face, genitals, or most of the body.    An entire body part, such as an arm or leg, swells.    The tongue or lips begin to swell.     0843-6403 [employer]. [address] [place] [address]. All rights reserved. This information is not intended as a substitute for professional medical care. Always follow your healthcare professional's instructions.              Follow-ups after your visit        Your next 10 appointments already scheduled     May 17, 2017 11:30 AM CDT   SIX MINUTE WALK with [facility] PFL 6 MINUTE WALK 2,  PFL B    Health Pulmonary  "Function Testing ([facility])    [address] [address]   [phone]            May 17, 2017  1:00 PM CDT   (Arrive by 12:45 PM)   New Patient Visit with [facility] PULMONARY CONSULT   [facility] ([facility])    [address] [address]   [phone]            Jun 02, 2017 10:00 AM CDT   (Arrive by 9:45 AM)   Return Visit with Carlos Enrique Lay MD   [facility] ([facility])    [address] [place] [address]   [phone]              Who to contact     If you have questions or need follow up information about today's clinic visit or your schedule please contact [facility] directly at [phone].  Normal or non-critical lab and imaging results will be communicated to you by [employer], letter or phone within 4 business days after the clinic has received the results. If you do not hear from us within 7 days, please contact the clinic through [employer] or phone. If you have a critical or abnormal lab result, we will notify you by phone as soon as possible.  Submit refill requests through [employer] or call your pharmacy and they will forward the refill request to us. Please allow 3 business days for your refill to be completed.          Additional Information About Your Visit        [employer] Information     [employer] lets you send messages to your doctor, view your test results, renew your prescriptions, schedule appointments and more. To sign up, go to [email or web address] . Click on \"Log in\" on the left side of the screen, which will take you to the Welcome page. Then click on \"Sign up Now\" on the right side of the page.     You will be asked to enter the access code listed below, as well as some personal information. Please follow the directions to create your " "username and password.     Your access code is: [email or web address]  Expires: 2017  7:49 PM     Your access code will  in 90 days. If you need help or a new code, please call your [facility] or [phone].        Care EveryWhere ID     This is your Care EveryWhere ID. This could be used by other organizations to access your [place] medical records  [id number]        Your Vitals Were     Pulse Temperature Respirations Height Pulse Oximetry BMI (Body Mass Index)    92 98.7  F (37.1  C) (Temporal) 16 5' 9\" (1.753 m) 96% 25.22 kg/m2       Blood Pressure from Last 3 Encounters:   17 120/82   17 132/87   17 120/80    Weight from Last 3 Encounters:   17 170 lb 12.8 oz (77.5 kg)   17 181 lb 10.5 oz (82.4 kg)   17 175 lb (79.4 kg)              Today, you had the following     No orders found for display         Where to get your medicines      These medications were sent to [id number] - [place], [place] - [address]   [address] [facility] , [facility] [address]     Phone:  [phone]     albuterol 108 (90 BASE) MCG/ACT Inhaler    ondansetron 4 MG ODT tab         Some of these will need a paper prescription and others can be bought over the counter.  Ask your nurse if you have questions.     Bring a paper prescription for each of these medications     nitroglycerin 0.4 MG sublingual tablet          Primary Care Provider Office Phone # Fax #    Antonella Obrien [phone]       [facility] [address] [facility] [address]        Goals        General    I want more information on palliative card/Start Date 2014 (pt-stated)     Notes - Note created  4/15/2014 10:07 AM by Tania Vasquez MSW    As of today's date 4/15/2014 goal is met at 0 - 25%.   Goal Status:  Active        I want to feel normal again, and be able to address my pain/Start Date 2014 (pt-stated)     Notes - Note created  4/15/2014 10:06 AM by Tania Vasquez MSW "    As of today's date 4/15/2014 goal is met at 0 - 25%.   Goal Status:  Active        I will take my nirto as directed for chest pain. (pt-stated)     I will weigh myself daily and keep a record (pt-stated)       Thank you!     Thank you for choosing [facility]  for your care. Our goal is always to provide you with excellent care. Hearing back from our patients is one way we can continue to improve our services. Please take a few minutes to complete the written survey that you may receive in the mail after your visit with us. Thank you!             Your Updated Medication List - Protect others around you: Learn how to safely use, store and throw away your medicines at www.disposemymeds.org.          This list is accurate as of: 5/8/17 12:17 PM.  Always use your most recent med list.                   Brand Name Dispense Instructions for use    albuterol 108 (90 BASE) MCG/ACT Inhaler    PROAIR HFA/PROVENTIL HFA/VENTOLIN HFA    1 Inhaler    Inhale 2 puffs into the lungs every 4 hours as needed for shortness of breath / dyspnea or wheezing       ALPRAZolam 0.5 MG tablet    XANAX    30 tablet    One or two tabs daily PRN anxiety       beclomethasone 40 MCG/ACT Inhaler    QVAR    1 Inhaler    Inhale 2 puffs into the lungs 2 times daily       BOOST     12 Can    fax # to the county worker fax#[phone] Attn:MILDRED       clotrimazole 1 % cream    LOTRIMIN    14 g    Apply topically 2 times daily       cyanocobalamin 1000 MCG Tabs    [facility] vitamin  B12    30 tablet    Take 1 tablet by mouth daily       docusate sodium 100 MG tablet    COLACE    60 tablet    Take 100 mg by mouth daily       dolutegravir 50 MG tablet    TIVICAY    30 tablet    Take 1 tablet (50 mg) by mouth daily       emtricitabine-tenofovir 200-300 MG per tablet    TRUVADA    30 tablet    Take 1 tablet by mouth daily       fexofenadine-pseudoePHEDrine  MG per 12 hr tablet    ALLEGRA-D    28 tablet    Take 1 tablet by mouth 2 times daily  as needed for allergies Reported on 5/8/2017       FLONASE 50 MCG/ACT spray   Generic drug:  fluticasone     16 g    Spray 1-2 sprays into both nostrils daily as needed for rhinitis or allergies Reported on 5/8/2017       FLUoxetine 20 MG capsule    PROzac    30 capsule    Take 1 capsule (20 mg) by mouth daily Along with 40 mg of fluoxetine       hydrocortisone 2.5 % cream    ANUSOL-HC    30 g    Place rectally 2 times daily       MORPHINE SULFATE PO      Take 15 mg by mouth Reported on 5/8/2017       nicotine 14 MG/24HR 24 hr patch    NICODERM CQ    30 patch    Place 1 patch onto the skin every 24 hours       nitroglycerin 0.4 MG sublingual tablet    NITROSTAT    30 tablet    Place 1 tablet (0.4 mg) under the tongue every 5 minutes as needed for chest pain If you are still having symptoms after 3 doses (15 minutes) call 911.       ondansetron 4 MG ODT tab    ZOFRAN ODT    40 tablet    Take 1-2 tablets (4-8 mg) by mouth every 8 hours as needed for nausea       * order for DME     1 Device    Equipment being ordered: Oxygen at 5 liters       * order for DME     1 each    Equipment being ordered: empty Oxygen tanks, oxygen concentrator and oxygen travel concentrator for portability       order for DME     1 Device    Equipment being ordered:  Portable Oxygen       oxyCODONE-acetaminophen  MG per tablet    PERCOCET    30 tablet    Take 1 tablet by mouth every 4 hours as needed for moderate to severe pain (Max of 6 a day.)       predniSONE 20 MG tablet    DELTASONE    70 tablet    Take 3 tablets (60 mg) by mouth daily Decrease by 10mg weekly. Example: Until 5/7 3 tabs daily, then 2.5 tabs daily for one week.       SPIRIVA RESPIMAT 2.5 MCG/ACT inhalation aerosol   Generic drug:  tiotropium     4 g    INHALE 2 PUFFS INTO THE LUNGS DAILY       sulfamethoxazole-trimethoprim 800-160 MG per tablet    BACTRIM DS/SEPTRA DS    60 tablet    Take 1 tablet by mouth daily       SUMAtriptan 50 MG tablet    IMITREX    30 tablet     Take 1 tablet (50 mg) by mouth at onset of headache for migraine       valACYclovir 500 MG tablet    VALTREX    60 tablet    Take 500 mg by mouth 2 times daily as needed Reported on 5/8/2017       * Notice:  This list has 2 medication(s) that are the same as other medications prescribed for you. Read the directions carefully, and ask your doctor or other care provider to review them with you.

## 2017-05-08 NOTE — PATIENT INSTRUCTIONS
"  Living with a Chronic Health Condition: Reducing Stress  Dealing with stress isn't easy. You may worry about all the things you have to do, but asking for help might make you upset. And being tired or in pain can make stress worse. Learning to control stress does take effort. Yet reducing stress can help you stay healthy.    Learn to relax  Listening to music, spending some quiet time alone, or taking a warm bath can all be relaxing. But there are many other things that can help you feel calm. Choose activities you enjoy and make them part of your daily life. You may also want to try:    Deep breathing. When you feel tense, take a few deep breaths.    Meditation, visualization, alisa chi, or yoga. You might want to take a class in these techniques. Or check the [employer] for books and tapes to help you get started.  Set priorities  Try not to worry about things you can't do. Instead, set goals you know you can achieve. Do the things you think are most important first. Also, look for ways to do things with less effort. Ask yourself:    Do I really need to do this today? If the answer is \"yes,\" take care of that task first. But keep in mind the answer can be \"no.\"    What do I need help with? Is there someone else who can do it?    Can I change this appointment or social event to another time?  Finding help  Knowing you can count on others can be a relief. Try these tips:    Be willing to accept help when it's offered.    If you need extra help, there are likely to be services nearby. These may include home health aides, and visiting nurses. A  can put you in touch with the help you need.  Prepare for down days  Plan ahead for the times when you need to rest more or limit what you do. Freeze prepared meals so you won't have to cook. Keep a phone and a list of important phone numbers by your bed.    7509-3854 [employer]. [address] [place] [address], [place] [address]. All rights reserved. " This information is not intended as a substitute for professional medical care. Always follow your healthcare professional's instructions.        Living with a Chronic Health Condition  Sometimes change brings unexpected rewards. This is a good time to look for all the ways you can be involved in life. Challenge yourself. Don't be afraid, or think of yourself as limited. As you begin to see what you can do, you'll realize just how much you have to offer.    Thinking  At times, you may want to be alone with your thoughts. At other times, you'll want to explore new interests. Try some of the following:    If you don't already know how, learn to use a computer. Computers can connect you to a vast amount of information. Many [employer] have systems you can use free of charge.    Read just for pleasure. Try novels, magazines, humor, even cookbooks.    Play board or computer games.    Plan a trip, listen to music, or start a journal.  Doing  Think about what you enjoy doing. Then find ways to make it happen. Your doctor, nurse, or occupational therapist may be able to help you get started. Consider the following:    Take a class in healthy cooking. What you learn can help, whether or not you're on a special diet.    Ask someone to take you to a ball game or on a picnic.    Go on a nature walk or work in the garden.  Relating to others  Over time, your bonds with some people may grow stronger while some may not last. As you move forward, keep these tips in mind:    Try volunteering. It's a good way to be involved with others.    Be open to new people you meet.    Find ways to maintain friendships you value. If you have to cancel plans, try to reschedule. If you can access a computer, use e-mail to stay in touch. Call or text.  Resources    American Diabetes Association at 145-854-2094, or www.diabetes.org    American Heart Association at 064-603-7212, or www.heart.org    American Lung Association at 678-572-5988, or  www.lung.org    Arthritis Foundation at 314-270-8404, or www.arthritis.org    Medicare Hotline at 055-657-3576, or www.medicare.gov    Phone book listings in the Government section and the yellow pages    To find out about disability benefits and your rights under the Americans with Disabilities Act (ADA) at 102-636-4441     2167-9713 [employer]. [address] [place] [address]. All rights reserved. This information is not intended as a substitute for professional medical care. Always follow your healthcare professional's instructions.        Hemorrhoids    Hemorrhoids are swollen and inflamed veins inside the rectum and near the anus. The rectum is the last several inches of the colon. The anus is the passage between the rectum and the outside of the body.  Causes   The veins can become swollen due to increased pressure in them. This is most often caused by:    Chronic constipation or diarrhea    Straining when having a bowel movement    Sitting too long on the toilet    A low-fiber diet    Pregnancy  Symptoms     Bleeding from the rectum (this may be noticeable after bowel movements)    Lump near the anus    Itching around the anus    Pain around the anus  There are different types of hemorrhoids. Depending on the type you have and the severity, you may be able to treat yourself at home. In some cases, a procedure may be the best treatment option. Your healthcare provider can tell you more about this, if needed.  Home care  General care    To get relief from pain or itching, try:    Topical products. Your healthcare provider may prescribe or recommend creams, ointments, or pads that can be applied to the hemorrhoid. Use these exactly as directed.    Medicines. Your healthcare provider may recommend stool softeners, suppositories, or laxatives to help manage constipation. Use these exactly as directed.    Sitz baths. A sitz bath involves sitting in a few inches of warm bath water. Be  careful not to make the water so hot that you burn yourself test it before sitting in it. Soak for about 10 to 15 minutes a few times a day. This may help relieve pain.  Tips to help prevent hemorrhoids    Eat more fiber. Fiber adds bulk to stool and absorbs water as it moves through your colon. This makes stool softer and easier to pass.    Increase the fiber in your diet with more fiber-rich foods. These include fresh fruit, vegetables, and whole grains.    Take a fiber supplement or bulking agent, if advised to by your provider. These include products such as psyllium or methylcellulose.    Drink plenty of water, if directed to by your provider. This can help keep stool soft.    Be more active. Frequent exercise aids digestion and helps prevent constipation. It may also help make bowel movements more regular.    Don t strain during bowel movements. This can make hemorrhoids more likely. Also, don t sit on the toilet for long periods of time.  Follow-up care  Follow up with your healthcare provider, or as advised. If a culture or imaging tests were done, you will be notified of the results when they are ready. This may take a few days or longer.  When to seek medical advice  Call your healthcare provider right away if any of these occur:    Increased bleeding from the rectum    Increased pain around the rectum or anus    Weakness or dizziness   Call 911   Call 911 or return to the emergency department right away if any of these occur:    Trouble breathing or swallowing    Fainting or loss of consciousness    Unusually fast heart rate    Vomiting blood    Large amounts of blood in stool      0485-6379 [employer] [address] [place] [address], [place] [address]. All rights reserved. This information is not intended as a substitute for professional medical care. Always follow your healthcare professional's instructions.        Constipation (Adult)  Constipation means that you have bowel movements that are  less frequent than usual. Stools often become very hard and difficult to pass.  Constipation is very common. At some point in life it affects almost everyone. Since everyone's bowel habits are different, what is constipation to one person may not be to another. Your healthcare provider may do tests to diagnose constipation. It depends on what he or she finds when evaluating you.    Symptoms of constipation include:    Abdominal pain    Bloating    Vomiting    Painful bowel movements    Itching, swelling, bleeding, or pain around the anus  Causes  Constipation can have many causes. These include:    Diet low in fiber    Too much dairy    Not drinking enough liquids    Lack of exercise or physical activity. This is especially true for older adults.    Changes in lifestyle or daily routine, including pregnancy, aging, work, and travel    Frequent use or misuse of laxatives    Ignoring the urge to have a bowel movement or delaying it until later    Medicines, such as certain prescription pain medicines, iron supplements, antacids, certain antidepressants, and calcium supplements    Diseases like irritable bowel syndrome, bowel obstructions, stroke, diabetes, thyroid disease, Parkinson disease, hemorrhoids, and colon cancer  Complications  Potential complications of constipation can include:    Hemorrhoids    Rectal bleeding from hemorrhoids or anal fissures (skin tears)    Hernias    Dependency on laxatives    Chronic constipation    Fecal impaction    Bowel obstruction or perforation  Home care  All treatment should be done after talking with your healthcare provider. This is especially true if you have another medical problems, are taking prescription medicines, or are an older adult. Treatment most often involves lifestyle changes. You may also need medicines. Your healthcare provider will tell you which will work best for you. Follow the advice below to help avoid this problem in the future.  Lifestyle changes   These lifestyle changes can help prevent constipation:    Diet. Eat a high-fiber diet, with fresh fruit and vegetables, and reduce dairy intake, meats, and processed foods    Fluids. It's important to get enough fluids each day. Drink plenty of water when you eat more fiber. If you are on diet that limits the amount of fluid you can have, talk about this with your healthcare provider.    Regular exercise. Check with your healthcare provider first.  Medications  Take any medicines as directed. Some laxatives are safe to use only every now and then. Others can be taken on a regular basis. Talk with your doctor or pharmacist if you have questions.  Prescription pain medicines can cause constipation. If you are taking this kind of medicine, ask your healthcare provider if you should also take a stool softener.  Medicines you may take to treat constipation include:    Fiber supplements    Stool softeners    Laxatives    Enemas    Rectal suppositories  Follow-up care  Follow up with your healthcare provider if symptoms don't get better in the next few days. You may need to have more tests or see a specialist.  Call 911  Call 911 if any of these occur:    Trouble breathing    Stiff, rigid abdomen that is severely painful to touch    Confusion    Fainting or loss of consciousness    Rapid heart rate    Chest pain  When to seek medical advice  Call your healthcare provider right away if any of these occur:    Fever over 100.4 F (38 C)    Failure to resume normal bowel movements    Pain in your abdomen or back gets worse    Nausea or vomiting    Swelling in your abdomen    Blood in the stool    Black, tarry stool    Involuntary weight loss    Weakness    4690-2627 [employer]. [address] [place] [address], [place] [address]. All rights reserved. This information is not intended as a substitute for professional medical care. Always follow your healthcare professional's instructions.        Controlling Allergens: In the  Home  Even a clean home can be full of allergens, so take a moment to see what you can do to cut down on allergens in each room of your home. Try to avoid things like cigarette smoke and perfume. They can irritate your eyes, nose, thorat, and lungs and make your allergies worse.    Avoid yard work and pulling weeds. These and other outdoor activities increase your exposure to pollen. If that s not possible, wear a filter mask. When you re done, bathe, wash your hair, and change your clothes.       7827-9294 [employer]. [address] [place] [address]. All rights reserved. This information is not intended as a substitute for professional medical care. Always follow your healthcare professional's instructions.        Controlling Allergens: Mold  Constant exposure to allergens means constant allergy symptoms. That's why controlling or avoiding the allergens that cause your symptoms is an important part of your treatment. If you are allergic to mold, the tips below may help lessen your exposure.     Keep damp areas clean to help prevent mold from growing.   Mold allergy  Mold allergy is actually from the spores that the molds release. Spores are microscopic seed particles that travel through the air. Molds grow best in dark, damp places. Outside, mold grows on rotting logs, wet leaves, as well as on certain grasses and weeds. In the home, mold commonly grows in or on the following:    Damp basements and closets    Bathrooms (especially shower stalls)    Places where fresh food is stored    Refrigerator drip trays    House plants    Air conditioners    Humidifiers    Garbage cans or waste baskets    Mattresses    Upholstered furniture    Old foam rubber pillows  Controlling mold  Try the following:    Watch the newspaper or local news for mold counts. When they are high, stay inside as much as possible.    Drain wet areas of your yard, and clean up leaves and weeds before they begin to rot. If  you compost, keep compost piles away from the house. Ask someone else to do these things for you.    If you are outside when mold counts are high, bathe, wash your hair, and change your clothes afterwards.    Use products with bleach to clean the shower or tub. Also clean the shower curtain.    Fix leaky faucets or leaks in the roof right away.    While bathing or showering, leave a window open or use a fan.    If your house is damp, use a dehumidifier. Empty once a day.    5815-8622 [employer]. [address] [place], [place] [address]. All rights reserved. This information is not intended as a substitute for professional medical care. Always follow your healthcare professional's instructions.        First Aid: Allergic Reactions  Limited Reaction  A limited (localized) reaction affects only the area of contact. Some reactions may not show up for days. Others can occur almost immediately.  Step 1  Stop the Source    If the person has been stung, scrape the stinger away with the edge of a credit card or the dull edge of a knife. DON'T use fingers or tweezers to remove a stinger. If pinched, the stinger may empty its venom into the skin.    If the reaction is caused by eating a specific food or taking a medication, the victim should not eat or take the substance again.  Step 2  Treat Skin Irritation    Wash insect bites with soap and water.    Remove and wash in hot water all clothing that may have plant oils (or any other substance that has caused a reaction) on them. Shower with plenty of soap to wash remaining plant oils (or other allergens) off the skin.    Control itching by making a thick paste of baking soda and water. Apply the paste directly to the skin.  Severe Reaction  A severe (systemic) reaction affects the entire body. In extreme cases, the airways from mouth to lungs may swell (anaphylaxis). The reaction may be immediate or develop over several hours.  Step 1  Calm the Person    Help  the victim into a comfortable position. Prop up the head to aid breathing.    Tell the victim to remain still and limit talking.    If the person carries medication (epinephrine) to control anaphylaxis, help him or her use it.    Prevent any further contact with or exposure to allergen.   Step 2  Monitor Breathing    Watch for signs of airway swelling such as wheezing or swollen lips. With an extreme reaction, the victim may have trouble getting any breath.    Perform rescue breathing, if needed. In extreme cases, you may not be able to get air into the lungs.  Call 911 immediately if the victim has any of the following:    Trouble breathing    A history of airway swelling (anaphylaxis)  While you wait for help:  1. Reassure the person.  2. Treat for shock or provide rescue breathing or CPR, if needed.   An allergic reaction may become more serious over time. Seek medical help if any of the following is true:    A rash or hives covers the face, genitals, or most of the body.    An entire body part, such as an arm or leg, swells.    The tongue or lips begin to swell.     2533-5567 [employer]. [address] [place] [address], [place] [address]. All rights reserved. This information is not intended as a substitute for professional medical care. Always follow your healthcare professional's instructions.

## 2017-05-08 NOTE — TELEPHONE ENCOUNTER
FLUoxetine (PROZAC) 20 MG capsule     Last Written Prescription Date: 4/7/16  Last Fill Quantity: 30, # refills: 6  Last Office Visit with [facility] primary care provider:  4/25/17 JENNY   Next 5 appointments (look out 90 days)     May 08, 2017 11:30 AM CDT   [facility]    [address]  [facility] [address]   [phone]                   Last PHQ-9 score on record=   PHQ-9 SCORE 3/10/2017   Total Score -   Total Score 0               predniSONE (DELTASONE) 20 MG tablet      Last Written Prescription Date:  5/2/17  Last Fill Quantity: 70 ,   # refills: 0  Last Office Visit with [facility] provider: 4/25/17 NATALIE  Future Office visit:    Next 5 appointments (look out 90 days)     May 08, 2017 11:30 AM CDT   [facility]    [address] [facility] [address] [phone]                   Routing refill request to provider for review/approval because:  Drug not on the [facility] refill protocol or controlled substance

## 2017-05-08 NOTE — TELEPHONE ENCOUNTER
Form completed and placed in MA basket.    Thank you.    Electronically signed:  Antonella Obrien M.D.

## 2017-05-09 ENCOUNTER — TELEPHONE (OUTPATIENT)
Dept: FAMILY MEDICINE | Facility: OTHER | Age: 47
End: 2017-05-09

## 2017-05-09 ENCOUNTER — CARE COORDINATION (OUTPATIENT)
Dept: PULMONOLOGY | Facility: CLINIC | Age: 47
End: 2017-05-09

## 2017-05-09 DIAGNOSIS — K21.9 GASTROESOPHAGEAL REFLUX DISEASE, ESOPHAGITIS PRESENCE NOT SPECIFIED: Primary | ICD-10-CM

## 2017-05-09 RX ORDER — PREDNISONE 20 MG/1
60 TABLET ORAL DAILY
Qty: 70 TABLET | Refills: 0 | OUTPATIENT
Start: 2017-05-09

## 2017-05-09 ASSESSMENT — PATIENT HEALTH QUESTIONNAIRE - PHQ9: SUM OF ALL RESPONSES TO PHQ QUESTIONS 1-9: 0

## 2017-05-09 NOTE — TELEPHONE ENCOUNTER
Will start patient on Ranitidine 150 mg BID, hold PPI at this time.    Electronically signed:  Antonella Obrien M.D.

## 2017-05-09 NOTE — PROGRESS NOTES
"Pt called writer to confirm time of appointment and see if could also get cardiac testing done. RN advised there were no orders for cardiology and should call PCP to discuss care plan as per pt PCP was the one who wanted testing. While speaking with pt RN reviewed medications (prednisone taper,  bactrim and O2 use). Pt reports using prednisone as needed. \"some days take 30mg some days I take 10mg in morning and 10mg at night, use it as needed.\" Pt is also not taking bactrim as \"don't have infection\".  Advised and reviewed Prednisone taper and bactrim use with patient. Stressed importance of taking as prescribed. Encouraged pt to have wife help set up medications to ensure taking properly. Pt is also not using O2 continuously, uses as he feels needs. Educated pt on importance of using Oxygen and medications as prescribed. Pt will call PCP regarding cardiac testing and was given RN's direct number to call if further questions by him or wife. Urged pt to ensure came to appointment in [facility] and encouraged him to bring someone with him. Pt stated he planned on being there.   Sakina Fraser, RN BSN   "

## 2017-05-09 NOTE — TELEPHONE ENCOUNTER
Patient has appointment with Cardiologist on 5/15/17, he may order stress test if needed.  Patient understand the plan, questions, answered.    Electronically signed:  Antonella Obrien M.D.

## 2017-05-09 NOTE — TELEPHONE ENCOUNTER
ICD-10-CM    1. Gastroesophageal reflux disease, esophagitis presence not specified K21.9 ranitidine (ZANTAC) 150 MG tablet     Patient declined allergy to Ranitidine.  Will hold off Pantoprazole at this time.  May need Upper GI scope if symptoms not better.  Patient has been taking Ranitidine and helping with symptoms.    Electronically signed:  Antonella Obrien M.D.

## 2017-05-09 NOTE — TELEPHONE ENCOUNTER
Prednisone:  Routing refill request to provider for review/approval because:  Prescribed for acute issue  Not on [facility] protocol    Prozac:  Routing refill request to provider for review/approval because:  Appears to be a break in medication -  Sakina Hickman RN, BSN

## 2017-05-09 NOTE — TELEPHONE ENCOUNTER
[facility] in [place] is requesting a refill for pantoprazole 40 mg tablets this medication was last dispensed in 10/15.

## 2017-05-09 NOTE — TELEPHONE ENCOUNTER
Reason for Call: Request for an order or referral:    Order or referral being requested: order    Date needed: as soon as possible    Has the patient been seen by the PCP for this problem? YES    Additional comments: Tommy would like to have his heart stress test done at the [facility].  He would like to do this on 5/17/17 since he will be there already.  He also has questions about this insulin.  Please call    Phone number Patient can be reached at:  Home number on file [phone] (home)    Best Time:  any    Can we leave a detailed message on this number?  YES    Call taken on 5/9/2017 at 1:24 PM by Manasa Jones

## 2017-05-09 NOTE — TELEPHONE ENCOUNTER
Did not see on medlist   Pantoprazole 40 mg      Last Written Prescription Date:   Last Fill Quantity: ,  # refills:    Last Office Visit with [facility], [facility] or [facility] prescribing provider: 5/8/2017

## 2017-05-11 LAB — LAB SCANNED RESULT: NORMAL

## 2017-05-15 ENCOUNTER — OFFICE VISIT (OUTPATIENT)
Dept: CARDIOLOGY | Facility: CLINIC | Age: 47
End: 2017-05-15
Payer: MEDICARE

## 2017-05-15 VITALS
OXYGEN SATURATION: 99 % | WEIGHT: 173.2 LBS | SYSTOLIC BLOOD PRESSURE: 122 MMHG | BODY MASS INDEX: 25.65 KG/M2 | HEIGHT: 69 IN | DIASTOLIC BLOOD PRESSURE: 80 MMHG | HEART RATE: 88 BPM

## 2017-05-15 DIAGNOSIS — B37.0 THRUSH: Primary | ICD-10-CM

## 2017-05-15 DIAGNOSIS — I20.9 ISCHEMIC CHEST PAIN (H): Primary | ICD-10-CM

## 2017-05-15 LAB
BACTERIA SPEC CULT: NORMAL
BACTERIA SPEC CULT: NORMAL
Lab: NORMAL
MICRO REPORT STATUS: NORMAL
MICRO REPORT STATUS: NORMAL
SPECIMEN SOURCE: NORMAL
SPECIMEN SOURCE: NORMAL

## 2017-05-15 PROCEDURE — 99214 OFFICE O/P EST MOD 30 MIN: CPT | Performed by: INTERNAL MEDICINE

## 2017-05-15 RX ORDER — FLUCONAZOLE 100 MG/1
TABLET ORAL
Qty: 4 TABLET | Refills: 0 | Status: SHIPPED | OUTPATIENT
Start: 2017-05-15 | End: 2017-08-08

## 2017-05-15 RX ORDER — SIMVASTATIN 20 MG
20 TABLET ORAL AT BEDTIME
Qty: 30 TABLET | Refills: 11 | Status: SHIPPED | OUTPATIENT
Start: 2017-05-15 | End: 2020-01-27
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
## 2017-05-15 NOTE — MR AVS SNAPSHOT
After Visit Summary   5/15/2017    Tommy Ross    MRN: [id number]           Patient Information     Date Of Birth          1970        Visit Information        Provider Department      5/15/2017 9:00 AM Sea Higuera MD [facility]        Today's Diagnoses     Ischemic chest pain (H)    -  1       Follow-ups after your visit        Additional Services     Sleep Study (referral)       Please be aware that coverage of these services is subject to the terms and limitations of your health insurance plan.  Call member services at your health plan with any benefit or coverage questions.      Please bring the following to your appointment:    >>   List of current medications   >>   This referral request   >>   Any documents/labs given to you for this referral    [facility]  Ph [phone]  (Age 13 if over 100 lbs and up)            Follow-Up with Cardiologist                 Your next 10 appointments already scheduled     May 17, 2017 11:30 AM CDT   SIX MINUTE WALK with  PFL 6 MINUTE WALK 2,  PFL B   [facility] Pulmonary Function Testing ([facility])    [address] [place] [id number] [phone]            May 17, 2017  1:00 PM CDT   (Arrive by 12:45 PM)   New Patient Visit with  PULMONARY CONSULT   [facility] ([facility])    [address] [place] [id number] [phone]            Jun 02, 2017 10:00 AM CDT   (Arrive by 9:45 AM)   Return Visit with Carlos Enrique Lay MD   [facility] and Infectious Diseases ([facility])    [address] [place] [id number] [phone]              Future tests that were ordered for you today     Open Future Orders        Priority Expected Expires Ordered    Exercise Stress Echocardiogram Routine 5/22/2017  "5/15/2018 5/15/2017    Sleep Study (referral) Routine 2017 5/15/2018 5/15/2017    Follow-Up with Cardiologist Routine 2017 5/15/2018 5/15/2017    Lipid Profile Routine 2017 5/15/2018 5/15/2017    ALT Routine 2017 5/15/2018 5/15/2017            Who to contact     If you have questions or need follow up information about today's clinic visit or your schedule please contact [facility] directly at [phone].  Normal or non-critical lab and imaging results will be communicated to you by MyChart, letter or phone within 4 business days after the clinic has received the results. If you do not hear from us within 7 days, please contact the clinic through [employer] or phone. If you have a critical or abnormal lab result, we will notify you by phone as soon as possible.  Submit refill requests through [employer] or call your pharmacy and they will forward the refill request to us. Please allow 3 business days for your refill to be completed.          Additional Information About Your Visit        MyChart Information     [employer] lets you send messages to your doctor, view your test results, renew your prescriptions, schedule appointments and more. To sign up, go to [email or web address] . Click on \"Log in\" on the left side of the screen, which will take you to the Welcome page. Then click on \"Sign up Now\" on the right side of the page.     You will be asked to enter the access code listed below, as well as some personal information. Please follow the directions to create your username and password.     Your access code is: [email or web address]  Expires: 2017  7:49 PM     Your access code will  in 90 days. If you need help or a new code, please call your [facility] or [phone].        Care EveryWhere ID     This is your Care EveryWhere ID. This could be used by other organizations to access your [facility] medical records  [id number]        Your Vitals Were     Pulse Height Pulse " "Oximetry BMI (Body Mass Index)          88 1.753 m (5' 9\") 99% 25.58 kg/m2         Blood Pressure from Last 3 Encounters:   05/15/17 122/80   05/08/17 120/82   05/02/17 132/87    Weight from Last 3 Encounters:   05/15/17 78.6 kg (173 lb 3.2 oz)   05/08/17 77.5 kg (170 lb 12.8 oz)   05/01/17 82.4 kg (181 lb 10.5 oz)                 Today's Medication Changes          These changes are accurate as of: 5/15/17 10:10 AM.  If you have any questions, ask your nurse or doctor.               Start taking these medicines.        Dose/Directions    simvastatin 20 MG tablet   Commonly known as:  ZOCOR   Used for:  Ischemic chest pain (H)        Dose:  20 mg   Take 1 tablet (20 mg) by mouth At Bedtime Hold if taking fluconozole   Quantity:  30 tablet   Refills:  11            Where to get your medicines      These medications were sent to [id number] - [place], [place] - [address]   [address] , [place] [facility] [address]     Phone:  [phone]     simvastatin 20 MG tablet                Primary Care Provider Office Phone # Fax #    Antonella Obrien [phone]       [facility] [address] [facility] [address]        Goals        General    I want more information on palliative card/Start Date 4/14/2014 (pt-stated)     Notes - Note created  4/15/2014 10:07 AM by Tania Vasquez MSW    As of today's date 4/15/2014 goal is met at 0 - 25%.   Goal Status:  Active        I want to feel normal again, and be able to address my pain/Start Date 4/14/2014 (pt-stated)     Notes - Note created  4/15/2014 10:06 AM by Tania Vasquez MSW    As of today's date 4/15/2014 goal is met at 0 - 25%.   Goal Status:  Active        I will take my nirto as directed for chest pain. (pt-stated)     I will weigh myself daily and keep a record (pt-stated)       Thank you!     Thank you for choosing [facility]  for your care. Our goal is always to provide you with excellent care. Hearing back " from our patients is one way we can continue to improve our services. Please take a few minutes to complete the written survey that you may receive in the mail after your visit with us. Thank you!             Your Updated Medication List - Protect others around you: Learn how to safely use, store and throw away your medicines at www.disposemymeds.org.          This list is accurate as of: 5/15/17 10:10 AM.  Always use your most recent med list.                   Brand Name Dispense Instructions for use    albuterol 108 (90 BASE) MCG/ACT Inhaler    PROAIR HFA/PROVENTIL HFA/VENTOLIN HFA    1 Inhaler    Inhale 2 puffs into the lungs every 4 hours as needed for shortness of breath / dyspnea or wheezing       ALPRAZolam 0.5 MG tablet    XANAX    30 tablet    One or two tabs daily PRN anxiety       aspirin 81 MG EC tablet     30 tablet    Take 1 tablet (81 mg) by mouth daily       beclomethasone 40 MCG/ACT Inhaler    QVAR    1 Inhaler    Inhale 2 puffs into the lungs 2 times daily       BOOST     12 Can    fax # to the county worker fax#[phone] Attn:MILDRED       clotrimazole 1 % cream    LOTRIMIN    14 g    Apply topically 2 times daily       cyanocobalamin 1000 MCG Tabs    CVS vitamin  B12    30 tablet    Take 1 tablet by mouth daily       docusate sodium 100 MG tablet    COLACE    60 tablet    Take 100 mg by mouth daily       dolutegravir 50 MG tablet    TIVICAY    30 tablet    Take 1 tablet (50 mg) by mouth daily       emtricitabine-tenofovir 200-300 MG per tablet    TRUVADA    30 tablet    Take 1 tablet by mouth daily       fexofenadine-pseudoePHEDrine  MG per 12 hr tablet    ALLEGRA-D    28 tablet    Take 1 tablet by mouth 2 times daily as needed for allergies Reported on 5/8/2017       FLONASE 50 MCG/ACT spray   Generic drug:  fluticasone     16 g    Spray 1-2 sprays into both nostrils daily as needed for rhinitis or allergies Reported on 5/8/2017       FLUCONAZOLE PO          FLUoxetine 20 MG capsule     PROzac    30 capsule    Take 1 capsule (20 mg) by mouth daily Along with 40 mg of fluoxetine       hydrocortisone 2.5 % cream    ANUSOL-HC    30 g    Place rectally 2 times daily       MORPHINE SULFATE PO      Take 15 mg by mouth Reported on 5/8/2017       nicotine 14 MG/24HR 24 hr patch    NICODERM CQ    30 patch    Place 1 patch onto the skin every 24 hours       nitroglycerin 0.4 MG sublingual tablet    NITROSTAT    30 tablet    Place 1 tablet (0.4 mg) under the tongue every 5 minutes as needed for chest pain If you are still having symptoms after 3 doses (15 minutes) call 911.       ondansetron 4 MG ODT tab    ZOFRAN ODT    40 tablet    Take 1-2 tablets (4-8 mg) by mouth every 8 hours as needed for nausea       * order for DME     1 Device    Equipment being ordered: Oxygen at 5 liters       * order for DME     1 each    Equipment being ordered: empty Oxygen tanks, oxygen concentrator and oxygen travel concentrator for portability       order for DME     1 Device    Equipment being ordered:  Portable Oxygen       oxyCODONE-acetaminophen  MG per tablet    PERCOCET    30 tablet    Take 1 tablet by mouth every 4 hours as needed for moderate to severe pain (Max of 6 a day.)       predniSONE 20 MG tablet    DELTASONE    70 tablet    Take 3 tablets (60 mg) by mouth daily Decrease by 10mg weekly. Example: Until 5/7 3 tabs daily, then 2.5 tabs daily for one week.       ranitidine 150 MG tablet    ZANTAC    60 tablet    Take 1 tablet (150 mg) by mouth 2 times daily       simvastatin 20 MG tablet    ZOCOR    30 tablet    Take 1 tablet (20 mg) by mouth At Bedtime Hold if taking fluconozole       SPIRIVA RESPIMAT 2.5 MCG/ACT inhalation aerosol   Generic drug:  tiotropium     4 g    INHALE 2 PUFFS INTO THE LUNGS DAILY       sulfamethoxazole-trimethoprim 800-160 MG per tablet    BACTRIM DS/SEPTRA DS    60 tablet    Take 1 tablet by mouth daily       SUMAtriptan 50 MG tablet    IMITREX    30 tablet    Take 1 tablet (50  mg) by mouth at onset of headache for migraine       valACYclovir 500 MG tablet    VALTREX    60 tablet    Take 500 mg by mouth 2 times daily as needed Reported on 5/8/2017       * Notice:  This list has 2 medication(s) that are the same as other medications prescribed for you. Read the directions carefully, and ask your doctor or other care provider to review them with you.
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
## 2017-05-15 NOTE — LETTER
"5/15/2017    Antonella Obrien MD  [address] [place] [address]    RE: Tommy Ayalajoselito       Dear Colleague,    I had the pleasure of seeing Tommy Ross in the [facility].    I am seeing Tommy Ross in Cardiology Clinic today for a followup visit.  Mr. Ross is a 46-year-old male who has followed with Dr. Harris in the past, last visit in 11/2014.  He has had significant medical problems including HIV/AIDS, and multiple hospital admissions for respiratory failure, exact cause unknown.  He was recently hospitalized at the [facility] for such a problem.  He was asked to see Cardiology for evaluation of possible coronary artery disease.  He underwent a nuclear stress test in 2014 showing normal ejection fraction and normal perfusion.  His lung problems predated that stress test.      Mr. Ross reports that any type of vigorous physical activity will bring on abrupt respiratory failure.  He has developed respiratory failure requiring intubation after activities such as raking, shoveling or tilling the garden.  He states that he feels short of breath and if he stops quickly enough he recovers, but if he pushes himself too far he goes into respiratory failure, develops a fever, and requires hospitalization.  Records were reviewed, and with his last episode he was out tilling in the garden and became short of breath.  He came in to [facility], where he spent 1-2 nights and then was discharged, but within a couple of days his respiratory status worsened and he was admitted to the [facility].  They did a full evaluation and never really found out why he went into respiratory failure.  Included in his evaluation was an echocardiogram on the day of admission which showed no abnormalities.  He also underwent a troponin which was negative and a BNP which was modestly elevated at 1777.  He was discharged after 7 days with a diagnosis of \"acute hypoxic " "respiratory failure in setting of recurrent acute respiratory failure episodes.\"  He was also noted to have HIV well controlled and elevated CRP.  The cause of his respiratory failure was unclear.      Since his discharge, the patient has been doing well.  He remains mildly dyspneic but is pretty much back to normal activities.  He has not had a cough and does not have exertional chest pain.  He does describe waking up short of breath sometimes abruptly and always with a great deal of fright.  This does not really happen if he lies on his right side.  It sometimes takes him a while to catch his breath, but he does not have to sleep in a chair, nor does he have to use extra pillows.  It sounds like he is describing nighttime terrors.  His wife, who accompanies him, states that he pauses in his breathing at night but had a sleep study done about 10 years ago which apparently did not show sleep apnea.  He was having similar symptoms at that time.     The patient denies significant peripheral edema or claudication.  He has not had palpitations, syncope or near-syncope.  He does not get exertional chest, arm, neck, shoulder or jaw discomfort.      The patient reports that his usual episode of deterioration, of which there have been about 20 in the past 5-10 years, includes sudden onset of shortness of breath with a burning sensation in the chest feeling like he is on fire.  This is a deep pain followed by lethargy and loss of his eyesight as well as a fever that goes up above 100.  His O2 sats decrease.  This all occurs within about 10 minutes.      The patient is currently using an aspirin intermittently but is really on no cardiac medications on a scheduled basis.     Outpatient Encounter Prescriptions as of 5/15/2017   Medication Sig Dispense Refill     aspirin 81 MG EC tablet Take 1 tablet (81 mg) by mouth daily 30 tablet      simvastatin (ZOCOR) 20 MG tablet Take 1 tablet (20 mg) by mouth At Bedtime Hold if taking " fluconozole 30 tablet 11     [DISCONTINUED] FLUCONAZOLE PO        FLUoxetine (PROZAC) 20 MG capsule Take 1 capsule (20 mg) by mouth daily Along with 40 mg of fluoxetine 30 capsule 6     [DISCONTINUED] ranitidine (ZANTAC) 150 MG tablet Take 1 tablet (150 mg) by mouth 2 times daily 60 tablet 1     ondansetron (ZOFRAN ODT) 4 MG ODT tab Take 1-2 tablets (4-8 mg) by mouth every 8 hours as needed for nausea 40 tablet 7     albuterol (PROAIR HFA/PROVENTIL HFA/VENTOLIN HFA) 108 (90 BASE) MCG/ACT Inhaler Inhale 2 puffs into the lungs every 4 hours as needed for shortness of breath / dyspnea or wheezing 1 Inhaler 11     predniSONE (DELTASONE) 20 MG tablet Take 3 tablets (60 mg) by mouth daily Decrease by 10mg weekly. Example: Until 5/7 3 tabs daily, then 2.5 tabs daily for one week. 70 tablet 0     valACYclovir (VALTREX) 500 MG tablet Take 500 mg by mouth 2 times daily as needed Reported on 5/8/2017 60 tablet 3     [DISCONTINUED] fexofenadine-pseudoePHEDrine (ALLEGRA-D)  MG per 12 hr tablet Take 1 tablet by mouth 2 times daily as needed for allergies Reported on 5/8/2017 28 tablet 1     [DISCONTINUED] fluticasone (FLONASE) 50 MCG/ACT spray Spray 1-2 sprays into both nostrils daily as needed for rhinitis or allergies Reported on 5/8/2017 16 g 5     SPIRIVA RESPIMAT 2.5 MCG/ACT inhalation aerosol INHALE 2 PUFFS INTO THE LUNGS DAILY 4 g 0     [DISCONTINUED] clotrimazole (LOTRIMIN) 1 % cream Apply topically 2 times daily (Patient not taking: Reported on 6/20/2017) 14 g 3     [DISCONTINUED] emtricitabine-tenofovir (TRUVADA) 200-300 MG per tablet Take 1 tablet by mouth daily 30 tablet 5     [DISCONTINUED] dolutegravir (TIVICAY) 50 MG tablet Take 1 tablet (50 mg) by mouth daily 30 tablet 5     cyanocobalamin ([employer] VITAMIN  B12) 1000 MCG TABS Take 1 tablet by mouth daily 30 tablet 3     [DISCONTINUED] nicotine (NICODERM CQ) 14 MG/24HR patch 2h hr Place 1 patch onto the skin every 24 hours 30 patch 1     MORPHINE SULFATE PO Take  15 mg by mouth Reported on 5/8/2017       [DISCONTINUED] nitroglycerin (NITROSTAT) 0.4 MG sublingual tablet Place 1 tablet (0.4 mg) under the tongue every 5 minutes as needed for chest pain If you are still having symptoms after 3 doses (15 minutes) call 911. (Patient not taking: Reported on 5/15/2017) 30 tablet 0     hydrocortisone (ANUSOL-HC) 2.5 % cream Place rectally 2 times daily 30 g 0     sulfamethoxazole-trimethoprim (BACTRIM DS/SEPTRA DS) 800-160 MG per tablet Take 1 tablet by mouth daily 60 tablet 1     [DISCONTINUED] ALPRAZolam (XANAX) 0.5 MG tablet One or two tabs daily PRN anxiety 30 tablet 0     beclomethasone (QVAR) 40 MCG/ACT Inhaler Inhale 2 puffs into the lungs 2 times daily 1 Inhaler 3     order for DME Equipment being ordered:  Portable Oxygen 1 Device 0     order for DME Equipment being ordered: empty Oxygen tanks, oxygen concentrator and oxygen travel concentrator for portability 1 each 0     [DISCONTINUED] docusate sodium (COLACE) 100 MG tablet Take 100 mg by mouth daily 60 tablet 1     SUMAtriptan (IMITREX) 50 MG tablet Take 1 tablet (50 mg) by mouth at onset of headache for migraine 30 tablet 1     oxyCODONE-acetaminophen (PERCOCET)  MG per tablet Take 1 tablet by mouth every 4 hours as needed for moderate to severe pain (Max of 6 a day.) 30 tablet 0     ORDER FOR DME Equipment being ordered: Oxygen at 5 liters 1 Device 0     Nutritional Supplements (BOOST) fax # to the county worker fax#[phone] Attn:MILDRED Castillo Can 12     No facility-administered encounter medications on file as of 5/15/2017.       ASSESSMENT AND PLAN:   1.  Abrupt dyspnea on exertion/respiratory failure, brought on by exertion.  The cause of this is unclear.  It has been mentioned in the past that this may represent diastolic dysfunction, but the onset is extremely abrupt, the severity is excessive and the BNP, while elevated, is not very consistent with the degree of respiratory failure on presentation.  His  echocardiogram was done on arrival to the [facility], same day, and showed no abnormalities.  Thus, although acute diastolic heart failure cannot be completely excluded, it seems highly unlikely.  Coronary artery disease is also very unlikely, although he did have some nonspecific EKG changes on arrival to the ER.  He was asked by the [facility] to have a stress test done.  I talked to him about chemical stress testing versus exercise, and he would prefer exercise.  He states that if he stops soon enough, he can avoid the severe deterioration he has experienced.  A stress echo is ordered, and I have given specific instructions to stop whenever he says he needs to stop, and if he has abrupt shortness of breath, to check for  wall motion abnormalities, pulmonary pressures, and abrupt changes in valve function.  I think the probability is low that this patient has a cardiac etiology for his recurrent abrupt pulmonary deterioration.   2.  Given this patient has HIV and is on antiviral agents, he is at increased risk of coronary artery disease.  For this reason, I have recommended that he take a statin and use a daily aspirin.  He has agreed to both of these medication changes, and they will be instituted.  Risks and benefits were reviewed.  A lipid profile and ALT will be checked in about 2 months.     3.  Night terrors and or sleep apnea.  Sleep referral sent, pt in agreement.     I appreciate the opportunity of participating in Mr. Ross's care.  If there are questions or concerns about him, please do not hesitate to call.     Sincerely,    Sea Higuera MD     [facility]

## 2017-05-15 NOTE — TELEPHONE ENCOUNTER
ICD-10-CM    1. Thrush B37.0 fluconazole (DIFLUCAN) 100 MG tablet     Please follow up if symptoms persists.    Electronically signed:  Antonella Obrien M.D.

## 2017-05-16 ENCOUNTER — TELEPHONE (OUTPATIENT)
Dept: FAMILY MEDICINE | Facility: OTHER | Age: 47
End: 2017-05-16

## 2017-05-16 NOTE — TELEPHONE ENCOUNTER
Reason for call:  Patient reporting a symptom    Symptom or request: side affects from possible medication / nausea / feels like he is having a withdrawal      Duration (how long have symptoms been present): ?    Have you been treated for this before? Yes    Additional comments: Patient is asking to speak to someone about this    Phone Number patient can be reached at:  Home number on file [phone] (home) or Cell number on file:    Telephone Information:   Mobile [phone]       Best Time:  Any time     Can we leave a detailed message on this number:  YES    Call taken on 5/16/2017 at 10:16 AM by Era Laboy

## 2017-05-16 NOTE — PROGRESS NOTES
"HISTORY OF PRESENT ILLNESS:  I am seeing Tommy Ross in Cardiology Clinic today for a followup visit.  Mr. Ross is a 46-year-old male who has followed with Dr. Harris in the past, last visit in 11/2014.  He has had significant medical problems including HIV/AIDS, and multiple hospital admissions for respiratory failure, exact cause unknown.  He was recently hospitalized at the [facility] for such a problem.  He was asked to see Cardiology for evaluation of possible coronary artery disease.  He underwent a nuclear stress test in 2014 showing normal ejection fraction and normal perfusion.  His lung problems predated that stress test.      Mr. Ross reports that any type of vigorous physical activity will bring on abrupt respiratory failure.  He has developed respiratory failure requiring intubation after activities such as raking, shoveling or tilling the garden.  He states that he feels short of breath and if he stops quickly enough he recovers, but if he pushes himself too far he goes into respiratory failure, develops a fever, and requires hospitalization.  Records were reviewed, and with his last episode he was out tilling in the garden and became short of breath.  He came in to [facility], where he spent 1-2 nights and then was discharged, but within a couple of days his respiratory status worsened and he was admitted to the [facility].  They did a full evaluation and never really found out why he went into respiratory failure.  Included in his evaluation was an echocardiogram on the day of admission which showed no abnormalities.  He also underwent a troponin which was negative and a BNP which was modestly elevated at 1777.  He was discharged after 7 days with a diagnosis of \"acute hypoxic respiratory failure in setting of recurrent acute respiratory failure episodes.\"  He was also noted to have HIV well controlled and elevated CRP.  The cause of his respiratory failure " was unclear.      Since his discharge, the patient has been doing well.  He remains mildly dyspneic but is pretty much back to normal activities.  He has not had a cough and does not have exertional chest pain.  He does describe waking up short of breath sometimes abruptly and always with a great deal of fright.  This does not really happen if he lies on his right side.  It sometimes takes him a while to catch his breath, but he does not have to sleep in a chair, nor does he have to use extra pillows.  It sounds like he is describing nighttime terrors.  His wife, who accompanies him, states that he pauses in his breathing at night but had a sleep study done about 10 years ago which apparently did not show sleep apnea.  He was having similar symptoms at that time.     The patient denies significant peripheral edema or claudication.  He has not had palpitations, syncope or near-syncope.  He does not get exertional chest, arm, neck, shoulder or jaw discomfort.      The patient reports that his usual episode of deterioration, of which there have been about 20 in the past 5-10 years, includes sudden onset of shortness of breath with a burning sensation in the chest feeling like he is on fire.  This is a deep pain followed by lethargy and loss of his eyesight as well as a fever that goes up above 100.  His O2 sats decrease.  This all occurs within about 10 minutes.      The patient is currently using an aspirin intermittently but is really on no cardiac medications on a scheduled basis.      ASSESSMENT AND PLAN:   1.  Abrupt dyspnea on exertion/respiratory failure, brought on by exertion.  The cause of this is unclear.  It has been mentioned in the past that this may represent diastolic dysfunction, but the onset is extremely abrupt, the severity is excessive and the BNP, while elevated, is not very consistent with the degree of respiratory failure on presentation.  His echocardiogram was done on arrival to the  [facility], same day, and showed no abnormalities.  Thus, although acute diastolic heart failure cannot be completely excluded, it seems highly unlikely.  Coronary artery disease is also very unlikely, although he did have some nonspecific EKG changes on arrival to the ER.  He was asked by the [facility] to have a stress test done.  I talked to him about chemical stress testing versus exercise, and he would prefer exercise.  He states that if he stops soon enough, he can avoid the severe deterioration he has experienced.  A stress echo is ordered, and I have given specific instructions to stop whenever he says he needs to stop, and if he has abrupt shortness of breath, to check for  wall motion abnormalities, pulmonary pressures, and abrupt changes in valve function.  I think the probability is low that this patient has a cardiac etiology for his recurrent abrupt pulmonary deterioration.   2.  Given this patient has HIV and is on antiviral agents, he is at increased risk of coronary artery disease.  For this reason, I have recommended that he take a statin and use a daily aspirin.  He has agreed to both of these medication changes, and they will be instituted.  Risks and benefits were reviewed.  A lipid profile and ALT will be checked in about 2 months.     3.  Night terrors and or sleep apnea.  Sleep referral sent, pt in agreement.     I appreciate the opportunity of participating in Mr. Mace's care.  If there are questions or concerns about him, please do not hesitate to call.         BRUCE NOLAND MD, [facility]             D: 05/15/2017 12:11   T: 05/15/2017 21:33   MT: [id number]      Name:     YEMI MACE   MRN:      [id number]        Account:      [id number]   :      1970           Service Date: 05/15/2017      Document: [id number]

## 2017-05-16 NOTE — TELEPHONE ENCOUNTER
Spoke with patient.  Discusses side effects of prednisone.  Will continue tape and discuss with pulmonology tomorrow.  Janusz Harmon, RN, BSN

## 2017-05-24 ENCOUNTER — HOSPITAL ENCOUNTER (OUTPATIENT)
Dept: CARDIOLOGY | Facility: CLINIC | Age: 47
Discharge: HOME | End: 2017-05-24
Attending: INTERNAL MEDICINE | Admitting: INTERNAL MEDICINE
Payer: MEDICARE

## 2017-05-24 ENCOUNTER — TELEPHONE (OUTPATIENT)
Dept: CARDIOLOGY | Facility: CLINIC | Age: 47
End: 2017-05-24

## 2017-05-24 DIAGNOSIS — I20.9 ISCHEMIC CHEST PAIN (H): ICD-10-CM

## 2017-05-24 PROCEDURE — 93350 STRESS TTE ONLY: CPT | Mod: 26 | Performed by: INTERNAL MEDICINE

## 2017-05-24 PROCEDURE — 93018 CV STRESS TEST I&R ONLY: CPT | Performed by: INTERNAL MEDICINE

## 2017-05-24 PROCEDURE — 25500064 ZZH RX 255 OP 636: Performed by: INTERNAL MEDICINE

## 2017-05-24 PROCEDURE — 93017 CV STRESS TEST TRACING ONLY: CPT | Performed by: REHABILITATION PRACTITIONER

## 2017-05-24 PROCEDURE — 93016 CV STRESS TEST SUPVJ ONLY: CPT | Performed by: INTERNAL MEDICINE

## 2017-05-24 PROCEDURE — 93321 DOPPLER ECHO F-UP/LMTD STD: CPT | Mod: 26 | Performed by: INTERNAL MEDICINE

## 2017-05-24 PROCEDURE — 93325 DOPPLER ECHO COLOR FLOW MAPG: CPT | Mod: 26 | Performed by: INTERNAL MEDICINE

## 2017-05-24 PROCEDURE — 40000264 ECHO STRESS WITH OPTISON

## 2017-05-24 RX ADMIN — HUMAN ALBUMIN MICROSPHERES AND PERFLUTREN 2 ML: 10; .22 INJECTION, SOLUTION INTRAVENOUS at 09:28

## 2017-05-24 NOTE — TELEPHONE ENCOUNTER
Dr. Kalen Robert is out of the office this week. I will check on patient next week to see if his symptoms have improved, if they did not improve, patient should restart it. Isi

## 2017-05-24 NOTE — TELEPHONE ENCOUNTER
Patient was here in cardiology for stress test today and wanted to get a message to Dr. Higuera. Patient is stating he thinks the Simvastatin is causing him to be bloated and constipated. Patient said he has stopped taking Simvastatin for this reason. Message routed to Dr. Higuera for recommendations.

## 2017-05-25 DIAGNOSIS — B20 HUMAN IMMUNODEFICIENCY VIRUS (HIV) DISEASE (H): ICD-10-CM

## 2017-05-26 ENCOUNTER — TELEPHONE (OUTPATIENT)
Dept: FAMILY MEDICINE | Facility: OTHER | Age: 47
End: 2017-05-26

## 2017-05-26 DIAGNOSIS — K59.00 CONSTIPATION, UNSPECIFIED CONSTIPATION TYPE: Primary | ICD-10-CM

## 2017-05-26 RX ORDER — ASPIRIN 81 MG
100 TABLET, DELAYED RELEASE (ENTERIC COATED) ORAL DAILY
Qty: 60 TABLET | Refills: 3 | Status: SHIPPED | OUTPATIENT
Start: 2017-05-26

## 2017-05-26 NOTE — TELEPHONE ENCOUNTER
Okay to leave message per first note taken so informed pt the 3 Rx's have been sent to [facility] in [place].  Told him to call with any further questions.  Sandee Mao, CMA

## 2017-05-26 NOTE — TELEPHONE ENCOUNTER
Reason for call:  Medication  Reason for Call:  Medication or medication refill:    Do you use a [facility]?  Name of the pharmacy and phone number for the current request:  pt needs a hard copy/ or he uses [facility]     Name of the medication requested: pt requests a medication for anti-acid other than ranitidine, and also requests metamucil and docolax.    Other request:     Can we leave a detailed message on this number? YES    Phone number patient can be reached at: Home number on file [phone] (home)    Best Time: any    Call taken on 5/26/2017 at 10:59 AM by Minal Paredes

## 2017-05-26 NOTE — TELEPHONE ENCOUNTER
ICD-10-CM    1. Constipation, unspecified constipation type K59.00 psyllium (METAMUCIL SMOOTH TEXTURE) 58.6 % POWD     docusate sodium (COLACE) 100 MG tablet     omeprazole (PRILOSEC) 20 MG CR capsule       Prescription filled.  If any questions, I can call.  If symptoms not better, please follow up.    Electronically signed:  Antonella Obrien M.D.

## 2017-05-29 LAB
BACTERIA SPEC CULT: NORMAL
FUNGUS SPEC CULT: NORMAL
MICRO REPORT STATUS: NORMAL
MICRO REPORT STATUS: NORMAL
SPECIMEN SOURCE: NORMAL
SPECIMEN SOURCE: NORMAL

## 2017-05-31 ENCOUNTER — TELEPHONE (OUTPATIENT)
Dept: CARDIOLOGY | Facility: CLINIC | Age: 47
End: 2017-05-31

## 2017-05-31 DIAGNOSIS — I50.31 ACUTE DIASTOLIC CHF (CONGESTIVE HEART FAILURE) (H): ICD-10-CM

## 2017-05-31 NOTE — TELEPHONE ENCOUNTER
Patient underwent a stress echo on 05-24-17. I received a message from Adele SORENSON. Adele spoke to patient when patient came for his stress echo. Patient told Adele, that he stopped his Simvastatin due to bloating and constipation.     ~~~~~~~~~~~~~~~~~~~~~~~~~  Stress echo reviewed.      Notes Recorded by Isi Stout RN on 5/24/2017 at 3:16 PM  Dr. Higuera, Stress echo is negative for inducible ischemia. No RWMA. No valvular disease noted. EF 55-60%. Patient was referred for a sleep study. Isi    Notes Recorded by Sea Higuera MD on 5/24/2017 at 4:56 PM  Good.  I see that he may be having GI problems with the simvastatin I started him on.  If he is convinced it is due to the med, let's ask him to try taking it just 3 days/wk to see if it still bothers his stomach.   ~~~~~~~~~~~~~~~~~~~~~~~~    Stress echo results and Dr. Higuera's recommendations were reviewed with patient over the phone. Patient had no questions about his stress echo results. Dr. Higuera referred patient for Sleep consult, scheduling to call patient to set this up. Patient stated his bloating and constipation improved after stopping his Simvastatin.  Patient stated he does not want to try Simvastatin again, he took one pill and he had bloating and constipation right away.     Messaged Dr. Higuera to review if patient should try a different statin.     Jhon SORENSON  [facility]

## 2017-05-31 NOTE — TELEPHONE ENCOUNTER
Nitroglycerin 0.4 mg      Last Written Prescription Date: 5/8/2017  Last Fill Quantity: 30,  # refills: 0   Last Office Visit with [facility], [facility] or [facility] prescribing provider: 5/8/2017

## 2017-06-01 NOTE — TELEPHONE ENCOUNTER
It is unlikely that he got bloating and constipation from a single dose of simvastatin, but we only started it for prophylaxis.  We don't need to try to replace it with anything.

## 2017-06-01 NOTE — TELEPHONE ENCOUNTER
Dr. Higuera's recommendations were reviewed with patient over the phone. Patient asked if can follow up with his PMD. I told patient that would be fine. Patient was instructed to call our clinic, if he having any concerning symptoms, or if he has questions, or if he needs to be seen.  Messaged Dr. Higuera with update.     Jhon SORENSON  [facility]

## 2017-06-02 RX ORDER — NITROGLYCERIN 0.4 MG/1
TABLET SUBLINGUAL
Qty: 30 TABLET | Refills: 1 | Status: SHIPPED | OUTPATIENT
Start: 2017-06-02

## 2017-06-02 NOTE — TELEPHONE ENCOUNTER
Prescription approved per [facility] Refill Protocol.  Sakina Hickman, RN, BSN

## 2017-06-07 NOTE — TELEPHONE ENCOUNTER
Dr. Higuera reviewed my message. Patient to f/u with PMD.     Jhon SORENSON  [facility]

## 2017-06-19 NOTE — PROGRESS NOTES
SUBJECTIVE:                                                    Tommy Ross is a 47 year old male who presents to clinic today for the following health issues:    Genitourinary - Male     Onset: x 1 week, face felt tight and swollen, and patient says his vision was blurry. After starting bactrim patients vision got really blurry. Patient states that when he stopped taking it vision came back. Patient states he needs to make an eye appointment. He says he has eye problems for about 5 years.    Description:   Dysuria (painful urination): not anymore but was  Hematuria (blood in urine): YES- possibly- patient states it's kind of orange  Frequency: YES  Are you urinating at night : no   Hesitancy (delay in urine): YES- at first but not anymore.  Retention (unable to empty): not sure- he doesn't think so.  Decrease in urinary flow: no   Incontinence: no     Progression of Symptoms:  improving    Accompanying Signs & Symptoms:  Fever: no- chills  Back/Flank pain: YES  Urethral discharge: no   Testicle lumps/masses/pain: no   Nausea and/or vomiting: YES- nausea  Abdominal pain: YES- cramping   History:   History of frequent UTI's: YES  History of kidney stones: YES  History of hernias: no   Personal or Family history of Prostate problems: no  Sexually active: YES    Precipitating factors:   no    Alleviating factors:  no         Therapies Tried and outcome: Patient states he had left over bactrim 800mg; Outcome - Helped but UTI sx are not gone.    Complain of blurry eye vision for 2 days, has happened in the past , not sure if allergy related.  Urinary symptoms started few days ago and started Bactrim and seem to do better, bactrim  mg, BID for 3 days, then and then 1 for two days, last one 2 days ago.Followed up with cardiologist for evaluation for Coronary artery disease and stress echo with in normal range.    PROBLEMS TO ADD ON...    Problem list and histories reviewed & adjusted, as indicated.  Additional  history: as documented    Current Outpatient Prescriptions   Medication Sig Dispense Refill     loratadine (CLARITIN) 10 MG tablet Take 1 tablet (10 mg) by mouth daily 30 tablet 1     fluticasone (FLONASE) 50 MCG/ACT spray Spray 1-2 sprays into both nostrils daily 1 Bottle 3     lidocaine (LIDODERM) 5 % Patch Apply up to 3 patches to painful area at once for up to 12 h within a 24 h period as needed.  Remove after 12 hours. 30 patch 8     psyllium (METAMUCIL SMOOTH TEXTURE) 58.6 % POWD Take 12 g (2 teaspoonful) by mouth 2 times daily 425 g 3     omeprazole (PRILOSEC) 20 MG CR capsule Take 1 capsule (20 mg) by mouth daily 30 capsule 3     dolutegravir (TIVICAY) 50 MG tablet Take 1 tablet (50 mg) by mouth daily 30 tablet 0     aspirin 81 MG EC tablet Take 1 tablet (81 mg) by mouth daily 30 tablet      ranitidine (ZANTAC) 150 MG tablet Take 1 tablet (150 mg) by mouth 2 times daily 60 tablet 1     ondansetron (ZOFRAN ODT) 4 MG ODT tab Take 1-2 tablets (4-8 mg) by mouth every 8 hours as needed for nausea 40 tablet 7     fluticasone (FLONASE) 50 MCG/ACT spray Spray 1-2 sprays into both nostrils daily as needed for rhinitis or allergies Reported on 5/8/2017 16 g 5     cyanocobalamin ([employer] VITAMIN  B12) 1000 MCG TABS Take 1 tablet by mouth daily 30 tablet 3     oxyCODONE-acetaminophen (PERCOCET)  MG per tablet Take 1 tablet by mouth every 4 hours as needed for moderate to severe pain (Max of 6 a day.) 30 tablet 0     emtricitabine-tenofovir (TRUVADA) 200-300 MG per tablet Take 1 tablet by mouth daily Please call [phone] to make appointment for further refills. 30 tablet 0     nitroglycerin (NITROSTAT) 0.4 MG sublingual tablet Place 1 tablet under the tongue every 5 mins as needed for chest pain If you are still having symptoms after 3 doses (15 minutes) call 283. (Patient not taking: Reported on 6/20/2017) 30 tablet 1     docusate sodium (COLACE) 100 MG tablet Take 100 mg by mouth daily (Patient not taking:  Reported on 6/20/2017) 60 tablet 3     simvastatin (ZOCOR) 20 MG tablet Take 1 tablet (20 mg) by mouth At Bedtime Hold if taking fluconozole (Patient not taking: Reported on 6/20/2017) 30 tablet 11     fluconazole (DIFLUCAN) 100 MG tablet TAKE 1/2 TABLET (50 MG) BY MOUTH DAILY (Patient not taking: Reported on 6/20/2017) 4 tablet 0     FLUoxetine (PROZAC) 20 MG capsule Take 1 capsule (20 mg) by mouth daily Along with 40 mg of fluoxetine (Patient not taking: Reported on 6/20/2017) 30 capsule 6     albuterol (PROAIR HFA/PROVENTIL HFA/VENTOLIN HFA) 108 (90 BASE) MCG/ACT Inhaler Inhale 2 puffs into the lungs every 4 hours as needed for shortness of breath / dyspnea or wheezing (Patient not taking: Reported on 6/20/2017) 1 Inhaler 11     hydrocortisone (ANUSOL-HC) 2.5 % cream Place rectally 2 times daily (Patient not taking: Reported on 5/8/2017) 30 g 0     sulfamethoxazole-trimethoprim (BACTRIM DS/SEPTRA DS) 800-160 MG per tablet Take 1 tablet by mouth daily (Patient not taking: Reported on 5/8/2017) 60 tablet 1     predniSONE (DELTASONE) 20 MG tablet Take 3 tablets (60 mg) by mouth daily Decrease by 10mg weekly. Example: Until 5/7 3 tabs daily, then 2.5 tabs daily for one week. (Patient not taking: Reported on 6/20/2017) 70 tablet 0     valACYclovir (VALTREX) 500 MG tablet Take 500 mg by mouth 2 times daily as needed Reported on 5/8/2017 60 tablet 3     SPIRIVA RESPIMAT 2.5 MCG/ACT inhalation aerosol INHALE 2 PUFFS INTO THE LUNGS DAILY (Patient not taking: Reported on 6/20/2017) 4 g 0     ALPRAZolam (XANAX) 0.5 MG tablet One or two tabs daily PRN anxiety (Patient not taking: Reported on 5/8/2017) 30 tablet 0     clotrimazole (LOTRIMIN) 1 % cream Apply topically 2 times daily (Patient not taking: Reported on 6/20/2017) 14 g 3     beclomethasone (QVAR) 40 MCG/ACT Inhaler Inhale 2 puffs into the lungs 2 times daily (Patient not taking: Reported on 6/20/2017) 1 Inhaler 3     order for DME Equipment being ordered:  Portable  Oxygen (Patient not taking: Reported on 5/8/2017) 1 Device 0     order for DME Equipment being ordered: empty Oxygen tanks, oxygen concentrator and oxygen travel concentrator for portability (Patient not taking: Reported on 5/8/2017) 1 each 0     nicotine (NICODERM CQ) 14 MG/24HR patch 2h hr Place 1 patch onto the skin every 24 hours (Patient not taking: Reported on 6/20/2017) 30 patch 1     docusate sodium (COLACE) 100 MG tablet Take 100 mg by mouth daily (Patient not taking: Reported on 5/8/2017) 60 tablet 1     MORPHINE SULFATE PO Take 15 mg by mouth Reported on 5/8/2017       SUMAtriptan (IMITREX) 50 MG tablet Take 1 tablet (50 mg) by mouth at onset of headache for migraine (Patient not taking: Reported on 5/15/2017) 30 tablet 1     ORDER FOR DME Equipment being ordered: Oxygen at 5 liters (Patient not taking: Reported on 5/8/2017) 1 Device 0     Nutritional Supplements (BOOST) fax # to the county worker fax#[phone] Attn:MILDRED (Patient not taking: Reported on 5/15/2017) 12 Can 12     Allergies   Allergen Reactions     Diphenhydramine Citrate Anaphylaxis     Estradiol Shortness Of Breath     CMV-TMPDS     Ibuprofen [Ibuprofen/Ibuprofen Lysinate] Shortness Of Breath     Nortriptyline Shortness Of Breath     Prochlorperazine Shortness Of Breath     Cytomegalovirus Immune Globulin Unknown     SOB     Demerol [Meperidine]      anxiety     Gabapentin Hives     Icy Hot [Analgesic [place]]      anxiety     Lyrica      Methocarbamol      anxiety     Naratriptan      Pregabalin Unknown     Anxiety and nightmares     Tegretol [Carbamazepine] Hives     Topamax [Topiramate]      anxiety     Tramadol        Reviewed and updated as needed this visit by clinical staff       Reviewed and updated as needed this visit by Provider         ROS:  Constitutional, HEENT, cardiovascular, pulmonary, gi and gu systems are negative, except as otherwise noted.    OBJECTIVE:     /78  Pulse 100  Temp 98.7  F (37.1  C) (Temporal)   "Resp 16  Ht 5' 9\" (1.753 m)  Wt 171 lb 14.4 oz (78 kg)  SpO2 99%  BMI 25.39 kg/m2  Body mass index is 25.39 kg/(m^2).  GENERAL: healthy, alert and no distress  NECK: no adenopathy, no asymmetry, masses, or scars and thyroid normal to palpation  RESP: lungs clear to auscultation - no rales, rhonchi or wheezes  CV: regular rate and rhythm, normal S1 S2, no S3 or S4, no murmur, click or rub, no peripheral edema and peripheral pulses strong  ABDOMEN: soft, nontender, no hepatosplenomegaly, no masses and bowel sounds normal  MS: no gross musculoskeletal defects noted, no edema  NEURO: Normal strength and tone, mentation intact and speech normal  BACK: no CVA tenderness, no paralumbar tenderness    Diagnostic Test Results:  Results for orders placed or performed in visit on 06/20/17   *UA reflex to Microscopic and Culture ([facility] (except [place] and [place])   Result Value Ref Range    Color Urine Yellow     Appearance Urine Clear     Glucose Urine Negative NEG mg/dL    Bilirubin Urine Negative NEG    Ketones Urine Negative NEG mg/dL    Specific Gravity Urine 1.015 1.003 - 1.035    Blood Urine Trace (A) NEG    pH Urine 6.0 5.0 - 7.0 pH    Protein Albumin Urine Negative NEG mg/dL    Urobilinogen Urine 0.2 0.2 - 1.0 EU/dL    Nitrite Urine Negative NEG    Leukocyte Esterase Urine Negative NEG    Source Unspecified Urine    Urine Microscopic   Result Value Ref Range    WBC Urine O - 2 0 - 2 /HPF    RBC Urine O - 2 0 - 2 /HPF       ASSESSMENT/PLAN:     Orders Placed This Encounter   Procedures     *UA reflex to Microscopic and Culture ([facility] (except [place] and [place])     Urine Microscopic     ALLERGY/ASTHMA ADULT REFERRAL     OPHTHALMOLOGY ADULT REFERRAL         ICD-10-CM    1. UTI symptoms R39.9 *UA reflex to Microscopic and Culture ([facility] (except [place] and [place])     Urine Microscopic   2. Anxiety F41.9 lidocaine (LIDODERM) 5 % Patch   3. " Seasonal allergic rhinitis, unspecified allergic rhinitis trigger J30.2 loratadine (CLARITIN) 10 MG tablet     fluticasone (FLONASE) 50 MCG/ACT spray     ALLERGY/ASTHMA ADULT REFERRAL   4. Blurred vision H53.8 OPHTHALMOLOGY ADULT REFERRAL   5. RAD (reactive airway disease), unspecified asthma severity, uncomplicated J45.909 ALLERGY/ASTHMA ADULT REFERRAL    with hx of respiratory failure when exposed to unknown stimulant   6. Onychomycosis B35.1      The patient understood the rational for the diagnosis and treatment plan.   All questions were answered to best of my ability and the patient's satisfaction.  I have reviewed all pertinent investigations including labs and imaging including outside records if relevent.  Hydrate well, tylenol as needed.  Risks, benefits and alternatives of treatments discussed. Plan agreed on.  Followup: if not better.  Will call, return to clinic, or go to ED if worsening or symptoms not improving as discussed.  See patient instructions.     Patient Instructions       Blurred Vision  Blurred vision is the loss of sharpness of vision and the inability to see small details. Any changes in your vision, whether sudden or gradual, should be checked out by an eye specialist.  Vision changes can be caused by many different things. These include eye diseases, side effects of some medicines, or a condition like diabetes. Vision changes should never be ignored. It is common to assume that vision changes are due to needing more powerful vision correction. Making this assumption, many people postpone seeing their healthcare provider about their vision changes. Delaying care is risky, however. Some eye problems, if left untreated, can lead to permanent vision loss that is not correctable with glasses. This can significantly reduce quality of life.  Home care    Make changes in your home to reduce the risk of falling.    Keep walkways clear of objects you may trip over. Use nonslip pads under  rugs.    Do not walk in poorly lit areas.    Be cautious when stepping up and down from curbs and walking on uneven sidewalks.    Brighter lighting in your home may help you see better.  Follow-up care  Follow up with an eye specialist or as advised. There are two types of eye doctor you can consult:    An optometrist is a licensed doctor of optometry. Optometrists are not medical doctors. Optometrists specialize in eye exams and may diagnose some eye problems. They also prescribe glasses and contact lenses.    An ophthalmologist is a medical doctor who specializes in eye care. Ophthalmologists diagnose and treat all eye diseases, prescribe medicines, and perform eye surgery. They may also prescribe glasses and contact lenses.  An optometrist can provide a basic screening eye exam for much less cost than an ophthalmologist. The optometrist can tell you if your condition needs the services of an ophthalmologist.  When to seek medical advice  Call your healthcare provider right away if any of these occur.    Sudden change in your vision    Eye pain, redness, or discharge from your eyelid    No improvement or worsening of blurriness    Dark spots in your field of vision    Halos around lights    Floaters (dots or strings moving across your field of vision)    Sudden flash of light inside your eye    Dimness of vision    Partial or complete loss of vision  Date Last Reviewed: 6/14/2015 2000-2017 [employer]. [address] [place] [address], [place] [address]. All rights reserved. This information is not intended as a substitute for professional medical care. Always follow your healthcare professional's instructions.        Adult Self-Care for Colds  Colds are caused by viruses. They can t be cured with antibiotics. However, you can ease symptoms and support your body s efforts to heal itself.  No matter which symptoms you have, be sure to:    Drink plenty of fluids (water or clear soup)    Stop smoking and  drinking alcohol    Get plenty of rest    Understand a fever    Take your temperature several times a day. If your fever is 100.4 F (38.0 C) for more than a day, call your healthcare provider.    Relax, lie down. Go to bed if you want. Just get off your feet and rest. Also, drink plenty of fluids to avoid dehydration.    Take acetaminophen or a nonsteroidal anti-inflammatory agent (NSAID), such as ibuprofen.  Treat a troubled nose kindly    Breathe steam or heated humidified air to open blocked nasal passages.  a hot shower or use a vaporizer. Be careful not to get burned by the steam.    Saline nasal sprays and decongestant tablets help open a stuffy nose. Antihistamines can also help, but they can cause side effects such as drowsiness and drying of the eyes, nose, and mouth.  Soothe a sore throat and cough    Gargle every 2 hours with 1/4 teaspoon of salt dissolved in 1/2 cup of warm water. Suck on throat lozenges and cough drops to moisten your throat.    Cough medicines are available but it is unclear how well they actually work.    Take acetaminophen or an NSAID, such as ibuprofen, to ease throat pain  Ease digestive problems    Put fluids back into your body. Take frequent sips of clear liquids such as water or broth. Avoid drinks that have a lot of sugar in them, such as juices and sodas. These can make diarrhea worse. Older children and adults can drink sports drinks.    As your appetite returns, you can resume your normal diet. Ask your healthcare provider if there are any foods you should avoid.  When to seek medical care  When you first notice symptoms, ask your healthcare provider if antiviral medicines are appropriate. Antibiotics should not be taken for colds or flu. Also, call your healthcare provider if you have any of the following symptoms or if you aren t feeling better after 7 days:    Shortness of breath    Pain or pressure in the chest or belly (abdomen)    Worsening symptoms,  especially after a period of improvement    Fever of 100.4 F  (38.0 C) or higher, or fever that doesn t go down with medicine    Sudden dizziness or confusion    Severe or continued vomiting    Signs of dehydration, including extreme thirst, dark urine, infrequent urination, dry mouth    Spotted, red, or very sore throat   Date Last Reviewed: 12/1/2016 2000-2017 [employer]. [address] [place] [address], [place] [address]. All rights reserved. This information is not intended as a substitute for professional medical care. Always follow your healthcare professional's instructions.        Nail Fungal Infection  A nail fungal infection changes the way fingernails and toenails look. They may thicken, discolor, change shape, or split. This condition is hard to treat because nails grow slowly and have limited blood supply. The infection often comes back after treatment.  There are 2 types of medicines used to treat this condition:    Topical anti-fungal medicines. These are applied to the surface of the skin and nail area. These medicines are not very effective because they can t get deep into the nail.    Oral antifungal medicines. These medicines work better because they go into the nail from the inside out. But the infection may still come back. It may take 9 to 12 months for your nail to look normal again. This means you are cured. You can repeat treatment if needed. Most people take these medicines without any problems. It is rare to stop therapy because of side effects. But your healthcare provider may give you some monitoring tests. Talk about possible side effects with your provider before starting treatment.  If medicines fail, the nail can be removed surgically or chemically. These methods physically remove the fungus from the body. This helps medical treatment be more effective.  Home care    Use medicines exactly as directed for as long as directed. Treating a fungal infection can take longer than  other kinds of infections.    Smoking is a risk factor for fungal infection. This is one more reason to quit.    Wear absorbent socks, and shoes that let your feet breathe. Sweaty feet increase your risk of fungal infection. They also make an existing infection harder to treat.    Use footwear when in damp public places like swimming pools, gyms, and shower rooms. This will help you avoid the fungus that grows there.    Don't share nail clippers or scissors with others.  Follow-up care  Follow up with your healthcare provider, or as advised.  When to seek medical advice  Call your healthcare provider right away if any of these occur:    Skin by the nail becomes red, swollen, painful, or drains pus (a creamy yellow or white liquid)    Side effects from oral anti-fungal medicines  Date Last Reviewed: 8/1/2016 2000-2017 The "[employer]. [address] [place] [address]. All rights reserved. This information is not intended as a substitute for professional medical care. Always follow your healthcare professional's instructions.        Generalized Allergic Reaction (Other)  You are having an allergic reaction. Almost anything can cause one. Different people are allergic to different things. It is usually something that you ate or swallowed, came into contact with by getting or putting it on your skin or clothes, or something you breathed in the air. This can be very annoying and sometimes scary.  Most of us think of allergic reactions when we have a rash or itchy skin. Symptoms can include:    Rash, hives, redness, welts, blisters    Itching, burning, stinging, pain    Dry, flaky, cracking, scaly skin    Swelling of the face, lips or other parts of the body    Hoarse voice    Trouble swallowing, feeling like your throat is closing    Trouble breathing, wheezing    Nausea, vomiting, diarrhea, stomach cramps    Feeling faint or lightheaded, rapid heart rate  Sometimes the cause may be obvious.  However, there are so many things that can cause a reaction that you may not be able to figure out. The most important things to help find your allergen are:    Remembering when it started    What you were doing at the time or just before that    Any activities you were involved in    Any new products or contacts  Here are some common causes, but remember almost anything can cause a reaction, and you may not even be aware that you came into contact with one of these things.    Dust, mold, pollen    Plants, such aspoison ivy and poison oak are common ones, but there are many others    Animals    Foods such as shrimp, shellfish, peanuts, milk products, gluten, eggs; also colorings, flavorings, additives    Insect bites or stings such as bees, mosquitos, flees, ticks    Medicines such as penicillin, sulfa drugs, amoxicillin, aspirin, ibuprofen; any medicine can cause a reaction    Jewelry such as nickel, gold (new, or something you ve worn for a while including zippers, and buttons)    Latex such as in gloves, clothes, toys, balloons, or some tapes (some people allergic to latex may also have problems with foods like bananas, avocados, kiwi, papaya, or chestnuts)    Lotions, perfumes, cosmetics, soaps, shampoos, skincare products, nail products    Chemicals or dyes in clothing, linen, , hair dyes, soaps, iodine  Many viruses and common colds can cause a rash, but is not an allergic reaction. Sometimes it is hard to tell the difference between allergies, sensitivity and intolerance to something. This is especially true with food. Many things can cause diarrhea, vomiting, stomach cramps, and skin irritation.  Home care    The goal of our treatment is to help relieve the symptoms, and get you feeling better. The rash will usually fade over several days, but can sometimes last a couple of weeks. Over the next couple of days, there may be times when it is gets a little worse, and then better again. Here are some  things to do:    If you know what you are allergic to, avoid it because future reactions could be worse than this one.    Avoid tight clothing and anything that heats up your skin (hot showers/baths, direct sunlight) since heat will make itching worse.    An ice pack will relieve local areas of intense itching and redness. Don t put the ice directly on the skin, because it can damage the skin. You can also ice put it in a plastic bag. Wrap it in something like a thin towel, edvin shirt, or cloth, or use a bag of frozen peas.    Oral Benadryl (diphenhydramine) is an antihistamine available at drug and grocery stores. Unless a prescription antihistamine was given, Benadryl may be used to reduce itching if large areas of the skin are involved. It may make you sleepy, so be careful using it in the daytime or when going to school, working, or driving. [NOTE: Do not use Benadryl if you have glaucoma or if you are a man with trouble urinating due to an enlarged prostate.] There are antihistamines that causes less drowsiness and are good alternatives for daytime use. Ask your pharmacist for suggestions.    Do not use Benadryl cream on your skin, because in some people it can cause a further reaction, and make you allergic to Benadryl.    Try not to scratch. This can tear the skin and cause an infection.    Using heat-steam to clean your home, using high-efficiency particulate (HEPA) vacuums and filters, avoiding food and pet triggers, exterminating cockroaches, and frequent house cleaning are a few of the strategies used to decrease allergic reactions.  Follow-up care  Follow up with your healthcare provider, or as advised. If you had a severe reaction today, or if you have had several mild-moderate allergic reactions in the past, ask your doctor about allergy testing to find out what you are allergic to. If your reaction included dizziness, fainting or trouble breathing or swallowing, ask your doctor about carrying  injectable epinephrine for home use.  Call 911  Call 911 if any of these occur:    Trouble breathing or swallowing, wheezing    Hoarse voice or trouble speaking    Confused    Very drowsy or trouble awakening    Fainting or loss of consciousness    Rapid heart rate    Low blood pressure    Feeling of doom    Nausea, vomiting, abdominal pain, diarrhea    Vomiting blood, or large amounts of blood in stool    Seizure  When to seek medical advice  Call your healthcare provider right away if any of these occur:    Spreading areas of itching, redness or swelling    New or worse swelling in the face, eyelids, lips, mouth, throat or tongue    Dizziness, weakness    Signs of infection:    Spreading redness    Increased pain or swelling    Fever (1 degree above your normal temperature) lasting for 24 to 48 hours  Date Last Reviewed: 7/30/2015 2000-2017 [employer]. [address] [place] [address], [place] [address]. All rights reserved. This information is not intended as a substitute for professional medical care. Always follow your healthcare professional's instructions.        Anaphylaxis  Anaphylaxis is a severe allergic reaction that can be life threatening. This reaction can happen in a few minutes, or a few hours after exposure to what you are allergic to. Some people are more prone to this than others.  The symptoms of an anaphylactic reaction may at first seem similar to other allergic reactions. If this has happened to you in the past, do not let the initial mild symptoms, such as a rash, hives and itching, mislead you. Your reaction can worsen very quickly and become much more severe and life threatening within minutes.  More severe symptoms include:    Trouble swallowing, feeling like your throat is closing    Trouble breathing, wheezing    Hoarse voice or trouble speaking    Nausea, vomiting, diarrhea, stomach cramps    Feeling faint or lightheaded, rapid heart rate, low blood pressure    Becoming very  drowsy, poorly responsive or trouble awakening  Sometimes the cause may be obvious, like knowing you are allergic to peanuts. To help identify your allergen, remember:    When it started    What you were doing at the time or just before that    Any activities you were involved in    Any new products or contacts   Here are some common causes, but remember almost anything can cause a reaction, and you may not even be aware that you came into contact with one of these things.    Dust, mold, pollen    Plants, such as poison ivy and poison oak    Animals    Foods such as shrimp, shellfish, peanuts, milk products, gluten, eggs; also colorings, flavorings, additives    Insect bites or stings such as bees, mosquitos, flees, ticks    Medicines such as penicillin, sulfa drugs, amoxicillin, aspirin, ibuprofen; any medicine can cause a reaction    Jewelry such as nickel, or gold (new, or something you ve worn for a while including zippers, and buttons)    Latex such as in gloves, clothes, toys, balloons, or some tapes (some people allergic to latex may also  have problems with foods like bananas, avocados, kiwi, papaya, or chestnuts)    Lotions, perfumes, cosmetics, soaps, shampoos, skincare products, nail products    Chemicals or dyes in clothing, linen, , hair dyes, soaps, iodine  If you are exposed to the same substance again, you may have the same or more severe reaction. Treatment for anaphylaxis is epinephrine (adrenalin). This is available by prescription as a self-injectable pen. If the cause of your reaction is known, you should avoid exposure in the future. If the cause is not known, follow up with your doctor for special testing to determine what you are allergic to.     Home care  If was safe for you to go home, watch for any worsening of symptoms. You may need to be treated again.  Medications  Injectable epinephrine  One of the key tools in treating anaphylaxis is early use of epinephrine. If you had a  severe allergic or anaphylactic reaction, the doctor may prescribe a self- injectable epinephrine kit. If this was prescribed, carry it at all times. It can be life saving. Epinephrine can help stop the progression of an allergic reaction. Its effects are brief, so after you use the medicine,  it is still very  important to call 911 and get to an emergency room.  When to use injectable epinephrine. Use the epinephrine if you have a history of severe reactions or any of the following symptoms:    Swelling in your mouth or throat     Trouble speaking or swallowing    Trouble breathing    Feeling faint, low blood pressure, or becoming drowsy or poorly responsive    Worsening rash  How to use injectable epinephrine:    Hold the syringe firmly in your hand with the orange (or black) needle end away from your thumb    Be careful not to stick your fingers or hand with the needle!    At the opposite end, pull off the activation cap- the blue or grey tab    Holding the syringe tightly, jab it into the outer part of your upper thigh. This is one of the softest, fleshiest parts of the upper leg, and is not near a major blood vessel or nerve. Be careful not to inject it into your hip or any place that there is a pulse.    You can inject it through pants, but make sure not to inject it into the seam of the pants.    Do not pull it out right away. Try to hold the needle in place for 10 seconds.    Massage the spot for a few seconds.    If you are injecting it in someone else or a child, try to hold them or their leg still. If they jerk or yank their legs away as you are doing it, it can cause a cut on their leg.  You may feel shaky, jittery, nervous, and anxious after the injection. Although it is difficult, try to relax. This is a side effect of the epinephrine, and should stop after a few minutes   Important    Get to the emergency room after using the epinephrine. Its affect will wear off, and you may have a second reaction.  This could even happen hours later.    Never intentionally eat, use, or expose yourself to the substance that caused the anaphylactic reaction.  Nothing is foolproof, including the injectable epinephrine.  Other medications  The doctor may prescribe medicine to relieve swelling, itching, and pain. Follow the doctor s instructions when this medicine.     Oral Benadryl (diphenhydramine) is an antihistamine available at drug and grocery stores. Unless a prescription antihistamine was given, Benadryl may be used to reduce itching if large areas of the skin are involved. It may make you sleepy, so be careful using it in the daytime or when going to school, working, or driving.     Do not use Benadryl cream on your skin, because in some people it can cause a further reaction, and make you allergic to Benadryl.    You may use over-the-counter acetaminophen or ibuprofen to control pain, unless another pain medicine was prescribed.    If you were prescribed any medicines to prevent symptoms from returning, be sure to take them exactly as directed.  General care    Rest at home for the next 24 hours.    Avoid tobacco and alcohol consumption. These may worsen your symptoms.    If you know what caused your reaction today, avoid that in the future since the next reaction may be worse. Let your family members, friends and personal physician know about your allergic reaction.    If your allergy was to food, learn how to read food labels so you can check for that ingredient. If a product does not have a label, it is best to avoid it.    Consider carrying an identification card or getting a medical alert bracelet to inform medical personnel of your condition in case you cannot tell them.    Tell all of  your healthcare providers know you had an anphylactic reaction.  Make certain the information is added to your electronic or paper medical records.  Follow-up care  Follow up with your doctor or as advised if you are not improving  over the next 1 to 2 days.  Call 911  Call 911 if any of these occur:    Trouble breathing or swallowing, wheezing    Hoarse voice or trouble speaking    Chest pain    Confused    Very drowsy or trouble awakening    Fainting or loss of consciousness    Rapid heart rate    Vomiting blood, or large amounts of blood in stool    Seizure  When to seek medical advice  Call your healthcare provider right away if any of these occur:    Worsening of your symptoms    Swelling in the mouth or face    Dizziness, weakness  Date Last Reviewed: 7/30/2015 2000-2017 [employer]. [address] [place] [address], [place] [address]. All rights reserved. This information is not intended as a substitute for professional medical care. Always follow your healthcare professional's instructions.        Controlling Allergens: In the Home  Even a clean home can be full of allergens, so take a moment to see what you can do to cut down on allergens in each room of your home. Try to avoid things like cigarette smoke and perfume. They can irritate your eyes, nose, throat, and lungs and make your allergies worse.    Buy an air purifier with a HEPA filter. Look in consumer magazines for recommendations. Avoid vaporizers and humidifiers, since they encourage mold and dust-mite growth.    Use shades or vertical blinds instead of horizontal blinds, which collect dust. Replace drapes with curtains that can be washed regularly.    Enclose mattresses, box springs, and pillows in allergy-proof casings. Use washable blankets and quilts. Avoid feather pillows, down comforters, and wool blankets.    Avoid dust-catching clutter. Have enclosed places to keep books, toys, and clothes. Keep closet doors closed.    Use washable throw rugs wherever possible, or have bare floors.    Put filters over forced-air heating vents. Change the filters regularly.    Keep your car clean. Vacuum the seats and carpets regularly. If you have air conditioning, use it  instead of opening the windows.    Keep rain gutters clean. Remove leaves and debris that can grow mold.    Check stored food for spoilage and mold growth. Clean up spills right away.    Don't let wet clothing sit and grow mold. And don't hang clothes outside to dry where they can collect airborne pollen. Dry clothing immediately in a clothes dryer that's vented to the outside.    Install a fan to keep the bathroom well ventilated.        Avoid yard work and pulling weeds. These and other outdoor activities increase your exposure to pollen. If that s not possible, wear a filter mask. When you re done, bathe, wash your hair, and change your clothes.      Date Last Reviewed: 9/1/2016 2000-2017 [employer]. [address] [place] [address], [place] [address]. All rights reserved. This information is not intended as a substitute for professional medical care. Always follow your healthcare professional's instructions.            Antonella Obrien MD  [facility]      Time: I spent 25 minutes of face- face time with patient greater than one half devoted to coordination of care for diagnosis and plan above.

## 2017-06-20 ENCOUNTER — OFFICE VISIT (OUTPATIENT)
Dept: FAMILY MEDICINE | Facility: OTHER | Age: 47
End: 2017-06-20
Payer: MEDICARE

## 2017-06-20 VITALS
TEMPERATURE: 98.7 F | RESPIRATION RATE: 16 BRPM | HEART RATE: 100 BPM | DIASTOLIC BLOOD PRESSURE: 78 MMHG | OXYGEN SATURATION: 99 % | WEIGHT: 171.9 LBS | BODY MASS INDEX: 25.46 KG/M2 | SYSTOLIC BLOOD PRESSURE: 132 MMHG | HEIGHT: 69 IN

## 2017-06-20 DIAGNOSIS — F41.9 ANXIETY: ICD-10-CM

## 2017-06-20 DIAGNOSIS — B35.1 ONYCHOMYCOSIS: ICD-10-CM

## 2017-06-20 DIAGNOSIS — H53.8 BLURRED VISION: ICD-10-CM

## 2017-06-20 DIAGNOSIS — J45.909 RAD (REACTIVE AIRWAY DISEASE), UNSPECIFIED ASTHMA SEVERITY, UNCOMPLICATED: ICD-10-CM

## 2017-06-20 DIAGNOSIS — R39.9 UTI SYMPTOMS: Primary | ICD-10-CM

## 2017-06-20 DIAGNOSIS — J30.2 SEASONAL ALLERGIC RHINITIS, UNSPECIFIED ALLERGIC RHINITIS TRIGGER: ICD-10-CM

## 2017-06-20 LAB

## 2017-06-20 PROCEDURE — 81001 URINALYSIS AUTO W/SCOPE: CPT | Performed by: FAMILY MEDICINE

## 2017-06-20 PROCEDURE — 99214 OFFICE O/P EST MOD 30 MIN: CPT | Performed by: FAMILY MEDICINE

## 2017-06-20 RX ORDER — LIDOCAINE 50 MG/G
PATCH TOPICAL
Qty: 30 PATCH | Refills: 8 | Status: SHIPPED | OUTPATIENT
Start: 2017-06-20 | End: 2017-06-29

## 2017-06-20 RX ORDER — FLUTICASONE PROPIONATE 50 MCG
1-2 SPRAY, SUSPENSION (ML) NASAL DAILY
Qty: 1 BOTTLE | Refills: 3 | Status: SHIPPED | OUTPATIENT
Start: 2017-06-20 | End: 2017-10-10

## 2017-06-20 RX ORDER — LORATADINE 10 MG/1
10 TABLET ORAL DAILY
Qty: 30 TABLET | Refills: 1 | Status: SHIPPED | OUTPATIENT
Start: 2017-06-20 | End: 2017-08-03

## 2017-06-20 ASSESSMENT — PAIN SCALES - GENERAL: PAINLEVEL: MODERATE PAIN (4)

## 2017-06-20 NOTE — NURSING NOTE
"Chief Complaint   Patient presents with     UTI     Panel Management     honoring choices, ldl, heart failure action plan, lipid, CSA, urine drug screen       Initial /80  Pulse 100  Temp 98.7  F (37.1  C) (Temporal)  Resp 16  Ht 5' 9\" (1.753 m)  Wt 171 lb 14.4 oz (78 kg)  SpO2 99%  BMI 25.39 kg/m2 Estimated body mass index is 25.39 kg/(m^2) as calculated from the following:    Height as of this encounter: 5' 9\" (1.753 m).    Weight as of this encounter: 171 lb 14.4 oz (78 kg).  Medication Reconciliation: complete    "

## 2017-06-20 NOTE — PATIENT INSTRUCTIONS
Blurred Vision  Blurred vision is the loss of sharpness of vision and the inability to see small details. Any changes in your vision, whether sudden or gradual, should be checked out by an eye specialist.  Vision changes can be caused by many different things. These include eye diseases, side effects of some medicines, or a condition like diabetes. Vision changes should never be ignored. It is common to assume that vision changes are due to needing more powerful vision correction. Making this assumption, many people postpone seeing their healthcare provider about their vision changes. Delaying care is risky, however. Some eye problems, if left untreated, can lead to permanent vision loss that is not correctable with glasses. This can significantly reduce quality of life.  Home care    Make changes in your home to reduce the risk of falling.    Keep walkways clear of objects you may trip over. Use nonslip pads under rugs.    Do not walk in poorly lit areas.    Be cautious when stepping up and down from curbs and walking on uneven sidewalks.    Brighter lighting in your home may help you see better.  Follow-up care  Follow up with an eye specialist or as advised. There are two types of eye doctor you can consult:    An optometrist is a licensed doctor of optometry. Optometrists are not medical doctors. Optometrists specialize in eye exams and may diagnose some eye problems. They also prescribe glasses and contact lenses.    An ophthalmologist is a medical doctor who specializes in eye care. Ophthalmologists diagnose and treat all eye diseases, prescribe medicines, and perform eye surgery. They may also prescribe glasses and contact lenses.  An optometrist can provide a basic screening eye exam for much less cost than an ophthalmologist. The optometrist can tell you if your condition needs the services of an ophthalmologist.  When to seek medical advice  Call your healthcare provider right away if any of these  occur.    Sudden change in your vision    Eye pain, redness, or discharge from your eyelid    No improvement or worsening of blurriness    Dark spots in your field of vision    Halos around lights    Floaters (dots or strings moving across your field of vision)    Sudden flash of light inside your eye    Dimness of vision    Partial or complete loss of vision  Date Last Reviewed: 6/14/2015 2000-2017 [employer]. [address] [place] [address], [place] [address]. All rights reserved. This information is not intended as a substitute for professional medical care. Always follow your healthcare professional's instructions.        Adult Self-Care for Colds  Colds are caused by viruses. They can t be cured with antibiotics. However, you can ease symptoms and support your body s efforts to heal itself.  No matter which symptoms you have, be sure to:    Drink plenty of fluids (water or clear soup)    Stop smoking and drinking alcohol    Get plenty of rest    Understand a fever    Take your temperature several times a day. If your fever is 100.4 F (38.0 C) for more than a day, call your healthcare provider.    Relax, lie down. Go to bed if you want. Just get off your feet and rest. Also, drink plenty of fluids to avoid dehydration.    Take acetaminophen or a nonsteroidal anti-inflammatory agent (NSAID), such as ibuprofen.  Treat a troubled nose kindly    Breathe steam or heated humidified air to open blocked nasal passages.  a hot shower or use a vaporizer. Be careful not to get burned by the steam.    Saline nasal sprays and decongestant tablets help open a stuffy nose. Antihistamines can also help, but they can cause side effects such as drowsiness and drying of the eyes, nose, and mouth.  Soothe a sore throat and cough    Gargle every 2 hours with 1/4 teaspoon of salt dissolved in 1/2 cup of warm water. Suck on throat lozenges and cough drops to moisten your throat.    Cough medicines are available but it  is unclear how well they actually work.    Take acetaminophen or an NSAID, such as ibuprofen, to ease throat pain  Ease digestive problems    Put fluids back into your body. Take frequent sips of clear liquids such as water or broth. Avoid drinks that have a lot of sugar in them, such as juices and sodas. These can make diarrhea worse. Older children and adults can drink sports drinks.    As your appetite returns, you can resume your normal diet. Ask your healthcare provider if there are any foods you should avoid.  When to seek medical care  When you first notice symptoms, ask your healthcare provider if antiviral medicines are appropriate. Antibiotics should not be taken for colds or flu. Also, call your healthcare provider if you have any of the following symptoms or if you aren t feeling better after 7 days:    Shortness of breath    Pain or pressure in the chest or belly (abdomen)    Worsening symptoms, especially after a period of improvement    Fever of 100.4 F  (38.0 C) or higher, or fever that doesn t go down with medicine    Sudden dizziness or confusion    Severe or continued vomiting    Signs of dehydration, including extreme thirst, dark urine, infrequent urination, dry mouth    Spotted, red, or very sore throat   Date Last Reviewed: 12/1/2016 2000-2017 [employer]. [address] [place] [address]. All rights reserved. This information is not intended as a substitute for professional medical care. Always follow your healthcare professional's instructions.        Nail Fungal Infection  A nail fungal infection changes the way fingernails and toenails look. They may thicken, discolor, change shape, or split. This condition is hard to treat because nails grow slowly and have limited blood supply. The infection often comes back after treatment.  There are 2 types of medicines used to treat this condition:    Topical anti-fungal medicines. These are applied to the surface of the  skin and nail area. These medicines are not very effective because they can t get deep into the nail.    Oral antifungal medicines. These medicines work better because they go into the nail from the inside out. But the infection may still come back. It may take 9 to 12 months for your nail to look normal again. This means you are cured. You can repeat treatment if needed. Most people take these medicines without any problems. It is rare to stop therapy because of side effects. But your healthcare provider may give you some monitoring tests. Talk about possible side effects with your provider before starting treatment.  If medicines fail, the nail can be removed surgically or chemically. These methods physically remove the fungus from the body. This helps medical treatment be more effective.  Home care    Use medicines exactly as directed for as long as directed. Treating a fungal infection can take longer than other kinds of infections.    Smoking is a risk factor for fungal infection. This is one more reason to quit.    Wear absorbent socks, and shoes that let your feet breathe. Sweaty feet increase your risk of fungal infection. They also make an existing infection harder to treat.    Use footwear when in damp public places like swimming pools, gyms, and shower rooms. This will help you avoid the fungus that grows there.    Don't share nail clippers or scissors with others.  Follow-up care  Follow up with your healthcare provider, or as advised.  When to seek medical advice  Call your healthcare provider right away if any of these occur:    Skin by the nail becomes red, swollen, painful, or drains pus (a creamy yellow or white liquid)    Side effects from oral anti-fungal medicines  Date Last Reviewed: 8/1/2016 2000-2017 [employer]. [address] [place] [address], [place] [address]. All rights reserved. This information is not intended as a substitute for professional medical care. Always follow your  healthcare professional's instructions.        Generalized Allergic Reaction (Other)  You are having an allergic reaction. Almost anything can cause one. Different people are allergic to different things. It is usually something that you ate or swallowed, came into contact with by getting or putting it on your skin or clothes, or something you breathed in the air. This can be very annoying and sometimes scary.  Most of us think of allergic reactions when we have a rash or itchy skin. Symptoms can include:    Rash, hives, redness, welts, blisters    Itching, burning, stinging, pain    Dry, flaky, cracking, scaly skin    Swelling of the face, lips or other parts of the body    Hoarse voice    Trouble swallowing, feeling like your throat is closing    Trouble breathing, wheezing    Nausea, vomiting, diarrhea, stomach cramps    Feeling faint or lightheaded, rapid heart rate  Sometimes the cause may be obvious. However, there are so many things that can cause a reaction that you may not be able to figure out. The most important things to help find your allergen are:    Remembering when it started    What you were doing at the time or just before that    Any activities you were involved in    Any new products or contacts  Here are some common causes, but remember almost anything can cause a reaction, and you may not even be aware that you came into contact with one of these things.    Dust, mold, pollen    Plants, such aspoison ivy and poison oak are common ones, but there are many others    Animals    Foods such as shrimp, shellfish, peanuts, milk products, gluten, eggs; also colorings, flavorings, additives    Insect bites or stings such as bees, mosquitos, flees, ticks    Medicines such as penicillin, sulfa drugs, amoxicillin, aspirin, ibuprofen; any medicine can cause a reaction    Jewelry such as nickel, gold (new, or something you ve worn for a while including zippers, and buttons)    Latex such as in gloves,  clothes, toys, balloons, or some tapes (some people allergic to latex may also have problems with foods like bananas, avocados, kiwi, papaya, or chestnuts)    Lotions, perfumes, cosmetics, soaps, shampoos, skincare products, nail products    Chemicals or dyes in clothing, linen, , hair dyes, soaps, iodine  Many viruses and common colds can cause a rash, but is not an allergic reaction. Sometimes it is hard to tell the difference between allergies, sensitivity and intolerance to something. This is especially true with food. Many things can cause diarrhea, vomiting, stomach cramps, and skin irritation.  Home care    The goal of our treatment is to help relieve the symptoms, and get you feeling better. The rash will usually fade over several days, but can sometimes last a couple of weeks. Over the next couple of days, there may be times when it is gets a little worse, and then better again. Here are some things to do:    If you know what you are allergic to, avoid it because future reactions could be worse than this one.    Avoid tight clothing and anything that heats up your skin (hot showers/baths, direct sunlight) since heat will make itching worse.    An ice pack will relieve local areas of intense itching and redness. Don t put the ice directly on the skin, because it can damage the skin. You can also ice put it in a plastic bag. Wrap it in something like a thin towel, edvin shirt, or cloth, or use a bag of frozen peas.    Oral Benadryl (diphenhydramine) is an antihistamine available at drug and grocery stores. Unless a prescription antihistamine was given, Benadryl may be used to reduce itching if large areas of the skin are involved. It may make you sleepy, so be careful using it in the daytime or when going to school, working, or driving. [NOTE: Do not use Benadryl if you have glaucoma or if you are a man with trouble urinating due to an enlarged prostate.] There are antihistamines that causes less  drowsiness and are good alternatives for daytime use. Ask your pharmacist for suggestions.    Do not use Benadryl cream on your skin, because in some people it can cause a further reaction, and make you allergic to Benadryl.    Try not to scratch. This can tear the skin and cause an infection.    Using heat-steam to clean your home, using high-efficiency particulate (HEPA) vacuums and filters, avoiding food and pet triggers, exterminating cockroaches, and frequent house cleaning are a few of the strategies used to decrease allergic reactions.  Follow-up care  Follow up with your healthcare provider, or as advised. If you had a severe reaction today, or if you have had several mild-moderate allergic reactions in the past, ask your doctor about allergy testing to find out what you are allergic to. If your reaction included dizziness, fainting or trouble breathing or swallowing, ask your doctor about carrying injectable epinephrine for home use.  Call 911  Call 911 if any of these occur:    Trouble breathing or swallowing, wheezing    Hoarse voice or trouble speaking    Confused    Very drowsy or trouble awakening    Fainting or loss of consciousness    Rapid heart rate    Low blood pressure    Feeling of doom    Nausea, vomiting, abdominal pain, diarrhea    Vomiting blood, or large amounts of blood in stool    Seizure  When to seek medical advice  Call your healthcare provider right away if any of these occur:    Spreading areas of itching, redness or swelling    New or worse swelling in the face, eyelids, lips, mouth, throat or tongue    Dizziness, weakness    Signs of infection:    Spreading redness    Increased pain or swelling    Fever (1 degree above your normal temperature) lasting for 24 to 48 hours  Date Last Reviewed: 7/30/2015 2000-2017 [employer]. [address] [place] [address], [place] [address]. All rights reserved. This information is not intended as a substitute for professional medical care.  Always follow your healthcare professional's instructions.        Anaphylaxis  Anaphylaxis is a severe allergic reaction that can be life threatening. This reaction can happen in a few minutes, or a few hours after exposure to what you are allergic to. Some people are more prone to this than others.  The symptoms of an anaphylactic reaction may at first seem similar to other allergic reactions. If this has happened to you in the past, do not let the initial mild symptoms, such as a rash, hives and itching, mislead you. Your reaction can worsen very quickly and become much more severe and life threatening within minutes.  More severe symptoms include:    Trouble swallowing, feeling like your throat is closing    Trouble breathing, wheezing    Hoarse voice or trouble speaking    Nausea, vomiting, diarrhea, stomach cramps    Feeling faint or lightheaded, rapid heart rate, low blood pressure    Becoming very drowsy, poorly responsive or trouble awakening  Sometimes the cause may be obvious, like knowing you are allergic to peanuts. To help identify your allergen, remember:    When it started    What you were doing at the time or just before that    Any activities you were involved in    Any new products or contacts   Here are some common causes, but remember almost anything can cause a reaction, and you may not even be aware that you came into contact with one of these things.    Dust, mold, pollen    Plants, such as poison ivy and poison oak    Animals    Foods such as shrimp, shellfish, peanuts, milk products, gluten, eggs; also colorings, flavorings, additives    Insect bites or stings such as bees, mosquitos, flees, ticks    Medicines such as penicillin, sulfa drugs, amoxicillin, aspirin, ibuprofen; any medicine can cause a reaction    Jewelry such as nickel, or gold (new, or something you ve worn for a while including zippers, and buttons)    Latex such as in gloves, clothes, toys, balloons, or some tapes (some  people allergic to latex may also  have problems with foods like bananas, avocados, kiwi, papaya, or chestnuts)    Lotions, perfumes, cosmetics, soaps, shampoos, skincare products, nail products    Chemicals or dyes in clothing, linen, , hair dyes, soaps, iodine  If you are exposed to the same substance again, you may have the same or more severe reaction. Treatment for anaphylaxis is epinephrine (adrenalin). This is available by prescription as a self-injectable pen. If the cause of your reaction is known, you should avoid exposure in the future. If the cause is not known, follow up with your doctor for special testing to determine what you are allergic to.     Home care  If was safe for you to go home, watch for any worsening of symptoms. You may need to be treated again.  Medications  Injectable epinephrine  One of the key tools in treating anaphylaxis is early use of epinephrine. If you had a severe allergic or anaphylactic reaction, the doctor may prescribe a self- injectable epinephrine kit. If this was prescribed, carry it at all times. It can be life saving. Epinephrine can help stop the progression of an allergic reaction. Its effects are brief, so after you use the medicine,  it is still very  important to call 911 and get to an emergency room.  When to use injectable epinephrine. Use the epinephrine if you have a history of severe reactions or any of the following symptoms:    Swelling in your mouth or throat     Trouble speaking or swallowing    Trouble breathing    Feeling faint, low blood pressure, or becoming drowsy or poorly responsive    Worsening rash  How to use injectable epinephrine:    Hold the syringe firmly in your hand with the orange (or black) needle end away from your thumb    Be careful not to stick your fingers or hand with the needle!    At the opposite end, pull off the activation cap- the blue or grey tab    Holding the syringe tightly, jab it into the outer part of your  upper thigh. This is one of the softest, fleshiest parts of the upper leg, and is not near a major blood vessel or nerve. Be careful not to inject it into your hip or any place that there is a pulse.    You can inject it through pants, but make sure not to inject it into the seam of the pants.    Do not pull it out right away. Try to hold the needle in place for 10 seconds.    Massage the spot for a few seconds.    If you are injecting it in someone else or a child, try to hold them or their leg still. If they jerk or yank their legs away as you are doing it, it can cause a cut on their leg.  You may feel shaky, jittery, nervous, and anxious after the injection. Although it is difficult, try to relax. This is a side effect of the epinephrine, and should stop after a few minutes   Important    Get to the emergency room after using the epinephrine. Its affect will wear off, and you may have a second reaction. This could even happen hours later.    Never intentionally eat, use, or expose yourself to the substance that caused the anaphylactic reaction.  Nothing is foolproof, including the injectable epinephrine.  Other medications  The doctor may prescribe medicine to relieve swelling, itching, and pain. Follow the doctor s instructions when this medicine.     Oral Benadryl (diphenhydramine) is an antihistamine available at drug and grocery stores. Unless a prescription antihistamine was given, Benadryl may be used to reduce itching if large areas of the skin are involved. It may make you sleepy, so be careful using it in the daytime or when going to school, working, or driving.     Do not use Benadryl cream on your skin, because in some people it can cause a further reaction, and make you allergic to Benadryl.    You may use over-the-counter acetaminophen or ibuprofen to control pain, unless another pain medicine was prescribed.    If you were prescribed any medicines to prevent symptoms from returning, be sure to take  them exactly as directed.  General care    Rest at home for the next 24 hours.    Avoid tobacco and alcohol consumption. These may worsen your symptoms.    If you know what caused your reaction today, avoid that in the future since the next reaction may be worse. Let your family members, friends and personal physician know about your allergic reaction.    If your allergy was to food, learn how to read food labels so you can check for that ingredient. If a product does not have a label, it is best to avoid it.    Consider carrying an identification card or getting a medical alert bracelet to inform medical personnel of your condition in case you cannot tell them.    Tell all of  your healthcare providers know you had an anphylactic reaction.  Make certain the information is added to your electronic or paper medical records.  Follow-up care  Follow up with your doctor or as advised if you are not improving over the next 1 to 2 days.  Call 911  Call 911 if any of these occur:    Trouble breathing or swallowing, wheezing    Hoarse voice or trouble speaking    Chest pain    Confused    Very drowsy or trouble awakening    Fainting or loss of consciousness    Rapid heart rate    Vomiting blood, or large amounts of blood in stool    Seizure  When to seek medical advice  Call your healthcare provider right away if any of these occur:    Worsening of your symptoms    Swelling in the mouth or face    Dizziness, weakness  Date Last Reviewed: 7/30/2015 2000-2017 [employer]. [address] [place] [address], [place] [address]. All rights reserved. This information is not intended as a substitute for professional medical care. Always follow your healthcare professional's instructions.        Controlling Allergens: In the Home  Even a clean home can be full of allergens, so take a moment to see what you can do to cut down on allergens in each room of your home. Try to avoid things like cigarette smoke and perfume. They can  irritate your eyes, nose, throat, and lungs and make your allergies worse.    Buy an air purifier with a HEPA filter. Look in consumer magazines for recommendations. Avoid vaporizers and humidifiers, since they encourage mold and dust-mite growth.    Use shades or vertical blinds instead of horizontal blinds, which collect dust. Replace drapes with curtains that can be washed regularly.    Enclose mattresses, box springs, and pillows in allergy-proof casings. Use washable blankets and quilts. Avoid feather pillows, down comforters, and wool blankets.    Avoid dust-catching clutter. Have enclosed places to keep books, toys, and clothes. Keep closet doors closed.    Use washable throw rugs wherever possible, or have bare floors.    Put filters over forced-air heating vents. Change the filters regularly.    Keep your car clean. Vacuum the seats and carpets regularly. If you have air conditioning, use it instead of opening the windows.    Keep rain gutters clean. Remove leaves and debris that can grow mold.    Check stored food for spoilage and mold growth. Clean up spills right away.    Don't let wet clothing sit and grow mold. And don't hang clothes outside to dry where they can collect airborne pollen. Dry clothing immediately in a clothes dryer that's vented to the outside.    Install a fan to keep the bathroom well ventilated.        Avoid yard work and pulling weeds. These and other outdoor activities increase your exposure to pollen. If that s not possible, wear a filter mask. When you re done, bathe, wash your hair, and change your clothes.      Date Last Reviewed: 9/1/2016 2000-2017 [employer]. [address] [place] [address], [place] [address]. All rights reserved. This information is not intended as a substitute for professional medical care. Always follow your healthcare professional's instructions.

## 2017-06-20 NOTE — MR AVS SNAPSHOT
After Visit Summary   6/20/2017    Tommy Ross    MRN: [id number]           Patient Information     Date Of Birth          1970        Visit Information        Provider Department      6/20/2017 11:10 AM Antonella Obrien MD [facility]        Today's Diagnoses     UTI symptoms    -  1    Anxiety        Seasonal allergic rhinitis, unspecified allergic rhinitis trigger        Blurred vision        RAD (reactive airway disease), unspecified asthma severity, uncomplicated        Onychomycosis          Care Instructions      Blurred Vision  Blurred vision is the loss of sharpness of vision and the inability to see small details. Any changes in your vision, whether sudden or gradual, should be checked out by an eye specialist.  Vision changes can be caused by many different things. These include eye diseases, side effects of some medicines, or a condition like diabetes. Vision changes should never be ignored. It is common to assume that vision changes are due to needing more powerful vision correction. Making this assumption, many people postpone seeing their healthcare provider about their vision changes. Delaying care is risky, however. Some eye problems, if left untreated, can lead to permanent vision loss that is not correctable with glasses. This can significantly reduce quality of life.  Home care    Make changes in your home to reduce the risk of falling.    Keep walkways clear of objects you may trip over. Use nonslip pads under rugs.    Do not walk in poorly lit areas.    Be cautious when stepping up and down from curbs and walking on uneven sidewalks.    Brighter lighting in your home may help you see better.  Follow-up care  Follow up with an eye specialist or as advised. There are two types of eye doctor you can consult:    An optometrist is a licensed doctor of optometry. Optometrists are not medical doctors. Optometrists specialize in eye exams and may diagnose some eye  problems. They also prescribe glasses and contact lenses.    An ophthalmologist is a medical doctor who specializes in eye care. Ophthalmologists diagnose and treat all eye diseases, prescribe medicines, and perform eye surgery. They may also prescribe glasses and contact lenses.  An optometrist can provide a basic screening eye exam for much less cost than an ophthalmologist. The optometrist can tell you if your condition needs the services of an ophthalmologist.  When to seek medical advice  Call your healthcare provider right away if any of these occur.    Sudden change in your vision    Eye pain, redness, or discharge from your eyelid    No improvement or worsening of blurriness    Dark spots in your field of vision    Halos around lights    Floaters (dots or strings moving across your field of vision)    Sudden flash of light inside your eye    Dimness of vision    Partial or complete loss of vision  Date Last Reviewed: 6/14/2015 2000-2017 [employer]. [address] [place] [address]. All rights reserved. This information is not intended as a substitute for professional medical care. Always follow your healthcare professional's instructions.        Adult Self-Care for Colds  Colds are caused by viruses. They can t be cured with antibiotics. However, you can ease symptoms and support your body s efforts to heal itself.  No matter which symptoms you have, be sure to:    Drink plenty of fluids (water or clear soup)    Stop smoking and drinking alcohol    Get plenty of rest    Understand a fever    Take your temperature several times a day. If your fever is 100.4 F (38.0 C) for more than a day, call your healthcare provider.    Relax, lie down. Go to bed if you want. Just get off your feet and rest. Also, drink plenty of fluids to avoid dehydration.    Take acetaminophen or a nonsteroidal anti-inflammatory agent (NSAID), such as ibuprofen.  Treat a troubled nose kindly    Breathe steam or  heated humidified air to open blocked nasal passages.  a hot shower or use a vaporizer. Be careful not to get burned by the steam.    Saline nasal sprays and decongestant tablets help open a stuffy nose. Antihistamines can also help, but they can cause side effects such as drowsiness and drying of the eyes, nose, and mouth.  Soothe a sore throat and cough    Gargle every 2 hours with 1/4 teaspoon of salt dissolved in 1/2 cup of warm water. Suck on throat lozenges and cough drops to moisten your throat.    Cough medicines are available but it is unclear how well they actually work.    Take acetaminophen or an NSAID, such as ibuprofen, to ease throat pain  Ease digestive problems    Put fluids back into your body. Take frequent sips of clear liquids such as water or broth. Avoid drinks that have a lot of sugar in them, such as juices and sodas. These can make diarrhea worse. Older children and adults can drink sports drinks.    As your appetite returns, you can resume your normal diet. Ask your healthcare provider if there are any foods you should avoid.  When to seek medical care  When you first notice symptoms, ask your healthcare provider if antiviral medicines are appropriate. Antibiotics should not be taken for colds or flu. Also, call your healthcare provider if you have any of the following symptoms or if you aren t feeling better after 7 days:    Shortness of breath    Pain or pressure in the chest or belly (abdomen)    Worsening symptoms, especially after a period of improvement    Fever of 100.4 F  (38.0 C) or higher, or fever that doesn t go down with medicine    Sudden dizziness or confusion    Severe or continued vomiting    Signs of dehydration, including extreme thirst, dark urine, infrequent urination, dry mouth    Spotted, red, or very sore throat   Date Last Reviewed: 12/1/2016 2000-2017 [employer]. [address] [place] [address], [place] [address]. All rights reserved. This  information is not intended as a substitute for professional medical care. Always follow your healthcare professional's instructions.        Nail Fungal Infection  A nail fungal infection changes the way fingernails and toenails look. They may thicken, discolor, change shape, or split. This condition is hard to treat because nails grow slowly and have limited blood supply. The infection often comes back after treatment.  There are 2 types of medicines used to treat this condition:    Topical anti-fungal medicines. These are applied to the surface of the skin and nail area. These medicines are not very effective because they can t get deep into the nail.    Oral antifungal medicines. These medicines work better because they go into the nail from the inside out. But the infection may still come back. It may take 9 to 12 months for your nail to look normal again. This means you are cured. You can repeat treatment if needed. Most people take these medicines without any problems. It is rare to stop therapy because of side effects. But your healthcare provider may give you some monitoring tests. Talk about possible side effects with your provider before starting treatment.  If medicines fail, the nail can be removed surgically or chemically. These methods physically remove the fungus from the body. This helps medical treatment be more effective.  Home care    Use medicines exactly as directed for as long as directed. Treating a fungal infection can take longer than other kinds of infections.    Smoking is a risk factor for fungal infection. This is one more reason to quit.    Wear absorbent socks, and shoes that let your feet breathe. Sweaty feet increase your risk of fungal infection. They also make an existing infection harder to treat.    Use footwear when in damp public places like swimming pools, gyms, and shower rooms. This will help you avoid the fungus that grows there.    Don't share nail clippers or scissors  with others.  Follow-up care  Follow up with your healthcare provider, or as advised.  When to seek medical advice  Call your healthcare provider right away if any of these occur:    Skin by the nail becomes red, swollen, painful, or drains pus (a creamy yellow or white liquid)    Side effects from oral anti-fungal medicines  Date Last Reviewed: 8/1/2016 2000-2017 [employer]. [address] [place] [address], [place] [address]. All rights reserved. This information is not intended as a substitute for professional medical care. Always follow your healthcare professional's instructions.        Generalized Allergic Reaction (Other)  You are having an allergic reaction. Almost anything can cause one. Different people are allergic to different things. It is usually something that you ate or swallowed, came into contact with by getting or putting it on your skin or clothes, or something you breathed in the air. This can be very annoying and sometimes scary.  Most of us think of allergic reactions when we have a rash or itchy skin. Symptoms can include:    Rash, hives, redness, welts, blisters    Itching, burning, stinging, pain    Dry, flaky, cracking, scaly skin    Swelling of the face, lips or other parts of the body    Hoarse voice    Trouble swallowing, feeling like your throat is closing    Trouble breathing, wheezing    Nausea, vomiting, diarrhea, stomach cramps    Feeling faint or lightheaded, rapid heart rate  Sometimes the cause may be obvious. However, there are so many things that can cause a reaction that you may not be able to figure out. The most important things to help find your allergen are:    Remembering when it started    What you were doing at the time or just before that    Any activities you were involved in    Any new products or contacts  Here are some common causes, but remember almost anything can cause a reaction, and you may not even be aware that you came into contact with one of  these things.    Dust, mold, pollen    Plants, such aspoison ivy and poison oak are common ones, but there are many others    Animals    Foods such as shrimp, shellfish, peanuts, milk products, gluten, eggs; also colorings, flavorings, additives    Insect bites or stings such as bees, mosquitos, flees, ticks    Medicines such as penicillin, sulfa drugs, amoxicillin, aspirin, ibuprofen; any medicine can cause a reaction    Jewelry such as nickel, gold (new, or something you ve worn for a while including zippers, and buttons)    Latex such as in gloves, clothes, toys, balloons, or some tapes (some people allergic to latex may also have problems with foods like bananas, avocados, kiwi, papaya, or chestnuts)    Lotions, perfumes, cosmetics, soaps, shampoos, skincare products, nail products    Chemicals or dyes in clothing, linen, , hair dyes, soaps, iodine  Many viruses and common colds can cause a rash, but is not an allergic reaction. Sometimes it is hard to tell the difference between allergies, sensitivity and intolerance to something. This is especially true with food. Many things can cause diarrhea, vomiting, stomach cramps, and skin irritation.  Home care    The goal of our treatment is to help relieve the symptoms, and get you feeling better. The rash will usually fade over several days, but can sometimes last a couple of weeks. Over the next couple of days, there may be times when it is gets a little worse, and then better again. Here are some things to do:    If you know what you are allergic to, avoid it because future reactions could be worse than this one.    Avoid tight clothing and anything that heats up your skin (hot showers/baths, direct sunlight) since heat will make itching worse.    An ice pack will relieve local areas of intense itching and redness. Don t put the ice directly on the skin, because it can damage the skin. You can also ice put it in a plastic bag. Wrap it in something like a  thin towel, edvin shirt, or cloth, or use a bag of frozen peas.    Oral Benadryl (diphenhydramine) is an antihistamine available at drug and grocery stores. Unless a prescription antihistamine was given, Benadryl may be used to reduce itching if large areas of the skin are involved. It may make you sleepy, so be careful using it in the daytime or when going to school, working, or driving. [NOTE: Do not use Benadryl if you have glaucoma or if you are a man with trouble urinating due to an enlarged prostate.] There are antihistamines that causes less drowsiness and are good alternatives for daytime use. Ask your pharmacist for suggestions.    Do not use Benadryl cream on your skin, because in some people it can cause a further reaction, and make you allergic to Benadryl.    Try not to scratch. This can tear the skin and cause an infection.    Using heat-steam to clean your home, using high-efficiency particulate (HEPA) vacuums and filters, avoiding food and pet triggers, exterminating cockroaches, and frequent house cleaning are a few of the strategies used to decrease allergic reactions.  Follow-up care  Follow up with your healthcare provider, or as advised. If you had a severe reaction today, or if you have had several mild-moderate allergic reactions in the past, ask your doctor about allergy testing to find out what you are allergic to. If your reaction included dizziness, fainting or trouble breathing or swallowing, ask your doctor about carrying injectable epinephrine for home use.  Call 911  Call 911 if any of these occur:    Trouble breathing or swallowing, wheezing    Hoarse voice or trouble speaking    Confused    Very drowsy or trouble awakening    Fainting or loss of consciousness    Rapid heart rate    Low blood pressure    Feeling of doom    Nausea, vomiting, abdominal pain, diarrhea    Vomiting blood, or large amounts of blood in stool    Seizure  When to seek medical advice  Call your healthcare  provider right away if any of these occur:    Spreading areas of itching, redness or swelling    New or worse swelling in the face, eyelids, lips, mouth, throat or tongue    Dizziness, weakness    Signs of infection:    Spreading redness    Increased pain or swelling    Fever (1 degree above your normal temperature) lasting for 24 to 48 hours  Date Last Reviewed: 7/30/2015 2000-2017 [employer]. [address] [place] [address], [place] [address]. All rights reserved. This information is not intended as a substitute for professional medical care. Always follow your healthcare professional's instructions.        Anaphylaxis  Anaphylaxis is a severe allergic reaction that can be life threatening. This reaction can happen in a few minutes, or a few hours after exposure to what you are allergic to. Some people are more prone to this than others.  The symptoms of an anaphylactic reaction may at first seem similar to other allergic reactions. If this has happened to you in the past, do not let the initial mild symptoms, such as a rash, hives and itching, mislead you. Your reaction can worsen very quickly and become much more severe and life threatening within minutes.  More severe symptoms include:    Trouble swallowing, feeling like your throat is closing    Trouble breathing, wheezing    Hoarse voice or trouble speaking    Nausea, vomiting, diarrhea, stomach cramps    Feeling faint or lightheaded, rapid heart rate, low blood pressure    Becoming very drowsy, poorly responsive or trouble awakening  Sometimes the cause may be obvious, like knowing you are allergic to peanuts. To help identify your allergen, remember:    When it started    What you were doing at the time or just before that    Any activities you were involved in    Any new products or contacts   Here are some common causes, but remember almost anything can cause a reaction, and you may not even be aware that you came into contact with one of these  things.    Dust, mold, pollen    Plants, such as poison ivy and poison oak    Animals    Foods such as shrimp, shellfish, peanuts, milk products, gluten, eggs; also colorings, flavorings, additives    Insect bites or stings such as bees, mosquitos, flees, ticks    Medicines such as penicillin, sulfa drugs, amoxicillin, aspirin, ibuprofen; any medicine can cause a reaction    Jewelry such as nickel, or gold (new, or something you ve worn for a while including zippers, and buttons)    Latex such as in gloves, clothes, toys, balloons, or some tapes (some people allergic to latex may also  have problems with foods like bananas, avocados, kiwi, papaya, or chestnuts)    Lotions, perfumes, cosmetics, soaps, shampoos, skincare products, nail products    Chemicals or dyes in clothing, linen, , hair dyes, soaps, iodine  If you are exposed to the same substance again, you may have the same or more severe reaction. Treatment for anaphylaxis is epinephrine (adrenalin). This is available by prescription as a self-injectable pen. If the cause of your reaction is known, you should avoid exposure in the future. If the cause is not known, follow up with your doctor for special testing to determine what you are allergic to.     Home care  If was safe for you to go home, watch for any worsening of symptoms. You may need to be treated again.  Medications  Injectable epinephrine  One of the key tools in treating anaphylaxis is early use of epinephrine. If you had a severe allergic or anaphylactic reaction, the doctor may prescribe a self- injectable epinephrine kit. If this was prescribed, carry it at all times. It can be life saving. Epinephrine can help stop the progression of an allergic reaction. Its effects are brief, so after you use the medicine,  it is still very  important to call 911 and get to an emergency room.  When to use injectable epinephrine. Use the epinephrine if you have a history of severe reactions or any of  the following symptoms:    Swelling in your mouth or throat     Trouble speaking or swallowing    Trouble breathing    Feeling faint, low blood pressure, or becoming drowsy or poorly responsive    Worsening rash  How to use injectable epinephrine:    Hold the syringe firmly in your hand with the orange (or black) needle end away from your thumb    Be careful not to stick your fingers or hand with the needle!    At the opposite end, pull off the activation cap- the blue or grey tab    Holding the syringe tightly, jab it into the outer part of your upper thigh. This is one of the softest, fleshiest parts of the upper leg, and is not near a major blood vessel or nerve. Be careful not to inject it into your hip or any place that there is a pulse.    You can inject it through pants, but make sure not to inject it into the seam of the pants.    Do not pull it out right away. Try to hold the needle in place for 10 seconds.    Massage the spot for a few seconds.    If you are injecting it in someone else or a child, try to hold them or their leg still. If they jerk or yank their legs away as you are doing it, it can cause a cut on their leg.  You may feel shaky, jittery, nervous, and anxious after the injection. Although it is difficult, try to relax. This is a side effect of the epinephrine, and should stop after a few minutes   Important    Get to the emergency room after using the epinephrine. Its affect will wear off, and you may have a second reaction. This could even happen hours later.    Never intentionally eat, use, or expose yourself to the substance that caused the anaphylactic reaction.  Nothing is foolproof, including the injectable epinephrine.  Other medications  The doctor may prescribe medicine to relieve swelling, itching, and pain. Follow the doctor s instructions when this medicine.     Oral Benadryl (diphenhydramine) is an antihistamine available at drug and grocery stores. Unless a prescription  antihistamine was given, Benadryl may be used to reduce itching if large areas of the skin are involved. It may make you sleepy, so be careful using it in the daytime or when going to school, working, or driving.     Do not use Benadryl cream on your skin, because in some people it can cause a further reaction, and make you allergic to Benadryl.    You may use over-the-counter acetaminophen or ibuprofen to control pain, unless another pain medicine was prescribed.    If you were prescribed any medicines to prevent symptoms from returning, be sure to take them exactly as directed.  General care    Rest at home for the next 24 hours.    Avoid tobacco and alcohol consumption. These may worsen your symptoms.    If you know what caused your reaction today, avoid that in the future since the next reaction may be worse. Let your family members, friends and personal physician know about your allergic reaction.    If your allergy was to food, learn how to read food labels so you can check for that ingredient. If a product does not have a label, it is best to avoid it.    Consider carrying an identification card or getting a medical alert bracelet to inform medical personnel of your condition in case you cannot tell them.    Tell all of  your healthcare providers know you had an anphylactic reaction.  Make certain the information is added to your electronic or paper medical records.  Follow-up care  Follow up with your doctor or as advised if you are not improving over the next 1 to 2 days.  Call 911  Call 911 if any of these occur:    Trouble breathing or swallowing, wheezing    Hoarse voice or trouble speaking    Chest pain    Confused    Very drowsy or trouble awakening    Fainting or loss of consciousness    Rapid heart rate    Vomiting blood, or large amounts of blood in stool    Seizure  When to seek medical advice  Call your healthcare provider right away if any of these occur:    Worsening of your symptoms    Swelling  in the mouth or face    Dizziness, weakness  Date Last Reviewed: 7/30/2015 2000-2017 [employer]. [address] [place] [address], [place] [address]. All rights reserved. This information is not intended as a substitute for professional medical care. Always follow your healthcare professional's instructions.        Controlling Allergens: In the Home  Even a clean home can be full of allergens, so take a moment to see what you can do to cut down on allergens in each room of your home. Try to avoid things like cigarette smoke and perfume. They can irritate your eyes, nose, throat, and lungs and make your allergies worse.    Buy an air purifier with a HEPA filter. Look in consumer magazines for recommendations. Avoid vaporizers and humidifiers, since they encourage mold and dust-mite growth.    Use shades or vertical blinds instead of horizontal blinds, which collect dust. Replace drapes with curtains that can be washed regularly.    Enclose mattresses, box springs, and pillows in allergy-proof casings. Use washable blankets and quilts. Avoid feather pillows, down comforters, and wool blankets.    Avoid dust-catching clutter. Have enclosed places to keep books, toys, and clothes. Keep closet doors closed.    Use washable throw rugs wherever possible, or have bare floors.    Put filters over forced-air heating vents. Change the filters regularly.    Keep your car clean. Vacuum the seats and carpets regularly. If you have air conditioning, use it instead of opening the windows.    Keep rain gutters clean. Remove leaves and debris that can grow mold.    Check stored food for spoilage and mold growth. Clean up spills right away.    Don't let wet clothing sit and grow mold. And don't hang clothes outside to dry where they can collect airborne pollen. Dry clothing immediately in a clothes dryer that's vented to the outside.    Install a fan to keep the bathroom well ventilated.        Avoid yard work and pulling  weeds. These and other outdoor activities increase your exposure to pollen. If that s not possible, wear a filter mask. When you re done, bathe, wash your hair, and change your clothes.      Date Last Reviewed: 9/1/2016 2000-2017 [employer]. [address] [place] [id number]. All rights reserved. This information is not intended as a substitute for professional medical care. Always follow your healthcare professional's instructions.                Follow-ups after your visit        Additional Services     ALLERGY/ASTHMA ADULT REFERRAL       Your provider has referred you to: [facility]: [facility] [facility] [phone] [email or web address]    Please be aware that coverage of these services is subject to the terms and limitations of your health insurance plan.  Call member services at your health plan with any benefit or coverage questions.      Please bring the following with you to your appointment:    (1) Any X-Rays, CTs or MRIs which have been performed.  Contact the facility where they were done to arrange for  prior to your scheduled appointment.    (2) List of current medications  (3) This referral request   (4) Any documents/labs given to you for this referral            OPHTHALMOLOGY ADULT REFERRAL       Your provider has referred you to: PREFERRED PROVIDERS:  [facility]: [facility] [phone]   [email or web address]    Please be aware that coverage of these services is subject to the terms and limitations of your health insurance plan.  Call member services at your health plan with any benefit or coverage questions.      Please bring the following with you to your appointment:    (1) Any X-Rays, CTs or MRIs which have been performed.  Contact the facility where they were done to arrange for  prior to your scheduled appointment.    (2) List of current  "medications  (3) This referral request   (4) Any documents/labs given to you for this referral                  Your next 10 appointments already scheduled     2017  9:30 AM CDT   (Arrive by 9:15 AM)   Return Visit with Carlos Enrique Lay MD   [facility] ([facility])    [address]  3rd Floor  [place] [address] [phone]              Who to contact     If you have questions or need follow up information about today's clinic visit or your schedule please contact [facility] directly at [phone].  Normal or non-critical lab and imaging results will be communicated to you by MyChart, letter or phone within 4 business days after the clinic has received the results. If you do not hear from us within 7 days, please contact the clinic through [employer] or phone. If you have a critical or abnormal lab result, we will notify you by phone as soon as possible.  Submit refill requests through [employer] or call your pharmacy and they will forward the refill request to us. Please allow 3 business days for your refill to be completed.          Additional Information About Your Visit        My[employer] Information     [employer] lets you send messages to your doctor, view your test results, renew your prescriptions, schedule appointments and more. To sign up, go to [email or web address] . Click on \"Log in\" on the left side of the screen, which will take you to the Welcome page. Then click on \"Sign up Now\" on the right side of the page.     You will be asked to enter the access code listed below, as well as some personal information. Please follow the directions to create your username and password.     Your access code is: [email or web address]  Expires: 2017 12:24 PM     Your access code will  in 90 days. If you need help or a new code, please call your [facility] or [phone].        Care EveryWhere ID     This is " "your Care EveryWhere ID. This could be used by other organizations to access your [facility] medical records  [id number]        Your Vitals Were     Pulse Temperature Respirations Height Pulse Oximetry BMI (Body Mass Index)    100 98.7  F (37.1  C) (Temporal) 16 5' 9\" (1.753 m) 99% 25.39 kg/m2       Blood Pressure from Last 3 Encounters:   06/20/17 140/80   05/15/17 122/80   05/08/17 120/82    Weight from Last 3 Encounters:   06/20/17 171 lb 14.4 oz (78 kg)   05/15/17 173 lb 3.2 oz (78.6 kg)   05/08/17 170 lb 12.8 oz (77.5 kg)              We Performed the Following     *UA reflex to Microscopic and Culture ([facility] (except [place] and [place])     ALLERGY/ASTHMA ADULT REFERRAL     OPHTHALMOLOGY ADULT REFERRAL     Urine Microscopic          Today's Medication Changes          These changes are accurate as of: 6/20/17 12:24 PM.  If you have any questions, ask your nurse or doctor.               Start taking these medicines.        Dose/Directions    lidocaine 5 % Patch   Commonly known as:  LIDODERM   Used for:  Anxiety   Started by:  Antonella Obrien MD        Apply up to 3 patches to painful area at once for up to 12 h within a 24 h period as needed.  Remove after 12 hours.   Quantity:  30 patch   Refills:  8       loratadine 10 MG tablet   Commonly known as:  CLARITIN   Used for:  Seasonal allergic rhinitis, unspecified allergic rhinitis trigger   Started by:  Antonella Obrien MD        Dose:  10 mg   Take 1 tablet (10 mg) by mouth daily   Quantity:  30 tablet   Refills:  1         These medicines have changed or have updated prescriptions.        Dose/Directions    * FLONASE 50 MCG/ACT spray   This may have changed:  Another medication with the same name was added. Make sure you understand how and when to take each.   Used for:  Acute recurrent maxillary sinusitis   Generic drug:  fluticasone        Dose:  1-2 spray   Spray 1-2 sprays into both nostrils daily as needed for rhinitis " or allergies Reported on 5/8/2017   Quantity:  16 g   Refills:  5       * fluticasone 50 MCG/ACT spray   Commonly known as:  FLONASE   This may have changed:  You were already taking a medication with the same name, and this prescription was added. Make sure you understand how and when to take each.   Used for:  Seasonal allergic rhinitis, unspecified allergic rhinitis trigger   Changed by:  Antonella Obrien MD        Dose:  1-2 spray   Spray 1-2 sprays into both nostrils daily   Quantity:  1 Bottle   Refills:  3       * Notice:  This list has 2 medication(s) that are the same as other medications prescribed for you. Read the directions carefully, and ask your doctor or other care provider to review them with you.      Stop taking these medicines if you haven't already. Please contact your care team if you have questions.     fexofenadine-pseudoePHEDrine  MG per 12 hr tablet   Commonly known as:  ALLEGRA-D   Stopped by:  Antonella Obrien MD                Where to get your medicines      These medications were sent to [id number] - [place], [place] - [address]   [address] [facility] [address]     Phone:  [phone]     fluticasone 50 MCG/ACT spray    lidocaine 5 % Patch    loratadine 10 MG tablet                Primary Care Provider Office Phone # Fax #    Antonella Obrien [phone]       [facility] [address] [facility] [address]        Goals        General    I want more information on palliative card/Start Date 4/14/2014 (pt-stated)     Notes - Note created  4/15/2014 10:07 AM by Tania Vasquez MSW    As of today's date 4/15/2014 goal is met at 0 - 25%.   Goal Status:  Active        I want to feel normal again, and be able to address my pain/Start Date 4/14/2014 (pt-stated)     Notes - Note created  4/15/2014 10:06 AM by Tania Vasquez MSW    As of today's date 4/15/2014 goal is met at 0 - 25%.   Goal Status:  Active        I will take my  navin as directed for chest pain. (pt-stated)     I will weigh myself daily and keep a record (pt-stated)       Thank you!     Thank you for choosing [facility]  for your care. Our goal is always to provide you with excellent care. Hearing back from our patients is one way we can continue to improve our services. Please take a few minutes to complete the written survey that you may receive in the mail after your visit with us. Thank you!             Your Updated Medication List - Protect others around you: Learn how to safely use, store and throw away your medicines at www.disposemymeds.org.          This list is accurate as of: 6/20/17 12:24 PM.  Always use your most recent med list.                   Brand Name Dispense Instructions for use    albuterol 108 (90 BASE) MCG/ACT Inhaler    PROAIR HFA/PROVENTIL HFA/VENTOLIN HFA    1 Inhaler    Inhale 2 puffs into the lungs every 4 hours as needed for shortness of breath / dyspnea or wheezing       ALPRAZolam 0.5 MG tablet    XANAX    30 tablet    One or two tabs daily PRN anxiety       aspirin 81 MG EC tablet     30 tablet    Take 1 tablet (81 mg) by mouth daily       beclomethasone 40 MCG/ACT Inhaler    QVAR    1 Inhaler    Inhale 2 puffs into the lungs 2 times daily       BOOST     12 Can    fax # to the county worker fax#[phone] Attn:MILDRED       clotrimazole 1 % cream    LOTRIMIN    14 g    Apply topically 2 times daily       cyanocobalamin 1000 MCG Tabs    [facility] vitamin  B12    30 tablet    Take 1 tablet by mouth daily       * docusate sodium 100 MG tablet    COLACE    60 tablet    Take 100 mg by mouth daily       * docusate sodium 100 MG tablet    COLACE    60 tablet    Take 100 mg by mouth daily       dolutegravir 50 MG tablet    TIVICAY    30 tablet    Take 1 tablet (50 mg) by mouth daily       emtricitabine-tenofovir 200-300 MG per tablet    TRUVADA    30 tablet    Take 1 tablet by mouth daily       * FLONASE 50 MCG/ACT spray   Generic drug:   fluticasone     16 g    Spray 1-2 sprays into both nostrils daily as needed for rhinitis or allergies Reported on 5/8/2017       * fluticasone 50 MCG/ACT spray    FLONASE    1 Bottle    Spray 1-2 sprays into both nostrils daily       fluconazole 100 MG tablet    DIFLUCAN    4 tablet    TAKE 1/2 TABLET (50 MG) BY MOUTH DAILY       FLUoxetine 20 MG capsule    PROzac    30 capsule    Take 1 capsule (20 mg) by mouth daily Along with 40 mg of fluoxetine       hydrocortisone 2.5 % cream    ANUSOL-HC    30 g    Place rectally 2 times daily       lidocaine 5 % Patch    LIDODERM    30 patch    Apply up to 3 patches to painful area at once for up to 12 h within a 24 h period as needed.  Remove after 12 hours.       loratadine 10 MG tablet    CLARITIN    30 tablet    Take 1 tablet (10 mg) by mouth daily       MORPHINE SULFATE PO      Take 15 mg by mouth Reported on 5/8/2017       nicotine 14 MG/24HR 24 hr patch    NICODERM CQ    30 patch    Place 1 patch onto the skin every 24 hours       nitroglycerin 0.4 MG sublingual tablet    NITROSTAT    30 tablet    Place 1 tablet under the tongue every 5 mins as needed for chest pain If you are still having symptoms after 3 doses (15 minutes) call 911.       omeprazole 20 MG CR capsule    priLOSEC    30 capsule    Take 1 capsule (20 mg) by mouth daily       ondansetron 4 MG ODT tab    ZOFRAN ODT    40 tablet    Take 1-2 tablets (4-8 mg) by mouth every 8 hours as needed for nausea       * order for DME     1 Device    Equipment being ordered: Oxygen at 5 liters       * order for DME     1 each    Equipment being ordered: empty Oxygen tanks, oxygen concentrator and oxygen travel concentrator for portability       order for DME     1 Device    Equipment being ordered:  Portable Oxygen       oxyCODONE-acetaminophen  MG per tablet    PERCOCET    30 tablet    Take 1 tablet by mouth every 4 hours as needed for moderate to severe pain (Max of 6 a day.)       predniSONE 20 MG tablet     DELTASONE    70 tablet    Take 3 tablets (60 mg) by mouth daily Decrease by 10mg weekly. Example: Until 5/7 3 tabs daily, then 2.5 tabs daily for one week.       psyllium 58.6 % Powd    METAMUCIL SMOOTH TEXTURE    425 g    Take 12 g (2 teaspoonful) by mouth 2 times daily       ranitidine 150 MG tablet    ZANTAC    60 tablet    Take 1 tablet (150 mg) by mouth 2 times daily       simvastatin 20 MG tablet    ZOCOR    30 tablet    Take 1 tablet (20 mg) by mouth At Bedtime Hold if taking fluconozole       SPIRIVA RESPIMAT 2.5 MCG/ACT inhalation aerosol   Generic drug:  tiotropium     4 g    INHALE 2 PUFFS INTO THE LUNGS DAILY       sulfamethoxazole-trimethoprim 800-160 MG per tablet    BACTRIM DS/SEPTRA DS    60 tablet    Take 1 tablet by mouth daily       SUMAtriptan 50 MG tablet    IMITREX    30 tablet    Take 1 tablet (50 mg) by mouth at onset of headache for migraine       valACYclovir 500 MG tablet    VALTREX    60 tablet    Take 500 mg by mouth 2 times daily as needed Reported on 5/8/2017       * Notice:  This list has 6 medication(s) that are the same as other medications prescribed for you. Read the directions carefully, and ask your doctor or other care provider to review them with you.

## 2017-06-26 DIAGNOSIS — Z21 HIV (HUMAN IMMUNODEFICIENCY VIRUS INFECTION) (H): ICD-10-CM

## 2017-06-26 RX ORDER — EMTRICITABINE AND TENOFOVIR DISOPROXIL FUMARATE 200; 300 MG/1; MG/1
1 TABLET, FILM COATED ORAL DAILY
Qty: 30 TABLET | Refills: 0 | Status: SHIPPED | OUTPATIENT
Start: 2017-06-26 | End: 2017-07-21

## 2017-06-27 LAB
MICRO REPORT STATUS: NORMAL
MYCOBACTERIUM SPEC CULT: NORMAL
SPECIMEN SOURCE: NORMAL

## 2017-06-28 ENCOUNTER — TELEPHONE (OUTPATIENT)
Dept: FAMILY MEDICINE | Facility: OTHER | Age: 47
End: 2017-06-28

## 2017-06-28 DIAGNOSIS — B35.3 TINEA PEDIS OF BOTH FEET: Primary | ICD-10-CM

## 2017-06-28 DIAGNOSIS — G89.29 OTHER CHRONIC PAIN: Primary | ICD-10-CM

## 2017-06-28 DIAGNOSIS — M54.5 LOW BACK PAIN, UNSPECIFIED BACK PAIN LATERALITY, UNSPECIFIED CHRONICITY, WITH SCIATICA PRESENCE UNSPECIFIED: ICD-10-CM

## 2017-06-28 NOTE — TELEPHONE ENCOUNTER
Per fax from [facility] pharmacy, a PRIOR AUTHORIZATION is needed for LIDOCAINE 5% PATCH, Due to Diagnosis of anxiety, please review disgnosis and attach all that apply.   Thank you  Lois MARKHAM (R)

## 2017-06-29 RX ORDER — PRENATAL VIT 91/IRON/FOLIC/DHA 28-975-200
COMBINATION PACKAGE (EA) ORAL 2 TIMES DAILY
Qty: 42 G | Refills: 1 | Status: SHIPPED | OUTPATIENT
Start: 2017-06-29 | End: 2018-02-19

## 2017-06-29 RX ORDER — LIDOCAINE 50 MG/G
PATCH TOPICAL
Qty: 30 PATCH | Refills: 8 | Status: SHIPPED | OUTPATIENT
Start: 2017-06-29 | End: 2018-02-19

## 2017-06-29 NOTE — TELEPHONE ENCOUNTER
ICD-10-CM    1. Other chronic pain G89.29 lidocaine (LIDODERM) 5 % Patch   2. Low back pain, unspecified back pain laterality, unspecified chronicity, with sciatica presence unspecified M54.5 lidocaine (LIDODERM) 5 % Patch   3. Anxiety F41.9 lidocaine (LIDODERM) 5 % Patch       Orders placed, please notify patient.    Thank you.    Electronically signed:  Antonella Obrien M.D.

## 2017-06-29 NOTE — TELEPHONE ENCOUNTER
ICD-10-CM    1. Tinea pedis of both feet B35.3 terbinafine (LAMISIL AT) 1 % cream       Electronically signed:  Antonella Obrien M.D.

## 2017-06-30 ENCOUNTER — TELEPHONE (OUTPATIENT)
Dept: FAMILY MEDICINE | Facility: OTHER | Age: 47
End: 2017-06-30

## 2017-06-30 NOTE — TELEPHONE ENCOUNTER
Prior Authorization for TERBINAFINE has been completed through Cover My Meds, and outcome was DENIED, called and spoke to [facility] pharmacy, out of pocket cost to patient will be $23.99, pharmacy will contact the patient to notify.   Closing encounter  Lois Cannon RT (R)

## 2017-06-30 NOTE — TELEPHONE ENCOUNTER
Please see form in your inbox for completion and signature,  When form is complete please reflag for  to fax to [phone]  Thanks  Lois AndradeR)

## 2017-07-01 ENCOUNTER — TRANSFERRED RECORDS (OUTPATIENT)
Dept: HEALTH INFORMATION MANAGEMENT | Facility: CLINIC | Age: 47
End: 2017-07-01

## 2017-07-12 DIAGNOSIS — K21.9 GASTROESOPHAGEAL REFLUX DISEASE, ESOPHAGITIS PRESENCE NOT SPECIFIED: ICD-10-CM

## 2017-07-12 DIAGNOSIS — B35.3 TINEA PEDIS OF BOTH FEET: ICD-10-CM

## 2017-07-12 NOTE — TELEPHONE ENCOUNTER
Ranitidine 150 mg      Last Written Prescription Date: 5/9/2017  Last Fill Quantity: 60,  # refills: 1   Last Office Visit with [facility], [facility] or [facility] prescribing provider: 6/20/2017

## 2017-07-13 NOTE — TELEPHONE ENCOUNTER
clotrimazole (LOTRIMIN) 1 % cream      Last Written Prescription Date: 3/7/2017  Last Fill Quantity: 14g,  # refills: 3   Last Office Visit with [facility], [facility] or [facility] prescribing provider: 6/20/2017      Olga Barriga CMA  July 13, 2017

## 2017-07-14 ENCOUNTER — TELEPHONE (OUTPATIENT)
Dept: FAMILY MEDICINE | Facility: OTHER | Age: 47
End: 2017-07-14

## 2017-07-14 DIAGNOSIS — J45.909 ASTHMATIC BRONCHITIS, UNSPECIFIED ASTHMA SEVERITY, UNCOMPLICATED: Primary | ICD-10-CM

## 2017-07-14 NOTE — TELEPHONE ENCOUNTER
Summary:    Patient is due/failing the following:   ACT    Action needed:   Patient needs to do ACT.    Type of outreach:    Sent letter.    Questions for provider review:    Please add J45.480 diagnosis which was used in office visit 06/20/2017                                                                                                                                    Edilma Kay       Chart routed to PCP      Panel Management Review      Patient has the following on his problem list:   Asthma review   No flowsheet data found.   1. Is Asthma diagnosis on the Problem List? Yes    2. Is Asthma listed on Health Maintenance? Yes    3. Patient is due for:  ACT      Composite cancer screening  Chart review shows that this patient is due/due soon for the following None

## 2017-07-14 NOTE — LETTER
[facility]  [address] [place] [id number]  Phone: [phone]  July 14, 2017      Tommy Ayalaer  [address] [place] [id number]      Dear Tommy,    We care about your health and have reviewed your health plan including your medical conditions, medications, and lab results.  Based on this review, it is recommended that you follow up regarding the following health topic(s):  -Asthma    We recommend you take the following action(s):   -Complete and return the attached ASTHMA CONTROL TEST.  If your total score is 19 or less or you have been to the ER or urgent care for your asthma, then please schedule an asthma followup appointment.     Please call us at the [facility] - [phone] (or use [employer]) to address the above recommendations.     Thank you for trusting [facility] and we appreciate the opportunity to serve you.  We look forward to supporting your healthcare needs in the future.    Healthy Regards,    Your Health Care Team  [facility]

## 2017-07-14 NOTE — TELEPHONE ENCOUNTER
ranitidine (ZANTAC) 150 MG tablet  Prescription approved per [facility] Refill Protocol.    January Denson RN, BSN

## 2017-07-14 NOTE — TELEPHONE ENCOUNTER
ICD-10-CM    1. Asthmatic bronchitis, unspecified asthma severity, uncomplicated J45.909        Electronically signed:  Antonella Obrien M.D.

## 2017-07-17 RX ORDER — CLOTRIMAZOLE 1 %
CREAM (GRAM) TOPICAL
Qty: 15 G | Refills: 2 | Status: SHIPPED | OUTPATIENT
Start: 2017-07-17 | End: 2017-08-14

## 2017-07-17 NOTE — TELEPHONE ENCOUNTER
Prescription approved per [facility] Refill Protocol.  Janusz Harmon, RN, BSN

## 2017-07-21 DIAGNOSIS — Z21 HIV (HUMAN IMMUNODEFICIENCY VIRUS INFECTION) (H): ICD-10-CM

## 2017-07-21 DIAGNOSIS — B20 HUMAN IMMUNODEFICIENCY VIRUS (HIV) DISEASE (H): ICD-10-CM

## 2017-07-21 RX ORDER — EMTRICITABINE AND TENOFOVIR DISOPROXIL FUMARATE 200; 300 MG/1; MG/1
1 TABLET, FILM COATED ORAL DAILY
Qty: 30 TABLET | Refills: 1 | Status: SHIPPED | OUTPATIENT
Start: 2017-07-21 | End: 2017-09-25

## 2017-07-24 ENCOUNTER — TELEPHONE (OUTPATIENT)
Dept: FAMILY MEDICINE | Facility: OTHER | Age: 47
End: 2017-07-24

## 2017-07-24 NOTE — TELEPHONE ENCOUNTER
Reason for call:  Symptom  Reason for call:  Patient reporting a symptom    Symptom or request: poss shingles and sinus infection    Duration (how long have symptoms been present): unable to ask him this he ended the call too soon    Have you been treated for this before? unknown    Additional comments: pt was very upset with me that I was asking him lots of questions and he just hung up with me. I tried to call him back and he did not answer.  He was upset that I wasn't in [place] (where he called).  So upset at the beginning of the call.     Phone Number patient can be reached at:  Home number on file [phone] (home)    Best Time:  unknown    Can we leave a detailed message on this number:  Not Applicable    Call taken on 7/24/2017 at 1:45 PM by Minal Paredes

## 2017-07-24 NOTE — TELEPHONE ENCOUNTER
Spoke to Tommy he states that he thinks he might have shingles and possible sinus infection he has a swollen left cheek by nose under cheek bone.     Appointment made for 7/25/17 12:50 with Dr Obrien  Closing encounter  Lois MARKHAM (R)

## 2017-07-24 NOTE — PROGRESS NOTES
SUBJECTIVE:                                                    Tommy Ross is a 47 year old male who presents to clinic today for the following health issues:    Acute Illness   Acute illness concerns: sinus issues  Onset: about a week    Fever: no    Chills/Sweats: no    Headache (location?): YES- some    Sinus Pressure:YES- right side along with left side neck and should pain/stiffness     Conjunctivitis:  no    Ear Pain: no    Rhinorrhea: no    Congestion: YES a little     Sore Throat: no     Cough: no    Wheeze: no    Decreased Appetite: YES- a little less    Nausea: YES- some    Vomiting: no    Diarrhea:  no    Dysuria/Freq.: no    Fatigue/Achiness: YES- fatigue     Sick/Strep Exposure: no     Therapies Tried and outcome: has been using bactrim, Flonase, hot/cold packs    Had some tingling on left shoulder and thought he has shingles, he was seen couple days ago by his pain specialist, had some rash, left lower back and it has resolved. Is interested in medication to help with enlarged prostate. Left sinus was hurting, start bactrim 4 days ago, took one tab daily for couple days and then started taking twice a day, since then started to feel better. Also complain of frequent urination with minimum burning and no blood in the urine.    Problem list and histories reviewed & adjusted, as indicated.  Additional history: as documented    Current Outpatient Prescriptions   Medication Sig Dispense Refill     sulfamethoxazole-trimethoprim (BACTRIM DS/SEPTRA DS) 800-160 MG per tablet Take 1 tablet by mouth 2 times daily 28 tablet 0     dolutegravir (TIVICAY) 50 MG tablet Take 1 tablet (50 mg) by mouth daily Please call [phone] and make appointment with Dr. Lay for future refills. 30 tablet 0     emtricitabine-tenofovir (TRUVADA) 200-300 MG per tablet Take 1 tablet by mouth daily Please call [phone] to make appointment for further refills. 30 tablet 1     clotrimazole (LOTRIMIN) 1 % cream APPLY  TOPICALLY 2 TIMES DAILY 15 g 2     ranitidine (ZANTAC) 150 MG tablet Take 1 tablet (150 mg) by mouth 2 times daily 60 tablet 9     terbinafine (LAMISIL AT) 1 % cream Apply topically 2 times daily To effected areas on feet and toes for two weeks. 42 g 1     lidocaine (LIDODERM) 5 % Patch Apply up to 3 patches to painful area at once for up to 12 h within a 24 h period as needed.  Remove after 12 hours. 30 patch 8     loratadine (CLARITIN) 10 MG tablet Take 1 tablet (10 mg) by mouth daily 30 tablet 1     fluticasone (FLONASE) 50 MCG/ACT spray Spray 1-2 sprays into both nostrils daily 1 Bottle 3     nitroglycerin (NITROSTAT) 0.4 MG sublingual tablet Place 1 tablet under the tongue every 5 mins as needed for chest pain If you are still having symptoms after 3 doses (15 minutes) call 911. 30 tablet 1     psyllium (METAMUCIL SMOOTH TEXTURE) 58.6 % POWD Take 12 g (2 teaspoonful) by mouth 2 times daily 425 g 3     docusate sodium (COLACE) 100 MG tablet Take 100 mg by mouth daily 60 tablet 3     omeprazole (PRILOSEC) 20 MG CR capsule Take 1 capsule (20 mg) by mouth daily 30 capsule 3     aspirin 81 MG EC tablet Take 1 tablet (81 mg) by mouth daily 30 tablet      simvastatin (ZOCOR) 20 MG tablet Take 1 tablet (20 mg) by mouth At Bedtime Hold if taking fluconozole 30 tablet 11     fluconazole (DIFLUCAN) 100 MG tablet TAKE 1/2 TABLET (50 MG) BY MOUTH DAILY 4 tablet 0     FLUoxetine (PROZAC) 20 MG capsule Take 1 capsule (20 mg) by mouth daily Along with 40 mg of fluoxetine 30 capsule 6     ondansetron (ZOFRAN ODT) 4 MG ODT tab Take 1-2 tablets (4-8 mg) by mouth every 8 hours as needed for nausea 40 tablet 7     albuterol (PROAIR HFA/PROVENTIL HFA/VENTOLIN HFA) 108 (90 BASE) MCG/ACT Inhaler Inhale 2 puffs into the lungs every 4 hours as needed for shortness of breath / dyspnea or wheezing 1 Inhaler 11     hydrocortisone (ANUSOL-HC) 2.5 % cream Place rectally 2 times daily 30 g 0     sulfamethoxazole-trimethoprim (BACTRIM  DS/SEPTRA DS) 800-160 MG per tablet Take 1 tablet by mouth daily 60 tablet 1     predniSONE (DELTASONE) 20 MG tablet Take 3 tablets (60 mg) by mouth daily Decrease by 10mg weekly. Example: Until 5/7 3 tabs daily, then 2.5 tabs daily for one week. 70 tablet 0     valACYclovir (VALTREX) 500 MG tablet Take 500 mg by mouth 2 times daily as needed Reported on 5/8/2017 60 tablet 3     fluticasone (FLONASE) 50 MCG/ACT spray Spray 1-2 sprays into both nostrils daily as needed for rhinitis or allergies Reported on 5/8/2017 16 g 5     SPIRIVA RESPIMAT 2.5 MCG/ACT inhalation aerosol INHALE 2 PUFFS INTO THE LUNGS DAILY 4 g 0     ALPRAZolam (XANAX) 0.5 MG tablet One or two tabs daily PRN anxiety 30 tablet 0     beclomethasone (QVAR) 40 MCG/ACT Inhaler Inhale 2 puffs into the lungs 2 times daily 1 Inhaler 3     cyanocobalamin ([employer] VITAMIN  B12) 1000 MCG TABS Take 1 tablet by mouth daily 30 tablet 3     order for DME Equipment being ordered:  Portable Oxygen 1 Device 0     order for DME Equipment being ordered: empty Oxygen tanks, oxygen concentrator and oxygen travel concentrator for portability 1 each 0     nicotine (NICODERM CQ) 14 MG/24HR patch 2h hr Place 1 patch onto the skin every 24 hours 30 patch 1     MORPHINE SULFATE PO Take 15 mg by mouth Reported on 5/8/2017       SUMAtriptan (IMITREX) 50 MG tablet Take 1 tablet (50 mg) by mouth at onset of headache for migraine 30 tablet 1     oxyCODONE-acetaminophen (PERCOCET)  MG per tablet Take 1 tablet by mouth every 4 hours as needed for moderate to severe pain (Max of 6 a day.) 30 tablet 0     ORDER FOR DME Equipment being ordered: Oxygen at 5 liters 1 Device 0     Nutritional Supplements (BOOST) fax # to the county worker fax#[phone] Attn:MILDRED 12 Can 12     Allergies   Allergen Reactions     Diphenhydramine Citrate Anaphylaxis     Estradiol Shortness Of Breath     CMV-TMPDS     Ibuprofen [Ibuprofen/Ibuprofen Lysinate] Shortness Of Breath     Nortriptyline Shortness  "Of Breath     Prochlorperazine Shortness Of Breath     Cytomegalovirus Immune Globulin Unknown     SOB     Demerol [Meperidine]      anxiety     Gabapentin Hives     Icy Hot [Analgesic Loiza]      anxiety     Lyrica      Methocarbamol      anxiety     Naratriptan      Pregabalin Unknown     Anxiety and nightmares     Tegretol [Carbamazepine] Hives     Topamax [Topiramate]      anxiety     Tramadol        Reviewed and updated as needed this visit by clinical staff     Reviewed and updated as needed this visit by Provider         ROS:  Constitutional, HEENT, cardiovascular, pulmonary, gi and gu systems are negative, except as otherwise noted.      OBJECTIVE:   /64  Pulse 88  Temp 98.1  F (36.7  C) (Temporal)  Resp 16  Ht 5' 9\" (1.753 m)  Wt 174 lb (78.9 kg)  SpO2 98%  BMI 25.7 kg/m2  Body mass index is 25.7 kg/(m^2).    GENERAL: no apparent distress  EYES: Conjunctiva are not injected, no discharge.  EARS: Left TM -no erythema, no effusion,  not bulged.               Right TM -no erythema, no effusion,  not bulged.  NOSE: discharge, sinus tenderness  THROAT: no erythema, no exudate, no lesions  NECK: supple, no adenopathy.  CARDIAC: regular rate and rhythm, no murmur  RESP: clear, no wheezing, no rales, no rhonchi  ABD: soft, no distension, no tenderness  SKIN: normal, warm, no rash, nml skin turgor  Patient decline rectal exam to evaluate prostate.    Diagnostic Test Results:  Results for orders placed or performed in visit on 07/25/17   UA with Microscopic reflex to Culture   Result Value Ref Range    Color Urine Yellow     Appearance Urine Clear     Glucose Urine Negative NEG mg/dL    Bilirubin Urine Negative NEG    Ketones Urine Negative NEG mg/dL    Specific Gravity Urine 1.025 1.003 - 1.035    pH Urine 5.5 5.0 - 7.0 pH    Protein Albumin Urine Negative NEG mg/dL    Urobilinogen Urine 0.2 0.2 - 1.0 EU/dL    Nitrite Urine Negative NEG    Blood Urine Trace (A) NEG    Leukocyte Esterase Urine Negative " NEG    Source Unspecified Urine     WBC Urine O - 2 0 - 2 /HPF    RBC Urine O - 2 0 - 2 /HPF    Bacteria Urine Few (A) NEG /HPF    Yeast Urine Few (A) NEG /HPF       ASSESSMENT/PLAN:       ICD-10-CM    1. Urinary frequency R35.0 UA with Microscopic reflex to Culture     CANCELED: UA with Microscopic reflex to Culture   2. Acute sinusitis with coexisting condition, need prophylactic treatment J01.90      [facility]Results.  Symptomatic cares and fever control(if indicated) discussed.  Symptomatic therapy suggested: use acetaminophen prn.   The patient understood the rational for the diagnosis and treatment plan.   All questions were answered to best of my ability and the patient's satisfaction.  Hydrate well, tylenol as needed. Risks, benefits and alternatives of treatments discussed. Plan agreed on.  Followup: if not better. Will call, return to clinic, or go to ED if worsening or symptoms not improving as discussed. See patient instructions.       Patient Instructions     Sinusitis (Antibiotic Treatment)    The sinuses are air-filled spaces within the bones of the face. They connect to the inside of the nose. Sinusitis is an inflammation of the tissue lining the sinus cavity. Sinus inflammation can occur during a cold. It can also be due to allergies to pollens and other particles in the air. Sinusitis can cause symptoms of sinus congestion and fullness. A sinus infection causes fever, headache and facial pain. There is often green or yellow drainage from the nose or into the back of the throat (post-nasal drip). You have been given antibiotics to treat this condition.  Home care:    Take the full course of antibiotics as instructed. Do not stop taking them, even if you feel better.    Drink plenty of water, hot tea, and other liquids. This may help thin mucus. It also may promote sinus drainage.    Heat may help soothe painful areas of the face. Use a towel soaked in hot water. Or,   the shower and direct the hot spray onto your face. Using a vaporizer along with a menthol rub at night may also help.     An expectorant containing guaifenesin may help thin the mucus and promote drainage from the sinuses.    Over-the-counter decongestants may be used unless a similar medicine was prescribed. Nasal sprays work the fastest. Use one that contains phenylephrine or oxymetazoline. First blow the nose gently. Then use the spray. Do not use these medicines more often than directed on the label or symptoms may get worse. You may also use tablets containing pseudoephedrine. Avoid products that combine ingredients, because side effects may be increased. Read labels. You can also ask the pharmacist for help. (NOTE: Persons with high blood pressure should not use decongestants. They can raise blood pressure.)    Over-the-counter antihistamines may help if allergies contributed to your sinusitis.      Do not use nasal rinses or irrigation during an acute sinus infection, unless told to by your health care provider. Rinsing may spread the infection to other sinuses.    Use acetaminophen or ibuprofen to control pain, unless another pain medicine was prescribed. (If you have chronic liver or kidney disease or ever had a stomach ulcer, talk with your doctor before using these medicines. Aspirin should never be used in anyone under 18 years of age who is ill with a fever. It may cause severe liver damage.)    Don't smoke. This can worsen symptoms.  Follow-up care  Follow up with your healthcare provider or our staff if you are not improving within the next week.  When to seek medical advice  Call your healthcare provider if any of these occur:    Facial pain or headache becoming more severe    Stiff neck    Unusual drowsiness or confusion    Swelling of the forehead or eyelids    Vision problems, including blurred or double vision    Fever of 100.4 F (38 C) or higher, or as directed by your healthcare  provider    Seizure    Breathing problems    Symptoms not resolving within 10 days  Date Last Reviewed: 4/13/2015 2000-2017 [employer]. [address] [place] [address], [place] [address]. All rights reserved. This information is not intended as a substitute for professional medical care. Always follow your healthcare professional's instructions.        Dysuria with Uncertain Cause (Adult)    The urethra is the tube that allows urine to pass out of the body. In a woman, the urethra is the opening above the vagina. In men, the urethra is the opening on the tip of the penis. Dysuria is the feeling of pain or burning in the urethra when passing urine.  Dysuria can be caused by anything that irritates or inflames the urethra. An infection or chemical irritation can cause this reaction. A bladder infection is the most common cause of dysuria in adults. A urine test can diagnose this. A bladder infection needs antibiotic treatment.  Soaps, lotions, colognes and feminine hygiene products can cause dysuria. So can birth control jellies, creams, and foams. It will go away 1 to 3 days after using these irritants.  Sexually transmitted diseases (STDs) such as chlamydia or gonorrhea can cause dysuria. Your healthcare provider may take a culture sample. Your provider may start you on antibiotic medicine before the culture test returns.  In women who have gone through menopause, dysuria can be from dryness in the lining of the urethra. This can be treated with hormones. Dysuria becomes long-term (chronic) when it lasts for weeks or months. You may need to see a specialist (urologist) to diagnose and treat chronic dysuria.  Home care  These home care tips may help:    Don't use any chemicals or products that you think may be causing your symptoms.    If you were given a prescription medicine, take as directed. Be sure to take it until it is all used up.    If a culture was taken, don't have sex until you have been told  that it is negative. This means you don't have an infection. Then follow your healthcare provider's advice to treat your condition.  If a culture was done and it is positive:    Both you and your sexual partner may need to be treated. This is true even if your partner has no symptoms.    Contact your healthcare provider or go to an urgent care clinic or the public health department to be looked at and treated.    Don't have sex until both you and your partner(s) have finished all antibiotics and your healthcare provider says you are no longer contagious.    Learn about and use safe sex practices. The safest sex is with a partner who has tested negative and only has sex with you. Condoms can prevent STDs from spreading, but they aren't a guarantee.  Follow-up care  Follow up with your healthcare provider, or as advised. If a culture was taken, you may call as directed for the results. If you have an STD, follow up with your provider or the public health department for a complete STD screening, including HIV testing. For more information, contact CDC-INFO at 036-989-6207.  When to seek medical advice  Call your healthcare provider right away if any of these occur:    You aren't better after 3 days of treatment    Fever of 100.4 F (38 C) or higher, or as directed by your healthcare provider    Back or belly pain that gets worse    You can't urinate because of pain    New discharge from the urethra, vagina, or penis    Painful sores on the penis    Rash or joint pain    Painful lumps (lymph nodes) in the groin    Testicle pain or swelling of the scrotum  Date Last Reviewed: 11/1/2016 2000-2017 [employer]. [address] [place] [address], [place] [address]. All rights reserved. This information is not intended as a substitute for professional medical care. Always follow your healthcare professional's instructions.            Antonella Obrien MD  [facility]

## 2017-07-25 ENCOUNTER — TELEPHONE (OUTPATIENT)
Dept: FAMILY MEDICINE | Facility: OTHER | Age: 47
End: 2017-07-25

## 2017-07-25 ENCOUNTER — OFFICE VISIT (OUTPATIENT)
Dept: FAMILY MEDICINE | Facility: OTHER | Age: 47
End: 2017-07-25
Payer: MEDICARE

## 2017-07-25 VITALS
BODY MASS INDEX: 25.77 KG/M2 | HEART RATE: 88 BPM | HEIGHT: 69 IN | SYSTOLIC BLOOD PRESSURE: 112 MMHG | RESPIRATION RATE: 16 BRPM | OXYGEN SATURATION: 98 % | WEIGHT: 174 LBS | TEMPERATURE: 98.1 F | DIASTOLIC BLOOD PRESSURE: 64 MMHG

## 2017-07-25 DIAGNOSIS — J01.90 ACUTE SINUSITIS WITH COEXISTING CONDITION, NEED PROPHYLACTIC TREATMENT: ICD-10-CM

## 2017-07-25 DIAGNOSIS — R35.0 URINARY FREQUENCY: Primary | ICD-10-CM

## 2017-07-25 LAB
ALBUMIN UR-MCNC: NEGATIVE MG/DL
APPEARANCE UR: CLEAR
BACTERIA #/AREA URNS HPF: ABNORMAL /HPF
BILIRUB UR QL STRIP: NEGATIVE
COLOR UR AUTO: YELLOW
GLUCOSE UR STRIP-MCNC: NEGATIVE MG/DL
HGB UR QL STRIP: ABNORMAL
KETONES UR STRIP-MCNC: NEGATIVE MG/DL
LEUKOCYTE ESTERASE UR QL STRIP: NEGATIVE
NITRATE UR QL: NEGATIVE
PH UR STRIP: 5.5 PH (ref 5–7)
RBC #/AREA URNS AUTO: ABNORMAL /HPF (ref 0–2)
SP GR UR STRIP: 1.02 (ref 1–1.03)
URN SPEC COLLECT METH UR: ABNORMAL
UROBILINOGEN UR STRIP-ACNC: 0.2 EU/DL (ref 0.2–1)
WBC #/AREA URNS AUTO: ABNORMAL /HPF (ref 0–2)
YEAST #/AREA URNS HPF: ABNORMAL /HPF

## 2017-07-25 PROCEDURE — 99213 OFFICE O/P EST LOW 20 MIN: CPT | Performed by: FAMILY MEDICINE

## 2017-07-25 PROCEDURE — 81001 URINALYSIS AUTO W/SCOPE: CPT | Performed by: FAMILY MEDICINE

## 2017-07-25 RX ORDER — SULFAMETHOXAZOLE/TRIMETHOPRIM 800-160 MG
1 TABLET ORAL 2 TIMES DAILY
Qty: 28 TABLET | Refills: 0 | COMMUNITY
Start: 2017-07-25 | End: 2018-02-19

## 2017-07-25 ASSESSMENT — PAIN SCALES - GENERAL: PAINLEVEL: SEVERE PAIN (6)

## 2017-07-25 NOTE — TELEPHONE ENCOUNTER
Spoke to patient he will come by tomorrow to complete his section of the form,   I have placed the form at the [facility]    Lois MARKHAM (R)

## 2017-07-25 NOTE — TELEPHONE ENCOUNTER
Please advise patient, I need him complete his section before I can sign off.  Form is placed in MA basket.    Thank you.  Electronically signed:  Antonella Obrien M.D.

## 2017-07-25 NOTE — MR AVS SNAPSHOT
After Visit Summary   7/25/2017    Tommy Ross    MRN: [id number]           Patient Information     Date Of Birth          1970        Visit Information        Provider Department      7/25/2017 12:50 PM Antonella Obrien MD [facility]        Today's Diagnoses     Urinary frequency    -  1    Acute sinusitis with coexisting condition, need prophylactic treatment          Care Instructions      Sinusitis (Antibiotic Treatment)    The sinuses are air-filled spaces within the bones of the face. They connect to the inside of the nose. Sinusitis is an inflammation of the tissue lining the sinus cavity. Sinus inflammation can occur during a cold. It can also be due to allergies to pollens and other particles in the air. Sinusitis can cause symptoms of sinus congestion and fullness. A sinus infection causes fever, headache and facial pain. There is often green or yellow drainage from the nose or into the back of the throat (post-nasal drip). You have been given antibiotics to treat this condition.  Home care:    Take the full course of antibiotics as instructed. Do not stop taking them, even if you feel better.    Drink plenty of water, hot tea, and other liquids. This may help thin mucus. It also may promote sinus drainage.    Heat may help soothe painful areas of the face. Use a towel soaked in hot water. Or,  the shower and direct the hot spray onto your face. Using a vaporizer along with a menthol rub at night may also help.     An expectorant containing guaifenesin may help thin the mucus and promote drainage from the sinuses.    Over-the-counter decongestants may be used unless a similar medicine was prescribed. Nasal sprays work the fastest. Use one that contains phenylephrine or oxymetazoline. First blow the nose gently. Then use the spray. Do not use these medicines more often than directed on the label or symptoms may get worse. You may also use tablets containing  pseudoephedrine. Avoid products that combine ingredients, because side effects may be increased. Read labels. You can also ask the pharmacist for help. (NOTE: Persons with high blood pressure should not use decongestants. They can raise blood pressure.)    Over-the-counter antihistamines may help if allergies contributed to your sinusitis.      Do not use nasal rinses or irrigation during an acute sinus infection, unless told to by your health care provider. Rinsing may spread the infection to other sinuses.    Use acetaminophen or ibuprofen to control pain, unless another pain medicine was prescribed. (If you have chronic liver or kidney disease or ever had a stomach ulcer, talk with your doctor before using these medicines. Aspirin should never be used in anyone under 18 years of age who is ill with a fever. It may cause severe liver damage.)    Don't smoke. This can worsen symptoms.  Follow-up care  Follow up with your healthcare provider or our staff if you are not improving within the next week.  When to seek medical advice  Call your healthcare provider if any of these occur:    Facial pain or headache becoming more severe    Stiff neck    Unusual drowsiness or confusion    Swelling of the forehead or eyelids    Vision problems, including blurred or double vision    Fever of 100.4 F (38 C) or higher, or as directed by your healthcare provider    Seizure    Breathing problems    Symptoms not resolving within 10 days  Date Last Reviewed: 4/13/2015 2000-2017 The "[employer]". [address] [place] [address]. All rights reserved. This information is not intended as a substitute for professional medical care. Always follow your healthcare professional's instructions.        Dysuria with Uncertain Cause (Adult)    The urethra is the tube that allows urine to pass out of the body. In a woman, the urethra is the opening above the vagina. In men, the urethra is the opening on the tip of the penis.  Dysuria is the feeling of pain or burning in the urethra when passing urine.  Dysuria can be caused by anything that irritates or inflames the urethra. An infection or chemical irritation can cause this reaction. A bladder infection is the most common cause of dysuria in adults. A urine test can diagnose this. A bladder infection needs antibiotic treatment.  Soaps, lotions, colognes and feminine hygiene products can cause dysuria. So can birth control jellies, creams, and foams. It will go away 1 to 3 days after using these irritants.  Sexually transmitted diseases (STDs) such as chlamydia or gonorrhea can cause dysuria. Your healthcare provider may take a culture sample. Your provider may start you on antibiotic medicine before the culture test returns.  In women who have gone through menopause, dysuria can be from dryness in the lining of the urethra. This can be treated with hormones. Dysuria becomes long-term (chronic) when it lasts for weeks or months. You may need to see a specialist (urologist) to diagnose and treat chronic dysuria.  Home care  These home care tips may help:    Don't use any chemicals or products that you think may be causing your symptoms.    If you were given a prescription medicine, take as directed. Be sure to take it until it is all used up.    If a culture was taken, don't have sex until you have been told that it is negative. This means you don't have an infection. Then follow your healthcare provider's advice to treat your condition.  If a culture was done and it is positive:    Both you and your sexual partner may need to be treated. This is true even if your partner has no symptoms.    Contact your healthcare provider or go to an urgent care clinic or the public health department to be looked at and treated.    Don't have sex until both you and your partner(s) have finished all antibiotics and your healthcare provider says you are no longer contagious.    Learn about and use safe  sex practices. The safest sex is with a partner who has tested negative and only has sex with you. Condoms can prevent STDs from spreading, but they aren't a guarantee.  Follow-up care  Follow up with your healthcare provider, or as advised. If a culture was taken, you may call as directed for the results. If you have an STD, follow up with your provider or the public health department for a complete STD screening, including HIV testing. For more information, contact CDC-INFO at 910-830-4000.  When to seek medical advice  Call your healthcare provider right away if any of these occur:    You aren't better after 3 days of treatment    Fever of 100.4 F (38 C) or higher, or as directed by your healthcare provider    Back or belly pain that gets worse    You can't urinate because of pain    New discharge from the urethra, vagina, or penis    Painful sores on the penis    Rash or joint pain    Painful lumps (lymph nodes) in the groin    Testicle pain or swelling of the scrotum  Date Last Reviewed: 11/1/2016 2000-2017 [employer]. [address] [place] [address]. All rights reserved. This information is not intended as a substitute for professional medical care. Always follow your healthcare professional's instructions.                Follow-ups after your visit        Who to contact     If you have questions or need follow up information about today's clinic visit or your schedule please contact [facility] directly at [phone].  Normal or non-critical lab and imaging results will be communicated to you by MyChart, letter or phone within 4 business days after the clinic has received the results. If you do not hear from us within 7 days, please contact the clinic through MyChart or phone. If you have a critical or abnormal lab result, we will notify you by phone as soon as possible.  Submit refill requests through [employer] or call your pharmacy and they will forward the refill  "request to us. Please allow 3 business days for your refill to be completed.          Additional Information About Your Visit        MyChart Information     [employer] lets you send messages to your doctor, view your test results, renew your prescriptions, schedule appointments and more. To sign up, go to [email or web address] . Click on \"Log in\" on the left side of the screen, which will take you to the Welcome page. Then click on \"Sign up Now\" on the right side of the page.     You will be asked to enter the access code listed below, as well as some personal information. Please follow the directions to create your username and password.     Your access code is: [email or web address]  Expires: 2017 12:24 PM     Your access code will  in 90 days. If you need help or a new code, please call your [facility] clinic or [phone].        Care EveryWhere ID     This is your [facility] EveryWhere ID. This could be used by other organizations to access your [facility] medical records  [id number]        Your Vitals Were     Pulse Temperature Respirations Height Pulse Oximetry BMI (Body Mass Index)    88 98.1  F (36.7  C) (Temporal) 16 5' 9\" (1.753 m) 98% 25.7 kg/m2       Blood Pressure from Last 3 Encounters:   17 112/64   17 132/78   05/15/17 122/80    Weight from Last 3 Encounters:   17 174 lb (78.9 kg)   17 171 lb 14.4 oz (78 kg)   05/15/17 173 lb 3.2 oz (78.6 kg)              We Performed the Following     UA with Microscopic reflex to Culture        Primary Care Provider Office Phone # Fax #    Antonella Obrien [phone]       [facility] [address] [facility] [address]        Goals        General    I want more information on palliative card/Start Date 2014 (pt-stated)     Notes - Note created  4/15/2014 10:07 AM by Tania Vasquez MSW    As of today's date 4/15/2014 goal is met at 0 - 25%.   Goal Status:  Active        I want to feel normal again, and be " able to address my pain/Start Date 4/14/2014 (pt-stated)     Notes - Note created  4/15/2014 10:06 AM by Tania Vasquez MSW    As of today's date 4/15/2014 goal is met at 0 - 25%.   Goal Status:  Active        I will take my nirto as directed for chest pain. (pt-stated)     I will weigh myself daily and keep a record (pt-stated)       Equal Access to Services     MARI DEAN : Hadii aad ku hadasho Soomaali, waaxda luqadaha, qaybta kaalmada adeegyada, waxay idiin hayaan starla thorpelexiiyuriy lacristobal . So [facility] [phone].    ATENCIÓN: Si jacobla espyasemin, tiene a alejandro disposición servicios gratuitos de asistencia lingüística. Llame al [phone].    We comply with applicable federal civil rights laws and [place] laws. We do not discriminate on the basis of race, color, national origin, age, disability sex, sexual orientation or gender identity.            Thank you!     Thank you for choosing [facility]  for your care. Our goal is always to provide you with excellent care. Hearing back from our patients is one way we can continue to improve our services. Please take a few minutes to complete the written survey that you may receive in the mail after your visit with us. Thank you!             Your Updated Medication List - Protect others around you: Learn how to safely use, store and throw away your medicines at www.disposemymeds.org.          This list is accurate as of: 7/25/17  1:44 PM.  Always use your most recent med list.                   Brand Name Dispense Instructions for use Diagnosis    albuterol 108 (90 BASE) MCG/ACT Inhaler    PROAIR HFA/PROVENTIL HFA/VENTOLIN HFA    1 Inhaler    Inhale 2 puffs into the lungs every 4 hours as needed for shortness of breath / dyspnea or wheezing    Acute bronchospasm       ALPRAZolam 0.5 MG tablet    XANAX    30 tablet    One or two tabs daily PRN anxiety    Anxiety       aspirin 81 MG EC tablet     30 tablet    Take 1 tablet (81 mg) by mouth daily         beclomethasone 40 MCG/ACT Inhaler    QVAR    1 Inhaler    Inhale 2 puffs into the lungs 2 times daily    Acute on chronic respiratory failure with hypoxia (H)       BOOST     12 Can    fax # to the county worker fax#[phone] Attn:MILDRED Sorenson       clotrimazole 1 % cream    LOTRIMIN    15 g    APPLY TOPICALLY 2 TIMES DAILY    Tinea pedis of both feet       cyanocobalamin 1000 MCG Tabs    [facility] vitamin  B12    30 tablet    Take 1 tablet by mouth daily    Vitamin B12 deficiency (non anemic)       docusate sodium 100 MG tablet    COLACE    60 tablet    Take 100 mg by mouth daily    Constipation, unspecified constipation type       dolutegravir 50 MG tablet    TIVICAY    30 tablet    Take 1 tablet (50 mg) by mouth daily Please call [phone] and make appointment with Dr. Lay for future refills.    Human immunodeficiency virus (HIV) disease (H)       emtricitabine-tenofovir 200-300 MG per tablet    TRUVADA    30 tablet    Take 1 tablet by mouth daily Please call [phone] to make appointment for further refills.    HIV (human immunodeficiency virus infection) (H)       * FLONASE 50 MCG/ACT spray   Generic drug:  fluticasone     16 g    Spray 1-2 sprays into both nostrils daily as needed for rhinitis or allergies Reported on 5/8/2017    Acute recurrent maxillary sinusitis       * fluticasone 50 MCG/ACT spray    FLONASE    1 Bottle    Spray 1-2 sprays into both nostrils daily    Seasonal allergic rhinitis, unspecified allergic rhinitis trigger       fluconazole 100 MG tablet    DIFLUCAN    4 tablet    TAKE 1/2 TABLET (50 MG) BY MOUTH DAILY    Thrush       FLUoxetine 20 MG capsule    PROzac    30 capsule    Take 1 capsule (20 mg) by mouth daily Along with 40 mg of fluoxetine    Anxiety       hydrocortisone 2.5 % cream    ANUSOL-HC    30 g    Place rectally 2 times daily    External hemorrhoids       lidocaine 5 % Patch    LIDODERM    30 patch    Apply up to 3 patches to painful area at once for up to 12 h  within a 24 h period as needed.  Remove after 12 hours.    Other chronic pain, Low back pain, unspecified back pain laterality, unspecified chronicity, with sciatica presence unspecified       loratadine 10 MG tablet    CLARITIN    30 tablet    Take 1 tablet (10 mg) by mouth daily    Seasonal allergic rhinitis, unspecified allergic rhinitis trigger       MORPHINE SULFATE PO      Take 15 mg by mouth Reported on 5/8/2017        nicotine 14 MG/24HR 24 hr patch    NICODERM CQ    30 patch    Place 1 patch onto the skin every 24 hours    Tobacco use disorder       nitroGLYcerin 0.4 MG sublingual tablet    NITROSTAT    30 tablet    Place 1 tablet under the tongue every 5 mins as needed for chest pain If you are still having symptoms after 3 doses (15 minutes) call 911.    Acute diastolic CHF (congestive heart failure) (H)       omeprazole 20 MG CR capsule    priLOSEC    30 capsule    Take 1 capsule (20 mg) by mouth daily    Constipation, unspecified constipation type       ondansetron 4 MG ODT tab    ZOFRAN ODT    40 tablet    Take 1-2 tablets (4-8 mg) by mouth every 8 hours as needed for nausea    Nausea       * order for DME     1 Device    Equipment being ordered: Oxygen at 5 liters    HIV (human immunodeficiency virus infection) (H), Acute respiratory failure with hypoxia (H), Acute diastolic CHF (congestive heart failure) (H)       * order for DME     1 each    Equipment being ordered: empty Oxygen tanks, oxygen concentrator and oxygen travel concentrator for portability    Pneumonia of both lungs due to infectious organism, unspecified part of lung, Acute respiratory failure with hypoxia (H)       order for DME     1 Device    Equipment being ordered:  Portable Oxygen    Pneumonia of both lower lobes due to infectious organism       oxyCODONE-acetaminophen  MG per tablet    PERCOCET    30 tablet    Take 1 tablet by mouth every 4 hours as needed for moderate to severe pain (Max of 6 a day.)    Low back pain,  Chronic pain       predniSONE 20 MG tablet    DELTASONE    70 tablet    Take 3 tablets (60 mg) by mouth daily Decrease by 10mg weekly. Example: Until 5/7 3 tabs daily, then 2.5 tabs daily for one week.    Pneumonitis, Pneumonia of right lower lobe due to infectious organism (H)       psyllium 58.6 % Powd    METAMUCIL SMOOTH TEXTURE    425 g    Take 12 g (2 teaspoonful) by mouth 2 times daily    Constipation, unspecified constipation type       ranitidine 150 MG tablet    ZANTAC    60 tablet    Take 1 tablet (150 mg) by mouth 2 times daily    Gastroesophageal reflux disease, esophagitis presence not specified       simvastatin 20 MG tablet    ZOCOR    30 tablet    Take 1 tablet (20 mg) by mouth At Bedtime Hold if taking fluconozole    Ischemic chest pain (H)       SPIRIVA RESPIMAT 2.5 MCG/ACT inhalation aerosol   Generic drug:  tiotropium     4 g    INHALE 2 PUFFS INTO THE LUNGS DAILY    Acute and chronic respiratory failure with hypoxia (H), Acute bronchospasm, Pneumonia of both lungs due to infectious organism, unspecified part of lung       * sulfamethoxazole-trimethoprim 800-160 MG per tablet    BACTRIM DS/SEPTRA DS    60 tablet    Take 1 tablet by mouth daily    HIV (human immunodeficiency virus infection) (H)       * sulfamethoxazole-trimethoprim 800-160 MG per tablet    BACTRIM DS/SEPTRA DS    28 tablet    Take 1 tablet by mouth 2 times daily        SUMAtriptan 50 MG tablet    IMITREX    30 tablet    Take 1 tablet (50 mg) by mouth at onset of headache for migraine    Headache(784.0)       terbinafine 1 % cream    lamISIL AT    42 g    Apply topically 2 times daily To effected areas on feet and toes for two weeks.    Tinea pedis of both feet       valACYclovir 500 MG tablet    VALTREX    60 tablet    Take 500 mg by mouth 2 times daily as needed Reported on 5/8/2017    Genital herpes in men       * Notice:  This list has 6 medication(s) that are the same as other medications prescribed for you. Read the  directions carefully, and ask your doctor or other care provider to review them with you.

## 2017-07-25 NOTE — TELEPHONE ENCOUNTER
Patient was reminded today to set up appointment with Allergy and Asthma for consultation.    Electronically signed:  Antonella Obrien M.D.

## 2017-07-25 NOTE — TELEPHONE ENCOUNTER
Pt stated the one he has now is going to  and would like to get a new parking certificate. I have filled this out to the best of my ability and there are some sections the pt will need to complete after the provider's portion is done. Form is in provider's form basket at desk.    Desiree Saul CMA ([facility])

## 2017-07-25 NOTE — TELEPHONE ENCOUNTER
You placed a referral for patient to Allergy & Asthma  on 6/20/17.  Patient has not scheduled as of yet.      Please review and forward to team if follow up with the patient is needed.     Thank you!  Paty/Clinic Referrals Dyad II

## 2017-07-25 NOTE — PATIENT INSTRUCTIONS
Sinusitis (Antibiotic Treatment)    The sinuses are air-filled spaces within the bones of the face. They connect to the inside of the nose. Sinusitis is an inflammation of the tissue lining the sinus cavity. Sinus inflammation can occur during a cold. It can also be due to allergies to pollens and other particles in the air. Sinusitis can cause symptoms of sinus congestion and fullness. A sinus infection causes fever, headache and facial pain. There is often green or yellow drainage from the nose or into the back of the throat (post-nasal drip). You have been given antibiotics to treat this condition.  Home care:    Take the full course of antibiotics as instructed. Do not stop taking them, even if you feel better.    Drink plenty of water, hot tea, and other liquids. This may help thin mucus. It also may promote sinus drainage.    Heat may help soothe painful areas of the face. Use a towel soaked in hot water. Or,  the shower and direct the hot spray onto your face. Using a vaporizer along with a menthol rub at night may also help.     An expectorant containing guaifenesin may help thin the mucus and promote drainage from the sinuses.    Over-the-counter decongestants may be used unless a similar medicine was prescribed. Nasal sprays work the fastest. Use one that contains phenylephrine or oxymetazoline. First blow the nose gently. Then use the spray. Do not use these medicines more often than directed on the label or symptoms may get worse. You may also use tablets containing pseudoephedrine. Avoid products that combine ingredients, because side effects may be increased. Read labels. You can also ask the pharmacist for help. (NOTE: Persons with high blood pressure should not use decongestants. They can raise blood pressure.)    Over-the-counter antihistamines may help if allergies contributed to your sinusitis.      Do not use nasal rinses or irrigation during an acute sinus infection, unless told to by  your health care provider. Rinsing may spread the infection to other sinuses.    Use acetaminophen or ibuprofen to control pain, unless another pain medicine was prescribed. (If you have chronic liver or kidney disease or ever had a stomach ulcer, talk with your doctor before using these medicines. Aspirin should never be used in anyone under 18 years of age who is ill with a fever. It may cause severe liver damage.)    Don't smoke. This can worsen symptoms.  Follow-up care  Follow up with your healthcare provider or our staff if you are not improving within the next week.  When to seek medical advice  Call your healthcare provider if any of these occur:    Facial pain or headache becoming more severe    Stiff neck    Unusual drowsiness or confusion    Swelling of the forehead or eyelids    Vision problems, including blurred or double vision    Fever of 100.4 F (38 C) or higher, or as directed by your healthcare provider    Seizure    Breathing problems    Symptoms not resolving within 10 days  Date Last Reviewed: 4/13/2015 2000-2017 [employer]. [address] [place] [address]. All rights reserved. This information is not intended as a substitute for professional medical care. Always follow your healthcare professional's instructions.        Dysuria with Uncertain Cause (Adult)    The urethra is the tube that allows urine to pass out of the body. In a woman, the urethra is the opening above the vagina. In men, the urethra is the opening on the tip of the penis. Dysuria is the feeling of pain or burning in the urethra when passing urine.  Dysuria can be caused by anything that irritates or inflames the urethra. An infection or chemical irritation can cause this reaction. A bladder infection is the most common cause of dysuria in adults. A urine test can diagnose this. A bladder infection needs antibiotic treatment.  Soaps, lotions, colognes and feminine hygiene products can cause dysuria.  So can birth control jellies, creams, and foams. It will go away 1 to 3 days after using these irritants.  Sexually transmitted diseases (STDs) such as chlamydia or gonorrhea can cause dysuria. Your healthcare provider may take a culture sample. Your provider may start you on antibiotic medicine before the culture test returns.  In women who have gone through menopause, dysuria can be from dryness in the lining of the urethra. This can be treated with hormones. Dysuria becomes long-term (chronic) when it lasts for weeks or months. You may need to see a specialist (urologist) to diagnose and treat chronic dysuria.  Home care  These home care tips may help:    Don't use any chemicals or products that you think may be causing your symptoms.    If you were given a prescription medicine, take as directed. Be sure to take it until it is all used up.    If a culture was taken, don't have sex until you have been told that it is negative. This means you don't have an infection. Then follow your healthcare provider's advice to treat your condition.  If a culture was done and it is positive:    Both you and your sexual partner may need to be treated. This is true even if your partner has no symptoms.    Contact your healthcare provider or go to an urgent care clinic or the public health department to be looked at and treated.    Don't have sex until both you and your partner(s) have finished all antibiotics and your healthcare provider says you are no longer contagious.    Learn about and use safe sex practices. The safest sex is with a partner who has tested negative and only has sex with you. Condoms can prevent STDs from spreading, but they aren't a guarantee.  Follow-up care  Follow up with your healthcare provider, or as advised. If a culture was taken, you may call as directed for the results. If you have an STD, follow up with your provider or the public health department for a complete STD screening, including HIV  testing. For more information, contact CDC-INFO at 149-229-6150.  When to seek medical advice  Call your healthcare provider right away if any of these occur:    You aren't better after 3 days of treatment    Fever of 100.4 F (38 C) or higher, or as directed by your healthcare provider    Back or belly pain that gets worse    You can't urinate because of pain    New discharge from the urethra, vagina, or penis    Painful sores on the penis    Rash or joint pain    Painful lumps (lymph nodes) in the groin    Testicle pain or swelling of the scrotum  Date Last Reviewed: 11/1/2016 2000-2017 [employer]. [address] [place] [address], [place] [address]. All rights reserved. This information is not intended as a substitute for professional medical care. Always follow your healthcare professional's instructions.

## 2017-07-26 ASSESSMENT — ASTHMA QUESTIONNAIRES: ACT_TOTALSCORE: 19

## 2017-07-28 ENCOUNTER — TELEPHONE (OUTPATIENT)
Dept: FAMILY MEDICINE | Facility: OTHER | Age: 47
End: 2017-07-28

## 2017-07-28 DIAGNOSIS — M54.5 LOW BACK PAIN, UNSPECIFIED BACK PAIN LATERALITY, UNSPECIFIED CHRONICITY, WITH SCIATICA PRESENCE UNSPECIFIED: ICD-10-CM

## 2017-07-28 DIAGNOSIS — F41.9 ANXIETY: ICD-10-CM

## 2017-07-28 DIAGNOSIS — G89.29 OTHER CHRONIC PAIN: ICD-10-CM

## 2017-07-28 RX ORDER — ALPRAZOLAM 0.5 MG
TABLET ORAL
Qty: 30 TABLET | Refills: 0 | Status: SHIPPED | OUTPATIENT
Start: 2017-07-28 | End: 2019-01-04

## 2017-07-28 RX ORDER — LIDOCAINE 50 MG/G
PATCH TOPICAL
Qty: 30 PATCH | Refills: 8 | Status: CANCELLED | OUTPATIENT
Start: 2017-07-28

## 2017-07-28 NOTE — TELEPHONE ENCOUNTER
Prescription placed in MA basket, please fax.  Thank you.    Electronically signed:  Antonella Obrien M.D.

## 2017-07-28 NOTE — TELEPHONE ENCOUNTER
Copy was brought to . Did have a fax number. Patient told  he would pick it up.  Ayla Rodrigues MA

## 2017-07-28 NOTE — TELEPHONE ENCOUNTER
Patient completed his portion of form. When form is complete please fax to [facility]. And place copy upfront for patient to .    Hiwot Stovall MA

## 2017-07-28 NOTE — TELEPHONE ENCOUNTER
Left message for patient to return call to clinic. When call is returned please ask patient if he has stopped by clinic to complete his section of the handicap form.    Hiwot Stovall MA

## 2017-07-28 NOTE — TELEPHONE ENCOUNTER
Alprazolam      Last Written Prescription Date:  03/07/2017  Last Fill Quantity: 30,   # refills: 0  Last Office Visit with [facility] or  Health prescribing provider: 07/25/2017  Future Office visit:       Routing refill request to provider for review/approval because:  Drug not on the [facility], [facility] or [facility] refill protocol or controlled substance      Lidoderm Patch- needed PA. PA was denied, cannot do appeal because there has been no change in diagnosis or instructions so insurance will not cover this.     Hiwot Stovall MA

## 2017-07-28 NOTE — TELEPHONE ENCOUNTER
Pt states has not stopped by clinic yet to complete handicap form , states will try to stop by today

## 2017-07-28 NOTE — TELEPHONE ENCOUNTER
Reason for call:  Medication  Reason for Call:  Medication or medication refill:    Do you use a [facility]?  Name of the pharmacy and phone number for the current request:  [facility] in [place]    Name of the medication requested: ALPRAZolam (XANAX) 0.5 MG tablet,lidocaine (LIDODERM) 5 % Patch    Other request: pt would also like the form he filled out for handicap to be faxed to the [facility]. Also he would like if we were to keep that form upfront so he can  at a later time. Please advise.     Can we leave a detailed message on this number? YES    Phone number patient can be reached at: Home number on file [phone] (home)    Best Time: anytime    Call taken on 7/28/2017 at 2:59 PM by Iram Kong

## 2017-07-31 ENCOUNTER — TELEPHONE (OUTPATIENT)
Dept: FAMILY MEDICINE | Facility: OTHER | Age: 47
End: 2017-07-31

## 2017-07-31 NOTE — TELEPHONE ENCOUNTER
Patient informed that this had been denied and we tried to appeal it but insurance stated we had to wait 60 days.

## 2017-07-31 NOTE — TELEPHONE ENCOUNTER
Patient said you were to fill out a PA for Lidocaine patch, he said he has not heard anything back regarding this.

## 2017-08-03 DIAGNOSIS — F17.200 TOBACCO USE DISORDER: ICD-10-CM

## 2017-08-03 NOTE — TELEPHONE ENCOUNTER
Loratadine 10 mg      Last Written Prescription Date: 6/20/2017  Last Fill Quantity: 30,  # refills: 1   Last Office Visit with [facility] or [facility] prescribing provider: 7/25/2017    Nicotine patch     Last Written Prescription Date: 11/7/2016  Last Fill Quantity: 30, # refills: 1  Last Office Visit with [facility], [facility] or [facility] prescribing provider: 7/25/2017        BP Readings from Last 3 Encounters:   07/25/17 112/64   06/20/17 132/78   05/15/17 122/80

## 2017-08-04 NOTE — TELEPHONE ENCOUNTER
loratadine (CLARITIN) 10 MG tablet        Last Written Prescription Date: 6/20/17  Last Fill Quantity: 30,  # refills: 1   Last Office Visit with [facility], [facility] or [facility] prescribing provider: 7/25/17                                               nicotine (NICODERM CQ) 14 MG/24HR patch 2h hr       Last Written Prescription Date: 11/7/16  Last Fill Quantity: 30 patch, # refills: 1  Last Office Visit with [facility], [facility] or [facility] prescribing provider: 7/25/17        BP Readings from Last 3 Encounters:   07/25/17 112/64   06/20/17 132/78   05/15/17 122/80

## 2017-08-08 DIAGNOSIS — B37.0 THRUSH: ICD-10-CM

## 2017-08-08 RX ORDER — NICOTINE 21 MG/24HR
1 PATCH, TRANSDERMAL 24 HOURS TRANSDERMAL EVERY 24 HOURS
Qty: 30 PATCH | Refills: 1 | Status: SHIPPED | OUTPATIENT
Start: 2017-08-08 | End: 2018-02-19

## 2017-08-08 RX ORDER — LORATADINE 10 MG/1
10 TABLET ORAL DAILY
Qty: 30 TABLET | Refills: 3 | Status: SHIPPED | OUTPATIENT
Start: 2017-08-08

## 2017-08-08 NOTE — TELEPHONE ENCOUNTER
Fluconazole 100 mg      Last Written Prescription Date: 5/15/2017  Last Fill Quantity: 4,  # refills: 0   Last Office Visit with [facility], [facility] or [facility] prescribing provider: 7/25/2017

## 2017-08-08 NOTE — TELEPHONE ENCOUNTER
Prescription approved per [facility] Refill Protocol.  Janusz Harmon, RN, BSN

## 2017-08-09 RX ORDER — FLUCONAZOLE 100 MG/1
TABLET ORAL
Qty: 4 TABLET | Refills: 1 | Status: SHIPPED | OUTPATIENT
Start: 2017-08-09 | End: 2017-09-05

## 2017-08-14 DIAGNOSIS — B35.3 TINEA PEDIS OF BOTH FEET: ICD-10-CM

## 2017-08-15 RX ORDER — CLOTRIMAZOLE 1 %
CREAM (GRAM) TOPICAL
Qty: 15 G | Refills: 1 | Status: SHIPPED | OUTPATIENT
Start: 2017-08-15 | End: 2018-02-19

## 2017-08-15 NOTE — TELEPHONE ENCOUNTER
Clotrimazole      Last Written Prescription Date:  07/17/17  Last Fill Quantity: 15g,   # refills: 2  Last Office Visit with [facility], [facility] or  Health prescribing provider: 07/25/17  Future Office visit:       Routing refill request to provider for review/approval because:  Drug not on the [facility], [facility] or  [facility] refill protocol or controlled substance

## 2017-08-23 ENCOUNTER — OFFICE VISIT (OUTPATIENT)
Dept: URGENT CARE | Facility: RETAIL CLINIC | Age: 47
End: 2017-08-23
Payer: MEDICARE

## 2017-08-23 VITALS
TEMPERATURE: 99 F | OXYGEN SATURATION: 99 % | DIASTOLIC BLOOD PRESSURE: 76 MMHG | HEART RATE: 74 BPM | SYSTOLIC BLOOD PRESSURE: 112 MMHG

## 2017-08-23 DIAGNOSIS — F17.200 TOBACCO USE DISORDER: ICD-10-CM

## 2017-08-23 DIAGNOSIS — R19.7 VOMITING AND DIARRHEA: ICD-10-CM

## 2017-08-23 DIAGNOSIS — J02.9 ACUTE PHARYNGITIS, UNSPECIFIED ETIOLOGY: Primary | ICD-10-CM

## 2017-08-23 DIAGNOSIS — R11.10 VOMITING AND DIARRHEA: ICD-10-CM

## 2017-08-23 LAB — S PYO AG THROAT QL IA.RAPID: NORMAL

## 2017-08-23 PROCEDURE — 99213 OFFICE O/P EST LOW 20 MIN: CPT | Performed by: PHYSICIAN ASSISTANT

## 2017-08-23 PROCEDURE — 87081 CULTURE SCREEN ONLY: CPT | Performed by: PHYSICIAN ASSISTANT

## 2017-08-23 PROCEDURE — 87880 STREP A ASSAY W/OPTIC: CPT | Mod: QW | Performed by: PHYSICIAN ASSISTANT
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
## 2017-08-23 NOTE — NURSING NOTE
"Chief Complaint   Patient presents with     Pharyngitis     sore throat x 1 day vomiting since last night        Initial /76  Pulse 74  Temp 99  F (37.2  C) (Tympanic)  SpO2 99% Estimated body mass index is 25.7 kg/(m^2) as calculated from the following:    Height as of 7/25/17: 5' 9\" (1.753 m).    Weight as of 7/25/17: 174 lb (78.9 kg).  Medication Reconciliation: complete     Jessica Sundet      "

## 2017-08-23 NOTE — PATIENT INSTRUCTIONS
Hold all liquids and solids until no vomiting x 1 hour. Then start with small sips of clear liquids - wait 10 minutes and if no vomiting gradually increase amount of fluids every 10 minutes and advance diet as tolerated.    If having diarrhea continue offering fluids in small amounts and frequently. Try to eat some yogurt daily (or take a probiotic). Advance diet as tolerated.    Gatorade   ................................      Please FOLLOW UP at primary care clinic if not improving, new symptoms, worse or this does not resolve.  [facility]  [phone]

## 2017-08-23 NOTE — MR AVS SNAPSHOT
"              After Visit Summary   2017    Tommy Ross    MRN: [id number]           Patient Information     Date Of Birth          1970        Visit Information        Provider Department      2017 12:40 PM Katie Armendariz PA-C [facility]        Today's Diagnoses     Acute pharyngitis, unspecified etiology    -  1    Tobacco use disorder          Care Instructions    Hold all liquids and solids until no vomiting x 1 hour. Then start with small sips of clear liquids - wait 10 minutes and if no vomiting gradually increase amount of fluids every 10 minutes and advance diet as tolerated.    If having diarrhea continue offering fluids in small amounts and frequently. Try to eat some yogurt daily (or take a probiotic). Advance diet as tolerated.    Gatorade   ................................      Please FOLLOW UP at primary care clinic if not improving, new symptoms, worse or this does not resolve.  [facility]  [phone]              Follow-ups after your visit        Who to contact     You can reach your care team any time of the day by calling [phone].  Notification of test results:  If you have an abnormal lab result, we will notify you by phone as soon as possible.         Additional Information About Your Visit        MyChart Information     [employer] lets you send messages to your doctor, view your test results, renew your prescriptions, schedule appointments and more. To sign up, go to [email or web address] . Click on \"Log in\" on the left side of the screen, which will take you to the Welcome page. Then click on \"Sign up Now\" on the right side of the page.     You will be asked to enter the access code listed below, as well as some personal information. Please follow the directions to create your username and password.     Your access code is: [email or web address]  Expires: 2017 12:24 PM     Your access code will  in 90 days. If you need help or a " new code, please call your [place] clinic or [phone].        Care EveryWhere ID     This is your Care EveryWhere ID. This could be used by other organizations to access your [place] medical records  [id number]        Your Vitals Were     Pulse Temperature Pulse Oximetry             74 99  F (37.2  C) (Tympanic) 99%          Blood Pressure from Last 3 Encounters:   08/23/17 112/76   07/25/17 112/64   06/20/17 132/78    Weight from Last 3 Encounters:   07/25/17 174 lb (78.9 kg)   06/20/17 171 lb 14.4 oz (78 kg)   05/15/17 173 lb 3.2 oz (78.6 kg)              We Performed the Following     BETA STREP GROUP A R/O CULTURE     RAPID STREP SCREEN        Primary Care Provider Office Phone # Fax #    Dawoodhitesh Araujo [phone] [address] [place] [address]        Goals        General    I want more information on palliative card/Start Date 4/14/2014 (pt-stated)     Notes - Note created  4/15/2014 10:07 AM by Tania Vasquez MSW    As of today's date 4/15/2014 goal is met at 0 - 25%.   Goal Status:  Active        I want to feel normal again, and be able to address my pain/Start Date 4/14/2014 (pt-stated)     Notes - Note created  4/15/2014 10:06 AM by Tania Vasquez MSW    As of today's date 4/15/2014 goal is met at 0 - 25%.   Goal Status:  Active        I will take my nirto as directed for chest pain. (pt-stated)     I will weigh myself daily and keep a record (pt-stated)       Equal Access to Services     EVER DEAN : Hadchava Chicas, wally de la rosa, walt crenshaw. So [facility] [phone].    ATENCIÓN: Si habla español, tiene a laejandro disposición servicios gratuitos de asistencia lingüística. Llame al [phone].    We comply with applicable federal civil rights laws and [place] laws. We do not discriminate on the basis of race, color, national origin, age, disability sex, sexual orientation or gender  identity.            Thank you!     Thank you for choosing [facility]  for your care. Our goal is always to provide you with excellent care. Hearing back from our patients is one way we can continue to improve our services. Please take a few minutes to complete the written survey that you may receive in the mail after your visit with us. Thank you!             Your Updated Medication List - Protect others around you: Learn how to safely use, store and throw away your medicines at www.disposemymeds.org.          This list is accurate as of: 8/23/17  1:09 PM.  Always use your most recent med list.                   Brand Name Dispense Instructions for use Diagnosis    albuterol 108 (90 BASE) MCG/ACT Inhaler    PROAIR HFA/PROVENTIL HFA/VENTOLIN HFA    1 Inhaler    Inhale 2 puffs into the lungs every 4 hours as needed for shortness of breath / dyspnea or wheezing    Acute bronchospasm       ALPRAZolam 0.5 MG tablet    XANAX    30 tablet    One or two tabs daily PRN anxiety    Anxiety       aspirin 81 MG EC tablet     30 tablet    Take 1 tablet (81 mg) by mouth daily        beclomethasone 40 MCG/ACT Inhaler    QVAR    1 Inhaler    Inhale 2 puffs into the lungs 2 times daily    Acute on chronic respiratory failure with hypoxia (H)       BOOST     12 Can    fax # to the county worker fax#[phone] Attn:MILDRED    Nausea       clotrimazole 1 % cream    LOTRIMIN    15 g    APPLY TOPICALLY 2 TIMES DAILY    Tinea pedis of both feet       cyanocobalamin 1000 MCG Tabs    [facility] vitamin  B12    30 tablet    Take 1 tablet by mouth daily    Vitamin B12 deficiency (non anemic)       docusate sodium 100 MG tablet    COLACE    60 tablet    Take 100 mg by mouth daily    Constipation, unspecified constipation type       dolutegravir 50 MG tablet    TIVICAY    30 tablet    Take 1 tablet (50 mg) by mouth daily Please call [phone] and make appointment with Dr. Lay for future refills.    Human immunodeficiency  virus (HIV) disease (H)       emtricitabine-tenofovir 200-300 MG per tablet    TRUVADA    30 tablet    Take 1 tablet by mouth daily Please call [phone] to make appointment for further refills.    HIV (human immunodeficiency virus infection) (H)       fluconazole 100 MG tablet    DIFLUCAN    4 tablet    TAKE 1/2 TABLET (50 MG) BY MOUTH DAILY    Thrush       FLUoxetine 20 MG capsule    PROzac    30 capsule    Take 1 capsule (20 mg) by mouth daily Along with 40 mg of fluoxetine    Anxiety       fluticasone 50 MCG/ACT spray    FLONASE    1 Bottle    Spray 1-2 sprays into both nostrils daily    Seasonal allergic rhinitis, unspecified allergic rhinitis trigger       hydrocortisone 2.5 % cream    ANUSOL-HC    30 g    Place rectally 2 times daily    External hemorrhoids       lidocaine 5 % Patch    LIDODERM    30 patch    Apply up to 3 patches to painful area at once for up to 12 h within a 24 h period as needed.  Remove after 12 hours.    Other chronic pain, Low back pain, unspecified back pain laterality, unspecified chronicity, with sciatica presence unspecified       loratadine 10 MG tablet    CLARITIN    30 tablet    Take 1 tablet (10 mg) by mouth daily    Tobacco use disorder       MORPHINE SULFATE PO      Take 15 mg by mouth Reported on 5/8/2017        nicotine 14 MG/24HR 24 hr patch    NICODERM CQ    30 patch    Place 1 patch onto the skin every 24 hours    Tobacco use disorder       nitroGLYcerin 0.4 MG sublingual tablet    NITROSTAT    30 tablet    Place 1 tablet under the tongue every 5 mins as needed for chest pain If you are still having symptoms after 3 doses (15 minutes) call 911.    Acute diastolic CHF (congestive heart failure) (H)       omeprazole 20 MG CR capsule    priLOSEC    30 capsule    Take 1 capsule (20 mg) by mouth daily    Constipation, unspecified constipation type       ondansetron 4 MG ODT tab    ZOFRAN ODT    40 tablet    Take 1-2 tablets (4-8 mg) by mouth every 8 hours as needed for  nausea    Nausea       * order for DME     1 Device    Equipment being ordered: Oxygen at 5 liters    HIV (human immunodeficiency virus infection) (H), Acute respiratory failure with hypoxia (H), Acute diastolic CHF (congestive heart failure) (H)       * order for DME     1 each    Equipment being ordered: empty Oxygen tanks, oxygen concentrator and oxygen travel concentrator for portability    Pneumonia of both lungs due to infectious organism, unspecified part of lung, Acute respiratory failure with hypoxia (H)       order for DME     1 Device    Equipment being ordered:  Portable Oxygen    Pneumonia of both lower lobes due to infectious organism       oxyCODONE-acetaminophen  MG per tablet    PERCOCET    30 tablet    Take 1 tablet by mouth every 4 hours as needed for moderate to severe pain (Max of 6 a day.)    Low back pain, Chronic pain       predniSONE 20 MG tablet    DELTASONE    70 tablet    Take 3 tablets (60 mg) by mouth daily Decrease by 10mg weekly. Example: Until 5/7 3 tabs daily, then 2.5 tabs daily for one week.    Pneumonitis, Pneumonia of right lower lobe due to infectious organism (H)       psyllium 58.6 % Powd    METAMUCIL SMOOTH TEXTURE    425 g    Take 12 g (2 teaspoonful) by mouth 2 times daily    Constipation, unspecified constipation type       ranitidine 150 MG tablet    ZANTAC    60 tablet    Take 1 tablet (150 mg) by mouth 2 times daily    Gastroesophageal reflux disease, esophagitis presence not specified       simvastatin 20 MG tablet    ZOCOR    30 tablet    Take 1 tablet (20 mg) by mouth At Bedtime Hold if taking fluconozole    Ischemic chest pain (H)       SPIRIVA RESPIMAT 2.5 MCG/ACT inhalation aerosol   Generic drug:  tiotropium     4 g    INHALE 2 PUFFS INTO THE LUNGS DAILY    Acute and chronic respiratory failure with hypoxia (H), Acute bronchospasm, Pneumonia of both lungs due to infectious organism, unspecified part of lung       * sulfamethoxazole-trimethoprim 800-160 MG  per tablet    BACTRIM DS/SEPTRA DS    60 tablet    Take 1 tablet by mouth daily    HIV (human immunodeficiency virus infection) (H)       * sulfamethoxazole-trimethoprim 800-160 MG per tablet    BACTRIM DS/SEPTRA DS    28 tablet    Take 1 tablet by mouth 2 times daily        SUMAtriptan 50 MG tablet    IMITREX    30 tablet    Take 1 tablet (50 mg) by mouth at onset of headache for migraine    Headache(784.0)       terbinafine 1 % cream    lamISIL AT    42 g    Apply topically 2 times daily To effected areas on feet and toes for two weeks.    Tinea pedis of both feet       valACYclovir 500 MG tablet    VALTREX    60 tablet    Take 500 mg by mouth 2 times daily as needed Reported on 5/8/2017    Genital herpes in men       * Notice:  This list has 4 medication(s) that are the same as other medications prescribed for you. Read the directions carefully, and ask your doctor or other care provider to review them with you.

## 2017-08-25 LAB — BETA STREP CONFIRM: NORMAL

## 2017-08-29 ENCOUNTER — TELEPHONE (OUTPATIENT)
Dept: FAMILY MEDICINE | Facility: OTHER | Age: 47
End: 2017-08-29

## 2017-08-29 NOTE — TELEPHONE ENCOUNTER
Form has been faxed to [phone] Dr Araujo patients PCP as Dr Obrien is no longer with [facility]  Thanks  Lois Cannon RT (R)

## 2017-08-29 NOTE — TELEPHONE ENCOUNTER
Reason for Call:  Form, our goal is to have forms completed with 72 hours, however, some forms may require a visit or additional information.    Type of letter, form or note:  medical    Who is the form from?: Aaron (if other please explain)    Where did the form come from: form was faxed in    What clinic location was the form placed at?: [facility] - [phone]    Where the form was placed: 's Box    What number is listed as a contact on the form?:  Fax [phone]       Additional comments: please complete and fax    Call taken on 8/29/2017 at 10:01 AM by Manasa Jones

## 2017-08-30 NOTE — TELEPHONE ENCOUNTER
"I saw this patient once in 2014. I don't think that makes me his primary care provider. If he would like to establish care, he can. Until that time, I will be unable to \"fill out his forms\".    Gayle"

## 2017-09-05 DIAGNOSIS — B37.0 THRUSH: ICD-10-CM

## 2017-09-05 NOTE — TELEPHONE ENCOUNTER
diflucan      Last Written Prescription Date: 08/09/17  Last Fill Quantity: 4,  # refills: 1   Last Office Visit with [facility], [facility] or [facility] prescribing provider: 07/25/17

## 2017-09-06 DIAGNOSIS — B20 HUMAN IMMUNODEFICIENCY VIRUS (HIV) DISEASE (H): ICD-10-CM

## 2017-09-06 RX ORDER — FLUCONAZOLE 100 MG/1
TABLET ORAL
Qty: 4 TABLET | Refills: 0 | Status: SHIPPED | OUTPATIENT
Start: 2017-09-06 | End: 2017-10-19

## 2017-09-06 NOTE — TELEPHONE ENCOUNTER
fluconazole (DIFLUCAN) 100 MG tablet  Routing refill request to provider for review/approval because:  Drug not on the [facility] refill protocol for associated diagnosis    January Denson RN, BSN

## 2017-09-09 DIAGNOSIS — K59.00 CONSTIPATION, UNSPECIFIED CONSTIPATION TYPE: ICD-10-CM

## 2017-09-11 NOTE — TELEPHONE ENCOUNTER
prilosec      Last Written Prescription Date: 05/26/17  Last Fill Quantity: 30,  # refills: 3   Last Office Visit with [facility], [facility] or [facility] prescribing provider: 07/25/17

## 2017-09-12 DIAGNOSIS — R11.0 NAUSEA: ICD-10-CM

## 2017-09-12 NOTE — TELEPHONE ENCOUNTER
Prescription approved per [facility] Refill Protocol.  Sakina Hickman, RN, BSN

## 2017-09-12 NOTE — TELEPHONE ENCOUNTER
zofran      Last Written Prescription Date: 05/08/17  Last Fill Quantity: 40,  # refills: 7   Last Office Visit with G, [facility] or [facility] prescribing provider: 07/25/17

## 2017-09-14 RX ORDER — ONDANSETRON 4 MG/1
TABLET, ORALLY DISINTEGRATING ORAL
Qty: 40 TABLET | Refills: 6 | Status: SHIPPED | OUTPATIENT
Start: 2017-09-14 | End: 2018-02-19

## 2017-09-14 NOTE — TELEPHONE ENCOUNTER
Prescription approved per [facility] Refill Protocol.  Janusz Harmon, RN, BSN

## 2017-09-18 DIAGNOSIS — B35.3 TINEA PEDIS OF BOTH FEET: ICD-10-CM

## 2017-09-18 DIAGNOSIS — B37.0 THRUSH: ICD-10-CM

## 2017-09-18 NOTE — TELEPHONE ENCOUNTER
Fluconazole      Last Written Prescription Date: 9/06/2017  Last Fill Quantity: 4 tabs,  # refills: 1   Last Office Visit with G, [facility] or [facility] prescribing provider: 7/25/2017

## 2017-09-19 RX ORDER — FLUCONAZOLE 100 MG/1
TABLET ORAL
Qty: 4 TABLET | Refills: 0 | Status: CANCELLED | OUTPATIENT
Start: 2017-09-19

## 2017-09-19 NOTE — TELEPHONE ENCOUNTER
Routing refill request to provider for review/approval because:  Is this something you want the patient to continue? I see you just reordered on 9/7.  Dee Fernandes RN

## 2017-09-19 NOTE — TELEPHONE ENCOUNTER
lotrim      Last Written Prescription Date: 0815/17  Last Fill Quantity: 15g,  # refills: 1   Last Office Visit with G, [facility] or [facility] prescribing provider: 07/25/17

## 2017-09-20 RX ORDER — CLOTRIMAZOLE 1 %
CREAM (GRAM) TOPICAL
Qty: 15 G | Refills: 0 | OUTPATIENT
Start: 2017-09-20

## 2017-09-20 NOTE — TELEPHONE ENCOUNTER
Will defer to PCP.  Pt appears to have been taking this for some time.  I would recommend follow up in clinic.    This is also available OTC.

## 2017-09-20 NOTE — TELEPHONE ENCOUNTER
"This \"PCP\" has not seen the patient since 2014. He will need to set up an appointment if he wishes me to refill the prescription.    Gayle  "

## 2017-09-20 NOTE — TELEPHONE ENCOUNTER
I have contacted pt. He said he doesn't need this filled right now. He still has some left. He states he uses this for recurrent thrush due to his chronic conditions. Kim Milian CMA ([facility])

## 2017-09-20 NOTE — TELEPHONE ENCOUNTER
Routing refill request to provider for review/approval because:  Drug not on the [facility] refill protocol.  Pt hasn't been seen by a current [place] provider.    Peri Claudio RN

## 2017-09-21 NOTE — TELEPHONE ENCOUNTER
Called pt and relayed message. Tommy states that he did not mean for the RX to come to this office and that he will call [facility] and have it sent to the correct provider/office.

## 2017-09-22 ENCOUNTER — TELEPHONE (OUTPATIENT)
Dept: FAMILY MEDICINE | Facility: OTHER | Age: 47
End: 2017-09-22

## 2017-09-22 NOTE — TELEPHONE ENCOUNTER
Huddled with provider who states he does not feel as a mid level that he has the expertise to see patient.  Provider suggests that patient establishes care with internal medicine.  Roberta Kumar CMA (MA)     Pt contacted and stated that she does need a refill before the end of this month.  1 month refill sent to pharmacy, and verified appt at the end of the month with the patient.

## 2017-09-22 NOTE — TELEPHONE ENCOUNTER
Spoke to patient informed of providers concern that patient may be beyond his scope of practice and patient stated that he will go elsewhere and will not see internal medicine providers.  Roberta Kumar CMA ([facility])

## 2017-09-25 DIAGNOSIS — Z21 HIV (HUMAN IMMUNODEFICIENCY VIRUS INFECTION) (H): ICD-10-CM

## 2017-09-25 RX ORDER — EMTRICITABINE AND TENOFOVIR DISOPROXIL FUMARATE 200; 300 MG/1; MG/1
1 TABLET, FILM COATED ORAL DAILY
Qty: 30 TABLET | Refills: 0 | Status: SHIPPED | OUTPATIENT
Start: 2017-09-25 | End: 2017-10-25

## 2017-10-08 ENCOUNTER — OFFICE VISIT (OUTPATIENT)
Dept: URGENT CARE | Facility: RETAIL CLINIC | Age: 47
End: 2017-10-08
Payer: MEDICARE

## 2017-10-08 VITALS
SYSTOLIC BLOOD PRESSURE: 121 MMHG | HEART RATE: 97 BPM | DIASTOLIC BLOOD PRESSURE: 85 MMHG | TEMPERATURE: 99 F | OXYGEN SATURATION: 97 %

## 2017-10-08 DIAGNOSIS — J22 CHEST COLD: Primary | ICD-10-CM

## 2017-10-08 DIAGNOSIS — H57.89 IRRITATION OF RIGHT EYE: ICD-10-CM

## 2017-10-08 PROCEDURE — 99213 OFFICE O/P EST LOW 20 MIN: CPT | Performed by: PHYSICIAN ASSISTANT

## 2017-10-08 RX ORDER — MORPHINE SULFATE 15 MG/1
15 TABLET, FILM COATED, EXTENDED RELEASE ORAL DAILY
COMMUNITY
Start: 2017-09-15 | End: 2019-08-19

## 2017-10-08 RX ORDER — METHOCARBAMOL 500 MG/1
TABLET, FILM COATED ORAL
COMMUNITY
Start: 2017-09-20 | End: 2018-04-24

## 2017-10-08 NOTE — NURSING NOTE
"Chief Complaint   Patient presents with     Nasal Congestion     congestion that he states is going down into chest     Eye Problem     feels like he might have pink eye       Initial /85  Pulse 97  Temp 99  F (37.2  C) (Tympanic)  SpO2 97% Estimated body mass index is 25.7 kg/(m^2) as calculated from the following:    Height as of 7/25/17: 5' 9\" (1.753 m).    Weight as of 7/25/17: 174 lb (78.9 kg).  Medication Reconciliation: complete     Jessica Sundet      "
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
## 2017-10-08 NOTE — MR AVS SNAPSHOT
"              After Visit Summary   10/8/2017    Tommy Ross    MRN: [id number]           Patient Information     Date Of Birth          1970        Visit Information        Provider Department      10/8/2017 12:50 PM Katie Armendariz PA-C [facility]        Today's Diagnoses     Chest cold    -  1    Irritation of right eye           Follow-ups after your visit        Who to contact     You can reach your care team any time of the day by calling [phone].  Notification of test results:  If you have an abnormal lab result, we will notify you by phone as soon as possible.         Additional Information About Your Visit        MyChart Information     [facility] lets you send messages to your doctor, view your test results, renew your prescriptions, schedule appointments and more. To sign up, go to [email or web address] . Click on \"Log in\" on the left side of the screen, which will take you to the Welcome page. Then click on \"Sign up Now\" on the right side of the page.     You will be asked to enter the access code listed below, as well as some personal information. Please follow the directions to create your username and password.     Your access code is: [email or web address]  Expires: 2018  1:45 PM     Your access code will  in 90 days. If you need help or a new code, please call your [place] clinic or [phone].        Care EveryWhere ID     This is your [facility] EveryWhere ID. This could be used by other organizations to access your [place] medical records  [id number]        Your Vitals Were     Pulse Temperature Pulse Oximetry             97 99  F (37.2  C) (Tympanic) 97%          Blood Pressure from Last 3 Encounters:   10/08/17 121/85   17 112/76   17 112/64    Weight from Last 3 Encounters:   17 174 lb (78.9 kg)   17 171 lb 14.4 oz (78 kg)   05/15/17 173 lb 3.2 oz (78.6 kg)              Today, you had the following     No orders found for display       " Primary Care Provider Office Phone # Fax #    Dawood Araujo [phone] [address] [place] [address]        Goals        General    I want more information on palliative card/Start Date 4/14/2014 (pt-stated)     Notes - Note created  4/15/2014 10:07 AM by Tania Vasquez MSW    As of today's date 4/15/2014 goal is met at 0 - 25%.   Goal Status:  Active        I want to feel normal again, and be able to address my pain/Start Date 4/14/2014 (pt-stated)     Notes - Note created  4/15/2014 10:06 AM by Tania Vasquez MSW    As of today's date 4/15/2014 goal is met at 0 - 25%.   Goal Status:  Active        I will take my nirto as directed for chest pain. (pt-stated)     I will weigh myself daily and keep a record (pt-stated)       Equal Access to Services     MARI DEAN : Hadii ashley cotto hadasho Soomaali, waaxda luqadaha, qaybta kaalmada adeegyada, walt kauffman . So [facility] [phone].    ATENCIÓN: Si habla español, tiene a alejandro disposición servicios gratuitos de asistencia lingüística. Llame al [phone].    We comply with applicable federal civil rights laws and [place] laws. We do not discriminate on the basis of race, color, national origin, age, disability, sex, sexual orientation, or gender identity.            Thank you!     Thank you for choosing [facility]  for your care. Our goal is always to provide you with excellent care. Hearing back from our patients is one way we can continue to improve our services. Please take a few minutes to complete the written survey that you may receive in the mail after your visit with us. Thank you!             Your Updated Medication List - Protect others around you: Learn how to safely use, store and throw away your medicines at www.disposemymeds.org.          This list is accurate as of: 10/8/17  1:45 PM.  Always use your most recent med list.                   Brand Name Dispense  Instructions for use Diagnosis    albuterol 108 (90 BASE) MCG/ACT Inhaler    PROAIR HFA/PROVENTIL HFA/VENTOLIN HFA    1 Inhaler    Inhale 2 puffs into the lungs every 4 hours as needed for shortness of breath / dyspnea or wheezing    Acute bronchospasm       ALPRAZolam 0.5 MG tablet    XANAX    30 tablet    One or two tabs daily PRN anxiety    Anxiety       aspirin 81 MG EC tablet     30 tablet    Take 1 tablet (81 mg) by mouth daily        beclomethasone 40 MCG/ACT Inhaler    QVAR    1 Inhaler    Inhale 2 puffs into the lungs 2 times daily    Acute on chronic respiratory failure with hypoxia (H)       BOOST     12 Can    fax # to the county worker fax#[phone] Attn:MILDRED    Nausea       clotrimazole 1 % cream    LOTRIMIN    15 g    APPLY TOPICALLY 2 TIMES DAILY    Tinea pedis of both feet       cyanocobalamin 1000 MCG Tabs    [facility] vitamin  B12    30 tablet    Take 1 tablet by mouth daily    Vitamin B12 deficiency (non anemic)       docusate sodium 100 MG tablet    COLACE    60 tablet    Take 100 mg by mouth daily    Constipation, unspecified constipation type       dolutegravir 50 MG tablet    TIVICAY    30 tablet    Take 1 tablet (50 mg) by mouth daily Please call [phone] and make appointment with Dr. Lay for future refills.    Human immunodeficiency virus (HIV) disease       emtricitabine-tenofovir 200-300 MG per tablet    TRUVADA    30 tablet    Take 1 tablet by mouth daily Please call [phone] to make appointment for further refills.    HIV (human immunodeficiency virus infection) (H)       fluconazole 100 MG tablet    DIFLUCAN    4 tablet    TAKE 1/2 TABLET (50 MG) BY MOUTH DAILY    Thrush       FLUoxetine 20 MG capsule    PROzac    30 capsule    Take 1 capsule (20 mg) by mouth daily Along with 40 mg of fluoxetine    Anxiety       fluticasone 50 MCG/ACT spray    FLONASE    1 Bottle    Spray 1-2 sprays into both nostrils daily    Seasonal allergic rhinitis, unspecified allergic rhinitis trigger        hydrocortisone 2.5 % cream    ANUSOL-HC    30 g    Place rectally 2 times daily    External hemorrhoids       lidocaine 5 % Patch    LIDODERM    30 patch    Apply up to 3 patches to painful area at once for up to 12 h within a 24 h period as needed.  Remove after 12 hours.    Other chronic pain, Low back pain, unspecified back pain laterality, unspecified chronicity, with sciatica presence unspecified       loratadine 10 MG tablet    CLARITIN    30 tablet    Take 1 tablet (10 mg) by mouth daily    Tobacco use disorder       methocarbamol 500 MG tablet    ROBAXIN          * MORPHINE SULFATE PO      Take 15 mg by mouth Reported on 5/8/2017        * morphine 15 MG 12 hr tablet    MS CONTIN          nicotine 14 MG/24HR 24 hr patch    NICODERM CQ    30 patch    Place 1 patch onto the skin every 24 hours    Tobacco use disorder       nitroGLYcerin 0.4 MG sublingual tablet    NITROSTAT    30 tablet    Place 1 tablet under the tongue every 5 mins as needed for chest pain If you are still having symptoms after 3 doses (15 minutes) call 911.    Acute diastolic CHF (congestive heart failure) (H)       omeprazole 20 MG CR capsule    priLOSEC    30 capsule    TAKE 1 CAPSULE (20 MG) BY MOUTH DAILY    Constipation, unspecified constipation type       ondansetron 4 MG ODT tab    ZOFRAN-ODT    40 tablet    TAKE 1-2 TABLETS (4-8 MG) BY MOUTH EVERY 8 HOURS AS NEEDED FOR NAUSEA    Nausea       * order for DME     1 Device    Equipment being ordered: Oxygen at 5 liters    HIV (human immunodeficiency virus infection) (H), Acute respiratory failure with hypoxia (H), Acute diastolic CHF (congestive heart failure) (H)       * order for DME     1 each    Equipment being ordered: empty Oxygen tanks, oxygen concentrator and oxygen travel concentrator for portability    Pneumonia of both lungs due to infectious organism, unspecified part of lung, Acute respiratory failure with hypoxia (H)       order for DME     1 Device    Equipment  being ordered:  Portable Oxygen    Pneumonia of both lower lobes due to infectious organism       oxyCODONE-acetaminophen  MG per tablet    PERCOCET    30 tablet    Take 1 tablet by mouth every 4 hours as needed for moderate to severe pain (Max of 6 a day.)    Low back pain, Chronic pain       predniSONE 20 MG tablet    DELTASONE    70 tablet    Take 3 tablets (60 mg) by mouth daily Decrease by 10mg weekly. Example: Until 5/7 3 tabs daily, then 2.5 tabs daily for one week.    Pneumonitis, Pneumonia of right lower lobe due to infectious organism (H)       psyllium 58.6 % Powd    METAMUCIL SMOOTH TEXTURE    425 g    Take 12 g (2 teaspoonful) by mouth 2 times daily    Constipation, unspecified constipation type       ranitidine 150 MG tablet    ZANTAC    60 tablet    Take 1 tablet (150 mg) by mouth 2 times daily    Gastroesophageal reflux disease, esophagitis presence not specified       simvastatin 20 MG tablet    ZOCOR    30 tablet    Take 1 tablet (20 mg) by mouth At Bedtime Hold if taking fluconozole    Ischemic chest pain (H)       SPIRIVA RESPIMAT 2.5 MCG/ACT inhalation aerosol   Generic drug:  tiotropium     4 g    INHALE 2 PUFFS INTO THE LUNGS DAILY    Acute and chronic respiratory failure with hypoxia (H), Acute bronchospasm, Pneumonia of both lungs due to infectious organism, unspecified part of lung       * sulfamethoxazole-trimethoprim 800-160 MG per tablet    BACTRIM DS/SEPTRA DS    60 tablet    Take 1 tablet by mouth daily    HIV (human immunodeficiency virus infection) (H)       * sulfamethoxazole-trimethoprim 800-160 MG per tablet    BACTRIM DS/SEPTRA DS    28 tablet    Take 1 tablet by mouth 2 times daily        SUMAtriptan 50 MG tablet    IMITREX    30 tablet    Take 1 tablet (50 mg) by mouth at onset of headache for migraine    Headache(784.0)       terbinafine 1 % cream    lamISIL AT    42 g    Apply topically 2 times daily To effected areas on feet and toes for two weeks.    Tinea pedis of  both feet       valACYclovir 500 MG tablet    VALTREX    60 tablet    Take 500 mg by mouth 2 times daily as needed Reported on 5/8/2017    Genital herpes in men       * Notice:  This list has 6 medication(s) that are the same as other medications prescribed for you. Read the directions carefully, and ask your doctor or other care provider to review them with you.

## 2017-10-10 DIAGNOSIS — J30.2 SEASONAL ALLERGIC RHINITIS: ICD-10-CM

## 2017-10-10 NOTE — TELEPHONE ENCOUNTER
fluticasone (FLONASE) 50 MCG/ACT spray      Last Written Prescription Date: 06/20/17  Last Fill Quantity: 1,  # refills: 3   Last Office Visit with [facility], [facility] or [facility] prescribing provider: 07/25/17

## 2017-10-12 RX ORDER — FLUTICASONE PROPIONATE 50 MCG
SPRAY, SUSPENSION (ML) NASAL
Qty: 16 G | Refills: 8 | Status: SHIPPED | OUTPATIENT
Start: 2017-10-12

## 2017-10-12 NOTE — TELEPHONE ENCOUNTER
Prescription approved per [facility] Refill Protocol.  Sakina Hickman, RN, BSN

## 2017-10-19 DIAGNOSIS — B37.0 THRUSH: ICD-10-CM

## 2017-10-19 RX ORDER — FLUCONAZOLE 100 MG/1
TABLET ORAL
Qty: 4 TABLET | Refills: 0 | Status: SHIPPED | OUTPATIENT
Start: 2017-10-19 | End: 2017-10-27

## 2017-10-23 DIAGNOSIS — F41.9 ANXIETY: ICD-10-CM

## 2017-10-24 NOTE — TELEPHONE ENCOUNTER
FLUoxetine (PROZAC) 20 MG capsule  Rx was sent 05/09/2017 for 30 tabs and 6 refills. Patient should have medication through 12/2017.  Pharmacy notified via E-prescribe refusal.  January Denson RN, BSN

## 2017-10-25 DIAGNOSIS — Z21 HIV (HUMAN IMMUNODEFICIENCY VIRUS INFECTION) (H): ICD-10-CM

## 2017-10-25 RX ORDER — EMTRICITABINE AND TENOFOVIR DISOPROXIL FUMARATE 200; 300 MG/1; MG/1
1 TABLET, FILM COATED ORAL DAILY
Qty: 30 TABLET | Refills: 0 | Status: SHIPPED | OUTPATIENT
Start: 2017-10-25 | End: 2017-11-22

## 2017-10-27 ENCOUNTER — TELEPHONE (OUTPATIENT)
Dept: INTERNAL MEDICINE | Facility: CLINIC | Age: 47
End: 2017-10-27

## 2017-10-27 DIAGNOSIS — B37.0 THRUSH: ICD-10-CM

## 2017-10-27 RX ORDER — FLUCONAZOLE 100 MG/1
TABLET ORAL
Qty: 4 TABLET | Refills: 0 | Status: SHIPPED | OUTPATIENT
Start: 2017-10-27 | End: 2018-02-19

## 2017-10-27 NOTE — TELEPHONE ENCOUNTER
Fluconazole 100 mg tablet     Last Written Prescription Date: 10/19/17  Last Fill Quantity: 4,  # refills: 0   Last Office Visit with [facility], [facility] or [facility] prescribing provider: 10/8/17

## 2017-11-03 ENCOUNTER — TELEPHONE (OUTPATIENT)
Dept: INTERNAL MEDICINE | Facility: CLINIC | Age: 47
End: 2017-11-03

## 2017-11-03 NOTE — TELEPHONE ENCOUNTER
"We received the following request from [facility] in [place]. \"Due to Tommy's medical history, they would like him on a beta blocker. Would you consider giving him a rx for one of the following?: Metoprolol 12.5 mg BID or Carvedilol 3.125 mg BID or Bisoprolol 5 mg once daily?\". Please advise if pt needs an OV to discuss or if you can send one over. Kim Milian CMA ([facility])    "

## 2017-11-03 NOTE — TELEPHONE ENCOUNTER
Seriously? I did not know there was a medical care provider in  residence at [facility] in [place].    If the pharmacist is practicing medicine, congratulations on the obvious career change.       Otherwise, I would be happy to discuss this with the patient at an office visit.      Yours Gayle sylvester

## 2017-11-22 DIAGNOSIS — Z21 HIV (HUMAN IMMUNODEFICIENCY VIRUS INFECTION) (H): ICD-10-CM

## 2017-11-22 RX ORDER — EMTRICITABINE AND TENOFOVIR DISOPROXIL FUMARATE 200; 300 MG/1; MG/1
1 TABLET, FILM COATED ORAL DAILY
Qty: 30 TABLET | Refills: 0 | Status: SHIPPED | OUTPATIENT
Start: 2017-11-22 | End: 2017-11-27

## 2017-11-22 NOTE — TELEPHONE ENCOUNTER
Spoke with pt today and he reports taking his HIV meds daily. He wishes to make an appt with Dr. Lay after the holidays. Will notify .  Khushbu Ojeda RN

## 2017-11-27 RX ORDER — EMTRICITABINE AND TENOFOVIR DISOPROXIL FUMARATE 200; 300 MG/1; MG/1
1 TABLET, FILM COATED ORAL DAILY
Qty: 30 TABLET | Refills: 0 | Status: SHIPPED | OUTPATIENT
Start: 2017-11-27 | End: 2017-12-21

## 2017-12-08 DIAGNOSIS — B37.0 THRUSH: ICD-10-CM

## 2017-12-08 NOTE — TELEPHONE ENCOUNTER
fluconazole (DIFLUCAN) 100 MG tablet     Last Written Prescription Date: 10/27/2017  Last Fill Quantity: 4,  # refills: 0   Last Office Visit with [facility], [facility] or [facility] prescribing provider: 12/12/2014.    Juanita Castro CMA

## 2017-12-12 RX ORDER — FLUCONAZOLE 100 MG/1
TABLET ORAL
Qty: 4 TABLET | Refills: 0 | OUTPATIENT
Start: 2017-12-12

## 2017-12-12 NOTE — TELEPHONE ENCOUNTER
Requested Prescriptions   Pending Prescriptions Disp Refills     fluconazole (DIFLUCAN) 100 MG tablet 4 tablet 0     Sig: Take 1 pill every third day    Antifungal Agents Failed    12/8/2017  2:28 PM       Failed - Not Fluconazole or Terconazole     If oral Fluconazole or Terconazole, may refill if indicated in progress notes.          Passed - Recent or future visit with authorizing provider's specialty    Patient had office visit in the last year or has a visit in the next 30 days with authorizing provider.  See chart review.

## 2017-12-12 NOTE — TELEPHONE ENCOUNTER
Routing refill request to provider for review/approval because:  Drug not on the [facility] refill protocol   Has not seen Dr. Araujo since 2014    Sarahi Gomez RN  [facility]

## 2017-12-21 DIAGNOSIS — Z21 HIV (HUMAN IMMUNODEFICIENCY VIRUS INFECTION) (H): ICD-10-CM

## 2017-12-21 RX ORDER — EMTRICITABINE AND TENOFOVIR DISOPROXIL FUMARATE 200; 300 MG/1; MG/1
1 TABLET, FILM COATED ORAL DAILY
Qty: 30 TABLET | Refills: 0 | Status: SHIPPED | OUTPATIENT
Start: 2017-12-21 | End: 2018-01-22

## 2017-12-27 DIAGNOSIS — F41.9 ANXIETY: ICD-10-CM

## 2017-12-27 NOTE — TELEPHONE ENCOUNTER
Requested Prescriptions   Pending Prescriptions Disp Refills     FLUoxetine (PROZAC) 20 MG capsule 30 capsule 0    SSRIs Protocol Passed    12/27/2017  4:20 PM       Passed - Recent or future visit with authorizing provider    Patient had office visit in the last year or has a visit in the next 30 days with authorizing provider.  See chart review.              Passed - Patient is age 18 or older         **See med note on last RX**   Marian Sommers MA     12/27/2017

## 2017-12-28 NOTE — TELEPHONE ENCOUNTER
Routing refill request to provider for review/approval because:  Dali given x1 and patient did not follow up, please advise  Patient needs to be seen because it has been more than 1 year since last office visit.  Patient has not seen PCP in over 3 years. All other providers he has seen are no longer with [facility].     Sarahi Gomez, RN  [facility]

## 2018-01-09 ENCOUNTER — TELEPHONE (OUTPATIENT)
Dept: INFECTIOUS DISEASES | Facility: CLINIC | Age: 48
End: 2018-01-09

## 2018-01-09 NOTE — TELEPHONE ENCOUNTER
"Pt called to request a longer appt time on 01/19 10am appt with Dr Lay to discuss concerns about his memory.    Pt states his \"memory has become profoundly worse\". His soc wkr though it may be ADC. Pt states he is having trouble concentrating & others have to remind him several times a day to take his meds.    Please callback pt at [phone].  "

## 2018-01-10 ENCOUNTER — TELEPHONE (OUTPATIENT)
Dept: INTERNAL MEDICINE | Facility: CLINIC | Age: 48
End: 2018-01-10

## 2018-01-10 NOTE — TELEPHONE ENCOUNTER
"Reason for Call:  Medication or medication refill:    Do you use a [facility]?  Name of the pharmacy and phone number for the current request:  [facility]    Name of the medication requested: preventative medication for URI sx     Other request: Pt's wife and son have bronchitis. He states he has a compromised immune system so would like a \"preventative medication\". He states he tried to go to [facility] but there was a long line.     Can we leave a detailed message on this number? YES    Phone number patient can be reached at: Home number on file [phone] (home)    Best Time: any     Call taken on 1/10/2018 at 11:37 AM by Ara Alan      "

## 2018-01-10 NOTE — TELEPHONE ENCOUNTER
"Most bronchitis is viral and there is no medication that \"prevents\" bronchitis.  Antibiotics are only good for bacterial infections, which most of the time bronchitis is not.  He should practice good handwashing, eat a balanced diet, rest and drink a lot of fluids to stay healthy.     Electronically signed by Barbra Martinez CNP.    "

## 2018-01-11 NOTE — TELEPHONE ENCOUNTER
Informed Dr Lay of situation. There is no way to open up time on Dr Lay's apt.  Alexandra Downs RN

## 2018-01-15 ENCOUNTER — TELEPHONE (OUTPATIENT)
Dept: INFECTIOUS DISEASES | Facility: CLINIC | Age: 48
End: 2018-01-15
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
## 2018-01-22 DIAGNOSIS — Z21 HIV (HUMAN IMMUNODEFICIENCY VIRUS INFECTION) (H): ICD-10-CM

## 2018-01-22 RX ORDER — EMTRICITABINE AND TENOFOVIR DISOPROXIL FUMARATE 200; 300 MG/1; MG/1
1 TABLET, FILM COATED ORAL DAILY
Qty: 30 TABLET | Refills: 0 | Status: SHIPPED | OUTPATIENT
Start: 2018-01-22 | End: 2018-03-14

## 2018-02-08 DIAGNOSIS — B37.0 THRUSH: ICD-10-CM

## 2018-02-08 NOTE — TELEPHONE ENCOUNTER
"Requested Prescriptions   Pending Prescriptions Disp Refills     fluconazole (DIFLUCAN) 100 MG tablet 4 tablet 0     Sig: Take 1 pill every third day    Antifungal Agents Failed    2/8/2018 10:04 AM       Failed - Not Fluconazole or Terconazole     If oral Fluconazole or Terconazole, may refill if indicated in progress notes.          Passed - Recent or future visit with authorizing provider's specialty    Patient had office visit in the last year or has a visit in the next 30 days with authorizing provider.  See \"Patient Info\" tab in inbasket, or \"Choose Columns\" in Meds & Orders section of the refill encounter.               Last Written Prescription Date:  10/27/17  Last Fill Quantity: 4,  # refills: 0   Last Office Visit with G, [facility] or [facility] prescribing provider:  8/23/17   Future Office Visit:       "

## 2018-02-09 DIAGNOSIS — Z21 HIV (HUMAN IMMUNODEFICIENCY VIRUS INFECTION) (H): ICD-10-CM

## 2018-02-09 NOTE — TELEPHONE ENCOUNTER
Last Written Prescription Date:  1/22/18  Last Fill Quantity: 30,  # refills: 0   Last office visit: 7/25/2017 with prescribing provider:     Future Office Visit:

## 2018-02-12 DIAGNOSIS — Z21 HIV (HUMAN IMMUNODEFICIENCY VIRUS INFECTION) (H): ICD-10-CM

## 2018-02-12 RX ORDER — EMTRICITABINE AND TENOFOVIR DISOPROXIL FUMARATE 200; 300 MG/1; MG/1
1 TABLET, FILM COATED ORAL DAILY
Qty: 30 TABLET | Refills: 0 | OUTPATIENT
Start: 2018-02-12

## 2018-02-12 RX ORDER — FLUCONAZOLE 100 MG/1
TABLET ORAL
Qty: 4 TABLET | Refills: 0 | OUTPATIENT
Start: 2018-02-12

## 2018-02-12 NOTE — TELEPHONE ENCOUNTER
Emtricitabine-tenofovir 200-300 MG      Last Written Prescription Date:  1/22/18  Last Fill Quantity: 30,   # refills: 0  Last Office Visit: 12/12/14  Future Office visit:       Routing refill request to provider for review/approval because:  Drug not on the [facility], [facility] or [facility] refill protocol or controlled substance

## 2018-02-12 NOTE — TELEPHONE ENCOUNTER
Routing refill request to provider for review/approval because:  Dali given x1 and patient did not follow up, please advise  Drug not on the [facility] refill protocol   Maribell Mar, RN, BSN

## 2018-02-12 NOTE — TELEPHONE ENCOUNTER
Diflucan  Routing refill request to provider for review/approval because:  A break in medication    Jose Juarez RN, BSN

## 2018-02-16 NOTE — TELEPHONE ENCOUNTER
Patient calling back, gave message below and scheduled patient for an appointment with Dr. Wagner for Monday 2/19/18

## 2018-02-19 ENCOUNTER — OFFICE VISIT (OUTPATIENT)
Dept: FAMILY MEDICINE | Facility: OTHER | Age: 48
End: 2018-02-19
Payer: MEDICARE

## 2018-02-19 VITALS
OXYGEN SATURATION: 95 % | SYSTOLIC BLOOD PRESSURE: 108 MMHG | WEIGHT: 153 LBS | BODY MASS INDEX: 24.01 KG/M2 | TEMPERATURE: 98.3 F | HEIGHT: 67 IN | DIASTOLIC BLOOD PRESSURE: 78 MMHG | HEART RATE: 96 BPM

## 2018-02-19 DIAGNOSIS — I20.89 STABLE ANGINA PECTORIS (H): ICD-10-CM

## 2018-02-19 DIAGNOSIS — M54.5 LOW BACK PAIN, UNSPECIFIED BACK PAIN LATERALITY, UNSPECIFIED CHRONICITY, WITH SCIATICA PRESENCE UNSPECIFIED: ICD-10-CM

## 2018-02-19 DIAGNOSIS — R40.4 TRANSIENT ALTERATION OF AWARENESS: ICD-10-CM

## 2018-02-19 DIAGNOSIS — E53.8 VITAMIN B12 DEFICIENCY (NON ANEMIC): ICD-10-CM

## 2018-02-19 DIAGNOSIS — J98.01 ACUTE BRONCHOSPASM: ICD-10-CM

## 2018-02-19 DIAGNOSIS — B20 AIDS (H): ICD-10-CM

## 2018-02-19 DIAGNOSIS — R11.0 NAUSEA: ICD-10-CM

## 2018-02-19 DIAGNOSIS — F17.200 TOBACCO USE DISORDER: ICD-10-CM

## 2018-02-19 DIAGNOSIS — K59.00 CONSTIPATION, UNSPECIFIED CONSTIPATION TYPE: ICD-10-CM

## 2018-02-19 DIAGNOSIS — G43.809 OTHER MIGRAINE WITHOUT STATUS MIGRAINOSUS, NOT INTRACTABLE: Primary | ICD-10-CM

## 2018-02-19 DIAGNOSIS — B35.3 TINEA PEDIS OF BOTH FEET: ICD-10-CM

## 2018-02-19 DIAGNOSIS — K64.4 EXTERNAL HEMORRHOIDS: ICD-10-CM

## 2018-02-19 DIAGNOSIS — B37.0 THRUSH: ICD-10-CM

## 2018-02-19 DIAGNOSIS — G89.29 OTHER CHRONIC PAIN: ICD-10-CM

## 2018-02-19 PROCEDURE — 99214 OFFICE O/P EST MOD 30 MIN: CPT | Performed by: INTERNAL MEDICINE

## 2018-02-19 RX ORDER — SUMATRIPTAN 50 MG/1
50 TABLET, FILM COATED ORAL
Qty: 30 TABLET | Refills: 1 | Status: SHIPPED | OUTPATIENT
Start: 2018-02-19 | End: 2018-04-16

## 2018-02-19 RX ORDER — CLOTRIMAZOLE 1 %
CREAM (GRAM) TOPICAL
Qty: 15 G | Refills: 1 | Status: SHIPPED | OUTPATIENT
Start: 2018-02-19 | End: 2018-03-12

## 2018-02-19 RX ORDER — PRENATAL VIT 91/IRON/FOLIC/DHA 28-975-200
COMBINATION PACKAGE (EA) ORAL 2 TIMES DAILY
Qty: 42 G | Refills: 1 | Status: SHIPPED | OUTPATIENT
Start: 2018-02-19 | End: 2020-06-09

## 2018-02-19 RX ORDER — ALBUTEROL SULFATE 90 UG/1
2 AEROSOL, METERED RESPIRATORY (INHALATION) EVERY 4 HOURS PRN
Qty: 1 INHALER | Refills: 11 | Status: SHIPPED | OUTPATIENT
Start: 2018-02-19 | End: 2018-05-15

## 2018-02-19 RX ORDER — FLUCONAZOLE 100 MG/1
TABLET ORAL
Qty: 4 TABLET | Refills: 0 | Status: SHIPPED | OUTPATIENT
Start: 2018-02-19 | End: 2018-05-15

## 2018-02-19 RX ORDER — LIDOCAINE 50 MG/G
PATCH TOPICAL
Qty: 30 PATCH | Refills: 8 | Status: SHIPPED | OUTPATIENT
Start: 2018-02-19 | End: 2019-07-07

## 2018-02-19 RX ORDER — ONDANSETRON 4 MG/1
TABLET, ORALLY DISINTEGRATING ORAL
Qty: 40 TABLET | Refills: 6 | Status: SHIPPED | OUTPATIENT
Start: 2018-02-19 | End: 2018-05-15

## 2018-02-19 RX ORDER — NICOTINE 21 MG/24HR
1 PATCH, TRANSDERMAL 24 HOURS TRANSDERMAL EVERY 24 HOURS
Qty: 30 PATCH | Refills: 1 | Status: SHIPPED | OUTPATIENT
Start: 2018-02-19 | End: 2019-08-09

## 2018-02-19 ASSESSMENT — ANXIETY QUESTIONNAIRES
6. BECOMING EASILY ANNOYED OR IRRITABLE: NOT AT ALL
1. FEELING NERVOUS, ANXIOUS, OR ON EDGE: SEVERAL DAYS
2. NOT BEING ABLE TO STOP OR CONTROL WORRYING: SEVERAL DAYS
GAD7 TOTAL SCORE: 5
3. WORRYING TOO MUCH ABOUT DIFFERENT THINGS: SEVERAL DAYS
IF YOU CHECKED OFF ANY PROBLEMS ON THIS QUESTIONNAIRE, HOW DIFFICULT HAVE THESE PROBLEMS MADE IT FOR YOU TO DO YOUR WORK, TAKE CARE OF THINGS AT HOME, OR GET ALONG WITH OTHER PEOPLE: NOT DIFFICULT AT ALL
7. FEELING AFRAID AS IF SOMETHING AWFUL MIGHT HAPPEN: SEVERAL DAYS
5. BEING SO RESTLESS THAT IT IS HARD TO SIT STILL: NOT AT ALL

## 2018-02-19 ASSESSMENT — PATIENT HEALTH QUESTIONNAIRE - PHQ9: 5. POOR APPETITE OR OVEREATING: SEVERAL DAYS

## 2018-02-19 NOTE — LETTER
[facility]  [address] [facility] [address]  Phone: [phone]    February 19, 2018        Tommy Ross  [address] [place] [id number]          To whom it may concern:    RE: Tommy Ross    The above-mentioned patient is currently seeking social assistance as he has a diagnosis of AIDS/HIV and is having difficulty maintaining his financial responsibilities.      Please contact me for questions or concerns.      Sincerely,        Dawood Araujo, DO

## 2018-02-19 NOTE — NURSING NOTE
"Chief Complaint   Patient presents with     Mouth/Lip Problem     Memory Loss       Initial /78 (BP Location: Left arm, Patient Position: Chair, Cuff Size: Adult Regular)  Pulse 96  Temp 98.3  F (36.8  C) (Tympanic)  Ht 5' 7\" (1.702 m)  Wt 153 lb (69.4 kg)  SpO2 95%  BMI 23.96 kg/m2 Estimated body mass index is 23.96 kg/(m^2) as calculated from the following:    Height as of this encounter: 5' 7\" (1.702 m).    Weight as of this encounter: 153 lb (69.4 kg).  Medication Reconciliation: complete  Lois ARGUELLES    "

## 2018-02-19 NOTE — LETTER
[facility]  [address] [facility] [address]  Phone: [phone]    February 19, 2018        Tommy Ross  [address] [place] [phone]          To whom it may concern:        The above patient has stage III AIDS/HIV        Sincerely,        Dawood Araujo, DO
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
## 2018-02-19 NOTE — LETTER
My Asthma Action Plan  Name: Tommy Ross   YOB: 1970  Date: 2/19/2018   My doctor: Dawood Araujo, DO   My clinic: [facility]        My Control Medicine:   My Rescue Medicine:    My Asthma Severity:   Avoid your asthma triggers:                GREEN ZONE   Good Control    I feel good    No cough or wheeze    Can work, sleep and play without asthma symptoms       Take your asthma control medicine every day.     1. If exercise triggers your asthma, take your rescue medication    15 minutes before exercise or sports, and    During exercise if you have asthma symptoms  2. Spacer to use with inhaler: If you have a spacer, make sure to use it with your inhaler             YELLOW ZONE Getting Worse  I have ANY of these:    I do not feel good    Cough or wheeze    Chest feels tight    Wake up at night   1. Keep taking your Green Zone medications  2. Start taking your rescue medicine:    every 20 minutes for up to 1 hour. Then every 4 hours for 24-48 hours.  3. If you stay in the Yellow Zone for more than 12-24 hours, contact your doctor.  4. If you do not return to the Green Zone in 12-24 hours or you get worse, start taking your oral steroid medicine if prescribed by your provider.           RED ZONE Medical Alert - Get Help  I have ANY of these:    I feel awful    Medicine is not helping    Breathing getting harder    Trouble walking or talking    Nose opens wide to breathe       1. Take your rescue medicine NOW  2. If your provider has prescribed an oral steroid medicine, start taking it NOW  3. Call your doctor NOW  4. If you are still in the Red Zone after 20 minutes and you have not reached your doctor:    Take your rescue medicine again and    Call 911 or go to the emergency room right away    See your regular doctor within 2 weeks of an Emergency Room or Urgent Care visit for follow-up treatment.        Electronically signed by: Lois Melvin, February 19, 2018    Annual  Reminders:  Meet with Asthma Educator,  Flu Shot in the Fall, consider Pneumonia Vaccination for patients with asthma (aged 19 and older).    Pharmacy:    [facility] - [id number] - [place], [place] - [address] [facility]  [id number] - [place], [place] - [address] [facility] [address]                    Asthma Triggers  How To Control Things That Make Your Asthma Worse    Triggers are things that make your asthma worse.  Look at the list below to help you find your triggers and what you can do about them.  You can help prevent asthma flare-ups by staying away from your triggers.      Trigger                                                          What you can do   Cigarette Smoke  Tobacco smoke can make asthma worse. Do not allow smoking in your home, car or around you.  Be sure no one smokes at a child s day care or school.  If you smoke, ask your health care provider for ways to help you quit.  Ask family members to quit too.  Ask your health care provider for a referral to Quit Plan to help you quit smoking, or call 8-419-520-PLAN.     Colds, Flu, Bronchitis  These are common triggers of asthma. Wash your hands often.  Don t touch your eyes, nose or mouth.  Get a flu shot every year.     Dust Mites  These are tiny bugs that live in cloth or carpet. They are too small to see. Wash sheets and blankets in hot water every week.   Encase pillows and mattress in dust mite proof covers.  Avoid having carpet if you can. If you have carpet, vacuum weekly.   Use a dust mask and HEPA vacuum.   Pollen and Outdoor Mold  Some people are allergic to trees, grass, or weed pollen, or molds. Try to keep your windows closed.  Limit time out doors when pollen count is high.   Ask you health care provider about taking medicine during allergy season.     Animal Dander  Some people are allergic to skin flakes, urine or saliva from  pets with fur or feathers. Keep pets with fur or feathers out of your home.    If you can t keep the pet outdoors, then keep the pet out of your bedroom.  Keep the bedroom door closed.  Keep pets off cloth furniture and away from stuffed toys.     Mice, Rats, and Cockroaches  Some people are allergic to the waste from these pests.   Cover food and garbage.  Clean up spills and food crumbs.  Store grease in the refrigerator.   Keep food out of the bedroom.   Indoor Mold  This can be a trigger if your home has high moisture. Fix leaking faucets, pipes, or other sources of water.   Clean moldy surfaces.  Dehumidify basement if it is damp and smelly.   Smoke, Strong Odors, and Sprays  These can reduce air quality. Stay away from strong odors and sprays, such as perfume, powder, hair spray, paints, smoke incense, paint, cleaning products, candles and new carpet.   Exercise or Sports  Some people with asthma have this trigger. Be active!  Ask your doctor about taking medicine before sports or exercise to prevent symptoms.    Warm up for 5-10 minutes before and after sports or exercise.     Other Triggers of Asthma  Cold air:  Cover your nose and mouth with a scarf.  Sometimes laughing or crying can be a trigger.  Some medicines and food can trigger asthma.

## 2018-02-19 NOTE — MR AVS SNAPSHOT
After Visit Summary   2/19/2018    Tommy Ross    MRN: [id number]           Patient Information     Date Of Birth          1970        Visit Information        Provider Department      2/19/2018 2:00 PM Dawood Araujo DO [facility]        Today's Diagnoses     Other migraine without status migrainosus, not intractable    -  1    Thrush        Acute bronchospasm (WHEEZING)        Nausea        Tinea pedis of both feet        Tobacco use disorder        Constipation, unspecified constipation type        Other chronic pain        Low back pain, unspecified back pain laterality, unspecified chronicity, with sciatica presence unspecified        Ischemic chest pain (H)        External hemorrhoids        Vitamin B12 deficiency (non anemic)        Transient alteration of awareness           Follow-ups after your visit        Additional Services     NEUROLOGY ADULT REFERRAL       Your provider has referred you for the following:   [place] [facility] [place] [facility] Neurology [facility] [phone]   [email or web address]    Please be aware that coverage of these services is subject to the terms and limitations of your health insurance plan.  Call member services at your health plan with any benefit or coverage questions.      Please bring the following with you to your appointment:    (1) Any X-Rays, CTs or MRIs which have been performed.  Contact the facility where they were done to arrange for  prior to your scheduled appointment.    (2) List of current medications  (3) This referral request   (4) Any documents/labs given to you for this referral                  Your next 10 appointments already scheduled     Feb 23, 2018 10:00 AM CST   (Arrive by 9:45 AM)   Return Visit with Carlos Enrique Lay MD   [facility] and Infectious Diseases ([facility])    [address] [place] [address]  "  [address] [place] [address] [phone]              Who to contact     If you have questions or need follow up information about today's clinic visit or your schedule please contact [facility] directly at [phone].  Normal or non-critical lab and imaging results will be communicated to you by MyChart, letter or phone within 4 business days after the clinic has received the results. If you do not hear from us within 7 days, please contact the clinic through MyChart or phone. If you have a critical or abnormal lab result, we will notify you by phone as soon as possible.  Submit refill requests through [employer] or call your pharmacy and they will forward the refill request to us. Please allow 3 business days for your refill to be completed.          Additional Information About Your Visit        [employer] Information     [employer] lets you send messages to your doctor, view your test results, renew your prescriptions, schedule appointments and more. To sign up, go to [email or web address] . Click on \"Log in\" on the left side of the screen, which will take you to the Welcome page. Then click on \"Sign up Now\" on the right side of the page.     You will be asked to enter the access code listed below, as well as some personal information. Please follow the directions to create your username and password.     Your access code is: [email or web address]  Expires: 5/10/2018  6:30 AM     Your access code will  in 90 days. If you need help or a new code, please call your [facility] or [phone].        Care EveryWhere ID     This is your Care EveryWhere ID. This could be used by other organizations to access your [facility] medical records  [id number]        Your Vitals Were     Pulse Temperature Height Pulse Oximetry BMI (Body Mass Index)       96 98.3  F (36.8  C) (Tympanic) 5' 7\" (1.702 m) 95% 23.96 kg/m2        Blood Pressure from Last 3 Encounters:   18 108/78   10/08/17 121/85 "   08/23/17 112/76    Weight from Last 3 Encounters:   02/19/18 153 lb (69.4 kg)   07/25/17 174 lb (78.9 kg)   06/20/17 171 lb 14.4 oz (78 kg)              We Performed the Following     NEUROLOGY ADULT REFERRAL          Today's Medication Changes          These changes are accurate as of 2/19/18  2:39 PM.  If you have any questions, ask your nurse or doctor.               These medicines have changed or have updated prescriptions.        Dose/Directions    clotrimazole 1 % cream   Commonly known as:  LOTRIMIN   This may have changed:  See the new instructions.   Used for:  Tinea pedis of both feet   Changed by:  Dawood Araujo DO        APPLY TOPICALLY 2 TIMES DAILY   Quantity:  15 g   Refills:  1       omeprazole 20 MG CR capsule   Commonly known as:  priLOSEC   This may have changed:  See the new instructions.   Used for:  Constipation, unspecified constipation type   Changed by:  Dawood Araujo DO        TAKE 1 CAPSULE (20 MG) BY MOUTH DAILY   Quantity:  30 capsule   Refills:  9       ondansetron 4 MG ODT tab   Commonly known as:  ZOFRAN-ODT   This may have changed:  See the new instructions.   Used for:  Nausea   Changed by:  Dawood Araujo DO        TAKE 1-2 TABLETS (4-8 MG) BY MOUTH EVERY 8 HOURS AS NEEDED FOR NAUSEA   Quantity:  40 tablet   Refills:  6            Where to get your medicines      [facility] [address] [place] [address] AT [facility] [address]  [address] [place] [address]     Phone:  [phone]     albuterol 108 (90 BASE) MCG/ACT Inhaler    clotrimazole 1 % cream    cyanocobalamin 1000 MCG Tabs    fluconazole 100 MG tablet    hydrocortisone 2.5 % cream    lidocaine 5 % Patch    nicotine 14 MG/24HR 24 hr patch    omeprazole 20 MG CR capsule    ondansetron 4 MG ODT tab    SUMAtriptan 50 MG tablet    terbinafine 1 % cream                Primary Care Provider Office Phone # Fax #     Dawood Sea Araujo, [phone]       [address] [facility] [address]        Goals        General    I want more information on palliative card/Start Date 4/14/2014 (pt-stated)     Notes - Note created  4/15/2014 10:07 AM by Tania Vasquez MSW    As of today's date 4/15/2014 goal is met at 0 - 25%.   Goal Status:  Active        I want to feel normal again, and be able to address my pain/Start Date 4/14/2014 (pt-stated)     Notes - Note created  4/15/2014 10:06 AM by Tania Vasquez MSW    As of today's date 4/15/2014 goal is met at 0 - 25%.   Goal Status:  Active        I will take my nirto as directed for chest pain. (pt-stated)     I will weigh myself daily and keep a record (pt-stated)       Equal Access to Services     [facility] : Hadii ashley cotto hadasho Solencho, waaxda luqadaha, qaybta kaalmada adeegyada, walt kauffman . So [facility] [phone].    ATENCIÓN: Si habla español, tiene a alejandro disposición servicios gratuitos de asistencia lingüística. Llame al [phone].    We comply with applicable federal civil rights laws and [place] laws. We do not discriminate on the basis of race, color, national origin, age, disability, sex, sexual orientation, or gender identity.            Thank you!     Thank you for choosing [facility]  for your care. Our goal is always to provide you with excellent care. Hearing back from our patients is one way we can continue to improve our services. Please take a few minutes to complete the written survey that you may receive in the mail after your visit with us. Thank you!             Your Updated Medication List - Protect others around you: Learn how to safely use, store and throw away your medicines at www.disposemymeds.org.          This list is accurate as of 2/19/18  2:39 PM.  Always use your most recent med list.                   Brand Name Dispense Instructions for use Diagnosis    albuterol 108 (90  BASE) MCG/ACT Inhaler    PROAIR HFA/PROVENTIL HFA/VENTOLIN HFA    1 Inhaler    Inhale 2 puffs into the lungs every 4 hours as needed for shortness of breath / dyspnea or wheezing    Acute bronchospasm       ALPRAZolam 0.5 MG tablet    XANAX    30 tablet    One or two tabs daily PRN anxiety    Anxiety       aspirin 81 MG EC tablet     30 tablet    Take 1 tablet (81 mg) by mouth daily        BOOST     12 Can    fax # to the county worker fax#[phone] Attn:MILDRED    Nausea       clotrimazole 1 % cream    LOTRIMIN    15 g    APPLY TOPICALLY 2 TIMES DAILY    Tinea pedis of both feet       cyanocobalamin 1000 MCG Tabs    [facility] vitamin  B12    30 tablet    Take 1 tablet by mouth daily    Vitamin B12 deficiency (non anemic)       docusate sodium 100 MG tablet    COLACE    60 tablet    Take 100 mg by mouth daily    Constipation, unspecified constipation type       dolutegravir 50 MG tablet    TIVICAY    1 tablet    Take 1 tablet (50 mg) by mouth daily Must complete clinic appointment for further refills.    HIV (human immunodeficiency virus infection) (H)       emtricitabine-tenofovir 200-300 MG per tablet    TRUVADA    30 tablet    Take 1 tablet by mouth daily Must complete clinic appointment for further refills.    HIV (human immunodeficiency virus infection) (H)       fluconazole 100 MG tablet    DIFLUCAN    4 tablet    Take 1 pill every third day    Thrush       FLUoxetine 20 MG capsule    PROzac    15 capsule    TAKE 1 CAPSULE (20 MG) BY MOUTH DAILY ALONG WITH 40 MG OF FLUOXETINE    Anxiety       fluticasone 50 MCG/ACT spray    FLONASE    16 g    SPRAY 1-2 SPRAYS INTO BOTH NOSTRILS DAILY    Seasonal allergic rhinitis       hydrocortisone 2.5 % cream    ANUSOL-HC    30 g    Place rectally 2 times daily    External hemorrhoids       lidocaine 5 % Patch    LIDODERM    30 patch    Apply up to 3 patches to painful area at once for up to 12 h within a 24 h period as needed.  Remove after 12 hours.    Other chronic pain, Low  back pain, unspecified back pain laterality, unspecified chronicity, with sciatica presence unspecified       loratadine 10 MG tablet    CLARITIN    30 tablet    Take 1 tablet (10 mg) by mouth daily    Tobacco use disorder       methocarbamol 500 MG tablet    ROBAXIN          * MORPHINE SULFATE PO      Take 15 mg by mouth Reported on 5/8/2017        * morphine 15 MG 12 hr tablet    MS CONTIN          nicotine 14 MG/24HR 24 hr patch    NICODERM CQ    30 patch    Place 1 patch onto the skin every 24 hours    Tobacco use disorder       nitroGLYcerin 0.4 MG sublingual tablet    NITROSTAT    30 tablet    Place 1 tablet under the tongue every 5 mins as needed for chest pain If you are still having symptoms after 3 doses (15 minutes) call 911.    Acute diastolic CHF (congestive heart failure) (H)       omeprazole 20 MG CR capsule    priLOSEC    30 capsule    TAKE 1 CAPSULE (20 MG) BY MOUTH DAILY    Constipation, unspecified constipation type       ondansetron 4 MG ODT tab    ZOFRAN-ODT    40 tablet    TAKE 1-2 TABLETS (4-8 MG) BY MOUTH EVERY 8 HOURS AS NEEDED FOR NAUSEA    Nausea       order for DME     1 Device    Equipment being ordered: Oxygen at 5 liters    HIV (human immunodeficiency virus infection) (H), Acute respiratory failure with hypoxia (H), Acute diastolic CHF (congestive heart failure) (H)       oxyCODONE-acetaminophen  MG per tablet    PERCOCET    30 tablet    Take 1 tablet by mouth every 4 hours as needed for moderate to severe pain (Max of 6 a day.)    Low back pain, Chronic pain       predniSONE 20 MG tablet    DELTASONE    70 tablet    Take 3 tablets (60 mg) by mouth daily Decrease by 10mg weekly. Example: Until 5/7 3 tabs daily, then 2.5 tabs daily for one week.    Pneumonitis, Pneumonia of right lower lobe due to infectious organism (H)       psyllium 58.6 % Powd    METAMUCIL SMOOTH TEXTURE    425 g    Take 12 g (2 teaspoonful) by mouth 2 times daily    Constipation, unspecified constipation type        ranitidine 150 MG tablet    ZANTAC    60 tablet    Take 1 tablet (150 mg) by mouth 2 times daily    Gastroesophageal reflux disease, esophagitis presence not specified       simvastatin 20 MG tablet    ZOCOR    30 tablet    Take 1 tablet (20 mg) by mouth At Bedtime Hold if taking fluconozole    Ischemic chest pain (H)       sulfamethoxazole-trimethoprim 800-160 MG per tablet    BACTRIM DS/SEPTRA DS    60 tablet    Take 1 tablet by mouth daily    HIV (human immunodeficiency virus infection) (H)       SUMAtriptan 50 MG tablet    IMITREX    30 tablet    Take 1 tablet (50 mg) by mouth at onset of headache for migraine    Other migraine without status migrainosus, not intractable       terbinafine 1 % cream    lamISIL AT    42 g    Apply topically 2 times daily To effected areas on feet and toes for two weeks.    Tinea pedis of both feet       valACYclovir 500 MG tablet    VALTREX    60 tablet    Take 500 mg by mouth 2 times daily as needed Reported on 5/8/2017    Genital herpes in men       * Notice:  This list has 2 medication(s) that are the same as other medications prescribed for you. Read the directions carefully, and ask your doctor or other care provider to review them with you.

## 2018-02-19 NOTE — LETTER
[facility]  [address] [facility] [address]  Phone: [phone]    February 19, 2018        Tommy Ross  [address] [facility] [address]          To whom it may concern:    RE: Tommy Ross      Regarding the above patient, requires a low carb, gluten-free diet.      Please contact me for questions or concerns.      Sincerely,        Dawood Araujo, DO

## 2018-02-19 NOTE — LETTER
My Heart Failure Action Plan   Name: Tommy Ross    YOB: 1970   Date: 2/19/2018    My doctor: Dawood Araujo     [address] [address] [phone]  My Diagnosis:    My Ejection Fraction:     My Exercise Goal: 30 minutes daily  .     My Weight Goal:   Wt Readings from Last 2 Encounters:   02/19/18 153 lb (69.4 kg)   07/25/17 174 lb (78.9 kg)     Weigh yourself daily using the same scale. If you gain more than 2 pounds in 24 hours or 5 pounds in a week     My Diet Goal:   Emergency Room Visits:    Our goal is to improve your quality of life and help you avoid a visit to the emergency room or hospital.  If we work together, we can achieve this goal. But, if you feel you need to call 911 or go to the emergency room, please do so.  If you go to the emergency room, please bring your list of medicines and your daily weight chart with you.       GREEN ZONE     Doing well today    Weight gained is no more than 2 pounds a day or 5 pounds a week.    No swelling in feet, ankles, legs or stomach.    No more swelling than usual.    No more trouble breathing than usual.    No change in my sleep.    No other problems. Actions:    I am doing fine.  I will take my medicine, follow my diet, see my doctor, exercise, and watch for symptoms.           YELLOW ZONE         Having a bad day or flare up    Weight gain of more than 2 pounds in one day or 5 pounds in one week.    New swelling in ankle, leg, knee or thigh.    Bloating in belly, pants feel tighter.    Swelling in hands or face.    Coughing or trouble breathing while walking or talking.    Harder to breathe last night.    Have trouble sleeping, wake up short of breath.    Much more tired than usual.    Not eating.    Pain in my chest or bad leg cramps.    Feel weak or dizzy. Actions:    I need to take action and call my doctor or nurse today.                 RED ZONE         Need medical care  now    Weight gain of 5 pounds overnight.    Chest pain or pressure that does not go away.    Feel less alert.    Wheezing or have trouble breathing when at rest.    Cannot sleep lying down.    Cannot take my water pill.    Pass out or faint. Actions:    I need to call my doctor or nurse now!    Call 911 if I have chest pain or cannot breathe.        Electronically signed by: Lois Melvin, February 19, 2018

## 2018-02-20 ASSESSMENT — ANXIETY QUESTIONNAIRES: GAD7 TOTAL SCORE: 5

## 2018-02-20 ASSESSMENT — ASTHMA QUESTIONNAIRES: ACT_TOTALSCORE: 9

## 2018-02-21 ENCOUNTER — TELEPHONE (OUTPATIENT)
Dept: FAMILY MEDICINE | Facility: OTHER | Age: 48
End: 2018-02-21

## 2018-02-21 NOTE — TELEPHONE ENCOUNTER
Reason for Call:  Other other      Detailed comments: [facility] can not get him in for a couple months unless you were to fax a note stating it is urgent.  Fax #[phone]    Phone Number Patient can be reached at: Home number on file [phone] (home)    Best Time: any    Can we leave a detailed message on this number? YES    Call taken on 2/21/2018 at 1:55 PM by Jonah Bettencourt

## 2018-02-21 NOTE — TELEPHONE ENCOUNTER
The patient reports symptoms progressing over the last couple years. I do not see an acuity or urgency at this time. I believe that he can wait for the scheduled appointment time as suggested.      Gayle

## 2018-03-12 DIAGNOSIS — B35.3 TINEA PEDIS OF BOTH FEET: ICD-10-CM

## 2018-03-13 RX ORDER — CLOTRIMAZOLE 1 %
CREAM (GRAM) TOPICAL
Qty: 15 G | Refills: 1 | Status: SHIPPED | OUTPATIENT
Start: 2018-03-13 | End: 2019-01-07

## 2018-03-13 NOTE — TELEPHONE ENCOUNTER
Clortimazole       Last Written Prescription Date:  2/19/18  Last Fill Quantity: 15 g,   # refills: 1  Last Office Visit: 2/19/18  Future Office visit:       Routing refill request to provider for review/approval because:  Drug not on the [facility], P or [facility] refill protocol or controlled substance

## 2018-03-14 DIAGNOSIS — Z21 HIV (HUMAN IMMUNODEFICIENCY VIRUS INFECTION) (H): ICD-10-CM

## 2018-03-14 RX ORDER — EMTRICITABINE AND TENOFOVIR DISOPROXIL FUMARATE 200; 300 MG/1; MG/1
1 TABLET, FILM COATED ORAL DAILY
Qty: 30 TABLET | Refills: 0 | Status: SHIPPED | OUTPATIENT
Start: 2018-03-14 | End: 2018-04-12

## 2018-03-15 ENCOUNTER — TELEPHONE (OUTPATIENT)
Dept: NEPHROLOGY | Facility: CLINIC | Age: 48
End: 2018-03-15

## 2018-03-15 DIAGNOSIS — Z21 HIV (HUMAN IMMUNODEFICIENCY VIRUS INFECTION) (H): ICD-10-CM

## 2018-03-15 NOTE — TELEPHONE ENCOUNTER
Quantity of 30 requested for dolutegravir (TIVICAY). Please call [facility] from [facility] [phone].  Katarzyna Lemus LPN  Nephrology  [phone]

## 2018-03-16 ENCOUNTER — TELEPHONE (OUTPATIENT)
Dept: INFECTIOUS DISEASES | Facility: CLINIC | Age: 48
End: 2018-03-16

## 2018-03-16 NOTE — TELEPHONE ENCOUNTER
Pt called to advise Dr Lay, that he voluntarily stopped his HIV meds, because they have been helping.    PT states he has been on life support 2x in the last year.     Pt states he went to [facility] last week for sinus & lung problems & that the Abx he was prescribed have not helped.     Pt reports his temp has been decreasing. This morning his temp was 96.0, previous morning it was 96.9. Pt states he is clammy. Has headache & congestion. Hands & feet are going numb. Pt is having trouble with memory & concentration, and has rashes & bumps on back. Pt states symptoms are 10x worse than when he was in the hosp, 6 mo's ago.     Pt also says he is forgetting to take his meds, even his pain med.    Pt also states he does not have O2. He is renting a room & is trying to be discrete & plus he doesn't have the room for O2.    Please callback pt at [phone], regarding palliative care & quality of life concerns.

## 2018-03-16 NOTE — TELEPHONE ENCOUNTER
Patient is returning call.   Palliative care with treatment can be offered through [facility].   Needing a referral for the ordering provider.   [facility].   Mary Coronel LPN

## 2018-03-16 NOTE — TELEPHONE ENCOUNTER
Called pt to let him know he is welcome to discuss his referral with Dr Lay when he comes for his apt on 4/6.  Alexandra Downs RN

## 2018-03-19 ENCOUNTER — TELEPHONE (OUTPATIENT)
Dept: FAMILY MEDICINE | Facility: OTHER | Age: 48
End: 2018-03-19

## 2018-03-19 DIAGNOSIS — B20 AIDS (ACQUIRED IMMUNE DEFICIENCY SYNDROME) (H): Primary | ICD-10-CM

## 2018-03-19 NOTE — TELEPHONE ENCOUNTER
Reason for Call: Request for an order or referral:   Order or referral being requested:  [facility]    Date needed: as soon as possible    Has the patient been seen by the PCP for this problem? other     Additional comments:  Tommy is requesting a referral to [facility] palliative care for treatment and support.    Phone number Patient can be reached at:  Home number on file [phone] (home)    Best Time:       Can we leave a detailed message on this number?  YES    Call taken on 3/19/2018 at 11:23 AM by Juanita Fernandes

## 2018-03-19 NOTE — TELEPHONE ENCOUNTER
I have placed a referral for the patient's palliative care into [facility]. If you could possibly prevent this and may let off to the requested fax number, that would be awesome.    Gayle  .

## 2018-03-20 ENCOUNTER — TELEPHONE (OUTPATIENT)
Dept: FAMILY MEDICINE | Facility: OTHER | Age: 48
End: 2018-03-20

## 2018-03-20 NOTE — TELEPHONE ENCOUNTER
Reason for Call:  Form, our goal is to have forms completed with 72 hours, however, some forms may require a visit or additional information.    Type of letter, form or note:  medical, letter of medical necessity    Who is the form from?: [employer] [facility] (if other please explain)    Where did the form come from: form was faxed in    What clinic location was the form placed at?: [facility]    Where the form was placed: [facility]    What number is listed as a contact on the form?: [phone]       Additional comments: please complete and fax to [phone]     Call taken on 3/20/2018 at 2:58 PM by Love Menendez

## 2018-03-20 NOTE — TELEPHONE ENCOUNTER
Left message at [facility] for more information and fax number.    Nazanin Jeffers XRO/  [facility]

## 2018-03-20 NOTE — TELEPHONE ENCOUNTER
Referral and last office visit faxed to [facility]  Fax# [phone]    Nazanin Jeffers XRO/  [facility]

## 2018-03-22 NOTE — TELEPHONE ENCOUNTER
Forms faxed [facility] [employer] [facility] Fax [phone]    Nazanin Jeffers XRO/  [facility]

## 2018-03-26 ENCOUNTER — TRANSFERRED RECORDS (OUTPATIENT)
Dept: HEALTH INFORMATION MANAGEMENT | Facility: CLINIC | Age: 48
End: 2018-03-26

## 2018-03-27 ENCOUNTER — TRANSFERRED RECORDS (OUTPATIENT)
Dept: HEALTH INFORMATION MANAGEMENT | Facility: CLINIC | Age: 48
End: 2018-03-27

## 2018-03-28 ENCOUNTER — TRANSFERRED RECORDS (OUTPATIENT)
Dept: HEALTH INFORMATION MANAGEMENT | Facility: CLINIC | Age: 48
End: 2018-03-28

## 2018-04-02 ENCOUNTER — TRANSFERRED RECORDS (OUTPATIENT)
Dept: HEALTH INFORMATION MANAGEMENT | Facility: CLINIC | Age: 48
End: 2018-04-02

## 2018-04-12 DIAGNOSIS — Z21 HIV (HUMAN IMMUNODEFICIENCY VIRUS INFECTION) (H): ICD-10-CM

## 2018-04-12 RX ORDER — EMTRICITABINE AND TENOFOVIR DISOPROXIL FUMARATE 200; 300 MG/1; MG/1
1 TABLET, FILM COATED ORAL DAILY
Qty: 30 TABLET | Refills: 0 | Status: SHIPPED | OUTPATIENT
Start: 2018-04-12 | End: 2018-05-15

## 2018-04-16 DIAGNOSIS — G43.809 OTHER MIGRAINE WITHOUT STATUS MIGRAINOSUS, NOT INTRACTABLE: ICD-10-CM

## 2018-04-16 NOTE — TELEPHONE ENCOUNTER
"Requested Prescriptions   Pending Prescriptions Disp Refills     SUMAtriptan (IMITREX) 50 MG tablet 30 tablet 1     Sig: Take 1 tablet (50 mg) by mouth at onset of headache for migraine    Serotonin Agonists Failed    4/16/2018 12:39 PM       Failed - Serotonin Agonist request needs review.    Please review patient's record. If patient has had 8 or more treatments in the past month, please forward to provider.         Passed - Blood pressure under 140/90 in past 12 months    BP Readings from Last 3 Encounters:   02/19/18 108/78   10/08/17 121/85   08/23/17 112/76                Passed - Recent (12 mo) or future (30 days) visit within the authorizing provider's specialty    Patient had office visit in the last 12 months or has a visit in the next 30 days with authorizing provider or within the authorizing provider's specialty.  See \"Patient Info\" tab in inbasket, or \"Choose Columns\" in Meds & Orders section of the refill encounter.           Passed - Patient is age 18 or older          Last Written Prescription Date:  2/19/18  Last Fill Quantity: 30,  # refills: 1   Last Office Visit with [facility], [facility] or [facility] prescribing provider:  2/19/18   Future Office Visit:       "

## 2018-04-17 RX ORDER — SUMATRIPTAN 50 MG/1
50 TABLET, FILM COATED ORAL
Qty: 30 TABLET | Refills: 1 | Status: SHIPPED | OUTPATIENT
Start: 2018-04-17 | End: 2018-12-10

## 2018-04-17 NOTE — TELEPHONE ENCOUNTER
Routing refill request to provider for review/approval because:  Please review allergy contraindication  Maribell Mar RN, BSN

## 2018-04-24 ENCOUNTER — APPOINTMENT (OUTPATIENT)
Dept: CT IMAGING | Facility: CLINIC | Age: 48
End: 2018-04-24
Attending: NURSE PRACTITIONER
Payer: MEDICARE

## 2018-04-24 ENCOUNTER — HOSPITAL ENCOUNTER (EMERGENCY)
Facility: CLINIC | Age: 48
Discharge: HOME | End: 2018-04-24
Attending: NURSE PRACTITIONER | Admitting: NURSE PRACTITIONER
Payer: MEDICARE

## 2018-04-24 VITALS
BODY MASS INDEX: 21.48 KG/M2 | WEIGHT: 145 LBS | OXYGEN SATURATION: 97 % | HEIGHT: 69 IN | TEMPERATURE: 97.8 F | HEART RATE: 96 BPM | SYSTOLIC BLOOD PRESSURE: 115 MMHG | RESPIRATION RATE: 16 BRPM | DIASTOLIC BLOOD PRESSURE: 84 MMHG

## 2018-04-24 DIAGNOSIS — N20.1 URETERAL STONE: ICD-10-CM

## 2018-04-24 LAB
ALBUMIN SERPL-MCNC: 3.5 G/DL (ref 3.4–5)
ALBUMIN UR-MCNC: 30 MG/DL
ALP SERPL-CCNC: 117 U/L (ref 40–150)
ALT SERPL W P-5'-P-CCNC: 65 U/L (ref 0–70)
ANION GAP SERPL CALCULATED.3IONS-SCNC: 10 MMOL/L (ref 3–14)
APPEARANCE UR: ABNORMAL
AST SERPL W P-5'-P-CCNC: 75 U/L (ref 0–45)
BASOPHILS # BLD AUTO: 0.1 10E9/L (ref 0–0.2)
BASOPHILS NFR BLD AUTO: 1 %
BILIRUB SERPL-MCNC: 0.7 MG/DL (ref 0.2–1.3)
BILIRUB UR QL STRIP: NEGATIVE
BUN SERPL-MCNC: 16 MG/DL (ref 7–30)
CALCIUM SERPL-MCNC: 8.4 MG/DL (ref 8.5–10.1)
CHLORIDE SERPL-SCNC: 106 MMOL/L (ref 94–109)
CO2 SERPL-SCNC: 23 MMOL/L (ref 20–32)
COLOR UR AUTO: ABNORMAL
CREAT SERPL-MCNC: 0.89 MG/DL (ref 0.66–1.25)
CRP SERPL-MCNC: 15.4 MG/L (ref 0–8)
DIFFERENTIAL METHOD BLD: ABNORMAL
EOSINOPHIL # BLD AUTO: 0 10E9/L (ref 0–0.7)
EOSINOPHIL NFR BLD AUTO: 0 %
ERYTHROCYTE [DISTWIDTH] IN BLOOD BY AUTOMATED COUNT: 14.4 % (ref 10–15)
GFR SERPL CREATININE-BSD FRML MDRD: >90 ML/MIN/1.7M2
GLUCOSE SERPL-MCNC: 90 MG/DL (ref 70–99)
GLUCOSE UR STRIP-MCNC: NEGATIVE MG/DL
HCT VFR BLD AUTO: 47.2 % (ref 40–53)
HGB BLD-MCNC: 16.4 G/DL (ref 13.3–17.7)
HGB UR QL STRIP: NEGATIVE
HYALINE CASTS #/AREA URNS LPF: 1 /LPF (ref 0–2)
KETONES UR STRIP-MCNC: 5 MG/DL
LACTATE BLD-SCNC: 1.2 MMOL/L (ref 0.7–2)
LEUKOCYTE ESTERASE UR QL STRIP: NEGATIVE
LYMPHOCYTES # BLD AUTO: 3.9 10E9/L (ref 0.8–5.3)
LYMPHOCYTES NFR BLD AUTO: 48 %
MCH RBC QN AUTO: 29.4 PG (ref 26.5–33)
MCHC RBC AUTO-ENTMCNC: 34.7 G/DL (ref 31.5–36.5)
MCV RBC AUTO: 85 FL (ref 78–100)
MONOCYTES # BLD AUTO: 1.2 10E9/L (ref 0–1.3)
MONOCYTES NFR BLD AUTO: 15 %
MUCOUS THREADS #/AREA URNS LPF: PRESENT /LPF
NEUTROPHILS # BLD AUTO: 2.9 10E9/L (ref 1.6–8.3)
NEUTROPHILS NFR BLD AUTO: 35 %
NITRATE UR QL: NEGATIVE
OTHER CELLS # BLD MANUAL: 0.1 10E9/L
OTHER CELLS NFR BLD MANUAL: 1 %
PH UR STRIP: 6 PH (ref 5–7)
PLATELET # BLD AUTO: 85 10E9/L (ref 150–450)
POTASSIUM SERPL-SCNC: 4.1 MMOL/L (ref 3.4–5.3)
PROT SERPL-MCNC: 8.1 G/DL (ref 6.8–8.8)
RBC # BLD AUTO: 5.58 10E12/L (ref 4.4–5.9)
RBC #/AREA URNS AUTO: 5 /HPF (ref 0–2)
RENAL EPI CELLS #/AREA URNS HPF: <1 /HPF
SODIUM SERPL-SCNC: 139 MMOL/L (ref 133–144)
SOURCE: ABNORMAL
SP GR UR STRIP: 1.02 (ref 1–1.03)
SPERM #/AREA URNS HPF: PRESENT /HPF
SQUAMOUS #/AREA URNS AUTO: <1 /HPF (ref 0–1)
UROBILINOGEN UR STRIP-MCNC: 2 MG/DL (ref 0–2)
WBC # BLD AUTO: 8.2 10E9/L (ref 4–11)
WBC #/AREA URNS AUTO: 0 /HPF (ref 0–5)

## 2018-04-24 PROCEDURE — 85025 COMPLETE CBC W/AUTO DIFF WBC: CPT | Performed by: NURSE PRACTITIONER

## 2018-04-24 PROCEDURE — 86140 C-REACTIVE PROTEIN: CPT | Performed by: NURSE PRACTITIONER

## 2018-04-24 PROCEDURE — 96361 HYDRATE IV INFUSION ADD-ON: CPT | Performed by: NURSE PRACTITIONER

## 2018-04-24 PROCEDURE — 74176 CT ABD & PELVIS W/O CONTRAST: CPT

## 2018-04-24 PROCEDURE — 80053 COMPREHEN METABOLIC PANEL: CPT | Performed by: NURSE PRACTITIONER

## 2018-04-24 PROCEDURE — 83605 ASSAY OF LACTIC ACID: CPT | Performed by: NURSE PRACTITIONER

## 2018-04-24 PROCEDURE — 96374 THER/PROPH/DIAG INJ IV PUSH: CPT | Performed by: NURSE PRACTITIONER

## 2018-04-24 PROCEDURE — 25000128 H RX IP 250 OP 636: Performed by: NURSE PRACTITIONER

## 2018-04-24 PROCEDURE — 99285 EMERGENCY DEPT VISIT HI MDM: CPT | Mod: Z6 | Performed by: NURSE PRACTITIONER

## 2018-04-24 PROCEDURE — 81001 URINALYSIS AUTO W/SCOPE: CPT | Performed by: NURSE PRACTITIONER

## 2018-04-24 PROCEDURE — 87086 URINE CULTURE/COLONY COUNT: CPT | Performed by: NURSE PRACTITIONER

## 2018-04-24 PROCEDURE — 99285 EMERGENCY DEPT VISIT HI MDM: CPT | Mod: 25 | Performed by: NURSE PRACTITIONER

## 2018-04-24 PROCEDURE — 96376 TX/PRO/DX INJ SAME DRUG ADON: CPT | Performed by: NURSE PRACTITIONER

## 2018-04-24 RX ORDER — MORPHINE SULFATE 4 MG/ML
4 INJECTION, SOLUTION INTRAMUSCULAR; INTRAVENOUS
Status: DISCONTINUED | OUTPATIENT
Start: 2018-04-24 | End: 2018-04-24 | Stop reason: HOSPADM

## 2018-04-24 RX ORDER — IPRATROPIUM BROMIDE AND ALBUTEROL SULFATE 2.5; .5 MG/3ML; MG/3ML
3 SOLUTION RESPIRATORY (INHALATION)
COMMUNITY
Start: 2013-03-21 | End: 2020-06-19

## 2018-04-24 RX ORDER — LOPERAMIDE HCL 2 MG
CAPSULE ORAL
COMMUNITY

## 2018-04-24 RX ORDER — TRIAMCINOLONE ACETONIDE 1 MG/G
CREAM TOPICAL
COMMUNITY
Start: 2018-03-02 | End: 2020-01-27

## 2018-04-24 RX ORDER — TAMSULOSIN HYDROCHLORIDE 0.4 MG/1
CAPSULE ORAL
COMMUNITY
Start: 2018-04-09 | End: 2019-08-09

## 2018-04-24 RX ORDER — DIPHENOXYLATE HCL/ATROPINE 2.5-.025MG
TABLET ORAL
COMMUNITY
Start: 2012-10-03

## 2018-04-24 RX ADMIN — MORPHINE SULFATE 4 MG: 4 INJECTION, SOLUTION INTRAMUSCULAR; INTRAVENOUS at 13:20

## 2018-04-24 RX ADMIN — SODIUM CHLORIDE 1000 ML: 9 INJECTION, SOLUTION INTRAVENOUS at 13:05

## 2018-04-24 RX ADMIN — MORPHINE SULFATE 4 MG: 4 INJECTION, SOLUTION INTRAMUSCULAR; INTRAVENOUS at 14:03

## 2018-04-24 ASSESSMENT — ENCOUNTER SYMPTOMS
APPETITE CHANGE: 1
CHILLS: 1
FEVER: 1
BACK PAIN: 1
NAUSEA: 1
ACTIVITY CHANGE: 1
FLANK PAIN: 1

## 2018-04-24 NOTE — LETTER
April 24, 2018      To Whom It May Concern:      Tommy Ross was seen in our Emergency Department today, 04/24/18. Please excuse Peri from work as she was here with him    Thank you.      Sincerely,        MARTIN Hummel CNP

## 2018-04-24 NOTE — DISCHARGE INSTRUCTIONS
"   * KIDNEY STONE (w/ Colic)    The sharp cramping pain and nausea/vomiting that you have is due to a small stone which has formed in the kidney and is now passing down a narrow tube (ureter) on its way to your bladder. Once it reaches your bladder, the pain will stop. The stone may pass in your urine stream in one piece. [The size may be 1/16\" to 1/4\" (1-6mm)]. Or, the stone may also break up into janna fragments which you may not even notice.  Once you have had a kidney stone there is a risk for recurrence in the future.  HOME CARE:      Drink lots of fluid (at least 8-10 glasses of water a day).    Most stones will pass on their own, but may take from a few hours to a few days.    Each time you urinate, do so in a jar. Pour the urine from the jar through the strainer and into the toilet. Continue doing this until 24 hours after your pain stops. By then, if there was a kidney stone, it should pass from your bladder. Some stones dissolve into sand-like particles and pass right through the strainer. In that case, you won't ever see a stone.    Save any stone that you find in the strainer and bring it to your doctor for analysis. It may be possible to prevent certain types of stones from forming. Therefore, it is important to know what kind of stone you have.    Try to stay as active as possible since this will help the stone pass. Do not stay in bed unless your pain prevents you from getting up. You may notice a red, pink or brown color to your urine. This is normal while passing a kidney stone.  FOLLOW UP with your doctor or return to this facility if the pain lasts more than 48 hours.  GET PROMPT MEDICAL ATTENTION if any of the following occur:    Pain that is not controlled by the medicine given    Repeated vomiting or unable to keep down fluids    Weakness, dizziness or fainting    Fever over 101  F (38.3  C)    Passage of solid red or brown urine (can't see through it) or urine with lots of blood " clots    Unable to pass urine for 8 hours and increasing bladder pressure    5268-1739 [employer] [address] [place] [address], [place] [address]. All rights reserved. This information is not intended as a substitute for professional medical care. Always follow your healthcare professional's instructions.  This information has been modified by your health care provider with permission from the publisher.      Treating Kidney Stones: Expectant Therapy  Most kidney stones are about the size of a grape seed. Stones of this size are small enough to pass naturally. Once it is passed, a stone can be analyzed. This wait-and-see approach is called expectant therapy. Small stones can often be passed with expectant therapy. If pain is a problem, ask your healthcare provider about pain medicines. Then follow his or her directions on how much water to drink. Drinking more water creates more urine to flush out your stone. Also be sure to strain your urine. Take any stones you pass to your provider for analysis.    Drink lots of water  Drinking lots of water may help your stone pass. Water also dilutes the chemicals in your urine. This reduces your risk of forming new stones. You may be told to drink 8, 12-ounce glasses of water a day. Avoid liquids that dehydrate you, such as those containing caffeine or alcohol.  Strain your urine  Straining your urine lets you collect your stone for analysis. Use the strainer each time you urinate. Strain your urine for as long as your healthcare provider suggests. Watch for brown, tan, gold, or black specks or tiny mauro. These may be kidney stones.  Take your medicine  Your healthcare provider may give you medicine that makes it more likely for you to pass the kidney stone.   Follow up with your healthcare provider  Follow up by taking any stones you find to your provider for analysis. The type of stone you have determines your diet and prevention program. You may need more tests  in the future. These tests will ensure that new stones are not forming.  Date Last Reviewed: 1/1/2017 2000-2017 [employer] [address] [place] [address], [place] [address]. All rights reserved. This information is not intended as a substitute for professional medical care. Always follow your healthcare professional's instructions.

## 2018-04-24 NOTE — ED AVS SNAPSHOT
" [facility] Emergency Department    [address] [place] [address]    Phone:  [phone]    Fax:  [phone]                                       Tommy Ross   MRN: [id number]    Department:  [facility] Emergency Department   Date of Visit:  4/24/2018           Patient Information     Date Of Birth          1970        Your diagnoses for this visit were:     Ureteral stone LEFT UPJ with obstruction and mild hydronephrosis       You were seen by Angela Alonso, MARTIN CNP.      Follow-up Information     Follow up with Man James MD In 1 day.    Specialty:  Urology    Contact information:    [facility]  [address] [place] [address] [phone]          Follow up with [facility] Emergency [facility].    Specialty:  EMERGENCY MEDICINE    Why:  If symptoms worsen    Contact information:    [address] [place] [address] [phone]    Additional information:    From [address]: Exit at [address] on [address] side of [place]. Turn right on [address]. Turn left at stoplight on [email or web address]. [facility] will be in view two blocks ahead        Discharge Instructions          * KIDNEY STONE (w/ Colic)    The sharp cramping pain and nausea/vomiting that you have is due to a small stone which has formed in the kidney and is now passing down a narrow tube (ureter) on its way to your bladder. Once it reaches your bladder, the pain will stop. The stone may pass in your urine stream in one piece. [The size may be 1/16\" to 1/4\" (1-6mm)]. Or, the stone may also break up into janna fragments which you may not even notice.  Once you have had a kidney stone there is a risk for recurrence in the future.  HOME CARE:      Drink lots of fluid (at least 8-10 glasses of water a day).    Most stones will pass on their own, but may take from a few hours to a few days.    Each time you urinate, do so in a jar. Pour the " urine from the jar through the strainer and into the toilet. Continue doing this until 24 hours after your pain stops. By then, if there was a kidney stone, it should pass from your bladder. Some stones dissolve into sand-like particles and pass right through the strainer. In that case, you won't ever see a stone.    Save any stone that you find in the strainer and bring it to your doctor for analysis. It may be possible to prevent certain types of stones from forming. Therefore, it is important to know what kind of stone you have.    Try to stay as active as possible since this will help the stone pass. Do not stay in bed unless your pain prevents you from getting up. You may notice a red, pink or brown color to your urine. This is normal while passing a kidney stone.  FOLLOW UP with your doctor or return to this facility if the pain lasts more than 48 hours.  GET PROMPT MEDICAL ATTENTION if any of the following occur:    Pain that is not controlled by the medicine given    Repeated vomiting or unable to keep down fluids    Weakness, dizziness or fainting    Fever over 101  F (38.3  C)    Passage of solid red or brown urine (can't see through it) or urine with lots of blood clots    Unable to pass urine for 8 hours and increasing bladder pressure    2612-1163 [employer] [address] [place] [address], [place] [address]. All rights reserved. This information is not intended as a substitute for professional medical care. Always follow your healthcare professional's instructions.  This information has been modified by your health care provider with permission from the publisher.      Treating Kidney Stones: Expectant Therapy  Most kidney stones are about the size of a grape seed. Stones of this size are small enough to pass naturally. Once it is passed, a stone can be analyzed. This wait-and-see approach is called expectant therapy. Small stones can often be passed with expectant therapy. If pain is a problem,  ask your healthcare provider about pain medicines. Then follow his or her directions on how much water to drink. Drinking more water creates more urine to flush out your stone. Also be sure to strain your urine. Take any stones you pass to your provider for analysis.    Drink lots of water  Drinking lots of water may help your stone pass. Water also dilutes the chemicals in your urine. This reduces your risk of forming new stones. You may be told to drink 8, 12-ounce glasses of water a day. Avoid liquids that dehydrate you, such as those containing caffeine or alcohol.  Strain your urine  Straining your urine lets you collect your stone for analysis. Use the strainer each time you urinate. Strain your urine for as long as your healthcare provider suggests. Watch for brown, tan, gold, or black specks or tiny mauro. These may be kidney stones.  Take your medicine  Your healthcare provider may give you medicine that makes it more likely for you to pass the kidney stone.   Follow up with your healthcare provider  Follow up by taking any stones you find to your provider for analysis. The type of stone you have determines your diet and prevention program. You may need more tests in the future. These tests will ensure that new stones are not forming.  Date Last Reviewed: 1/1/2017 2000-2017 [employer]. [address] [place] [address], [place] [address]. All rights reserved. This information is not intended as a substitute for professional medical care. Always follow your healthcare professional's instructions.          24 Hour Appointment Hotline       To make an appointment at any [facility], call [phone] ([phone]). If you don't have a family doctor or clinic, we will help you find one. [facility] are conveniently located to serve the needs of you and your family.             Review of your medicines      Our records show that you are taking the medicines listed below. If these are  incorrect, please call your family doctor or clinic.        Dose / Directions Last dose taken    albuterol 108 (90 Base) MCG/ACT Inhaler   Commonly known as:  PROAIR HFA/PROVENTIL HFA/VENTOLIN HFA   Dose:  2 puff   Quantity:  1 Inhaler        Inhale 2 puffs into the lungs every 4 hours as needed for shortness of breath / dyspnea or wheezing   Refills:  11        ALPRAZolam 0.5 MG tablet   Commonly known as:  XANAX   Quantity:  30 tablet        One or two tabs daily PRN anxiety   Refills:  0        aspirin 81 MG EC tablet   Dose:  81 mg   Quantity:  30 tablet        Take 1 tablet (81 mg) by mouth daily   Refills:  0        BOOST   Quantity:  12 Can        fax # to the county worker fax#[phone] Attn:MILDRED   Refills:  12        clotrimazole 1 % cream   Commonly known as:  LOTRIMIN   Quantity:  15 g        APPLY TOPICALLY 2 TIMES DAILY   Refills:  1        cyanocobalamin 1000 MCG Tabs   Commonly known as:  [facility] vitamin  B12   Dose:  1 tablet   Quantity:  30 tablet        Take 1 tablet by mouth daily   Refills:  3        diphenoxylate-atropine 2.5-0.025 MG per tablet   Commonly known as:  LOMOTIL        Refills:  0        docusate sodium 100 MG tablet   Commonly known as:  COLACE   Dose:  100 mg   Quantity:  60 tablet        Take 100 mg by mouth daily   Refills:  3        dolutegravir 50 MG tablet   Commonly known as:  TIVICAY   Dose:  50 mg   Quantity:  30 tablet        Take 1 tablet (50 mg) by mouth daily Please call [phone] & make appointment for further refills.   Refills:  0        emtricitabine-tenofovir 200-300 MG per tablet   Commonly known as:  TRUVADA   Dose:  1 tablet   Quantity:  30 tablet        Take 1 tablet by mouth daily Please call [phone] & make appointment for further refills.   Refills:  0        fluconazole 100 MG tablet   Commonly known as:  DIFLUCAN   Quantity:  4 tablet        Take 1 pill every third day   Refills:  0        FLUoxetine 20 MG capsule   Commonly known as:  PROzac    Quantity:  15 capsule        TAKE 1 CAPSULE (20 MG) BY MOUTH DAILY ALONG WITH 40 MG OF FLUOXETINE   Refills:  0        fluticasone 50 MCG/ACT spray   Commonly known as:  FLONASE   Quantity:  16 g        SPRAY 1-2 SPRAYS INTO BOTH NOSTRILS DAILY   Refills:  8        hydrocortisone 2.5 % cream   Commonly known as:  ANUSOL-HC   Quantity:  30 g        Place rectally 2 times daily   Refills:  0        ipratropium - albuterol 0.5 mg/2.5 mg/3 mL 0.5-2.5 (3) MG/3ML neb solution   Commonly known as:  DUONEB   Dose:  3 mL        Inhale 3 mLs into the lungs   Refills:  0        lidocaine 5 % Patch   Commonly known as:  LIDODERM   Quantity:  30 patch        Apply up to 3 patches to painful area at once for up to 12 h within a 24 h period as needed.  Remove after 12 hours.   Refills:  8        loperamide 2 MG capsule   Commonly known as:  IMODIUM        Refills:  0        loratadine 10 MG tablet   Commonly known as:  CLARITIN   Dose:  10 mg   Quantity:  30 tablet        Take 1 tablet (10 mg) by mouth daily   Refills:  3        * MORPHINE SULFATE PO   Dose:  15 mg        Take 15 mg by mouth Reported on 5/8/2017   Refills:  0        * morphine 15 MG 12 hr tablet   Commonly known as:  MS CONTIN        Refills:  0        MULTIVITAMIN & MINERAL PO        Refills:  0        nicotine 14 MG/24HR 24 hr patch   Commonly known as:  NICODERM CQ   Dose:  1 patch   Quantity:  30 patch        Place 1 patch onto the skin every 24 hours   Refills:  1        nitroGLYcerin 0.4 MG sublingual tablet   Commonly known as:  NITROSTAT   Quantity:  30 tablet        Place 1 tablet under the tongue every 5 mins as needed for chest pain If you are still having symptoms after 3 doses (15 minutes) call 911.   Refills:  1        omeprazole 20 MG CR capsule   Commonly known as:  priLOSEC   Quantity:  30 capsule        TAKE 1 CAPSULE (20 MG) BY MOUTH DAILY   Refills:  9        ondansetron 4 MG ODT tab   Commonly known as:  ZOFRAN-ODT   Quantity:  40 tablet         TAKE 1-2 TABLETS (4-8 MG) BY MOUTH EVERY 8 HOURS AS NEEDED FOR NAUSEA   Refills:  6        order for DME   Quantity:  1 Device        Equipment being ordered: Oxygen at 5 liters   Refills:  0        oxyCODONE-acetaminophen  MG per tablet   Commonly known as:  PERCOCET   Dose:  1 tablet   Quantity:  30 tablet        Take 1 tablet by mouth every 4 hours as needed for moderate to severe pain (Max of 6 a day.)   Refills:  0        predniSONE 20 MG tablet   Commonly known as:  DELTASONE   Dose:  60 mg   Quantity:  70 tablet        Take 3 tablets (60 mg) by mouth daily Decrease by 10mg weekly. Example: Until 5/7 3 tabs daily, then 2.5 tabs daily for one week.   Refills:  0        psyllium 58.6 % Powd   Commonly known as:  METAMUCIL SMOOTH TEXTURE   Dose:  2 teaspoonful   Quantity:  425 g        Take 12 g (2 teaspoonful) by mouth 2 times daily   Refills:  3        ranitidine 150 MG tablet   Commonly known as:  ZANTAC   Dose:  150 mg   Quantity:  60 tablet        Take 1 tablet (150 mg) by mouth 2 times daily   Refills:  9        simvastatin 20 MG tablet   Commonly known as:  ZOCOR   Dose:  20 mg   Quantity:  30 tablet        Take 1 tablet (20 mg) by mouth At Bedtime Hold if taking fluconozole   Refills:  11        sulfamethoxazole-trimethoprim 800-160 MG per tablet   Commonly known as:  BACTRIM DS/SEPTRA DS   Dose:  1 tablet   Indication:  Healthcare-Associated Pneumonia   Quantity:  60 tablet        Take 1 tablet by mouth daily   Refills:  1        SUMAtriptan 50 MG tablet   Commonly known as:  IMITREX   Dose:  50 mg   Quantity:  30 tablet        Take 1 tablet (50 mg) by mouth at onset of headache for migraine   Refills:  1        tamsulosin 0.4 MG capsule   Commonly known as:  FLOMAX        Refills:  0        terbinafine 1 % cream   Commonly known as:  lamISIL AT   Quantity:  42 g        Apply topically 2 times daily To effected areas on feet and toes for two weeks.   Refills:  1        triamcinolone 0.1 % cream    Commonly known as:  KENALOG        Refills:  0        valACYclovir 500 MG tablet   Commonly known as:  VALTREX   Dose:  500 mg   Quantity:  60 tablet        Take 500 mg by mouth 2 times daily as needed Reported on 5/8/2017   Refills:  3        VITAMIN D (CHOLECALCIFEROL) PO   Dose:  5000 Units        Take 5,000 Units by mouth   Refills:  0        * Notice:  This list has 2 medication(s) that are the same as other medications prescribed for you. Read the directions carefully, and ask your doctor or other care provider to review them with you.            Procedures and tests performed during your visit     CBC with platelets differential    CRP inflammation    CT Abdomen Pelvis w/o Contrast    Comprehensive metabolic panel    Lactic acid whole blood    Peripheral IV catheter    Routine UA with microscopic    Urine Culture      Orders Needing Specimen Collection     None      Pending Results     Date and Time Order Name Status Description    4/24/2018 1426 Urine Culture In process     4/24/2018 1327 CT Abdomen Pelvis w/o Contrast Preliminary             Pending Culture Results     Date and Time Order Name Status Description    4/24/2018 1426 Urine Culture In process             Pending Results Instructions     If you had any lab results that were not finalized at the time of your Discharge, you can call the ED Lab Result RN at [phone]. You will be contacted by this team for any positive Lab results or changes in treatment. The nurses are available 7 days a week from 10A to 6:30P.  You can leave a message 24 hours per day and they will return your call.        Thank you for choosing [facility]       Thank you for choosing [facility] for your care. Our goal is always to provide you with excellent care. Hearing back from our patients is one way we can continue to improve our services. Please take a few minutes to complete the written survey that you may receive in the mail after you visit with us. Thank you!       "  MyChart Information     "[employer]" lets you send messages to your doctor, view your test results, renew your prescriptions, schedule appointments and more. To sign up, go to [email or web address] . Click on \"Log in\" on the left side of the screen, which will take you to the Welcome page. Then click on \"Sign up Now\" on the right side of the page.     You will be asked to enter the access code listed below, as well as some personal information. Please follow the directions to create your username and password.     Your access code is: [email or web address]  Expires: 5/10/2018  7:30 AM     Your access code will  in 90 days. If you need help or a new code, please call your [facility] clinic or [phone].        Care EveryWhere ID     This is your Care EveryWhere ID. This could be used by other organizations to access your [facility] medical records  [id number]        Equal Access to Services     MARI DEAN : Hadii aad ku hadasho Solencho, waaxda luqadaha, qaybta kaalmada adeegyateri, walt kauffman . So [facility] [phone].    ATENCIÓN: Si habla español, tiene a alejandro disposición servicios gratuitos de asistencia lingüística. Llame al [phone].    We comply with applicable federal civil rights laws and [place] laws. We do not discriminate on the basis of race, color, national origin, age, disability, sex, sexual orientation, or gender identity.            After Visit Summary       This is your record. Keep this with you and show to your community pharmacist(s) and doctor(s) at your next visit.                  "

## 2018-04-24 NOTE — ED AVS SNAPSHOT
[facility] Emergency Department    [address] [place] [address]    Phone:  [phone]    Fax:  [phone]                                       Tommy Ross   MRN: [id number]    Department:  [facility] Emergency Department   Date of Visit:  4/24/2018           After Visit Summary Signature Page     I have received my discharge instructions, and my questions have been answered. I have discussed any challenges I see with this plan with the nurse or doctor.    ..........................................................................................................................................  Patient/Patient Representative Signature      ..........................................................................................................................................  Patient Representative Print Name and Relationship to Patient    ..................................................               ................................................  Date                                            Time    ..........................................................................................................................................  Reviewed by Signature/Title    ...................................................              ..............................................  Date                                                            Time

## 2018-04-24 NOTE — ED TRIAGE NOTES
Pt has a known kidney stone left side 7mm . Dx over 2 weeks ago. Put on antiboitics and thought he could pass it. Pt has also had a fever for 2 weeks wife just found out yest. Pt vd in small amts They called his pallitive care MD and was told to come to the ED. Pt has dementia per wife
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
## 2018-04-25 LAB
BACTERIA SPEC CULT: NO GROWTH
Lab: NORMAL
SPECIMEN SOURCE: NORMAL

## 2018-05-07 DIAGNOSIS — Z21 HIV (HUMAN IMMUNODEFICIENCY VIRUS INFECTION) (H): ICD-10-CM

## 2018-05-07 RX ORDER — DEXAMETHASONE 0.75 MG/1
TABLET ORAL
Qty: 30 TABLET | Refills: 0 | Status: CANCELLED | OUTPATIENT
Start: 2018-05-07

## 2018-05-08 ENCOUNTER — TRANSFERRED RECORDS (OUTPATIENT)
Dept: HEALTH INFORMATION MANAGEMENT | Facility: CLINIC | Age: 48
End: 2018-05-08

## 2018-05-11 DIAGNOSIS — Z21 HIV (HUMAN IMMUNODEFICIENCY VIRUS INFECTION) (H): ICD-10-CM

## 2018-05-15 DIAGNOSIS — J98.01 ACUTE BRONCHOSPASM: ICD-10-CM

## 2018-05-15 DIAGNOSIS — B37.0 THRUSH: ICD-10-CM

## 2018-05-15 DIAGNOSIS — R11.0 NAUSEA: ICD-10-CM

## 2018-05-15 DIAGNOSIS — Z21 HIV (HUMAN IMMUNODEFICIENCY VIRUS INFECTION) (H): ICD-10-CM

## 2018-05-15 RX ORDER — ONDANSETRON 4 MG/1
TABLET, ORALLY DISINTEGRATING ORAL
Qty: 40 TABLET | Refills: 6 | Status: SHIPPED | OUTPATIENT
Start: 2018-05-15 | End: 2018-11-07

## 2018-05-15 RX ORDER — FLUCONAZOLE 100 MG/1
TABLET ORAL
Qty: 4 TABLET | Refills: 0 | Status: SHIPPED | OUTPATIENT
Start: 2018-05-15 | End: 2019-08-09

## 2018-05-15 RX ORDER — DEXAMETHASONE 0.75 MG/1
TABLET ORAL
Qty: 30 TABLET | Refills: 0 | OUTPATIENT
Start: 2018-05-15

## 2018-05-15 RX ORDER — EMTRICITABINE AND TENOFOVIR DISOPROXIL FUMARATE 200; 300 MG/1; MG/1
1 TABLET, FILM COATED ORAL DAILY
Qty: 30 TABLET | Refills: 0 | Status: SHIPPED | OUTPATIENT
Start: 2018-05-15 | End: 2018-06-10

## 2018-05-15 NOTE — TELEPHONE ENCOUNTER
Zofran       Last Written Prescription Date:  2/19/18  Last Fill Quantity: 40,   # refills: 6  Last Office Visit: 2/19/18  Future Office visit:       Routing refill request to provider for review/approval because:  Drug not on the [facility], [facility] or [facility] refill protocol or controlled substance  Fluoxetine 20 MG       Last Written Prescription Date:  12/28/17  Last Fill Quantity: 15,   # refills: 0  Last Office Visit: 2/19/18  Future Office visit:       Routing refill request to provider for review/approval because:  Drug not on the [facility], [facility] or [facility] refill protocol or controlled substance

## 2018-05-16 NOTE — TELEPHONE ENCOUNTER
"Requested Prescriptions   Pending Prescriptions Disp Refills     VENTOLIN  (90 Base) MCG/ACT Inhaler [Pharmacy Med Name: VENTOLIN HFA        AER GLAX] 18 g 0     Sig: USE 2 PUFFS EVERY 4 HOURS AS NEEDED FOR SHORTNESS OF BREATH/DYSPNEA OR WHEEZING.    Asthma Maintenance Inhalers - Anticholinergics Failed    5/15/2018 10:22 AM       Failed - Asthma control assessment score within normal limits in last 6 months    Please review ACT score.   ACT Total Scores 2/19/2018   ACT TOTAL SCORE (Goal Greater than or Equal to 20) 9   In the past 12 months, how many times did you visit the emergency room for your asthma without being admitted to the hospital? 3   In the past 12 months, how many times were you hospitalized overnight because of your asthma? 3          Passed - Patient is age 12 years or older       Passed - Recent (6 mo) or future (30 days) visit within the authorizing provider's specialty    Patient had office visit in the last 6 months or has a visit in the next 30 days with authorizing provider or within the authorizing provider's specialty.  See \"Patient Info\" tab in inbasket, or \"Choose Columns\" in Meds & Orders section of the refill encounter.            albuterol (PROAIR HFA/PROVENTIL HFA/VENTOLIN HFA) 108 (90 BASE) MCG/ACT Inhaler  Routing refill request to provider for review/approval because:  ACT is less than 20  Rx was sent 02/19/2018 for 1 inhaler and 11 refills.     January Denson, RN, BSN             "

## 2018-05-17 RX ORDER — ALBUTEROL SULFATE 90 UG/1
AEROSOL, METERED RESPIRATORY (INHALATION)
Qty: 18 G | Refills: 0 | Status: SHIPPED | OUTPATIENT
Start: 2018-05-17 | End: 2018-06-22

## 2018-06-10 DIAGNOSIS — Z21 HIV (HUMAN IMMUNODEFICIENCY VIRUS INFECTION) (H): ICD-10-CM

## 2018-06-11 RX ORDER — DEXAMETHASONE 0.75 MG/1
TABLET ORAL
Qty: 30 TABLET | Refills: 0 | Status: SHIPPED | OUTPATIENT
Start: 2018-06-11 | End: 2018-06-25

## 2018-06-11 RX ORDER — DOLUTEGRAVIR SODIUM 50 MG/1
TABLET, FILM COATED ORAL
Qty: 30 TABLET | Refills: 0 | Status: SHIPPED | OUTPATIENT
Start: 2018-06-11 | End: 2018-06-25

## 2018-06-22 DIAGNOSIS — J98.01 ACUTE BRONCHOSPASM: ICD-10-CM

## 2018-06-22 RX ORDER — ALBUTEROL SULFATE 90 UG/1
2 AEROSOL, METERED RESPIRATORY (INHALATION) EVERY 4 HOURS PRN
Qty: 18 G | Refills: 0 | Status: SHIPPED | OUTPATIENT
Start: 2018-06-22 | End: 2018-07-26

## 2018-06-22 NOTE — TELEPHONE ENCOUNTER
ACT Total Scores 7/25/2017 2/19/2018   ACT TOTAL SCORE (Goal Greater than or Equal to 20) 19 9   In the past 12 months, how many times did you visit the emergency room for your asthma without being admitted to the hospital? 3 3   In the past 12 months, how many times were you hospitalized overnight because of your asthma? 3 3

## 2018-06-22 NOTE — TELEPHONE ENCOUNTER
Routing refill request to provider for review/approval because:  ACT <20    Sarahi Gomez, RN  [facility]

## 2018-06-22 NOTE — TELEPHONE ENCOUNTER
"Requested Prescriptions   Pending Prescriptions Disp Refills     albuterol (VENTOLIN HFA) 108 (90 Base) MCG/ACT Inhaler 18 g 0    Last Written Prescription Date:  05/17/2018  Last Fill Quantity: 18g,  # refills: 0   Last office visit: 2/19/2018 with prescribing provider:  02/19/2018   Future Office Visit:     Sig: Inhale 2 puffs into the lungs every 4 hours as needed for shortness of breath / dyspnea or wheezing    Asthma Maintenance Inhalers - Anticholinergics Failed    6/22/2018  2:21 PM       Failed - Asthma control assessment score within normal limits in last 6 months    Please review ACT score.          Passed - Patient is age 12 years or older       Passed - Recent (6 mo) or future (30 days) visit within the authorizing provider's specialty    Patient had office visit in the last 6 months or has a visit in the next 30 days with authorizing provider or within the authorizing provider's specialty.  See \"Patient Info\" tab in inbasket, or \"Choose Columns\" in Meds & Orders section of the refill encounter.              "

## 2018-06-25 DIAGNOSIS — Z21 HIV (HUMAN IMMUNODEFICIENCY VIRUS INFECTION) (H): ICD-10-CM

## 2018-06-25 RX ORDER — EMTRICITABINE AND TENOFOVIR DISOPROXIL FUMARATE 200; 300 MG/1; MG/1
1 TABLET, FILM COATED ORAL DAILY
Qty: 30 TABLET | Refills: 0 | Status: SHIPPED | OUTPATIENT
Start: 2018-06-25 | End: 2018-07-10

## 2018-07-02 ENCOUNTER — TELEPHONE (OUTPATIENT)
Dept: FAMILY MEDICINE | Facility: OTHER | Age: 48
End: 2018-07-02

## 2018-07-02 NOTE — TELEPHONE ENCOUNTER
Reason for call:  Other   Patient called regarding (reason for call): call back  Additional comments: Chiropractor said he has serve wasting beyond repair and needs a human growth hormones to help build muscles     Phone number to reach patient:  Home number on file [phone] (home)    Best Time:  Anytime     Can we leave a detailed message on this number?  YES

## 2018-07-09 NOTE — TELEPHONE ENCOUNTER
Really?    He may need to speak to his infectious disease specialist.  This is really outside of my field and I would even suggest that it might be outside of the field of his chiropractor.    Gayle

## 2018-07-10 DIAGNOSIS — Z21 HIV (HUMAN IMMUNODEFICIENCY VIRUS INFECTION) (H): ICD-10-CM

## 2018-07-10 RX ORDER — DOLUTEGRAVIR SODIUM 50 MG/1
TABLET, FILM COATED ORAL
Qty: 30 TABLET | Refills: 0 | Status: SHIPPED | OUTPATIENT
Start: 2018-07-10 | End: 2018-07-30

## 2018-07-10 RX ORDER — DEXAMETHASONE 0.75 MG/1
TABLET ORAL
Qty: 30 TABLET | Refills: 0 | Status: SHIPPED | OUTPATIENT
Start: 2018-07-10 | End: 2019-08-09

## 2018-07-21 ENCOUNTER — APPOINTMENT (OUTPATIENT)
Dept: CT IMAGING | Facility: CLINIC | Age: 48
End: 2018-07-21
Attending: FAMILY MEDICINE
Payer: MEDICARE

## 2018-07-21 ENCOUNTER — HOSPITAL ENCOUNTER (EMERGENCY)
Facility: CLINIC | Age: 48
Discharge: HOME | End: 2018-07-21
Attending: FAMILY MEDICINE | Admitting: FAMILY MEDICINE
Payer: MEDICARE

## 2018-07-21 VITALS
OXYGEN SATURATION: 97 % | SYSTOLIC BLOOD PRESSURE: 120 MMHG | DIASTOLIC BLOOD PRESSURE: 88 MMHG | WEIGHT: 150 LBS | TEMPERATURE: 98.4 F | BODY MASS INDEX: 22.15 KG/M2
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
## 2018-07-21 DIAGNOSIS — S16.1XXA STRAIN OF NECK MUSCLE, INITIAL ENCOUNTER: ICD-10-CM

## 2018-07-21 DIAGNOSIS — T14.8XXA CONTUSION OF SOFT TISSUE: ICD-10-CM

## 2018-07-21 LAB
ALBUMIN SERPL-MCNC: 4.1 G/DL (ref 3.4–5)
ALP SERPL-CCNC: 98 U/L (ref 40–150)
ALT SERPL W P-5'-P-CCNC: 18 U/L (ref 0–70)
ANION GAP SERPL CALCULATED.3IONS-SCNC: 9 MMOL/L (ref 3–14)
AST SERPL W P-5'-P-CCNC: 13 U/L (ref 0–45)
BASOPHILS # BLD AUTO: 0 10E9/L (ref 0–0.2)
BASOPHILS NFR BLD AUTO: 0.4 %
BILIRUB SERPL-MCNC: 0.8 MG/DL (ref 0.2–1.3)
BUN SERPL-MCNC: 20 MG/DL (ref 7–30)
CALCIUM SERPL-MCNC: 9 MG/DL (ref 8.5–10.1)
CHLORIDE SERPL-SCNC: 109 MMOL/L (ref 94–109)
CO2 SERPL-SCNC: 26 MMOL/L (ref 20–32)
CREAT SERPL-MCNC: 1.17 MG/DL (ref 0.66–1.25)
DIFFERENTIAL METHOD BLD: NORMAL
EOSINOPHIL NFR BLD AUTO: 0.7 %
ERYTHROCYTE [DISTWIDTH] IN BLOOD BY AUTOMATED COUNT: 15 % (ref 10–15)
GFR SERPL CREATININE-BSD FRML MDRD: 66 ML/MIN/1.7M2
GLUCOSE SERPL-MCNC: 82 MG/DL (ref 70–99)
HCT VFR BLD AUTO: 51.2 % (ref 40–53)
HGB BLD-MCNC: 17.1 G/DL (ref 13.3–17.7)
IMM GRANULOCYTES # BLD: 0 10E9/L (ref 0–0.4)
IMM GRANULOCYTES NFR BLD: 0.2 %
LIPASE SERPL-CCNC: 65 U/L (ref 73–393)
LYMPHOCYTES # BLD AUTO: 3.6 10E9/L (ref 0.8–5.3)
LYMPHOCYTES NFR BLD AUTO: 36.1 %
MCH RBC QN AUTO: 29.4 PG (ref 26.5–33)
MCHC RBC AUTO-ENTMCNC: 33.4 G/DL (ref 31.5–36.5)
MCV RBC AUTO: 88 FL (ref 78–100)
MONOCYTES # BLD AUTO: 0.6 10E9/L (ref 0–1.3)
MONOCYTES NFR BLD AUTO: 5.5 %
NEUTROPHILS # BLD AUTO: 5.7 10E9/L (ref 1.6–8.3)
NEUTROPHILS NFR BLD AUTO: 57.1 %
NRBC # BLD AUTO: 0 10*3/UL
NRBC BLD AUTO-RTO: 0 /100
PLATELET # BLD AUTO: 174 10E9/L (ref 150–450)
POTASSIUM SERPL-SCNC: 3.9 MMOL/L (ref 3.4–5.3)
PROT SERPL-MCNC: 7.6 G/DL (ref 6.8–8.8)
RBC # BLD AUTO: 5.82 10E12/L (ref 4.4–5.9)
SODIUM SERPL-SCNC: 144 MMOL/L (ref 133–144)
WBC # BLD AUTO: 10 10E9/L (ref 4–11)

## 2018-07-21 PROCEDURE — 25000125 ZZHC RX 250: Performed by: FAMILY MEDICINE

## 2018-07-21 PROCEDURE — 74177 CT ABD & PELVIS W/CONTRAST: CPT

## 2018-07-21 PROCEDURE — 25000128 H RX IP 250 OP 636: Performed by: FAMILY MEDICINE

## 2018-07-21 PROCEDURE — 80053 COMPREHEN METABOLIC PANEL: CPT | Performed by: FAMILY MEDICINE

## 2018-07-21 PROCEDURE — 72125 CT NECK SPINE W/O DYE: CPT

## 2018-07-21 PROCEDURE — 96374 THER/PROPH/DIAG INJ IV PUSH: CPT | Performed by: FAMILY MEDICINE

## 2018-07-21 PROCEDURE — 99285 EMERGENCY DEPT VISIT HI MDM: CPT | Mod: 25 | Performed by: FAMILY MEDICINE

## 2018-07-21 PROCEDURE — 72131 CT LUMBAR SPINE W/O DYE: CPT

## 2018-07-21 PROCEDURE — 96376 TX/PRO/DX INJ SAME DRUG ADON: CPT | Mod: 59 | Performed by: FAMILY MEDICINE

## 2018-07-21 PROCEDURE — 99285 EMERGENCY DEPT VISIT HI MDM: CPT | Mod: Z6 | Performed by: FAMILY MEDICINE

## 2018-07-21 PROCEDURE — 70450 CT HEAD/BRAIN W/O DYE: CPT

## 2018-07-21 PROCEDURE — 85025 COMPLETE CBC W/AUTO DIFF WBC: CPT | Performed by: FAMILY MEDICINE

## 2018-07-21 PROCEDURE — 83690 ASSAY OF LIPASE: CPT | Performed by: FAMILY MEDICINE

## 2018-07-21 RX ORDER — HYDROMORPHONE HYDROCHLORIDE 1 MG/ML
0.5 INJECTION, SOLUTION INTRAMUSCULAR; INTRAVENOUS; SUBCUTANEOUS
Status: DISCONTINUED | OUTPATIENT
Start: 2018-07-21 | End: 2018-07-21 | Stop reason: HOSPADM

## 2018-07-21 RX ORDER — IOPAMIDOL 755 MG/ML
500 INJECTION, SOLUTION INTRAVASCULAR ONCE
Status: COMPLETED | OUTPATIENT
Start: 2018-07-21 | End: 2018-07-21

## 2018-07-21 RX ORDER — CYCLOBENZAPRINE HCL 5 MG
5 TABLET ORAL 3 TIMES DAILY PRN
Qty: 30 TABLET | Refills: 0 | Status: SHIPPED | OUTPATIENT
Start: 2018-07-21 | End: 2019-08-09

## 2018-07-21 RX ADMIN — Medication 0.5 MG: at 16:59

## 2018-07-21 RX ADMIN — HYDROMORPHONE HYDROCHLORIDE 1 MG: 1 INJECTION, SOLUTION INTRAMUSCULAR; INTRAVENOUS; SUBCUTANEOUS at 16:06

## 2018-07-21 RX ADMIN — IOPAMIDOL 75 ML: 755 INJECTION, SOLUTION INTRAVENOUS at 16:41

## 2018-07-21 RX ADMIN — SODIUM CHLORIDE, PRESERVATIVE FREE 60 ML: 5 INJECTION INTRAVENOUS at 16:40
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
## 2018-07-21 NOTE — ED AVS SNAPSHOT
[facility] Emergency Department    [address] [place] [address]    Phone:  [phone]    Fax:  [phone]                                       Tommy Ross   MRN: [id number]    Department:  [facility] Emergency Department   Date of Visit:  7/21/2018           After Visit Summary Signature Page     I have received my discharge instructions, and my questions have been answered. I have discussed any challenges I see with this plan with the nurse or doctor.    ..........................................................................................................................................  Patient/Patient Representative Signature      ..........................................................................................................................................  Patient Representative Print Name and Relationship to Patient    ..................................................               ................................................  Date                                            Time    ..........................................................................................................................................  Reviewed by Signature/Title    ...................................................              ..............................................  Date                                                            Time
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
## 2018-07-26 DIAGNOSIS — Z21 HIV (HUMAN IMMUNODEFICIENCY VIRUS INFECTION) (H): ICD-10-CM

## 2018-07-26 DIAGNOSIS — F41.9 ANXIETY: ICD-10-CM

## 2018-07-26 DIAGNOSIS — J98.01 ACUTE BRONCHOSPASM: ICD-10-CM

## 2018-07-26 NOTE — TELEPHONE ENCOUNTER
"Requested Prescriptions   Pending Prescriptions Disp Refills     VENTOLIN  (90 Base) MCG/ACT Inhaler [Pharmacy Med Name: VENTOLIN HFA  (90 BAS AERO] 18 g 0    Last Written Prescription Date:  06/22/2018  Last Fill Quantity: 18g,  # refills: 0   Last office visit: 2/19/2018 with prescribing provider:  02/19/2018   Future Office Visit:     Sig: INHALE 2 PUFFS INTO THE LUNGS EVERY 4 HOURS AS NEEDED FOR SHORTNESS OF BREATH / DYSPNEA OR WHEEZING    Asthma Maintenance Inhalers - Anticholinergics Failed    7/26/2018  4:07 PM       Failed - Asthma control assessment score within normal limits in last 6 months    Please review ACT score.          Passed - Patient is age 12 years or older       Passed - Recent (6 mo) or future (30 days) visit within the authorizing provider's specialty    Patient had office visit in the last 6 months or has a visit in the next 30 days with authorizing provider or within the authorizing provider's specialty.  See \"Patient Info\" tab in inbasket, or \"Choose Columns\" in Meds & Orders section of the refill encounter.              "

## 2018-07-26 NOTE — TELEPHONE ENCOUNTER
"Fluoxetine  Requested Prescriptions   Pending Prescriptions Disp Refills     VENTOLIN  (90 Base) MCG/ACT Inhaler [Pharmacy Med Name: VENTOLIN HFA  (90 BAS AERO] 18 g 0     Sig: INHALE 2 PUFFS INTO THE LUNGS EVERY 4 HOURS AS NEEDED FOR SHORTNESS OF BREATH / DYSPNEA OR WHEEZING    Asthma Maintenance Inhalers - Anticholinergics Failed    7/26/2018  4:18 PM       Failed - Asthma control assessment score within normal limits in last 6 months    Please review ACT score.          Passed - Patient is age 12 years or older       Passed - Recent (6 mo) or future (30 days) visit within the authorizing provider's specialty    Patient had office visit in the last 6 months or has a visit in the next 30 days with authorizing provider or within the authorizing provider's specialty.  See \"Patient Info\" tab in inbasket, or \"Choose Columns\" in Meds & Orders section of the refill encounter.            Last Written Prescription Date:  12/28/2017  Last Fill Quantity: 15,  # refills: 0   Last office visit: 2/19/2018 with prescribing provider:  MIKE   Future Office Visit:      "

## 2018-07-30 ENCOUNTER — OFFICE VISIT (OUTPATIENT)
Dept: URGENT CARE | Facility: RETAIL CLINIC | Age: 48
End: 2018-07-30
Payer: MEDICARE

## 2018-07-30 ENCOUNTER — TELEPHONE (OUTPATIENT)
Dept: FAMILY MEDICINE | Facility: OTHER | Age: 48
End: 2018-07-30

## 2018-07-30 VITALS
SYSTOLIC BLOOD PRESSURE: 110 MMHG | TEMPERATURE: 97.7 F | HEART RATE: 82 BPM | DIASTOLIC BLOOD PRESSURE: 71 MMHG | OXYGEN SATURATION: 98 %

## 2018-07-30 DIAGNOSIS — M54.2 CERVICALGIA: ICD-10-CM

## 2018-07-30 DIAGNOSIS — R19.7 DIARRHEA OF PRESUMED INFECTIOUS ORIGIN: Primary | ICD-10-CM

## 2018-07-30 PROCEDURE — 99213 OFFICE O/P EST LOW 20 MIN: CPT | Performed by: FAMILY MEDICINE

## 2018-07-30 RX ORDER — DEXAMETHASONE 0.75 MG/1
TABLET ORAL
Qty: 30 TABLET | Refills: 0 | Status: SHIPPED | OUTPATIENT
Start: 2018-07-30 | End: 2018-08-09

## 2018-07-30 RX ORDER — ALBUTEROL SULFATE 90 UG/1
AEROSOL, METERED RESPIRATORY (INHALATION)
Qty: 18 G | Refills: 0 | Status: SHIPPED | OUTPATIENT
Start: 2018-07-30 | End: 2018-08-21

## 2018-07-30 NOTE — MR AVS SNAPSHOT
"              After Visit Summary   2018    Tommy Ross    MRN: [id number]           Patient Information     Date Of Birth          1970        Visit Information        Provider Department      2018 1:10 PM Carlton Brooks MD [facility]        Today's Diagnoses     Diarrhea of presumed infectious origin    -  1    Cervicalgia           Follow-ups after your visit        Who to contact     You can reach your care team any time of the day by calling [phone].  Notification of test results:  If you have an abnormal lab result, we will notify you by phone as soon as possible.         Additional Information About Your Visit        MyChart Information     ITC lets you send messages to your doctor, view your test results, renew your prescriptions, schedule appointments and more. To sign up, go to [email or web address] . Click on \"Log in\" on the left side of the screen, which will take you to the Welcome page. Then click on \"Sign up Now\" on the right side of the page.     You will be asked to enter the access code listed below, as well as some personal information. Please follow the directions to create your username and password.     Your access code is: [email or web address]  Expires: 10/19/2018  6:04 PM     Your access code will  in 90 days. If you need help or a new code, please call your [facility] clinic or [phone].        Care EveryWhere ID     This is your [facility] EveryWhere ID. This could be used by other organizations to access your [facility] medical records  [id number]        Your Vitals Were     Pulse Temperature Pulse Oximetry             82 97.7  F (36.5  C) (Oral) 98%          Blood Pressure from Last 3 Encounters:   18 110/71   18 120/88   18 115/84    Weight from Last 3 Encounters:   18 150 lb (68 kg)   18 145 lb (65.8 kg)   18 153 lb (69.4 kg)              We Performed the Following     FOAM CERVICAL COLLAR UNIVERSAL        " Primary Care Provider Office Phone # Fax #    Dawood Araujo [phone]       [address] [place] [address]        Goals        General    I want more information on palliative card/Start Date 4/14/2014 (pt-stated)     Notes - Note created  4/15/2014 10:07 AM by Tania Vasquez MSW    As of today's date 4/15/2014 goal is met at 0 - 25%.   Goal Status:  Active        I want to feel normal again, and be able to address my pain/Start Date 4/14/2014 (pt-stated)     Notes - Note created  4/15/2014 10:06 AM by Tania Vasquez MSW    As of today's date 4/15/2014 goal is met at 0 - 25%.   Goal Status:  Active        I will take my nirto as directed for chest pain. (pt-stated)     I will weigh myself daily and keep a record (pt-stated)       Equal Access to Services     [facility] : Hadii ashley cotto hadasho Solencho, waaxda luqadaha, qaybta kaalmada adeegyada, walt kauffman . So [facility] [phone].    ATENCIÓN: Si habla español, tiene a alejandro disposición servicios gratuitos de asistencia lingüística. Llame al [phone].    We comply with applicable federal civil rights laws and [place] laws. We do not discriminate on the basis of race, color, national origin, age, disability, sex, sexual orientation, or gender identity.            Thank you!     Thank you for choosing [facility]  for your care. Our goal is always to provide you with excellent care. Hearing back from our patients is one way we can continue to improve our services. Please take a few minutes to complete the written survey that you may receive in the mail after your visit with us. Thank you!             Your Updated Medication List - Protect others around you: Learn how to safely use, store and throw away your medicines at www.disposemymeds.org.          This list is accurate as of 7/30/18  7:05 PM.  Always use your most recent med list.                   Brand Name  Dispense Instructions for use Diagnosis    ALPRAZolam 0.5 MG tablet    XANAX    30 tablet    One or two tabs daily PRN anxiety    Anxiety       aspirin 81 MG EC tablet     30 tablet    Take 1 tablet (81 mg) by mouth daily        BOOST     12 Can    fax # to the county worker fax#[phone] Attn:MILDRED    Nausea       clotrimazole 1 % cream    LOTRIMIN    15 g    APPLY TOPICALLY 2 TIMES DAILY    Tinea pedis of both feet       cyanocobalamin 1000 MCG Tabs    [facility] vitamin  B12    30 tablet    Take 1 tablet by mouth daily    Vitamin B12 deficiency (non anemic)       cyclobenzaprine 5 MG tablet    FLEXERIL    30 tablet    Take 1 tablet (5 mg) by mouth 3 times daily as needed for muscle spasms        diphenoxylate-atropine 2.5-0.025 MG per tablet    LOMOTIL          docusate sodium 100 MG tablet    COLACE    60 tablet    Take 100 mg by mouth daily    Constipation, unspecified constipation type       dolutegravir 50 MG tablet    TIVICAY    30 tablet    Take 1 tablet (50 mg) by mouth daily    HIV (human immunodeficiency virus infection) (H)       fluconazole 100 MG tablet    DIFLUCAN    4 tablet    Take 1 pill every third day    Thrush       FLUoxetine 20 MG capsule    PROzac    90 capsule    TAKE 1 CAPSULE (20 MG) BY MOUTH DAILY ALONG WITH 40 MG OF FLUOXETINE    Anxiety       fluticasone 50 MCG/ACT spray    FLONASE    16 g    SPRAY 1-2 SPRAYS INTO BOTH NOSTRILS DAILY    Seasonal allergic rhinitis       hydrocortisone 2.5 % cream    ANUSOL-HC    30 g    Place rectally 2 times daily    External hemorrhoids       ipratropium - albuterol 0.5 mg/2.5 mg/3 mL 0.5-2.5 (3) MG/3ML neb solution    DUONEB     Inhale 3 mLs into the lungs        lidocaine 5 % Patch    LIDODERM    30 patch    Apply up to 3 patches to painful area at once for up to 12 h within a 24 h period as needed.  Remove after 12 hours.    Other chronic pain, Low back pain, unspecified back pain laterality, unspecified chronicity, with sciatica presence unspecified        loperamide 2 MG capsule    IMODIUM          loratadine 10 MG tablet    CLARITIN    30 tablet    Take 1 tablet (10 mg) by mouth daily    Tobacco use disorder       * MORPHINE SULFATE PO      Take 15 mg by mouth Reported on 5/8/2017        * morphine 15 MG 12 hr tablet    MS CONTIN          MULTIVITAMIN & MINERAL PO           nicotine 14 MG/24HR 24 hr patch    NICODERM CQ    30 patch    Place 1 patch onto the skin every 24 hours    Tobacco use disorder       nitroGLYcerin 0.4 MG sublingual tablet    NITROSTAT    30 tablet    Place 1 tablet under the tongue every 5 mins as needed for chest pain If you are still having symptoms after 3 doses (15 minutes) call 911.    Acute diastolic CHF (congestive heart failure) (H)       omeprazole 20 MG CR capsule    priLOSEC    30 capsule    TAKE 1 CAPSULE (20 MG) BY MOUTH DAILY    Constipation, unspecified constipation type       ondansetron 4 MG ODT tab    ZOFRAN-ODT    40 tablet    TAKE 1-2 TABLETS (4-8 MG) BY MOUTH EVERY 8 HOURS AS NEEDED FOR NAUSEA    Nausea       order for DME     1 Device    Equipment being ordered: Oxygen at 5 liters    HIV (human immunodeficiency virus infection) (H), Acute respiratory failure with hypoxia (H), Acute diastolic CHF (congestive heart failure) (H)       oxyCODONE-acetaminophen  MG per tablet    PERCOCET    30 tablet    Take 1 tablet by mouth every 4 hours as needed for moderate to severe pain (Max of 6 a day.)    Low back pain, Chronic pain       predniSONE 20 MG tablet    DELTASONE    70 tablet    Take 3 tablets (60 mg) by mouth daily Decrease by 10mg weekly. Example: Until 5/7 3 tabs daily, then 2.5 tabs daily for one week.    Pneumonitis, Pneumonia of right lower lobe due to infectious organism (H)       psyllium 58.6 % Powd    METAMUCIL SMOOTH TEXTURE    425 g    Take 12 g (2 teaspoonful) by mouth 2 times daily    Constipation, unspecified constipation type       ranitidine 150 MG tablet    ZANTAC    60 tablet    Take 1 tablet  (150 mg) by mouth 2 times daily    Gastroesophageal reflux disease, esophagitis presence not specified       simvastatin 20 MG tablet    ZOCOR    30 tablet    Take 1 tablet (20 mg) by mouth At Bedtime Hold if taking fluconozole    Ischemic chest pain (H)       sulfamethoxazole-trimethoprim 800-160 MG per tablet    BACTRIM DS/SEPTRA DS    60 tablet    Take 1 tablet by mouth daily    HIV (human immunodeficiency virus infection) (H)       SUMAtriptan 50 MG tablet    IMITREX    30 tablet    Take 1 tablet (50 mg) by mouth at onset of headache for migraine    Other migraine without status migrainosus, not intractable       tamsulosin 0.4 MG capsule    FLOMAX          terbinafine 1 % cream    lamISIL AT    42 g    Apply topically 2 times daily To effected areas on feet and toes for two weeks.    Tinea pedis of both feet       triamcinolone 0.1 % cream    KENALOG          * TRUVADA 200-300 MG per tablet   Generic drug:  emtricitabine-tenofovir     30 tablet    TAKE ONE TABLET BY MOUTH EVERY DAY    HIV (human immunodeficiency virus infection) (H)       * TRUVADA 200-300 MG per tablet   Generic drug:  emtricitabine-tenofovir     30 tablet    TAKE 1 TABLET BY MOUTH DAILY CALL [phone] & MAKE APPOINTMENT FOR ANY FURTHER REFILLS.    HIV (human immunodeficiency virus infection) (H)       valACYclovir 500 MG tablet    VALTREX    60 tablet    Take 500 mg by mouth 2 times daily as needed Reported on 5/8/2017    Genital herpes in men       [facility]  (90 Base) MCG/ACT Inhaler   Generic drug:  albuterol     18 g    INHALE 2 PUFFS INTO THE LUNGS EVERY 4 HOURS AS NEEDED FOR SHORTNESS OF BREATH / DYSPNEA OR WHEEZING    Acute bronchospasm       VITAMIN D (CHOLECALCIFEROL) PO      Take 5,000 Units by mouth        * Notice:  This list has 4 medication(s) that are the same as other medications prescribed for you. Read the directions carefully, and ask your doctor or other care provider to review them with you.

## 2018-07-30 NOTE — TELEPHONE ENCOUNTER
ACT Total Scores 7/25/2017 2/19/2018   ACT TOTAL SCORE (Goal Greater than or Equal to 20) 19 9   In the past 12 months, how many times did you visit the emergency room for your asthma without being admitted to the hospital? 3 3   In the past 12 months, how many times were you hospitalized overnight because of your asthma? 3 3      ONE  month Ventolin approved. Needs FU visit as just saw provider at [facility] 7/3/18 with Instruction to FU if NOT better. His ACT scores are low as well. ...........DELTA Mendiola    Routing Prozacrefill request to provider for review/approval because:   Dali period was given 12/28/17 for 15 capsules.      FLUoxetine (PROZAC) 20 MG capsule 15 capsule 0 12/28/2017  No   Sig: TAKE 1 CAPSULE (20 MG) BY MOUTH DAILY ALONG WITH 40 MG OF FLUOXETINE   Class: E-Prescribe   Notes to Pharmacy: No further refills without office visit   Order: [id number]   E-Prescribing Status: Receipt confirmed by pharmacy (12/28/2017 10:39 AM CST)   DELTA Mendiola

## 2018-07-30 NOTE — TELEPHONE ENCOUNTER
Pt walked into clinic today wanting appt for diarrhea and nausea. Pt stated has been going on since Sunday and feels weak/lethargic. States diarrhea is very watery and has had BM 3 times already this morning. Huddled with providers here in clinic today as there are no openings at clinic for a work in request, unable to see by any of the providers today. Pt was informed that we have no openings left and providers are unable to work him in. Stated Dr Schoen could see pt at 250pm but pt seemed unhappy with this option and that we could not see him here today. I did apologize to pt and stated that the providers are not able to accommodate him today here but can be seen in [place] per the appt noted above. Pt still declined and was upset stating I will just go to [facility] then.    Desiree aSul CMA ([facility])
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
## 2018-08-09 DIAGNOSIS — Z21 HIV (HUMAN IMMUNODEFICIENCY VIRUS INFECTION) (H): ICD-10-CM

## 2018-08-17 ENCOUNTER — OFFICE VISIT (OUTPATIENT)
Dept: URGENT CARE | Facility: RETAIL CLINIC | Age: 48
End: 2018-08-17
Payer: MEDICARE

## 2018-08-17 VITALS — HEART RATE: 80 BPM | DIASTOLIC BLOOD PRESSURE: 74 MMHG | TEMPERATURE: 98.2 F | SYSTOLIC BLOOD PRESSURE: 115 MMHG

## 2018-08-17 DIAGNOSIS — J02.9 ACUTE PHARYNGITIS, UNSPECIFIED ETIOLOGY: ICD-10-CM

## 2018-08-17 DIAGNOSIS — B37.0 CANDIDIASIS OF MOUTH: Primary | ICD-10-CM

## 2018-08-17 DIAGNOSIS — J06.9 ACUTE URI: ICD-10-CM

## 2018-08-17 LAB — S PYO AG THROAT QL IA.RAPID: NORMAL

## 2018-08-17 PROCEDURE — 99213 OFFICE O/P EST LOW 20 MIN: CPT | Performed by: NURSE PRACTITIONER

## 2018-08-17 PROCEDURE — 87880 STREP A ASSAY W/OPTIC: CPT | Mod: QW | Performed by: NURSE PRACTITIONER

## 2018-08-17 PROCEDURE — 87081 CULTURE SCREEN ONLY: CPT | Performed by: NURSE PRACTITIONER

## 2018-08-17 RX ORDER — FLUCONAZOLE 100 MG/1
100 TABLET ORAL DAILY
Qty: 14 TABLET | Refills: 0 | Status: SHIPPED | OUTPATIENT
Start: 2018-08-17 | End: 2018-08-31
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
## 2018-08-17 NOTE — MR AVS SNAPSHOT
"              After Visit Summary   2018    Tommy Ross    MRN: [id number]           Patient Information     Date Of Birth          1970        Visit Information        Provider Department      2018 9:10 AM Magdalena Mccain APRN CNP [facility]        Today's Diagnoses     Candidiasis of mouth    -  1    Acute URI           Follow-ups after your visit        Who to contact     You can reach your care team any time of the day by calling [phone].  Notification of test results:  If you have an abnormal lab result, we will notify you by phone as soon as possible.         Additional Information About Your Visit        MyChart Information     [employer] lets you send messages to your doctor, view your test results, renew your prescriptions, schedule appointments and more. To sign up, go to [email or web address] . Click on \"Log in\" on the left side of the screen, which will take you to the Welcome page. Then click on \"Sign up Now\" on the right side of the page.     You will be asked to enter the access code listed below, as well as some personal information. Please follow the directions to create your username and password.     Your access code is: [email or web address]  Expires: 10/19/2018  6:04 PM     Your access code will  in 90 days. If you need help or a new code, please call your [place] clinic or [phone].        Care EveryWhere ID     This is your [facility] EveryWhere ID. This could be used by other organizations to access your [facility] medical records  [id number]        Your Vitals Were     Pulse Temperature                80 98.2  F (36.8  C) (Oral)           Blood Pressure from Last 3 Encounters:   18 115/74   18 110/71   18 120/88    Weight from Last 3 Encounters:   18 150 lb (68 kg)   18 145 lb (65.8 kg)   18 153 lb (69.4 kg)              Today, you had the following     No orders found for display         Today's Medication Changes "          These changes are accurate as of 8/17/18  9:25 AM.  If you have any questions, ask your nurse or doctor.               These medicines have changed or have updated prescriptions.        Dose/Directions    * fluconazole 100 MG tablet   Commonly known as:  DIFLUCAN   This may have changed:  Another medication with the same name was added. Make sure you understand how and when to take each.   Used for:  Thrush   Changed by:  Magdalena Mccain APRN CNP        Take 1 pill every third day   Quantity:  4 tablet   Refills:  0       * fluconazole 100 MG tablet   Commonly known as:  DIFLUCAN   This may have changed:  You were already taking a medication with the same name, and this prescription was added. Make sure you understand how and when to take each.   Used for:  Candidiasis of mouth   Changed by:  Magdalena Mccain APRN CNP        Dose:  100 mg   Take 1 tablet (100 mg) by mouth daily for 14 days   Quantity:  14 tablet   Refills:  0       * Notice:  This list has 2 medication(s) that are the same as other medications prescribed for you. Read the directions carefully, and ask your doctor or other care provider to review them with you.         Where to get your medicines      These medications were sent to [facility] - [address]  [address], [facility] [address]     Phone:  [phone]     fluconazole 100 MG tablet                Primary Care Provider Office Phone # Fax #    Dawood AraujoDO [phone]       [address] [facility]   [facility] [address]        Goals        General    I want more information on palliative card/Start Date 4/14/2014 (pt-stated)     Notes - Note created  4/15/2014 10:07 AM by Tania Vasquez MSW    As of today's date 4/15/2014 goal is met at 0 - 25%.   Goal Status:  Active        I want to feel normal again, and be able to address my pain/Start Date 4/14/2014 (pt-stated)     Notes - Note created  4/15/2014 10:06 AM by Pedro  OKSANA Ponce    As of today's date 4/15/2014 goal is met at 0 - 25%.   Goal Status:  Active        I will take my nirto as directed for chest pain. (pt-stated)     I will weigh myself daily and keep a record (pt-stated)       Equal Access to Services     MARI DEAN : Hadii ashley ku hadasho Soomaali, waaxda luqadaha, qaybta kaalmada adeegyada, waxtiffany thorpelexiiyuriy soto. So [facility] [phone].    ATENCIÓN: Si habla español, tiene a alejandro disposición servicios gratuitos de asistencia lingüística. Llame al [phone].    We comply with applicable federal civil rights laws and [place] laws. We do not discriminate on the basis of race, color, national origin, age, disability, sex, sexual orientation, or gender identity.            Thank you!     Thank you for choosing [facility]  for your care. Our goal is always to provide you with excellent care. Hearing back from our patients is one way we can continue to improve our services. Please take a few minutes to complete the written survey that you may receive in the mail after your visit with us. Thank you!             Your Updated Medication List - Protect others around you: Learn how to safely use, store and throw away your medicines at www.disposemymeds.org.          This list is accurate as of 8/17/18  9:25 AM.  Always use your most recent med list.                   Brand Name Dispense Instructions for use Diagnosis    ALPRAZolam 0.5 MG tablet    XANAX    30 tablet    One or two tabs daily PRN anxiety    Anxiety       aspirin 81 MG EC tablet     30 tablet    Take 1 tablet (81 mg) by mouth daily        BOOST     12 Can    fax # to the county worker fax#[phone] Attn:MILDRED    Nausea       clotrimazole 1 % cream    LOTRIMIN    15 g    APPLY TOPICALLY 2 TIMES DAILY    Tinea pedis of both feet       cyanocobalamin 1000 MCG Tabs    [facility] vitamin  B12    30 tablet    Take 1 tablet by mouth daily    Vitamin B12 deficiency (non anemic)        cyclobenzaprine 5 MG tablet    FLEXERIL    30 tablet    Take 1 tablet (5 mg) by mouth 3 times daily as needed for muscle spasms        diphenoxylate-atropine 2.5-0.025 MG per tablet    LOMOTIL          docusate sodium 100 MG tablet    COLACE    60 tablet    Take 100 mg by mouth daily    Constipation, unspecified constipation type       dolutegravir 50 MG tablet    TIVICAY    30 tablet    Take 1 tablet (50 mg) by mouth daily    HIV (human immunodeficiency virus infection) (H)       * fluconazole 100 MG tablet    DIFLUCAN    4 tablet    Take 1 pill every third day    Thrush       * fluconazole 100 MG tablet    DIFLUCAN    14 tablet    Take 1 tablet (100 mg) by mouth daily for 14 days    Candidiasis of mouth       FLUoxetine 20 MG capsule    PROzac    90 capsule    TAKE 1 CAPSULE (20 MG) BY MOUTH DAILY ALONG WITH 40 MG OF FLUOXETINE    Anxiety       fluticasone 50 MCG/ACT spray    FLONASE    16 g    SPRAY 1-2 SPRAYS INTO BOTH NOSTRILS DAILY    Seasonal allergic rhinitis       hydrocortisone 2.5 % cream    ANUSOL-HC    30 g    Place rectally 2 times daily    External hemorrhoids       ipratropium - albuterol 0.5 mg/2.5 mg/3 mL 0.5-2.5 (3) MG/3ML neb solution    DUONEB     Inhale 3 mLs into the lungs        lidocaine 5 % Patch    LIDODERM    30 patch    Apply up to 3 patches to painful area at once for up to 12 h within a 24 h period as needed.  Remove after 12 hours.    Other chronic pain, Low back pain, unspecified back pain laterality, unspecified chronicity, with sciatica presence unspecified       loperamide 2 MG capsule    IMODIUM          loratadine 10 MG tablet    CLARITIN    30 tablet    Take 1 tablet (10 mg) by mouth daily    Tobacco use disorder       * MORPHINE SULFATE PO      Take 15 mg by mouth Reported on 5/8/2017        * morphine 15 MG 12 hr tablet    MS CONTIN          MULTIVITAMIN & MINERAL PO           nicotine 14 MG/24HR 24 hr patch    NICODERM CQ    30 patch    Place 1 patch onto the skin every 24  hours    Tobacco use disorder       nitroGLYcerin 0.4 MG sublingual tablet    NITROSTAT    30 tablet    Place 1 tablet under the tongue every 5 mins as needed for chest pain If you are still having symptoms after 3 doses (15 minutes) call 911.    Acute diastolic CHF (congestive heart failure) (H)       omeprazole 20 MG CR capsule    priLOSEC    30 capsule    TAKE 1 CAPSULE (20 MG) BY MOUTH DAILY    Constipation, unspecified constipation type       ondansetron 4 MG ODT tab    ZOFRAN-ODT    40 tablet    TAKE 1-2 TABLETS (4-8 MG) BY MOUTH EVERY 8 HOURS AS NEEDED FOR NAUSEA    Nausea       order for DME     1 Device    Equipment being ordered: Oxygen at 5 liters    HIV (human immunodeficiency virus infection) (H), Acute respiratory failure with hypoxia (H), Acute diastolic CHF (congestive heart failure) (H)       oxyCODONE-acetaminophen  MG per tablet    PERCOCET    30 tablet    Take 1 tablet by mouth every 4 hours as needed for moderate to severe pain (Max of 6 a day.)    Low back pain, Chronic pain       predniSONE 20 MG tablet    DELTASONE    70 tablet    Take 3 tablets (60 mg) by mouth daily Decrease by 10mg weekly. Example: Until 5/7 3 tabs daily, then 2.5 tabs daily for one week.    Pneumonitis, Pneumonia of right lower lobe due to infectious organism (H)       psyllium 58.6 % Powd    METAMUCIL SMOOTH TEXTURE    425 g    Take 12 g (2 teaspoonful) by mouth 2 times daily    Constipation, unspecified constipation type       ranitidine 150 MG tablet    ZANTAC    60 tablet    Take 1 tablet (150 mg) by mouth 2 times daily    Gastroesophageal reflux disease, esophagitis presence not specified       simvastatin 20 MG tablet    ZOCOR    30 tablet    Take 1 tablet (20 mg) by mouth At Bedtime Hold if taking fluconozole    Ischemic chest pain (H)       sulfamethoxazole-trimethoprim 800-160 MG per tablet    BACTRIM DS/SEPTRA DS    60 tablet    Take 1 tablet by mouth daily    HIV (human immunodeficiency virus infection)  (H)       SUMAtriptan 50 MG tablet    IMITREX    30 tablet    Take 1 tablet (50 mg) by mouth at onset of headache for migraine    Other migraine without status migrainosus, not intractable       tamsulosin 0.4 MG capsule    FLOMAX          terbinafine 1 % cream    lamISIL AT    42 g    Apply topically 2 times daily To effected areas on feet and toes for two weeks.    Tinea pedis of both feet       triamcinolone 0.1 % cream    KENALOG          * TRUVADA 200-300 MG per tablet   Generic drug:  emtricitabine-tenofovir     30 tablet    TAKE ONE TABLET BY MOUTH EVERY DAY    HIV (human immunodeficiency virus infection) (H)       * TRUVADA 200-300 MG per tablet   Generic drug:  emtricitabine-tenofovir     30 tablet    TAKE 1 TABLET BY MOUTH DAILY CALL [phone] & MAKE APPOINTMENT FOR ANY FURTHER REFILLS.    HIV (human immunodeficiency virus infection) (H)       valACYclovir 500 MG tablet    VALTREX    60 tablet    Take 500 mg by mouth 2 times daily as needed Reported on 5/8/2017    Genital herpes in men       VENTOLIN  (90 Base) MCG/ACT inhaler   Generic drug:  albuterol     18 g    INHALE 2 PUFFS INTO THE LUNGS EVERY 4 HOURS AS NEEDED FOR SHORTNESS OF BREATH / DYSPNEA OR WHEEZING    Acute bronchospasm       VITAMIN D (CHOLECALCIFEROL) PO      Take 5,000 Units by mouth        * Notice:  This list has 6 medication(s) that are the same as other medications prescribed for you. Read the directions carefully, and ask your doctor or other care provider to review them with you.

## 2018-08-19 LAB
BACTERIA SPEC CULT: NORMAL
SPECIMEN SOURCE: NORMAL

## 2018-08-21 DIAGNOSIS — Z21 HIV (HUMAN IMMUNODEFICIENCY VIRUS INFECTION) (H): ICD-10-CM

## 2018-08-21 DIAGNOSIS — J98.01 ACUTE BRONCHOSPASM: ICD-10-CM

## 2018-08-21 RX ORDER — DEXAMETHASONE 0.75 MG/1
TABLET ORAL
Qty: 30 TABLET | Refills: 0 | Status: SHIPPED | OUTPATIENT
Start: 2018-08-21 | End: 2019-08-09

## 2018-08-22 DIAGNOSIS — Z21 HIV (HUMAN IMMUNODEFICIENCY VIRUS INFECTION) (H): ICD-10-CM

## 2018-08-22 RX ORDER — DEXAMETHASONE 0.75 MG/1
TABLET ORAL
Qty: 30 TABLET | Refills: 11 | OUTPATIENT
Start: 2018-08-22

## 2018-08-22 RX ORDER — DEXAMETHASONE 0.75 MG/1
TABLET ORAL
Qty: 30 TABLET | Refills: 0 | OUTPATIENT
Start: 2018-08-22

## 2018-08-22 RX ORDER — ALBUTEROL SULFATE 90 UG/1
AEROSOL, METERED RESPIRATORY (INHALATION)
Qty: 18 G | Refills: 0 | Status: SHIPPED | OUTPATIENT
Start: 2018-08-22 | End: 2018-09-10

## 2018-08-22 NOTE — TELEPHONE ENCOUNTER
"Requested Prescriptions   Pending Prescriptions Disp Refills     VENTOLIN  (90 Base) MCG/ACT inhaler [Pharmacy Med Name: VENTOLIN HFA  (90 BAS AERO] 18 g 11    Last Written Prescription Date:  7/30/18  Last Fill Quantity: 18 g,  # refills: 0   Last office visit: 2/19/2018 with prescribing provider:  2/19/18   Future Office Visit:     Sig: INHALE 2 PUFFS INTO THE LUNGS EVERY 4 HOURS AS NEEDED FOR SHORTNESS OF BREATH/DYSPNEA OR WHEEZING. **NEEDS TO MAKE APPT FOR FURTHER REFILLS*    Asthma Maintenance Inhalers - Anticholinergics Failed    8/21/2018  6:05 PM       Failed - Asthma control assessment score within normal limits in last 6 months    Please review ACT score.          Failed - Recent (6 mo) or future (30 days) visit within the authorizing provider's specialty    Patient had office visit in the last 6 months or has a visit in the next 30 days with authorizing provider or within the authorizing provider's specialty.  See \"Patient Info\" tab in inbasket, or \"Choose Columns\" in Meds & Orders section of the refill encounter.           Passed - Patient is age 12 years or older          " Occupational Therapy  Treatment    Opal Diaz   MRN: [id number]   Admitting Diagnosis: Acute on chronic combined systolic and diastolic heart failure    OT Date of Treatment: 03/06/17   OT Start Time: 1343  OT Stop Time: 1406  OT Total Time (min): 23 min    Billable Minutes:  Self Care/Home Management 23    General Precautions: Standard, fall, sternal, contact (c diff)  Orthopedic Precautions: N/A  Braces: N/A         Subjective:  Communicated with patient prior to session.    Pain Rating:  (patient did not rate.)  Location - Side: Bilateral  Location - Orientation: generalized  Location: chest  Pain Addressed: Reposition, Distraction  Pain Rating Post-Intervention:  (patient did not rate)    Objective:  Patient found with: oxygen, telemetry, chest tube     Functional Mobility:  Bed Mobility:  Rolling/Turning to Left: Contact guard assistance  Scooting/Bridging: Contact Guard Assistance  Supine to Sit: Contact Guard Assistance    Transfers:   Sit <> Stand Assistance: Minimum Assistance  Sit <> Stand Assistive Device: No Assistive Device  Bed <> Chair Technique: Stand Pivot  Bed <> Chair Transfer Assistance: Minimum Assistance  Bed <> Chair Assistive Device: No Assistive Device    Activities of Daily Living:  UE Dressing Level of Assistance: Moderate assistance (Donning back gown)  LE Dressing Level of Assistance: Total assistance (Patient was unable to doff and don socks )  Grooming Position: Seated, bedside chair  Grooming Level of Assistance: Stand by assistance (combing hair, washing face, and oral care )    Balance:   Static Sit: GOOD: Takes MODERATE challenges from all directions  Dynamic Sit: FAIR+: Maintains balance through MINIMAL excursions of active trunk motion  Static Stand: POOR+: Needs MINIMAL assist to maintain  Dynamic stand: POOR: N/A    AM-PAC 6 CLICK ADL   How much help from another person does this patient currently need?   1 = Unable, Total/Dependent Assistance  2 = A lot, Maximum/Moderate  Assistance  3 = A little, Minimum/Contact Guard/Supervision  4 = None, Modified Foster/Independent    Putting on and taking off regular lower body clothing? : 1  Bathing (including washing, rinsing, drying)?: 2  Toileting, which includes using toilet, bedpan, or urinal? : 2  Putting on and taking off regular upper body clothing?: 2  Taking care of personal grooming such as brushing teeth?: 3  Eating meals?: 4  Total Score: 14     AM-PAC Raw Score CMS G-Code Modifier Level of Impairment Assistance   6 % Total / Unable   7 - 9 CM 80 - 100% Maximal Assist   10 - 14 CL 60 - 80% Moderate Assist   15 - 19 CK 40 - 60% Moderate Assist   20 - 22 CJ 20 - 40% Minimal Assist   23 CI 1-20% SBA / CGA   24 CH 0% Independent/ Mod I     Patient left up in chair with family present and all needs met.     ASSESSMENT:  Opal Diaz is a 64 y.o. female with a medical diagnosis of Acute on chronic combined systolic and diastolic heart failure and presents with the deficits listed below. Patient tolerated treatment well and was motivated to complete tasks. Patient continues to benefit from skilled OT services to achieve maximal independence.    Rehab identified problem list/impairments: Rehab identified problem list/impairments: weakness, impaired endurance, impaired self care skills, impaired functional mobilty, gait instability, impaired balance, decreased upper extremity function, decreased lower extremity function, impaired joint extensibility, impaired cardiopulmonary response to activity, edema, pain, decreased safety awareness, other (comment), impaired skin (sternal precautions)    Rehab potential is good.    Activity tolerance: Good    Discharge recommendations: Discharge Facility/Level Of Care Needs: nursing facility, skilled (Short stay)     Barriers to discharge: Barriers to Discharge: Inaccessible home environment, Decreased caregiver support    Equipment recommendations:  (TBD)     GOALS:   Occupational  Therapy Goals        Problem: Occupational Therapy Goal    Goal Priority Disciplines Outcome Interventions   Occupational Therapy Goal     OT, PT/OT Ongoing (interventions implemented as appropriate)    Description:  Goals to be met by: 2 weeks 3/13/17     Patient will increase functional independence with ADLs by performing:    UE Dressing with Supervision.  LE Dressing with Minimal Assistance.  Grooming while seated with Supervision.  Toileting from bedside commode with Stand-by Assistance for hygiene and clothing management.   Stand pivot transfers with Stand-by Assistance.  Toilet transfer to bedside commode with Stand-by Assistance.                Plan:  Patient to be seen 5 x/week to address the above listed problems via self-care/home management, therapeutic activities, therapeutic exercises  Plan of Care expires: 04/02/17  Plan of Care reviewed with: patient     ANNA MARIE Noble  03/06/2017

## 2018-08-22 NOTE — TELEPHONE ENCOUNTER
Routing refill request to provider for review/approval because:  ACT <20    DEVONTE TobiasN, RN  [facility] [place] [facility]

## 2018-08-23 ENCOUNTER — TELEPHONE (OUTPATIENT)
Dept: FAMILY MEDICINE | Facility: CLINIC | Age: 48
End: 2018-08-23

## 2018-08-23 DIAGNOSIS — F41.9 ANXIETY: ICD-10-CM

## 2018-08-23 NOTE — TELEPHONE ENCOUNTER
Reason for Call:  Medication or medication refill:    Do you use a [facility]?  Name of the pharmacy and phone number for the current request:  [facility]    Name of the medication requested: FLUoxetine 40mg    Other request:  Pharmacy has the 20 mg. Needs 40 mg filled.    Can we leave a detailed message on this number? YES    Phone number patient can be reached at:     Best Time:     Call taken on 8/23/2018 at 1:47 PM by Mirtha Danielle

## 2018-08-24 RX ORDER — FLUOXETINE 40 MG/1
40 CAPSULE ORAL DAILY
Qty: 90 CAPSULE | Refills: 1 | Status: SHIPPED | OUTPATIENT
Start: 2018-08-24 | End: 2018-10-10

## 2018-09-03 ENCOUNTER — MEDICAL CORRESPONDENCE (OUTPATIENT)
Dept: HEALTH INFORMATION MANAGEMENT | Facility: CLINIC | Age: 48
End: 2018-09-03

## 2018-09-10 DIAGNOSIS — Z21 HIV (HUMAN IMMUNODEFICIENCY VIRUS INFECTION) (H): ICD-10-CM

## 2018-09-10 DIAGNOSIS — J98.01 ACUTE BRONCHOSPASM: ICD-10-CM

## 2018-09-10 NOTE — TELEPHONE ENCOUNTER
"Requested Prescriptions   Pending Prescriptions Disp Refills     VENTOLIN  (90 Base) MCG/ACT inhaler [Pharmacy Med Name: VENTOLIN HFA  (90 BAS AERO] 18 g 0    Last Written Prescription Date:  8/22/18  Last Fill Quantity: 18 g,  # refills: 0   Last office visit: 2/19/2018 with prescribing provider:  2/19/18   Future Office Visit:    ACT Total Scores 7/25/2017 2/19/2018   ACT TOTAL SCORE (Goal Greater than or Equal to 20) 19 9   In the past 12 months, how many times did you visit the emergency room for your asthma without being admitted to the hospital? 3 3   In the past 12 months, how many times were you hospitalized overnight because of your asthma? 3 3      Sig: INHALE 2 PUFFS INTO THE LUNGS EVERY 4 HOURS AS NEEDED FOR SHORTNESS OF BREATH/DYSPNEA OR WHEEZING. **NEEDS TO MAKE APPT FOR FURTHER REFILLS*    Asthma Maintenance Inhalers - Anticholinergics Failed    9/10/2018  1:20 PM       Failed - Asthma control assessment score within normal limits in last 6 months    Please review ACT score.          Failed - Recent (6 mo) or future (30 days) visit within the authorizing provider's specialty    Patient had office visit in the last 6 months or has a visit in the next 30 days with authorizing provider or within the authorizing provider's specialty.  See \"Patient Info\" tab in inbasket, or \"Choose Columns\" in Meds & Orders section of the refill encounter.           Passed - Patient is age 12 years or older          "

## 2018-09-12 RX ORDER — ALBUTEROL SULFATE 90 UG/1
AEROSOL, METERED RESPIRATORY (INHALATION)
Qty: 18 G | Refills: 0 | Status: SHIPPED | OUTPATIENT
Start: 2018-09-12 | End: 2018-10-05

## 2018-09-12 NOTE — TELEPHONE ENCOUNTER
Routing refill request to provider for review/approval because:  Labs out of range:  ACT  Patient needs to be seen because it has been more than 6 mo since last office visit.    ACT Total Scores 7/25/2017 2/19/2018   ACT TOTAL SCORE (Goal Greater than or Equal to 20) 19 9   In the past 12 months, how many times did you visit the emergency room for your asthma without being admitted to the hospital? 3 3   In the past 12 months, how many times were you hospitalized overnight because of your asthma? 3 3     Marylu Jeffers RN

## 2018-09-13 ENCOUNTER — TELEPHONE (OUTPATIENT)
Dept: FAMILY MEDICINE | Facility: OTHER | Age: 48
End: 2018-09-13

## 2018-09-13 NOTE — TELEPHONE ENCOUNTER
Reason for Call:  Form, our goal is to have forms completed with 72 hours, however, some forms may require a visit or additional information.    Type of letter, form or note:  medical    Who is the form from?: Aaron (if other please explain)    Where did the form come from: form was faxed in    What clinic location was the form placed at?: [facility]    Where the form was placed: [facility]    What number is listed as a contact on the form?: Na Jimenez [phone] ext [phone]       Additional comments: please complete and fax back to [phone]    Call taken on 9/13/2018 at 4:13 PM by Love Menendez

## 2018-09-20 ENCOUNTER — TELEPHONE (OUTPATIENT)
Dept: INTERNAL MEDICINE | Facility: OTHER | Age: 48
End: 2018-09-20

## 2018-09-20 NOTE — TELEPHONE ENCOUNTER
Pt is requesting a larger quantity of Fluconazole to get rid of thrush. Please advise. Kim Milian CMA ([facility])     PT/OT/SLP as tolerted  Continue asa/statin   DVT/GI ppx with lovenox,
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
## 2018-09-20 NOTE — TELEPHONE ENCOUNTER
I called this patient and scheduled him with Dr. Araujo on Tuesday 9/25/2018.  He stated he will get the note on that day.  He also stated he may go to the emergency room for the note.

## 2018-09-25 ENCOUNTER — OFFICE VISIT (OUTPATIENT)
Dept: FAMILY MEDICINE | Facility: OTHER | Age: 48
End: 2018-09-25
Payer: MEDICARE

## 2018-09-25 DIAGNOSIS — Z87.828 HISTORY OF MOTOR VEHICLE ACCIDENT: Primary | ICD-10-CM

## 2018-09-25 PROCEDURE — 99207 ZZC NO BILLABLE SERVICE THIS VISIT: CPT | Performed by: INTERNAL MEDICINE

## 2018-09-25 NOTE — PROGRESS NOTES
I have reviewed the patient's request for a letter stating that he has been off work for the last several months for a medical condition.  As I have not seen this patient since February, I would have to assume that someone else was taking care of his medical condition.  For that reason, I (his primary care provider, who has only seen him twice in the last 4 years) we will not be able to give him this letter.  It may be in the patient's best interest to contact him and let him know this before he presents today.    Thank you  Gayle
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
## 2018-10-05 DIAGNOSIS — J98.01 ACUTE BRONCHOSPASM: ICD-10-CM

## 2018-10-05 RX ORDER — ALBUTEROL SULFATE 90 UG/1
AEROSOL, METERED RESPIRATORY (INHALATION)
Qty: 18 G | Refills: 0 | Status: SHIPPED | OUTPATIENT
Start: 2018-10-05 | End: 2018-10-06

## 2018-10-05 NOTE — TELEPHONE ENCOUNTER
"Last Written Prescription Date:  9/12/18  Last Fill Quantity: 18 g,  # refills: 0   Last office visit: 9/25/2018 with prescribing provider:  Dawood Araujo   Future Office Visit:      Requested Prescriptions   Pending Prescriptions Disp Refills     VENTOLIN  (90 Base) MCG/ACT inhaler [Pharmacy Med Name: VENTOLIN HFA  (90 BAS AERO] 18 g 0     Sig: INHALE 2 PUFFS INTO THE LUNGS EVERY 4 HOURS AS NEEDED FOR SHORTNESS OF BREATH/DYSPNEA OR WHEEZING. **NEEDS TO MAKE APPT FOR FURTHER REFILLS*    Asthma Maintenance Inhalers - Anticholinergics Failed    10/5/2018 10:19 AM       Failed - Asthma control assessment score within normal limits in last 6 months    Please review ACT score.          Passed - Patient is age 12 years or older       Passed - Recent (6 mo) or future (30 days) visit within the authorizing provider's specialty    Patient had office visit in the last 6 months or has a visit in the next 30 days with authorizing provider or within the authorizing provider's specialty.  See \"Patient Info\" tab in inbasket, or \"Choose Columns\" in Meds & Orders section of the refill encounter.            ACT Total Scores 7/25/2017 2/19/2018   ACT TOTAL SCORE (Goal Greater than or Equal to 20) 19 9   In the past 12 months, how many times did you visit the emergency room for your asthma without being admitted to the hospital? 3 3   In the past 12 months, how many times were you hospitalized overnight because of your asthma? 3 3     Routing refill request to provider for review/approval because:  Labs out of range:  ACT  Labs not current: ACT    Marylu Jeffers RN            "

## 2018-10-06 DIAGNOSIS — J98.01 ACUTE BRONCHOSPASM: ICD-10-CM

## 2018-10-06 DIAGNOSIS — F41.9 ANXIETY: ICD-10-CM

## 2018-10-08 DIAGNOSIS — G43.809 OTHER MIGRAINE WITHOUT STATUS MIGRAINOSUS, NOT INTRACTABLE: ICD-10-CM

## 2018-10-09 RX ORDER — ALBUTEROL SULFATE 90 UG/1
AEROSOL, METERED RESPIRATORY (INHALATION)
Qty: 18 G | Refills: 0 | Status: SHIPPED | OUTPATIENT
Start: 2018-10-09 | End: 2019-01-07

## 2018-10-09 NOTE — TELEPHONE ENCOUNTER
"Requested Prescriptions   Pending Prescriptions Disp Refills     FLUoxetine (PROZAC) 20 MG capsule [Pharmacy Med Name: FLUOXETINE 20MG CAP 20 CAP] 90 capsule 3    Last Written Prescription Date:  7/30/18  Last Fill Quantity: 90,  # refills: 0   Last office visit: 2/19/2018 with prescribing provider:  9/25/18   Future Office Visit:     Sig: TAKE 1 CAPSULE (20 MG) BY MOUTH DAILY ALONG WITH 40 MG OF FLUOXETINE    SSRIs Protocol Passed    10/6/2018 12:27 PM       Passed - Recent (12 mo) or future (30 days) visit within the authorizing provider's specialty    Patient had office visit in the last 12 months or has a visit in the next 30 days with authorizing provider or within the authorizing provider's specialty.  See \"Patient Info\" tab in inbasket, or \"Choose Columns\" in Meds & Orders section of the refill encounter.           Passed - Patient is age 18 or older        VENTOLIN  (90 Base) MCG/ACT inhaler [Pharmacy Med Name: VENTOLIN HFA  (90 BAS AERO] 18 g 5    Last Written Prescription Date:  10/5/18  Last Fill Quantity: 18 g,  # refills: 0   Last office visit: 2/19/2018 with prescribing provider:  9/25/18   Future Office Visit:     Sig: INHALE 2 PUFFS INTO THE LUNGS EVERY 4 HOURS AS NEEDED FOR SHORTNESS OF BREATH/DYSPNEA OR WHEEZING. **NEEDS TO MAKE APPT FOR FURTHER REFILLS*    Asthma Maintenance Inhalers - Anticholinergics Failed    10/6/2018 12:27 PM       Failed - Asthma control assessment score within normal limits in last 6 months    Please review ACT score.     ACT Total Scores 7/25/2017 2/19/2018   ACT TOTAL SCORE (Goal Greater than or Equal to 20) 19 9   In the past 12 months, how many times did you visit the emergency room for your asthma without being admitted to the hospital? 3 3   In the past 12 months, how many times were you hospitalized overnight because of your asthma? 3 3          Passed - Patient is age 12 years or older       Passed - Recent (6 mo) or future (30 days) visit within the " "authorizing provider's specialty    Patient had office visit in the last 6 months or has a visit in the next 30 days with authorizing provider or within the authorizing provider's specialty.  See \"Patient Info\" tab in inbasket, or \"Choose Columns\" in Meds & Orders section of the refill encounter.            "

## 2018-10-09 NOTE — TELEPHONE ENCOUNTER
Routing refill request to provider for review/approval because:  ACT <20    DEVONTE TobiasN, RN  [facility]

## 2018-10-10 DIAGNOSIS — F41.9 ANXIETY: ICD-10-CM

## 2018-10-10 RX ORDER — SUMATRIPTAN 50 MG/1
50 TABLET, FILM COATED ORAL
Qty: 30 TABLET | Refills: 1 | OUTPATIENT
Start: 2018-10-10

## 2018-10-10 NOTE — TELEPHONE ENCOUNTER
As I have seen this patient twice in 4 years, the most recent being last February, I am afraid that I no longer refill his medications without an appropriate office visit. I would recommend he sets up an office visit for time of his convenience or discusses his medication needs with one of his many other care providers.    Thank you very much.    Gayle

## 2018-10-10 NOTE — TELEPHONE ENCOUNTER
Routing refill request to provider for review/approval because:  Drug interaction/allergy warning    DEVONTE TobiasN, RN  [facility]

## 2018-10-10 NOTE — TELEPHONE ENCOUNTER
"Requested Prescriptions   Pending Prescriptions Disp Refills     SUMAtriptan (IMITREX) 50 MG tablet 30 tablet 1    Last Written Prescription Date:  4/17/18  Last Fill Quantity: 30,  # refills: 1   Last office visit: 2/19/2018 with prescribing provider:  9/25/18   Future Office Visit:     Sig: Take 1 tablet (50 mg) by mouth at onset of headache for migraine    Serotonin Agonists Failed    10/8/2018  3:45 PM       Failed - Serotonin Agonist request needs review.    Please review patient's record. If patient has had 8 or more treatments in the past month, please forward to provider.         Passed - Blood pressure under 140/90 in past 12 months    BP Readings from Last 3 Encounters:   08/17/18 115/74   07/30/18 110/71   07/21/18 120/88                Passed - Recent (12 mo) or future (30 days) visit within the authorizing provider's specialty    Patient had office visit in the last 12 months or has a visit in the next 30 days with authorizing provider or within the authorizing provider's specialty.  See \"Patient Info\" tab in inbasket, or \"Choose Columns\" in Meds & Orders section of the refill encounter.           Passed - Patient is age 18 or older        "

## 2018-10-11 RX ORDER — FLUOXETINE 40 MG/1
40 CAPSULE ORAL DAILY
Qty: 90 CAPSULE | Refills: 1 | Status: SHIPPED | OUTPATIENT
Start: 2018-10-11 | End: 2019-03-29

## 2018-10-11 NOTE — TELEPHONE ENCOUNTER
"FLUoxetine (PROZAC) 40 MG capsule  Requested Prescriptions   Pending Prescriptions Disp Refills     FLUoxetine (PROZAC) 40 MG capsule 90 capsule 1     Sig: Take 1 capsule (40 mg) by mouth daily    SSRIs Protocol Passed    10/10/2018  4:32 PM       Passed - Recent (12 mo) or future (30 days) visit within the authorizing provider's specialty    Patient had office visit in the last 12 months or has a visit in the next 30 days with authorizing provider or within the authorizing provider's specialty.  See \"Patient Info\" tab in inbasket, or \"Choose Columns\" in Meds & Orders section of the refill encounter.           Passed - Patient is age 18 or older        Last Written Prescription Date:  8/24/2018  Last Fill Quantity: 90,  # refills: 1   Last office visit: 9/25//2018 with prescribing provider:  MIKE   Future Office Visit:      "

## 2018-10-11 NOTE — TELEPHONE ENCOUNTER
Patient requesting new pharmacy. Will have PCP review.     DEVONTE TobiasN, RN  [facility]

## 2018-10-23 DIAGNOSIS — K64.4 EXTERNAL HEMORRHOIDS: ICD-10-CM

## 2018-10-25 NOTE — TELEPHONE ENCOUNTER
"Anusol  Last Written Prescription Date:  02/19/2018  Last Fill Quantity: 30g,  # refills: 0   Last office visit: 09/25/2018 with prescribing provider:  Gayle   Future Office Visit:  None  Prescription approved per [facility] Refill Protocol.    Requested Prescriptions   Pending Prescriptions Disp Refills     hydrocortisone (ANUSOL-HC) 2.5 % cream 30 g 0     Sig: Place rectally 2 times daily    Topical Steroids and Nonsteroidals Protocol Passed    10/23/2018  2:10 PM       Passed - Patient is age 6 or older       Passed - Authorizing prescriber's most recent note related to this medication read.    If refill request is for ophthalmic use, please forward request to provider for approval.         Passed - High potency steroid not ordered       Passed - Recent (12 mo) or future (30 days) visit within the authorizing provider's specialty    Patient had office visit in the last 12 months or has a visit in the next 30 days with authorizing provider or within the authorizing provider's specialty.  See \"Patient Info\" tab in inbasket, or \"Choose Columns\" in Meds & Orders section of the refill encounter.        Sayra Ferreira RN   "

## 2018-11-07 DIAGNOSIS — R11.0 NAUSEA: ICD-10-CM

## 2018-11-08 DIAGNOSIS — J98.01 ACUTE BRONCHOSPASM: ICD-10-CM
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
## 2018-11-12 ENCOUNTER — TELEPHONE (OUTPATIENT)
Dept: INFECTIOUS DISEASES | Facility: CLINIC | Age: 48
End: 2018-11-12

## 2018-11-12 RX ORDER — DEXAMETHASONE 0.75 MG/1
TABLET ORAL
Qty: 30 TABLET | Refills: 0 | OUTPATIENT
Start: 2018-11-12

## 2018-11-12 RX ORDER — ALBUTEROL SULFATE 90 UG/1
AEROSOL, METERED RESPIRATORY (INHALATION)
Qty: 18 G | Refills: 0 | Status: SHIPPED | OUTPATIENT
Start: 2018-11-12 | End: 2019-01-07

## 2018-11-12 RX ORDER — DOLUTEGRAVIR SODIUM 50 MG/1
TABLET, FILM COATED ORAL
Qty: 30 TABLET | Refills: 0 | OUTPATIENT
Start: 2018-11-12

## 2018-11-12 RX ORDER — ONDANSETRON 4 MG/1
TABLET, ORALLY DISINTEGRATING ORAL
Qty: 40 TABLET | Refills: 2 | Status: SHIPPED | OUTPATIENT
Start: 2018-11-12 | End: 2019-01-31

## 2018-11-12 NOTE — TELEPHONE ENCOUNTER
Health Call Center    Phone Message    May a detailed message be left on voicemail: no    Reason for Call: Medication Refill Request    Has the patient contacted the pharmacy for the refill? Yes   Name of medication being requested: TRUVADA 200-300 MG per tablet  AND dolutegravir (TIVICAY) 50 MG tablet    Provider who prescribed the medication: Alireza  Pharmacy:   [id number] [address]      Date medication is needed: ASAP - they have been sending requests for over a week.          Action Taken: Message routed to:  [facility] ([facility] INfectious Disease

## 2018-11-12 NOTE — TELEPHONE ENCOUNTER
Per chart review, pt has most recently been since by Dr. Brizuela at [facility]. He has not been seen by Dr. Lay here in our clinic since 2016. Called [facility] in [place] and notified pharmacist of pt's new provider.   Khushbu Ojeda RN

## 2018-11-12 NOTE — TELEPHONE ENCOUNTER
"Last Written Prescription Date:  10/9/18  Last Fill Quantity: 18 g,  # refills: 0   Last office visit: 9/25/2018 with prescribing provider:  Dawood Araujo   Future Office Visit:      Requested Prescriptions   Pending Prescriptions Disp Refills     VENTOLIN  (90 Base) MCG/ACT inhaler [Pharmacy Med Name: VENTOLIN HFA  (90 BAS AERO] 18 g 0     Sig: INHALE 2 PUFFS INTO THE LUNGS EVERY 4 HOURS AS NEEDED FOR SHORTNESS OF BREATH/DYSPNEA OR WHEEZING. **NEEDS TO MAKE APPT FOR FURTHER REFILLS*    Asthma Maintenance Inhalers - Anticholinergics Failed    11/8/2018 10:45 AM       Failed - Asthma control assessment score within normal limits in last 6 months    Please review ACT score.          Passed - Patient is age 12 years or older       Passed - Recent (6 mo) or future (30 days) visit within the authorizing provider's specialty    Patient had office visit in the last 6 months or has a visit in the next 30 days with authorizing provider or within the authorizing provider's specialty.  See \"Patient Info\" tab in inbasket, or \"Choose Columns\" in Meds & Orders section of the refill encounter.            ACT Total Scores 7/25/2017 2/19/2018   ACT TOTAL SCORE (Goal Greater than or Equal to 20) 19 9   In the past 12 months, how many times did you visit the emergency room for your asthma without being admitted to the hospital? 3 3   In the past 12 months, how many times were you hospitalized overnight because of your asthma? 3 3     Routing refill request to provider for review/approval because:  Labs out of range:  ACT    Marylu Jeffers RN        "

## 2018-12-10 DIAGNOSIS — G43.809 OTHER MIGRAINE WITHOUT STATUS MIGRAINOSUS, NOT INTRACTABLE: ICD-10-CM

## 2018-12-12 RX ORDER — SUMATRIPTAN 50 MG/1
TABLET, FILM COATED ORAL
Qty: 30 TABLET | Refills: 1 | Status: SHIPPED | OUTPATIENT
Start: 2018-12-12 | End: 2019-06-07

## 2018-12-21 ENCOUNTER — OFFICE VISIT (OUTPATIENT)
Dept: URGENT CARE | Facility: RETAIL CLINIC | Age: 48
End: 2018-12-21
Payer: MEDICARE

## 2018-12-21 VITALS — SYSTOLIC BLOOD PRESSURE: 131 MMHG | HEART RATE: 81 BPM | DIASTOLIC BLOOD PRESSURE: 79 MMHG | TEMPERATURE: 98 F

## 2018-12-21 DIAGNOSIS — B37.0 THRUSH: Primary | ICD-10-CM

## 2018-12-21 PROCEDURE — 99213 OFFICE O/P EST LOW 20 MIN: CPT | Performed by: FAMILY MEDICINE

## 2018-12-21 RX ORDER — FLUCONAZOLE 150 MG/1
TABLET ORAL
Qty: 3 TABLET | Refills: 0 | Status: SHIPPED | OUTPATIENT
Start: 2018-12-21 | End: 2020-01-27
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
## 2018-12-27 ENCOUNTER — OFFICE VISIT (OUTPATIENT)
Dept: URGENT CARE | Facility: RETAIL CLINIC | Age: 48
End: 2018-12-27
Payer: MEDICARE

## 2018-12-27 VITALS
TEMPERATURE: 100.4 F | SYSTOLIC BLOOD PRESSURE: 108 MMHG | OXYGEN SATURATION: 97 % | DIASTOLIC BLOOD PRESSURE: 73 MMHG | HEART RATE: 97 BPM

## 2018-12-27 DIAGNOSIS — J02.9 ACUTE PHARYNGITIS, UNSPECIFIED ETIOLOGY: Primary | ICD-10-CM

## 2018-12-27 PROCEDURE — 87081 CULTURE SCREEN ONLY: CPT | Performed by: INTERNAL MEDICINE

## 2018-12-27 PROCEDURE — 87880 STREP A ASSAY W/OPTIC: CPT | Mod: QW | Performed by: INTERNAL MEDICINE

## 2018-12-27 PROCEDURE — 99213 OFFICE O/P EST LOW 20 MIN: CPT | Performed by: INTERNAL MEDICINE

## 2018-12-27 RX ORDER — PANTOPRAZOLE SODIUM 40 MG/1
40 TABLET, DELAYED RELEASE ORAL
COMMUNITY
Start: 2015-11-11

## 2018-12-27 RX ORDER — ONDANSETRON 4 MG/1
4-8 TABLET, FILM COATED ORAL
COMMUNITY
Start: 2015-03-06 | End: 2019-08-09

## 2018-12-27 RX ORDER — DIPHENOXYLATE HYDROCHLORIDE AND ATROPINE SULFATE 2.5; .025 MG/1; MG/1
1 TABLET ORAL
COMMUNITY
Start: 2011-09-10 | End: 2019-08-09

## 2018-12-27 RX ORDER — AZITHROMYCIN 250 MG/1
TABLET, FILM COATED ORAL
Qty: 6 TABLET | Refills: 0 | Status: SHIPPED | OUTPATIENT
Start: 2018-12-27 | End: 2019-01-01

## 2018-12-27 RX ORDER — FUROSEMIDE 20 MG
20 TABLET ORAL
COMMUNITY
End: 2019-08-09

## 2018-12-27 NOTE — PROGRESS NOTES
[facility] [place]        Gray Eugene MD, MPH  12/27/2018        History:      Tommy Ross is a 48 year old male with a chief complaint of sore throat and cough.  Onset of symptoms was 5 day(s) ago.    No dyspnea or wheezing. No chest pain.   No vomiting    No diarrhea  No abdominal pain  Eating and drinking well  Making urine well         Assessment and Plan:         Acute pharyngitis:    - RAPID STREP SCREEN: negative  - BETA STREP GROUP A R/O CULTURE  - azithromycin (ZITHROMAX) 250 MG tablet; Take 2 tablets (500 mg) by mouth daily for 1 day, THEN 1 tablet (250 mg) daily for 4 days.  Dispense: 6 tablet; Refill: 0    Advised patient/Family to increase/encourage fluid intake and rest.  Advised to discard current tooth brush.  Advised to stay home and rest today and tomorrow.  Tylenol  Every 6 hours as needed alternating with ibuprofen every 6 hours as needed for pain and fever.  F/u w PCP in 2-3 days, earlier if symptoms worsen.                   Physical Exam:      /73   Pulse 97   Temp 100.4  F (38  C) (Tympanic)   SpO2 97%      Constitutional: Patient is in no distress The patient is pleasant and cooperative.   HEENT: Head:  Head is atraumatic, normocephalic.    Eyes: Pupils are equal, round and reactive to light and accomodation.  Sclera is non-icteric. No conjunctival injection, or exudate noted. Extraocular motion is intact. Visual acuity is intact bilaterally.  Ears:  External acoustic canals are patent and clear. There is no erythema and bulging( exudate)  of the ( R/L ) tympanic membrane(s ).   Nose:  No Nasal congestion, drainage or mucosal ulceration is noted.  Throat:  Oral mucosa is moist.  No oral lesions are noted.  Posterior pharyngeal hyperemia w exudate noted.     Neck Supple.  There is cervical lymphadenopathy.  No nuchal rigidity noted.  There is no meningismus.     Cardiovascular:  Chest Wall: Heart is regular to rate and rhythm.  No murmur is noted.       Lungs:  Clear in the anterior and posterior pulmonary fields.   Abdomen: Soft and non-tender.    Back No flank tenderness is noted.   Extremeties No edema, no calf tenderness.   Neuro: No focal deficit.   Skin No petechiae or purpura is noted.  There is no rash.   Mood Normal              Data:      All new lab and imaging data was reviewed.   Results for orders placed or performed in visit on 08/17/18   BETA STREP GROUP A R/O CULTURE   Result Value Ref Range    Specimen Description Throat     Culture Micro No beta hemolytic Streptococcus Group A isolated    RAPID STREP SCREEN   Result Value Ref Range    Rapid Strep A Screen neg neg     *Note: Due to a large number of results and/or encounters for the requested time period, some results have not been displayed. A complete set of results can be found in Results Review.

## 2018-12-29 LAB
BACTERIA SPEC CULT: NORMAL
SPECIMEN SOURCE: NORMAL

## 2019-01-03 DIAGNOSIS — F41.9 ANXIETY: ICD-10-CM

## 2019-01-03 DIAGNOSIS — E55.9 VITAMIN D DEFICIENCY: Primary | ICD-10-CM

## 2019-01-03 NOTE — TELEPHONE ENCOUNTER
Vitamin D3      Last Written Prescription Date:  n/a  Last Fill Quantity: 0,   # refills: 0  Last Office Visit: 09/25/2018  Future Office visit:       Routing refill request to provider for review/approval because:  Medication is reported/historical    Patient states he takes 2 tablets by mouth daily.   Candice Howard MA

## 2019-01-04 RX ORDER — ALPRAZOLAM 0.5 MG
TABLET ORAL
Qty: 30 TABLET | Refills: 0 | Status: SHIPPED | OUTPATIENT
Start: 2019-01-04 | End: 2019-03-04

## 2019-01-04 RX ORDER — MULTIVIT-MIN/IRON/FOLIC ACID/K 18-600-40
1000 CAPSULE ORAL DAILY
Qty: 90 TABLET | Refills: 0 | Status: SHIPPED | OUTPATIENT
Start: 2019-01-04 | End: 2019-08-09

## 2019-01-04 NOTE — TELEPHONE ENCOUNTER
Alprazolam 0.5 MG       Last Written Prescription Date:  7/28/17  Last Fill Quantity: 30,   # refills: 0  Last Office Visit: 9-25-18  Future Office visit:       Routing refill request to provider for review/approval because:  Drug not on the [facility], [facility] or [facility] refill protocol or controlled substance

## 2019-01-04 NOTE — TELEPHONE ENCOUNTER
Rx faxed to pharmacy - [facility] in [facility]    Nazanin Jeffers XRO/   [facility]

## 2019-01-07 DIAGNOSIS — J98.01 ACUTE BRONCHOSPASM: ICD-10-CM

## 2019-01-07 DIAGNOSIS — B35.3 TINEA PEDIS OF BOTH FEET: ICD-10-CM

## 2019-01-07 RX ORDER — ALBUTEROL SULFATE 90 UG/1
AEROSOL, METERED RESPIRATORY (INHALATION)
Qty: 18 G | Refills: 0 | Status: SHIPPED | OUTPATIENT
Start: 2019-01-07 | End: 2019-01-31

## 2019-01-07 RX ORDER — CLOTRIMAZOLE 1 %
CREAM (GRAM) TOPICAL
Qty: 15 G | Refills: 0 | Status: SHIPPED | OUTPATIENT
Start: 2019-01-07 | End: 2019-03-05

## 2019-01-07 NOTE — TELEPHONE ENCOUNTER
Routing refill request to provider for review/approval because:  Dali given x1 and patient did not follow up, please advise  Drug not on the [facility] refill protocol   ACT <20    JERE Tobias, RN  [facility]

## 2019-01-07 NOTE — TELEPHONE ENCOUNTER
"Requested Prescriptions   Pending Prescriptions Disp Refills     clotrimazole (LOTRIMIN) 1 % external cream 15 g 1    Last Written Prescription Date:  3/13/18  Last Fill Quantity: 15 g,  # refills: 1   Last office visit: 2/19/2018 with prescribing provider:  9/25/18   Future Office Visit:     Sig: APPLY TOPICALLY 2 TIMES DAILY    There is no refill protocol information for this order        albuterol (VENTOLIN HFA) 108 (90 Base) MCG/ACT inhaler 18 g 0    Last Written Prescription Date:  11/12/18  Last Fill Quantity: 18 g,  # refills: 0   Last office visit: 2/19/2018 with prescribing provider:  9/25/18   Future Office Visit:     Sig: INHALE 2 PUFFS INTO THE LUNGS EVERY 4 HOURS AS NEEDED FOR SHORTNESS OF BREATH/DYSPNEA OR WHEEZING. **NEEDS TO MAKE APPT FOR FURTHER REFILLS*    Asthma Maintenance Inhalers - Anticholinergics Failed - 1/7/2019 11:55 AM       Failed - Asthma control assessment score within normal limits in last 6 months    Please review ACT score.   ACT Total Scores 7/25/2017 2/19/2018   ACT TOTAL SCORE (Goal Greater than or Equal to 20) 19 9   In the past 12 months, how many times did you visit the emergency room for your asthma without being admitted to the hospital? 3 3   In the past 12 months, how many times were you hospitalized overnight because of your asthma? 3 3          Passed - Patient is age 12 years or older       Passed - Medication is active on med list       Passed - Recent (6 mo) or future (30 days) visit within the authorizing provider's specialty    Patient had office visit in the last 6 months or has a visit in the next 30 days with authorizing provider or within the authorizing provider's specialty.  See \"Patient Info\" tab in inbasket, or \"Choose Columns\" in Meds & Orders section of the refill encounter.            "

## 2019-01-17 ENCOUNTER — TELEPHONE (OUTPATIENT)
Dept: INTERNAL MEDICINE | Facility: CLINIC | Age: 49
End: 2019-01-17

## 2019-01-17 NOTE — TELEPHONE ENCOUNTER
Patient is requesting a new medication be sent for the 2% cream. It worked must better back in October.  He states the 1% is not working as well. Patient would like this sent to the [facility].

## 2019-01-22 NOTE — TELEPHONE ENCOUNTER
Patient informed of message below. He is going to try to coordinate a ride for appointment and Is also going to see if his palliative care Dr would be able to sign off on it.   Candice Howard MA

## 2019-01-22 NOTE — TELEPHONE ENCOUNTER
As the patient is unable to make it to the [facility], I would recommend that he attempts to find a new provider [place].  Additionally, he does frequently see his specialists at the [facility], may be he should set up a primary care provider there.  I will happily have him set up an appointment for follow-up but will be unable to give him the prescription without being seen.

## 2019-01-22 NOTE — TELEPHONE ENCOUNTER
This looks to be your patient. I have already told him that he needs to be seen. Thoughts on sending out the medication?    Jim Castillo PA-C on 1/22/2019 at 8:18 AM

## 2019-01-29 ENCOUNTER — TRANSFERRED RECORDS (OUTPATIENT)
Dept: HEALTH INFORMATION MANAGEMENT | Facility: CLINIC | Age: 49
End: 2019-01-29

## 2019-01-31 DIAGNOSIS — R11.0 NAUSEA: ICD-10-CM

## 2019-01-31 DIAGNOSIS — J98.01 ACUTE BRONCHOSPASM: ICD-10-CM

## 2019-01-31 RX ORDER — ALBUTEROL SULFATE 90 UG/1
AEROSOL, METERED RESPIRATORY (INHALATION)
Qty: 8.5 G | Refills: 0 | Status: SHIPPED | OUTPATIENT
Start: 2019-01-31 | End: 2019-03-04

## 2019-01-31 RX ORDER — ONDANSETRON 4 MG/1
TABLET, ORALLY DISINTEGRATING ORAL
Qty: 20 TABLET | Refills: 0 | Status: SHIPPED | OUTPATIENT
Start: 2019-01-31 | End: 2019-03-04

## 2019-01-31 NOTE — TELEPHONE ENCOUNTER
Routing refill request to provider for review/approval because:  Dali given x1 and patient did not follow up, please advise  Patient needs to be seen because:  Due for follow up    JERE Tobias, RN  [facility]

## 2019-01-31 NOTE — TELEPHONE ENCOUNTER
"Requested Prescriptions   Pending Prescriptions Disp Refills     albuterol (VENTOLIN HFA) 108 (90 Base) MCG/ACT inhaler 18 g 0    Last Written Prescription Date:  1/7/19  Last Fill Quantity: 18 g,  # refills: 0   Last office visit: No previous visit found with prescribing provider:  2/19/18   Future Office Visit:     Sig: INHALE 2 PUFFS INTO THE LUNGS EVERY 4 HOURS AS NEEDED FOR SHORTNESS OF BREATH/DYSPNEA OR WHEEZING. **NEEDS TO MAKE APPT FOR FURTHER REFILLS*    Asthma Maintenance Inhalers - Anticholinergics Failed - 1/31/2019  8:43 AM       Failed - Asthma control assessment score within normal limits in last 6 months    Please review ACT score.   ACT Total Scores 7/25/2017 2/19/2018   ACT TOTAL SCORE (Goal Greater than or Equal to 20) 19 9   In the past 12 months, how many times did you visit the emergency room for your asthma without being admitted to the hospital? 3 3   In the past 12 months, how many times were you hospitalized overnight because of your asthma? 3 3          Passed - Patient is age 12 years or older       Passed - Medication is active on med list       Passed - Recent (6 mo) or future (30 days) visit within the authorizing provider's specialty    Patient had office visit in the last 6 months or has a visit in the next 30 days with authorizing provider or within the authorizing provider's specialty.  See \"Patient Info\" tab in inbasket, or \"Choose Columns\" in Meds & Orders section of the refill encounter.            ondansetron (ZOFRAN-ODT) 4 MG ODT tab    Last Written Prescription Date:  11/12/18  Last Fill Quantity: 40,  # refills: 2   Last office visit: No previous visit found with prescribing provider:  2/19/18   Future Office Visit:     40 tablet 2     Antivertigo/Antiemetic Agents Passed - 1/31/2019  8:43 AM       Passed - Recent (12 mo) or future (30 days) visit within the authorizing provider's specialty    Patient had office visit in the last 12 months or has a visit in the next 30 days " "with authorizing provider or within the authorizing provider's specialty.  See \"Patient Info\" tab in inbasket, or \"Choose Columns\" in Meds & Orders section of the refill encounter.             Passed - Medication is active on med list       Passed - Patient is 18 years of age or older        "

## 2019-03-04 DIAGNOSIS — R11.0 NAUSEA: ICD-10-CM

## 2019-03-04 DIAGNOSIS — B35.3 TINEA PEDIS OF BOTH FEET: ICD-10-CM

## 2019-03-04 DIAGNOSIS — F41.9 ANXIETY: ICD-10-CM

## 2019-03-04 DIAGNOSIS — J98.01 ACUTE BRONCHOSPASM: ICD-10-CM

## 2019-03-04 NOTE — TELEPHONE ENCOUNTER
Clotrimazole       Last Written Prescription Date:  1/7/19  Last Fill Quantity: 15 g,   # refills: 0  Last Office Visit: 9-25-18  Future Office visit:       Routing refill request to provider for review/approval because:  Drug not on the [facility], [facility] or [facility] refill protocol or controlled substance

## 2019-03-05 RX ORDER — CLOTRIMAZOLE 1 %
CREAM (GRAM) TOPICAL
Qty: 15 G | Refills: 0 | Status: SHIPPED | OUTPATIENT
Start: 2019-03-05 | End: 2020-01-02

## 2019-03-06 RX ORDER — ALPRAZOLAM 0.5 MG
TABLET ORAL
Qty: 30 TABLET | Refills: 0 | Status: SHIPPED | OUTPATIENT
Start: 2019-03-06 | End: 2019-06-05

## 2019-03-06 RX ORDER — ALBUTEROL SULFATE 90 UG/1
AEROSOL, METERED RESPIRATORY (INHALATION)
Qty: 18 G | Refills: 0 | Status: SHIPPED | OUTPATIENT
Start: 2019-03-06 | End: 2019-07-10

## 2019-03-06 RX ORDER — ONDANSETRON 4 MG/1
TABLET, ORALLY DISINTEGRATING ORAL
Qty: 20 TABLET | Refills: 0 | Status: SHIPPED | OUTPATIENT
Start: 2019-03-06 | End: 2019-05-01

## 2019-03-06 NOTE — TELEPHONE ENCOUNTER
Called to schedule appointment for Tommy and he informed me he is not feeling well and in Hospice. Asking for a call tomorrow.

## 2019-03-06 NOTE — TELEPHONE ENCOUNTER
"Requested Prescriptions   Pending Prescriptions Disp Refills     ondansetron (ZOFRAN-ODT) 4 MG ODT tab [Pharmacy Med Name: ONDANSETRON ODT 4 MG TABLET 4 TAB] 20 tablet 0    Last Written Prescription Date:  1/31/19  Last Fill Quantity: 20,  # refills: 0   Last office visit: 2/19/18  Future Office Visit:     Sig: DISSOLVE 1-2 TABLETS UNDER TONGUE AS NEEDED FOR NAUSEA, **NEEDS APPT FOR FURTHER REFILLS*     Antivertigo/Antiemetic Agents Passed - 3/4/2019  1:36 PM       Passed - Recent (12 mo) or future (30 days) visit within the authorizing provider's specialty    Patient had office visit in the last 12 months or has a visit in the next 30 days with authorizing provider or within the authorizing provider's specialty.  See \"Patient Info\" tab in inbasket, or \"Choose Columns\" in Meds & Orders section of the refill encounter.             Passed - Medication is active on med list       Passed - Patient is 18 years of age or older   Routing refill request to provider for review/approval because:  Dali given x1 and patient did not follow up, please advise  T'd up 1 month for provider review.    Patient needs to be seen because it has been more than 1 year since last office visit.  Will forward to schedulers to schedule patient for Office visit.               albuterol (PROAIR HFA/PROVENTIL HFA/VENTOLIN HFA) 108 (90 Base) MCG/ACT inhaler [Pharmacy Med Name: ALBUTEROL SULFATE  ( 108 (90 BAS AERO] 18 g 0    Last Written Prescription Date:  1/13/19  Last Fill Quantity: 8.5g,  # refills: 0   Last office visit: 2/19/18  Future Office Visit:     Sig: INHALE 2 PUFFS INTO THE LUNGS EVERY 4 HOURS AS NEEDED FOR SHORTNESS OF BREATH/DYSPNEA OR WHEEZING. **NEEDS TO MAKE APPT FOR FURTHER REFILLS*    Asthma Maintenance Inhalers - Anticholinergics Failed - 3/4/2019  1:36 PM       Failed - Asthma control assessment score within normal limits in last 6 months    Please review ACT score.          Passed - Patient is age 12 years or older " "      Passed - Medication is active on med list       Passed - Recent (6 mo) or future (30 days) visit within the authorizing provider's specialty    Patient had office visit in the last 6 months or has a visit in the next 30 days with authorizing provider or within the authorizing provider's specialty.  See \"Patient Info\" tab in inbasket, or \"Choose Columns\" in Meds & Orders section of the refill encounter.       Routing refill request to provider for review/approval because:  Dali given x1 and patient did not follow up, please advise  T'd up 1 month for provider review.    Patient needs to be seen because it has been more than 1 year since last office visit.  Will forward to schedulers to schedule patient for Office visit.         ALPRAZolam (XANAX) 0.5 MG tablet [Pharmacy Med Name: ALPRAZOLAM 0.5 MG TABLET 0.5 TAB] 30 tablet 0     Sig: TAKE 1-2 TABLET(S) BY MOUTH DAILY AS NEEDED FOR ANXIETY    There is no refill protocol information for this order      Routing refill request to provider for review/approval because:  Drug not on the [facility] refill protocol       Dyan Xiong RN                  "

## 2019-03-13 ENCOUNTER — TELEPHONE (OUTPATIENT)
Dept: FAMILY MEDICINE | Facility: OTHER | Age: 49
End: 2019-03-13

## 2019-03-13 NOTE — TELEPHONE ENCOUNTER
PA initiated by pharmacy via Cover my Meds  KEY: [id number]      Prior Authorization Retail Medication Request    Medication/Dose: ondansetron (ZOFRAN-ODT) 4 MG ODT tab  ICD code (if different than what is on RX):  Nausea [R11.0]   Previously Tried and Failed:  na  Rationale:  HIV positive patient in hospice care    Insurance Name:  [employer]  Insurance ID:  [id number]      Pharmacy Information (if different than what is on RX)  Name:  [facility] [place]  Phone:  [phone]

## 2019-03-19 NOTE — TELEPHONE ENCOUNTER
PA Initiation    Medication: ondansetron (ZOFRAN-ODT) 4 MG ODT tab - INITIATED  Insurance Company: [facility]  [phone] phone  [phone] fax  Pharmacy Filling the Rx: [id number] - [place], [place] - [address]  Filling Pharmacy Phone: [phone]  Filling Pharmacy Fax:    Start Date: 3/19/2019

## 2019-03-19 NOTE — TELEPHONE ENCOUNTER
Is patient currently in hospice? If so, this needs to be handled by the clinic staff. If not, please reroute back for processing.

## 2019-03-27 NOTE — TELEPHONE ENCOUNTER
Prior Authorization Not Needed per Insurance  03/27/2019  Medication: ondansetron (ZOFRAN-ODT) 4 MG ODT tab - NOT NEEDED  Insurance Company: [facility]  [phone] phone  [phone] fax  Expected CoPay:      Pharmacy Filling the Rx: [id number] - [place], [place] - [address]  Pharmacy Notified: No  Patient Notified: No    Spoke with a representative from [facility] who stated the patient is current an active hospice patient. [facility] team does not do prior authorizations for hospice patients. Patients hospice coordinator or nurse will need to take care of this.

## 2019-03-29 DIAGNOSIS — J98.01 ACUTE BRONCHOSPASM: ICD-10-CM

## 2019-03-29 DIAGNOSIS — F41.9 ANXIETY: Primary | ICD-10-CM

## 2019-04-01 RX ORDER — ALBUTEROL SULFATE 90 UG/1
AEROSOL, METERED RESPIRATORY (INHALATION)
Qty: 18 G | Refills: 0 | Status: SHIPPED | OUTPATIENT
Start: 2019-04-01 | End: 2019-07-10

## 2019-04-01 RX ORDER — FLUOXETINE 40 MG/1
40 CAPSULE ORAL DAILY
Qty: 90 CAPSULE | Refills: 1 | Status: SHIPPED | OUTPATIENT
Start: 2019-04-01 | End: 2019-08-09

## 2019-04-01 NOTE — TELEPHONE ENCOUNTER
"Albuteral Inhaler  Last Written Prescription Date:  3/6/19  Last Fill Quantity: 1,  # refills: 0  -  \" Needs appt for cont med use.\"  Last office visit: 2/19/2018 with prescribing provider:     Future Office Visit:  NONE .   DX for use: Bronchospasm/ Wheezing  Routing refill request to provider for review/approval because:  Patient needs to be seen because:  He was to Follow-up per DR. Araujo with last refill comments.     Prozac  Last Written Prescription Date:  10/11/18  Last Fill Quantity: 90,  # refills: 1   Last office visit: 2/19/2018 with prescribing provider:     Future Office Visit:  NONE    PHQ-9 SCORE 12/21/2016 3/10/2017 5/8/2017   PHQ-9 Total Score - - -   PHQ-9 Total Score 0 0 0   DX for use is ANXIETY              "

## 2019-05-01 DIAGNOSIS — R11.0 NAUSEA: ICD-10-CM

## 2019-05-02 RX ORDER — ONDANSETRON 4 MG/1
TABLET, ORALLY DISINTEGRATING ORAL
Qty: 20 TABLET | Refills: 0 | Status: SHIPPED | OUTPATIENT
Start: 2019-05-02

## 2019-05-02 NOTE — TELEPHONE ENCOUNTER
"Requested Prescriptions   Pending Prescriptions Disp Refills     ondansetron (ZOFRAN-ODT) 4 MG ODT tab [Pharmacy Med Name: ONDANSETRON ODT 4 MG TABLET 4 TAB] 20 tablet 0     Sig: DISSOLVE 1-2 TABLETS UNDER TONGUE AS NEEDED FOR NAUSEA, **NEEDS APPT FOR FURTHER REFILLS*   Last Written Prescription Date:  3/6/19  Last Fill Quantity: 20,  # refills: 0   Last office visit: No previous visit found with prescribing provider:  9/25/18   Future Office Visit:         Antivertigo/Antiemetic Agents Passed - 5/1/2019 12:39 PM        Passed - Recent (12 mo) or future (30 days) visit within the authorizing provider's specialty     Patient had office visit in the last 12 months or has a visit in the next 30 days with authorizing provider or within the authorizing provider's specialty.  See \"Patient Info\" tab in inbasket, or \"Choose Columns\" in Meds & Orders section of the refill encounter.              Passed - Medication is active on med list        Passed - Patient is 18 years of age or older        "

## 2019-06-05 DIAGNOSIS — F41.9 ANXIETY: ICD-10-CM

## 2019-06-06 RX ORDER — ALPRAZOLAM 0.5 MG
TABLET ORAL
Qty: 30 TABLET | Refills: 0 | Status: SHIPPED | OUTPATIENT
Start: 2019-06-06 | End: 2019-07-09

## 2019-06-06 NOTE — TELEPHONE ENCOUNTER
Requested Prescriptions   Pending Prescriptions Disp Refills     ALPRAZolam (XANAX) 0.5 MG tablet [Pharmacy Med Name: ALPRAZOLAM 0.5 MG TABLET 0.5 TAB] 30 tablet 0     Sig: TAKE 1-2 TABLETS BY MOUTH DAILY AS NEEDED FOR ANXIETY       There is no refill protocol information for this order        Last Written Prescription Date:  3/6/2019  Last Fill Quantity: 30,  # refills: 0   Last office visit : 9/25/2018  Future Office Visit:      Routing refill request to provider for review/approval because:  Drug not on the [facility] refill protocol     Dyan Xoing RN

## 2019-06-07 DIAGNOSIS — G43.809 OTHER MIGRAINE WITHOUT STATUS MIGRAINOSUS, NOT INTRACTABLE: ICD-10-CM

## 2019-06-10 RX ORDER — SUMATRIPTAN 50 MG/1
TABLET, FILM COATED ORAL
Qty: 30 TABLET | Refills: 1 | Status: SHIPPED | OUTPATIENT
Start: 2019-06-10 | End: 2019-11-26

## 2019-06-10 NOTE — TELEPHONE ENCOUNTER
"Requested Prescriptions   Pending Prescriptions Disp Refills     SUMAtriptan (IMITREX) 50 MG tablet 30 tablet 1     Sig: TAKE 1 TABLET BY MOUTH AT HEADACH ONSET FOR MIGRAINE   Last Written Prescription Date:  12/12/2018  Last Fill Quantity: 30,  # refills: 1   Last office visit: 9/25/2018 with prescribing provider:     Future Office Visit:        Serotonin Agonists Failed - 6/7/2019 10:20 AM        Failed - Serotonin Agonist request needs review.     Please review patient's record. If patient has had 8 or more treatments in the past month, please forward to provider.          Passed - Blood pressure under 140/90 in past 12 months     BP Readings from Last 3 Encounters:   12/27/18 108/73   12/21/18 131/79   08/17/18 115/74           Passed - Recent (12 mo) or future (30 days) visit within the authorizing provider's specialty     Patient had office visit in the last 12 months or has a visit in the next 30 days with authorizing provider or within the authorizing provider's specialty.  See \"Patient Info\" tab in inbasket, or \"Choose Columns\" in Meds & Orders section of the refill encounter.              Passed - Medication is active on med list        Passed - Patient is age 18 or older      Prescription approved per [facility] Refill Protocol.  Dyan Xiong RN      "

## 2019-06-25 ENCOUNTER — TRANSFERRED RECORDS (OUTPATIENT)
Dept: HEALTH INFORMATION MANAGEMENT | Facility: CLINIC | Age: 49
End: 2019-06-25

## 2019-07-04 ENCOUNTER — HOSPITAL ENCOUNTER (EMERGENCY)
Facility: CLINIC | Age: 49
Discharge: HOME | End: 2019-07-04
Attending: FAMILY MEDICINE | Admitting: FAMILY MEDICINE
Payer: MEDICARE

## 2019-07-04 VITALS
SYSTOLIC BLOOD PRESSURE: 151 MMHG | RESPIRATION RATE: 20 BRPM | DIASTOLIC BLOOD PRESSURE: 89 MMHG | TEMPERATURE: 99.6 F | WEIGHT: 125 LBS | BODY MASS INDEX: 18.46 KG/M2 | HEART RATE: 115 BPM

## 2019-07-04 DIAGNOSIS — B02.9 HERPES ZOSTER WITHOUT COMPLICATION: ICD-10-CM

## 2019-07-04 PROCEDURE — 99284 EMERGENCY DEPT VISIT MOD MDM: CPT | Mod: 25

## 2019-07-04 PROCEDURE — 96372 THER/PROPH/DIAG INJ SC/IM: CPT

## 2019-07-04 PROCEDURE — 25000128 H RX IP 250 OP 636: Performed by: FAMILY MEDICINE

## 2019-07-04 PROCEDURE — 25000132 ZZH RX MED GY IP 250 OP 250 PS 637: Mod: GY | Performed by: FAMILY MEDICINE

## 2019-07-04 PROCEDURE — 99284 EMERGENCY DEPT VISIT MOD MDM: CPT | Mod: Z6 | Performed by: FAMILY MEDICINE

## 2019-07-04 RX ORDER — MORPHINE SULFATE 10 MG/ML
10 INJECTION, SOLUTION INTRAMUSCULAR; INTRAVENOUS ONCE
Status: COMPLETED | OUTPATIENT
Start: 2019-07-04 | End: 2019-07-04

## 2019-07-04 RX ORDER — VALACYCLOVIR HYDROCHLORIDE 1 G/1
1000 TABLET, FILM COATED ORAL 3 TIMES DAILY
Qty: 21 TABLET | Refills: 0 | Status: SHIPPED | OUTPATIENT
Start: 2019-07-04 | End: 2019-08-09

## 2019-07-04 RX ORDER — VALACYCLOVIR HYDROCHLORIDE 500 MG/1
1000 TABLET, FILM COATED ORAL ONCE
Status: COMPLETED | OUTPATIENT
Start: 2019-07-04 | End: 2019-07-04

## 2019-07-04 RX ADMIN — VALACYCLOVIR HYDROCHLORIDE 1000 MG: 500 TABLET, FILM COATED ORAL at 21:19

## 2019-07-04 RX ADMIN — MORPHINE SULFATE 10 MG: 10 INJECTION INTRAVENOUS at 21:17

## 2019-07-04 NOTE — ED AVS SNAPSHOT
[facility] Emergency Department  [address] [place] [address]  Phone:  [phone]  Fax:  [phone]                                    Tommy Ross   MRN: [id number]    Department:  [facility] Emergency Department   Date of Visit:  7/4/2019           After Visit Summary Signature Page    I have received my discharge instructions, and my questions have been answered. I have discussed any challenges I see with this plan with the nurse or doctor.    ..........................................................................................................................................  Patient/Patient Representative Signature      ..........................................................................................................................................  Patient Representative Print Name and Relationship to Patient    ..................................................               ................................................  Date                                   Time    ..........................................................................................................................................  Reviewed by Signature/Title    ...................................................              ..............................................  Date                                               Time          22EPIC Rev 08/18

## 2019-07-05 ASSESSMENT — ENCOUNTER SYMPTOMS
CHILLS: 0
FEVER: 0
MYALGIAS: 1

## 2019-07-05 NOTE — DISCHARGE INSTRUCTIONS
Please read and follow the handout(s) instructions. Return, if needed, for increased or worsening symptoms and as directed by the handout(s).    I sent your script(s) to the [facility] pharmacy.    I hope you feel better soon!    Electronically signed, Joe Carty DO
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
## 2019-07-07 ENCOUNTER — HOSPITAL ENCOUNTER (EMERGENCY)
Facility: CLINIC | Age: 49
Discharge: HOME | End: 2019-07-07
Attending: FAMILY MEDICINE | Admitting: FAMILY MEDICINE
Payer: MEDICARE

## 2019-07-07 VITALS
HEART RATE: 88 BPM | RESPIRATION RATE: 18 BRPM | TEMPERATURE: 98 F | SYSTOLIC BLOOD PRESSURE: 129 MMHG | OXYGEN SATURATION: 98 % | DIASTOLIC BLOOD PRESSURE: 84 MMHG

## 2019-07-07 DIAGNOSIS — B02.9 HERPES ZOSTER WITHOUT COMPLICATION: ICD-10-CM

## 2019-07-07 PROCEDURE — 25000132 ZZH RX MED GY IP 250 OP 250 PS 637: Mod: GY | Performed by: FAMILY MEDICINE

## 2019-07-07 PROCEDURE — 99283 EMERGENCY DEPT VISIT LOW MDM: CPT | Mod: Z6 | Performed by: FAMILY MEDICINE

## 2019-07-07 PROCEDURE — 99283 EMERGENCY DEPT VISIT LOW MDM: CPT

## 2019-07-07 RX ORDER — LIDOCAINE 4 G/G
3 PATCH TOPICAL ONCE
Status: DISCONTINUED | OUTPATIENT
Start: 2019-07-07 | End: 2019-07-07 | Stop reason: HOSPADM

## 2019-07-07 RX ORDER — OXYCODONE AND ACETAMINOPHEN 5; 325 MG/1; MG/1
2 TABLET ORAL ONCE
Status: COMPLETED | OUTPATIENT
Start: 2019-07-07 | End: 2019-07-07

## 2019-07-07 RX ORDER — LIDOCAINE 50 MG/G
1 PATCH TOPICAL EVERY 24 HOURS
Qty: 30 PATCH | Refills: 0 | Status: SHIPPED | OUTPATIENT
Start: 2019-07-07 | End: 2019-12-16

## 2019-07-07 RX ORDER — HYDROCODONE BITARTRATE AND ACETAMINOPHEN 5; 325 MG/1; MG/1
1 TABLET ORAL EVERY 6 HOURS PRN
Qty: 12 TABLET | Refills: 0 | Status: SHIPPED | OUTPATIENT
Start: 2019-07-07 | End: 2019-07-10

## 2019-07-07 RX ORDER — OXYCODONE HYDROCHLORIDE 5 MG/1
5 TABLET ORAL EVERY 6 HOURS PRN
Qty: 10 TABLET | Refills: 0 | Status: SHIPPED | OUTPATIENT
Start: 2019-07-07 | End: 2019-07-07

## 2019-07-07 RX ADMIN — LIDOCAINE 3 PATCH: 560 PATCH PERCUTANEOUS; TOPICAL; TRANSDERMAL at 15:19

## 2019-07-07 RX ADMIN — OXYCODONE HYDROCHLORIDE AND ACETAMINOPHEN 2 TABLET: 5; 325 TABLET ORAL at 15:19

## 2019-07-07 NOTE — ED AVS SNAPSHOT
[facility] Emergency Department  [address] [place] [address]  Phone:  [phone]  Fax:  [phone]                                    Tommy Ross   MRN: [id number]    Department:  [facility] Emergency Department   Date of Visit:  7/7/2019           After Visit Summary Signature Page    I have received my discharge instructions, and my questions have been answered. I have discussed any challenges I see with this plan with the nurse or doctor.    ..........................................................................................................................................  Patient/Patient Representative Signature      ..........................................................................................................................................  Patient Representative Print Name and Relationship to Patient    ..................................................               ................................................  Date                                   Time    ..........................................................................................................................................  Reviewed by Signature/Title    ...................................................              ..............................................  Date                                               Time          22EPIC Rev 08/18

## 2019-07-07 NOTE — ED TRIAGE NOTES
Pt was seen on Friday and got a dx of shingles.  Started on antivirals then.  Pt has been discharged from hospice.  He is here for shingles spreading more and then pain meds are a challenge.  More facial/eye pain.     Pt is asking to have his CD4 count checked as he does not have a PMD right now.
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
## 2019-07-09 ENCOUNTER — TELEPHONE (OUTPATIENT)
Dept: FAMILY MEDICINE | Facility: OTHER | Age: 49
End: 2019-07-09

## 2019-07-09 ENCOUNTER — MEDICAL CORRESPONDENCE (OUTPATIENT)
Dept: HEALTH INFORMATION MANAGEMENT | Facility: CLINIC | Age: 49
End: 2019-07-09

## 2019-07-09 DIAGNOSIS — K64.4 EXTERNAL HEMORRHOIDS: ICD-10-CM

## 2019-07-09 DIAGNOSIS — J98.01 ACUTE BRONCHOSPASM: ICD-10-CM

## 2019-07-09 DIAGNOSIS — F41.9 ANXIETY: ICD-10-CM

## 2019-07-09 NOTE — TELEPHONE ENCOUNTER
Home care notified, they understand why Dr Araujo is refusing to sign orders.  They will be trying to set patient up with establishing a primary provider, and also may ultimately have to do a vulnerable adult report.  Patient was discharged from hospice care.    Nazanin Jeffers XRO/

## 2019-07-09 NOTE — TELEPHONE ENCOUNTER
I think the patient would be better served if his home health care orders were signed by whoever is taking care of him.  Review of the chart reflects very clearly that that is not me.    Gayle

## 2019-07-09 NOTE — TELEPHONE ENCOUNTER
Kylah La with [facility] calls asking if Dr Araujo will sign home care  orders for this patient.  Please advise as soon as possible.  Call Kylah La at [phone].

## 2019-07-10 RX ORDER — ALBUTEROL SULFATE 90 UG/1
AEROSOL, METERED RESPIRATORY (INHALATION)
Qty: 18 G | Refills: 0 | Status: SHIPPED | OUTPATIENT
Start: 2019-07-10 | End: 2019-12-16

## 2019-07-10 RX ORDER — ALPRAZOLAM 0.5 MG
TABLET ORAL
Qty: 15 TABLET | Refills: 0 | Status: SHIPPED | OUTPATIENT
Start: 2019-07-10 | End: 2020-03-09

## 2019-07-10 NOTE — TELEPHONE ENCOUNTER
"Requested Prescriptions   Pending Prescriptions Disp Refills     ALPRAZolam (XANAX) 0.5 MG tablet [Pharmacy Med Name: ALPRAZOLAM 0.5 MG TABLET 0.5 TAB] 30 tablet 0     Sig: TAKE 1-2 TABLETS BY MOUTH DAILY AS NEEDED FOR ANXIETY       There is no refill protocol information for this order        albuterol (VENTOLIN HFA) 108 (90 Base) MCG/ACT inhaler 18 g 0     Sig: INHALE 2 PUFFS INTO THE LUNGS EVERY 4 HOURS AS NEEDED FOR SHORTNESS OF BREATH/DYSPNEA OR WHEEZING. **NEEDS TO MAKE APPT FOR FURTHER REFILLS*   Last Written Prescription Date:  4/1/19  Last Fill Quantity: 18 g,  # refills: 0   Last office visit: 2/19/2018 with prescribing provider:  9/25/18   Future Office Visit:        Asthma Maintenance Inhalers - Anticholinergics Failed - 7/9/2019  2:49 PM        Failed - Asthma control assessment score within normal limits in last 6 months     Please review ACT score.   ACT Total Scores 7/25/2017 2/19/2018   ACT TOTAL SCORE (Goal Greater than or Equal to 20) 19 9   In the past 12 months, how many times did you visit the emergency room for your asthma without being admitted to the hospital? 3 3   In the past 12 months, how many times were you hospitalized overnight because of your asthma? 3 3           Failed - Recent (6 mo) or future (30 days) visit within the authorizing provider's specialty     Patient had office visit in the last 6 months or has a visit in the next 30 days with authorizing provider or within the authorizing provider's specialty.  See \"Patient Info\" tab in inbasket, or \"Choose Columns\" in Meds & Orders section of the refill encounter.            Passed - Patient is age 12 years or older        Passed - Medication is active on med list        hydrocortisone (ANUSOL-HC) 2.5 % cream 30 g 0     Sig: Place rectally 2 times daily   Last Written Prescription Date:  10/25/18  Last Fill Quantity: 30 g,  # refills: 0   Last office visit: 2/19/2018 with prescribing provider:  9/25/18   Future Office Visit:        " "Topical Steroids and Nonsteroidals Protocol Passed - 7/9/2019  2:49 PM        Passed - Patient is age 6 or older        Passed - Authorizing prescriber's most recent note related to this medication read.     If refill request is for ophthalmic use, please forward request to provider for approval.          Passed - High potency steroid not ordered        Passed - Recent (12 mo) or future (30 days) visit within the authorizing provider's specialty     Patient had office visit in the last 12 months or has a visit in the next 30 days with authorizing provider or within the authorizing provider's specialty.  See \"Patient Info\" tab in inbasket, or \"Choose Columns\" in Meds & Orders section of the refill encounter.              Passed - Medication is active on med list        "

## 2019-07-10 NOTE — TELEPHONE ENCOUNTER
Xanax      Last Written Prescription Date:  6/6/19  Last Fill Quantity: 30,   # refills: 0  Last Office Visit: 9/25/18  Future Office visit:       Routing refill request to provider for review/approval because:  Drug not on the [facility] or [facility] refill protocol or controlled substance    Routing refill request to provider for review/approval because:  Dali given x1 and patient did not follow up, please advise  ACT <20    JERE Tobias, RN  [facility]

## 2019-07-22 ENCOUNTER — TELEPHONE (OUTPATIENT)
Dept: FAMILY MEDICINE | Facility: OTHER | Age: 49
End: 2019-07-22

## 2019-07-24 ENCOUNTER — TELEPHONE (OUTPATIENT)
Dept: FAMILY MEDICINE | Facility: OTHER | Age: 49
End: 2019-07-24

## 2019-07-24 NOTE — TELEPHONE ENCOUNTER
I saw this individual about 1 year ago.  He has a multitude of other providers who were out of system.  I suspect Aaron would be best served if they  contact them.  I do not know.  I do not even think that I am still his provider.    Gayle

## 2019-07-24 NOTE — TELEPHONE ENCOUNTER
Reason for Call:  Other     Detailed comments: Jaclyn from [facility] is calling to talk to you or your MA.  She states that she is wondering if oxygen was discontinued for this patient.  Please call to discuss.      Phone Number Patient can be reached at: [phone]    Best Time: any    Can we leave a detailed message on this number? YES    Call taken on 7/24/2019 at 1:36 PM by Jonah Bettencourt

## 2019-08-09 ENCOUNTER — OFFICE VISIT (OUTPATIENT)
Dept: FAMILY MEDICINE | Facility: CLINIC | Age: 49
End: 2019-08-09
Payer: COMMERCIAL

## 2019-08-09 VITALS
DIASTOLIC BLOOD PRESSURE: 64 MMHG | TEMPERATURE: 97.9 F | BODY MASS INDEX: 16.97 KG/M2 | HEART RATE: 125 BPM | WEIGHT: 114.9 LBS | SYSTOLIC BLOOD PRESSURE: 112 MMHG

## 2019-08-09 DIAGNOSIS — B35.3 TINEA PEDIS OF BOTH FEET: ICD-10-CM

## 2019-08-09 DIAGNOSIS — B02.9 HERPES ZOSTER WITHOUT COMPLICATION: ICD-10-CM

## 2019-08-09 DIAGNOSIS — J98.01 ACUTE BRONCHOSPASM: ICD-10-CM

## 2019-08-09 DIAGNOSIS — B20 SYMPTOMATIC HIV INFECTION (H): Primary | ICD-10-CM

## 2019-08-09 PROCEDURE — 99214 OFFICE O/P EST MOD 30 MIN: CPT | Performed by: FAMILY MEDICINE

## 2019-08-09 RX ORDER — DRONABINOL 5 MG/1
5 CAPSULE ORAL 3 TIMES DAILY
COMMUNITY
Start: 2019-07-16 | End: 2019-08-19

## 2019-08-09 RX ORDER — KETOCONAZOLE 20 MG/G
CREAM TOPICAL 2 TIMES DAILY
Qty: 60 G | Refills: 1 | Status: SHIPPED | OUTPATIENT
Start: 2019-08-09 | End: 2019-12-16

## 2019-08-09 RX ORDER — FLUOXETINE 40 MG/1
40 CAPSULE ORAL DAILY
COMMUNITY
End: 2019-11-25

## 2019-08-09 RX ORDER — EMTRICITABINE AND TENOFOVIR DISOPROXIL FUMARATE 200; 300 MG/1; MG/1
1 TABLET, FILM COATED ORAL DAILY
COMMUNITY
End: 2019-10-14

## 2019-08-09 RX ORDER — ACETAMINOPHEN 325 MG/1
650 TABLET ORAL 3 TIMES DAILY
COMMUNITY
Start: 2019-06-26
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
## 2019-08-13 ENCOUNTER — DOCUMENTATION ONLY (OUTPATIENT)
Dept: CARE COORDINATION | Facility: CLINIC | Age: 49
End: 2019-08-13

## 2019-08-13 NOTE — PROGRESS NOTES
We received the hospice referral for Tommy. Pt was on hospice 2/2/19 to 7/5/19 with [facility]. Pt was discharged due to not meeting hospice criteria.  Our Medical Director reviewed pt on 7/2/19 and did not appear pt met criteria for hospice at that time. Our hospice medical director has reviewed the patients recent records and it does not appear pt has end stage HIV. He has not had any labs done and no documentation of disease progression other than weight loss. Are you able to provide supporting documentation that pt has end stage HIV with a prognosis of 6 months or less.      Thank you for you assistance with this patient, if you have any questions feel free to call me or you can discuss with our Medical Director, Dr Gi Arenas. To reach Dr Arenas call  and request to be transferred to her.      Sylwia Tovar RN [facility]  Hospice Day Triage RN/Referral Navigator    [phone]  [email or web address]

## 2019-08-14 ENCOUNTER — TELEPHONE (OUTPATIENT)
Dept: FAMILY MEDICINE | Facility: CLINIC | Age: 49
End: 2019-08-14

## 2019-08-14 RX ORDER — OXYCODONE AND ACETAMINOPHEN 10; 325 MG/1; MG/1
TABLET ORAL
Qty: 120 TABLET | Refills: 0 | COMMUNITY
Start: 2019-08-14 | End: 2019-08-19

## 2019-08-14 NOTE — TELEPHONE ENCOUNTER
Would you be willing to fill this in Dr. Fernandes's absence? Thank you.       Endocet  mg       Last Written Prescription Date:  07/16/2019  Last Fill Quantity: 120,   # refills: 0  Last Office Visit: 08/09/2019  Future Office visit: none      Routing refill request to provider for review/approval because:  Drug not on the [facility], [facility] or [facility] refill protocol or controlled substance

## 2019-08-14 NOTE — TELEPHONE ENCOUNTER
Unclear from Dr Fernandes's note what he is on for pain medications. Need to know where, when and what his last scripts were.  Hospice should take over his pain medications if that is started.

## 2019-08-14 NOTE — PROGRESS NOTES
I am sorry Colleen but this is all we have.  I would call [facility] to get more information as this is the only time Dr. Fernandes saw this patient.  Here is is note from 8/9.

## 2019-08-15 ENCOUNTER — OFFICE VISIT (OUTPATIENT)
Dept: URGENT CARE | Facility: RETAIL CLINIC | Age: 49
End: 2019-08-15
Payer: COMMERCIAL

## 2019-08-15 VITALS
OXYGEN SATURATION: 98 % | DIASTOLIC BLOOD PRESSURE: 78 MMHG | TEMPERATURE: 98.4 F | SYSTOLIC BLOOD PRESSURE: 124 MMHG | HEART RATE: 88 BPM

## 2019-08-15 DIAGNOSIS — B37.0 THRUSH: Primary | ICD-10-CM

## 2019-08-15 DIAGNOSIS — M25.412 PAIN AND SWELLING OF LEFT SHOULDER: ICD-10-CM

## 2019-08-15 DIAGNOSIS — M25.512 PAIN AND SWELLING OF LEFT SHOULDER: ICD-10-CM

## 2019-08-15 PROCEDURE — 99214 OFFICE O/P EST MOD 30 MIN: CPT | Performed by: NURSE PRACTITIONER

## 2019-08-15 RX ORDER — OXYCODONE AND ACETAMINOPHEN 10; 325 MG/1; MG/1
TABLET ORAL
Qty: 120 TABLET | Refills: 0 | Status: CANCELLED | OUTPATIENT
Start: 2019-08-15

## 2019-08-15 RX ORDER — LIDOCAINE 50 MG/G
OINTMENT TOPICAL DAILY
Qty: 30 G | Refills: 0 | Status: SHIPPED | OUTPATIENT
Start: 2019-08-15 | End: 2020-01-16

## 2019-08-15 RX ORDER — FLUCONAZOLE 200 MG/1
200 TABLET ORAL DAILY
Qty: 14 TABLET | Refills: 0 | Status: SHIPPED | OUTPATIENT
Start: 2019-08-15 | End: 2019-12-13

## 2019-08-15 ASSESSMENT — ENCOUNTER SYMPTOMS
FATIGUE: 0
SORE THROAT: 1
FEVER: 0
CHILLS: 0
ACTIVITY CHANGE: 0
COUGH: 0
MYALGIAS: 1
PARESTHESIAS: 1
APPETITE CHANGE: 1
ADENOPATHY: 1
TROUBLE SWALLOWING: 1
DIAPHORESIS: 0

## 2019-08-15 NOTE — PATIENT INSTRUCTIONS
Diflucan for thrush - has used this in the past  Lidocaine for shingles pain per request - allergic to NSAIDS and gabapentin  follow up with primary care provider if symptoms continue or worsen or adverse effects with medications      Patient Education     Candida Infection: Thrush  Thrush is a fungal infection in the mouth and throat. Thrush does not usually affect healthy adults. It is more common in people with a weak immune system. It is also more likely if you take antibiotics. Thrush is normally not contagious.  Understanding fungus in the mouth and throat  Your mouth and throat normally contain millions of tiny organisms. These include bacteria and yeasts. Many of these do not cause any problems. In fact, they may help fight disease.  Yeasts are a type of fungus. A type of yeast called Candida normally lives on the membranes of your mouth and throat. Usually, this yeast grows only in small amounts and is harmless. But in some cases, Candida can grow out of control and cause thrush. Thrush is related to other kinds of Candida infections that can grow all over the body. Thrush refers to an infection of only the mouth and throat.  What causes thrush?  Thrush happens when something lets too much Candida grow inside your mouth and throat. Certain things that change the normal balance of organisms in the mouth can lead to thrush. One example is antibiotic medicine. This medicine may kill some of the normal bacteria in your mouth. Candida can then grow freely. People on antibiotics have an increased risk for thrush.  You have a higher risk for thrush if you:    Wear dentures    Are getting chemotherapy    Are getting radiation therapy    Have diabetes    Have a transplanted organ    Use corticosteroids, including inhaled corticosteroids for lung disease    Have a weak immune system, such as from AIDS    Are an older adult  Symptoms of thrush  Symptoms of thrush can include:    A dry, cottony feeling in your  mouth    Cracking at the corners of the mouth    Loss of taste    Pain while eating or swallowing    White patches on the tongue and around the sides of the mouth  Diagnosing thrush  Your healthcare provider will ask about your medical history and your symptoms. He or she will look closely at your mouth and throat. White or red patches will be scraped with a tongue depressor. The sample will be sent to a lab to test. This test can usually confirm thrush.  If you have thrush, you may also have esophageal candidiasis. This is common in people who have HIV or a weak immune system. Your healthcare provider may check for this condition with an upper endoscopy. This is a procedure to look at the esophagus. A tissue sample may be taken to test.  Treatment for thrush  Thrush is usually treated with antifungal medicine. The medicine is put directly in your mouth and throat. You may be given a  swish and swallow  medicine or an antifungal lozenge.  In some cases, you may need an antifungal pill. This can remove Candida throughout your body. Or you may need medicine through an intravenous line ( IV). These treatments depend on how severe your infection is, and what other health conditions you have.  If you are at high risk for thrush, you may need to keep taking oral antifungal medicine. This is to help prevent thrush in the future.  What happens if you don t get treated for thrush?  If untreated, the Candida may spread throughout your body. They may even enter your bloodstream. This can cause serious problems, such as organ failure and even death. Bloodstream infection may need to be treated with high doses of antifungal medicine through an IV.  Systemic infection is much more likely in people who are very ill. It is also more common in those who have serious problems with their immune system. Additional risk factors for systemic infection in very ill people include:    Central venous lines    IV nutrition    Use of  broad-spectrum antibiotics    Kidney failure    Recent surgery  Preventing thrush  You may be able to help prevent some cases of thrush. Make sure to:    Practice good oral hygiene. Try using a chlorhexidine mouthwash.    Clean your dentures regularly as instructed. Make sure they fit you correctly.    After using a corticosteroid inhaler, rinse out your mouth with water or mouthwash.    Do not use broad-spectrum antibiotics, if possible.    Get treated for health problems that increase your risk for thrush, such as diabetes.     When to call the healthcare provider  Call your healthcare provider right away if you have any of these:    Cottony feeling in your mouth    Loss of taste    Pain while eating or swallowing    White patches or plaques on your tongue or inside your mouth   Date Last Reviewed: 5/1/2017 2000-2018 [employer]. [address] [place] [address], [place] [address]. All rights reserved. This information is not intended as a substitute for professional medical care. Always follow your healthcare professional's instructions.
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
## 2019-08-19 DIAGNOSIS — M54.5 LOW BACK PAIN, UNSPECIFIED BACK PAIN LATERALITY, UNSPECIFIED CHRONICITY, WITH SCIATICA PRESENCE UNSPECIFIED: Primary | ICD-10-CM

## 2019-08-19 RX ORDER — OXYCODONE AND ACETAMINOPHEN 10; 325 MG/1; MG/1
TABLET ORAL
Qty: 120 TABLET | Refills: 0 | Status: SHIPPED | OUTPATIENT
Start: 2019-08-19 | End: 2019-08-20

## 2019-08-19 RX ORDER — MORPHINE SULFATE 15 MG/1
15 TABLET, FILM COATED, EXTENDED RELEASE ORAL DAILY
Qty: 30 TABLET | Refills: 0 | Status: SHIPPED | OUTPATIENT
Start: 2019-08-19 | End: 2019-09-28

## 2019-08-19 RX ORDER — DRONABINOL 5 MG/1
5 CAPSULE ORAL 3 TIMES DAILY
Qty: 90 CAPSULE | Refills: 0 | Status: SHIPPED | OUTPATIENT
Start: 2019-08-19 | End: 2019-09-09

## 2019-08-19 NOTE — TELEPHONE ENCOUNTER
Morphine 15 MG       Last Written Prescription Date:  9-15-17  Last Fill Quantity: ,   # refills:   Last Office Visit: 8/9/19  Future Office visit:       Routing refill request to provider for review/approval because:  Drug not on the [facility] or [facility] refill protocol or controlled substance  Dronabinol 5 MG       Last Written Prescription Date:  7/16/19  Last Fill Quantity: ,   # refills:   Last Office Visit: 8/9/19  Future Office visit:       Routing refill request to provider for review/approval because:  Drug not on the [facility], [facility] or [facility] refill protocol or controlled substance

## 2019-08-19 NOTE — TELEPHONE ENCOUNTER
Pt's wife calling (C2C on file) and states patient needs a refill of this medication. Please send to [facility] in [place] as soon as possible, patient is completley out.

## 2019-08-19 NOTE — TELEPHONE ENCOUNTER
Reason for Call:  Medication or medication refill:    Do you use a [facility]?  Name of the pharmacy and phone number for the current request:  [facility] in [place]    Name of the medication requested: dronabinol (MARINOL) 5 MG capsule and morphine (MS CONTIN) 15 MG 12 hr tablet    Other request: Please send as soon as possible, patient is out    Can we leave a detailed message on this number? YES    Phone number patient can be reached at: Home number on file [phone] (home)    Best Time:     Call taken on 8/19/2019 at 11:29 AM by Juani Ramirez

## 2019-08-19 NOTE — TELEPHONE ENCOUNTER
Endocet        Last Written Prescription Date:  8/14/19  Last Fill Quantity: 120,   # refills: 0  Last Office Visit: 8/9/19  Future Office visit:       Routing refill request to provider for review/approval because:  Drug not on the [facility], [facility] or [facility] refill protocol or controlled substance

## 2019-08-20 ENCOUNTER — TELEPHONE (OUTPATIENT)
Dept: FAMILY MEDICINE | Facility: CLINIC | Age: 49
End: 2019-08-20

## 2019-08-20 DIAGNOSIS — M54.5 LOW BACK PAIN, UNSPECIFIED BACK PAIN LATERALITY, UNSPECIFIED CHRONICITY, WITH SCIATICA PRESENCE UNSPECIFIED: Primary | ICD-10-CM

## 2019-08-20 RX ORDER — OXYCODONE HCL/ACETAMINOPHEN 10MG-325MG
TABLET ORAL
Qty: 120 TABLET | Refills: 0 | Status: SHIPPED | OUTPATIENT
Start: 2019-08-20 | End: 2019-09-06

## 2019-08-20 NOTE — TELEPHONE ENCOUNTER
Reason for Call:  Other prescription    Detailed comments: Can another provider help the pharmacy in Dr Souza absence with this prescription.  Endocet prescription is supposed to have GENESIS on it and it does not.  Please call Pharmacist Rossy    Phone Number Patient can be reached at: [phone]    Best Time: Any    Can we leave a detailed message on this number? YES    Call taken on 8/20/2019 at 1:31 PM by Alannah Gutierrez

## 2019-08-21 ENCOUNTER — TELEPHONE (OUTPATIENT)
Dept: FAMILY MEDICINE | Facility: CLINIC | Age: 49
End: 2019-08-21

## 2019-08-21 DIAGNOSIS — B20 AIDS (ACQUIRED IMMUNE DEFICIENCY SYNDROME) (H): Primary | ICD-10-CM

## 2019-08-21 NOTE — TELEPHONE ENCOUNTER
Per Dr. Fernandes patient needs to touch base with his infectious disease doctor for further testing in order to justify Home care/hospice. Hospice needs the following criteria to be met before patient can qualify: CD4 count of <25 or viral load of > 100,000 with weight loss, worsening kidney function and decrease in his functional status (things like walking, dressing, bathing and toileting.)     I have found the name and number of the infectious disease doctor, Dr. Brizuela, [facility]. Phone number [phone]. Patient needs to contact this provider for the lab tests to prove that he's worsening. Called Peri and she says that Tommy does not see [facility] anymore. She is questioning why Dr. Fernandes cannot put these orders in himself instead of making them go back to [facility]. She would like a call back with the reasoning. If Dr. Fernandes refuses to place the orders then she will schedule with [facility] but she knows they will not be able to get in to him for at least 6 wks.       Aaliyah Mittal on 8/21/2019 at 2:42 PM

## 2019-08-26 NOTE — TELEPHONE ENCOUNTER
Per Dr. Fernandes the CD4 and CMP can be ordered. It was suggested that patient see his infectious disease specialist because they would know how to interpret these results. We do not do these tests being that this is a family practice clinic. Not infectious disease.     Called Peri back to inform her of this. She would like to have it done at [place] because it is a hardship for Tommy to travel. These orders were placed per Dr. Fernandes.  Aaliyah Mittal on 8/26/2019 at 4:42 PM

## 2019-09-06 DIAGNOSIS — M54.5 LOW BACK PAIN, UNSPECIFIED BACK PAIN LATERALITY, UNSPECIFIED CHRONICITY, WITH SCIATICA PRESENCE UNSPECIFIED: ICD-10-CM

## 2019-09-06 RX ORDER — OXYCODONE HCL/ACETAMINOPHEN 10MG-325MG
TABLET ORAL
Qty: 120 TABLET | Refills: 0 | Status: SHIPPED | OUTPATIENT
Start: 2019-09-06 | End: 2019-09-28

## 2019-09-06 NOTE — TELEPHONE ENCOUNTER
Endocet  MG       Last Written Prescription Date:  8/20/19  Last Fill Quantity: 120,   # refills: 0  Last Office Visit: 8/9/19  Future Office visit:       Routing refill request to provider for review/approval because:  Drug not on the [facility], [facility] or [facility] refill protocol or controlled substance

## 2019-09-09 DIAGNOSIS — M54.5 LOW BACK PAIN, UNSPECIFIED BACK PAIN LATERALITY, UNSPECIFIED CHRONICITY, WITH SCIATICA PRESENCE UNSPECIFIED: ICD-10-CM

## 2019-09-09 RX ORDER — DRONABINOL 5 MG/1
CAPSULE ORAL
Qty: 90 CAPSULE | Refills: 0 | Status: SHIPPED | OUTPATIENT
Start: 2019-09-09 | End: 2019-09-09

## 2019-09-09 NOTE — TELEPHONE ENCOUNTER
Dronabinol 5 MG       Last Written Prescription Date:  8/19/19  Last Fill Quantity: 90,   # refills: 0  Last Office Visit: 8/9/19  Future Office visit:       Routing refill request to provider for review/approval because:  Drug not on the [facility], [facility] or [facility] refill protocol or controlled substance

## 2019-09-10 DIAGNOSIS — B20 AIDS (ACQUIRED IMMUNE DEFICIENCY SYNDROME) (H): ICD-10-CM

## 2019-09-10 LAB
ALBUMIN SERPL-MCNC: 3.7 G/DL (ref 3.4–5)
ALP SERPL-CCNC: 118 U/L (ref 40–150)
ALT SERPL W P-5'-P-CCNC: 14 U/L (ref 0–70)
ANION GAP SERPL CALCULATED.3IONS-SCNC: 7 MMOL/L (ref 3–14)
AST SERPL W P-5'-P-CCNC: 15 U/L (ref 0–45)
BILIRUB SERPL-MCNC: 1.1 MG/DL (ref 0.2–1.3)
BUN SERPL-MCNC: 9 MG/DL (ref 7–30)
CALCIUM SERPL-MCNC: 9.1 MG/DL (ref 8.5–10.1)
CHLORIDE SERPL-SCNC: 106 MMOL/L (ref 94–109)
CO2 SERPL-SCNC: 27 MMOL/L (ref 20–32)
CREAT SERPL-MCNC: 0.9 MG/DL (ref 0.66–1.25)
GFR SERPL CREATININE-BSD FRML MDRD: >90 ML/MIN/{1.73_M2}
GLUCOSE SERPL-MCNC: 86 MG/DL (ref 70–99)
POTASSIUM SERPL-SCNC: 3.7 MMOL/L (ref 3.4–5.3)
PROT SERPL-MCNC: 8 G/DL (ref 6.8–8.8)
SODIUM SERPL-SCNC: 140 MMOL/L (ref 133–144)

## 2019-09-10 PROCEDURE — 80053 COMPREHEN METABOLIC PANEL: CPT | Performed by: FAMILY MEDICINE

## 2019-09-10 PROCEDURE — 86359 T CELLS TOTAL COUNT: CPT | Performed by: FAMILY MEDICINE

## 2019-09-10 PROCEDURE — 36415 COLL VENOUS BLD VENIPUNCTURE: CPT | Performed by: FAMILY MEDICINE

## 2019-09-10 PROCEDURE — 86360 T CELL ABSOLUTE COUNT/RATIO: CPT | Performed by: FAMILY MEDICINE

## 2019-09-10 RX ORDER — OXYCODONE HCL/ACETAMINOPHEN 10MG-325MG
TABLET ORAL
Qty: 120 TABLET | Refills: 0 | OUTPATIENT
Start: 2019-09-10

## 2019-09-10 NOTE — LETTER
October 2, 2019      Tommy Ross  [address]  [facility] [address]    Dear Tommy:    I have enclosed your lab results for your convenience.    Thank you,    Faustino Fernandes MD

## 2019-09-10 NOTE — TELEPHONE ENCOUNTER
Marinol      Last Written Prescription Date:  8/19/2019  Last Fill Quantity: 90,   # refills: 0  Last Office Visit: 8/09/2019  Future Office visit:       Routing refill request to provider for review/approval because:  Drug not on the [facility], [facility] or [facility] refill protocol or controlled substance

## 2019-09-11 LAB
CD3 CELLS # BLD: 2733 CELLS/UL (ref 603–2990)
CD3 CELLS NFR BLD: 93 % (ref 49–84)
CD3+CD4+ CELLS # BLD: 274 CELLS/UL (ref 441–2156)
CD3+CD4+ CELLS NFR BLD: 9 % (ref 28–63)
CD3+CD4+ CELLS/CD3+CD8+ CLL BLD: 0.11 % (ref 1.4–2.6)
CD3+CD8+ CELLS # BLD: 2452 CELLS/UL (ref 125–1312)
CD3+CD8+ CELLS NFR BLD: 83 % (ref 10–40)
IFC SPECIMEN: ABNORMAL

## 2019-09-13 RX ORDER — DRONABINOL 5 MG/1
CAPSULE ORAL
Qty: 90 CAPSULE | Refills: 0 | OUTPATIENT
Start: 2019-09-13

## 2019-09-28 DIAGNOSIS — M54.5 LOW BACK PAIN, UNSPECIFIED BACK PAIN LATERALITY, UNSPECIFIED CHRONICITY, WITH SCIATICA PRESENCE UNSPECIFIED: ICD-10-CM

## 2019-09-30 RX ORDER — MORPHINE SULFATE 15 MG/1
TABLET, FILM COATED, EXTENDED RELEASE ORAL
Qty: 30 TABLET | Refills: 0 | Status: SHIPPED | OUTPATIENT
Start: 2019-09-30 | End: 2019-10-22

## 2019-09-30 RX ORDER — OXYCODONE HCL/ACETAMINOPHEN 10MG-325MG
TABLET ORAL
Qty: 120 TABLET | Refills: 0 | Status: SHIPPED | OUTPATIENT
Start: 2019-09-30 | End: 2019-10-22

## 2019-09-30 NOTE — TELEPHONE ENCOUNTER
Requested Prescriptions   Pending Prescriptions Disp Refills     morphine (MS CONTIN) 15 MG CR tablet [Pharmacy Med Name: MORPHINE SUL TAB 15MG ER 15 TAB] 30 tablet 0     Sig: TAKE ONE TABLET BY MOUTH DAILY       There is no refill protocol information for this order        Last Written Prescription Date:  8/19/2019  Last Fill Quantity: 30,  # refills: 0   Last office visit: 8/9/2019 with Dom   Future Office Visit:      Low back pain, unspecified back pain laterality, unspecified chronicity, with sciatica presence unspecified [M54.5]  - Primary     Routing refill request to provider for review/approval because:  Drug not on the [facility] refill protocol       Dyan Xiong RN

## 2019-09-30 NOTE — TELEPHONE ENCOUNTER
Requested Prescriptions   Pending Prescriptions Disp Refills     ENDOCET  MG per tablet [Pharmacy Med Name: ENDOCET 10-325MG TAB  TAB] 120 tablet 0     Sig: TAKE ONE TABLET BY MOUTH FOUR TIMES A DAY       There is no refill protocol information for this order        Last Written Prescription Date:  9/6/2019  Last Fill Quantity: 120,  # refills: 0   Last office visit: 8/9/2019 with prescribing provider:     Future Office Visit:      Low back pain, unspecified back pain laterality, unspecified chronicity, with sciatica presence unspecified [M54.5]     Routing refill request to provider for review/approval because:  Drug not on the [facility] refill protocol     Dyan Xiong RN

## 2019-10-01 ENCOUNTER — TELEPHONE (OUTPATIENT)
Dept: FAMILY MEDICINE | Facility: CLINIC | Age: 49
End: 2019-10-01

## 2019-10-01 NOTE — TELEPHONE ENCOUNTER
Reason for Call:  Request for results:    Name of test or procedure: Labs     Date of test of procedure: 9.10    Location of the test or procedure: [facility]    OK to leave the result message on voice mail or with a family member? YES    Phone number Patient can be reached at:  Home number on file [phone] (home)    Additional comments: Please mail result to pt at [phone] [facility].     Call taken on 10/1/2019 at 2:59 PM by Juani Ramirez

## 2019-10-02 NOTE — TELEPHONE ENCOUNTER
Letter has been mailed with results. See results encounter.  Aaliyah Mittal on 10/2/2019 at 1:34 PM

## 2019-10-14 ENCOUNTER — APPOINTMENT (OUTPATIENT)
Dept: CT IMAGING | Facility: CLINIC | Age: 49
End: 2019-10-14
Attending: EMERGENCY MEDICINE
Payer: COMMERCIAL

## 2019-10-14 ENCOUNTER — NURSE TRIAGE (OUTPATIENT)
Dept: FAMILY MEDICINE | Facility: CLINIC | Age: 49
End: 2019-10-14

## 2019-10-14 ENCOUNTER — HOSPITAL ENCOUNTER (EMERGENCY)
Facility: CLINIC | Age: 49
Discharge: HOME | End: 2019-10-14
Attending: EMERGENCY MEDICINE | Admitting: EMERGENCY MEDICINE
Payer: COMMERCIAL

## 2019-10-14 ENCOUNTER — TELEPHONE (OUTPATIENT)
Dept: FAMILY MEDICINE | Facility: CLINIC | Age: 49
End: 2019-10-14

## 2019-10-14 ENCOUNTER — APPOINTMENT (OUTPATIENT)
Dept: GENERAL RADIOLOGY | Facility: CLINIC | Age: 49
End: 2019-10-14
Attending: EMERGENCY MEDICINE
Payer: COMMERCIAL

## 2019-10-14 VITALS
HEART RATE: 88 BPM | TEMPERATURE: 98.1 F | SYSTOLIC BLOOD PRESSURE: 117 MMHG | DIASTOLIC BLOOD PRESSURE: 84 MMHG | BODY MASS INDEX: 17.9 KG/M2 | OXYGEN SATURATION: 99 % | RESPIRATION RATE: 16 BRPM | WEIGHT: 121.19 LBS

## 2019-10-14 DIAGNOSIS — B20 AIDS (H): ICD-10-CM

## 2019-10-14 DIAGNOSIS — J20.9 ACUTE BRONCHITIS, UNSPECIFIED ORGANISM: ICD-10-CM

## 2019-10-14 LAB
ALBUMIN SERPL-MCNC: 3.5 G/DL (ref 3.4–5)
ALBUMIN UR-MCNC: NEGATIVE MG/DL
ALP SERPL-CCNC: 105 U/L (ref 40–150)
ALT SERPL W P-5'-P-CCNC: 14 U/L (ref 0–70)
ANION GAP SERPL CALCULATED.3IONS-SCNC: 5 MMOL/L (ref 3–14)
APPEARANCE UR: CLEAR
AST SERPL W P-5'-P-CCNC: 17 U/L (ref 0–45)
BASOPHILS # BLD AUTO: 0 10E9/L (ref 0–0.2)
BASOPHILS NFR BLD AUTO: 0.6 %
BILIRUB SERPL-MCNC: 0.5 MG/DL (ref 0.2–1.3)
BILIRUB UR QL STRIP: NEGATIVE
BUN SERPL-MCNC: 12 MG/DL (ref 7–30)
CALCIUM SERPL-MCNC: 8.7 MG/DL (ref 8.5–10.1)
CHLORIDE SERPL-SCNC: 109 MMOL/L (ref 94–109)
CO2 SERPL-SCNC: 28 MMOL/L (ref 20–32)
COLOR UR AUTO: YELLOW
CREAT SERPL-MCNC: 0.96 MG/DL (ref 0.66–1.25)
DIFFERENTIAL METHOD BLD: ABNORMAL
EOSINOPHIL NFR BLD AUTO: 0.4 %
ERYTHROCYTE [DISTWIDTH] IN BLOOD BY AUTOMATED COUNT: 13 % (ref 10–15)
GFR SERPL CREATININE-BSD FRML MDRD: >90 ML/MIN/{1.73_M2}
GLUCOSE SERPL-MCNC: 78 MG/DL (ref 70–99)
GLUCOSE UR STRIP-MCNC: NEGATIVE MG/DL
HCT VFR BLD AUTO: 46.7 % (ref 40–53)
HGB BLD-MCNC: 15.8 G/DL (ref 13.3–17.7)
HGB UR QL STRIP: NEGATIVE
IMM GRANULOCYTES # BLD: 0 10E9/L (ref 0–0.4)
IMM GRANULOCYTES NFR BLD: 0.2 %
KETONES UR STRIP-MCNC: 5 MG/DL
LACTATE BLD-SCNC: 0.8 MMOL/L (ref 0.7–2)
LEUKOCYTE ESTERASE UR QL STRIP: NEGATIVE
LYMPHOCYTES # BLD AUTO: 2.7 10E9/L (ref 0.8–5.3)
LYMPHOCYTES NFR BLD AUTO: 55.2 %
MCH RBC QN AUTO: 28.7 PG (ref 26.5–33)
MCHC RBC AUTO-ENTMCNC: 33.8 G/DL (ref 31.5–36.5)
MCV RBC AUTO: 85 FL (ref 78–100)
MONOCYTES # BLD AUTO: 0.4 10E9/L (ref 0–1.3)
MONOCYTES NFR BLD AUTO: 7.4 %
MUCOUS THREADS #/AREA URNS LPF: PRESENT /LPF
NEUTROPHILS # BLD AUTO: 1.8 10E9/L (ref 1.6–8.3)
NEUTROPHILS NFR BLD AUTO: 36.2 %
NITRATE UR QL: NEGATIVE
NRBC # BLD AUTO: 0 10*3/UL
NRBC BLD AUTO-RTO: 0 /100
PH UR STRIP: 6 PH (ref 5–7)
PLATELET # BLD AUTO: 124 10E9/L (ref 150–450)
POTASSIUM SERPL-SCNC: 3.9 MMOL/L (ref 3.4–5.3)
PROT SERPL-MCNC: 7.7 G/DL (ref 6.8–8.8)
RBC # BLD AUTO: 5.51 10E12/L (ref 4.4–5.9)
RBC #/AREA URNS AUTO: 1 /HPF (ref 0–2)
SODIUM SERPL-SCNC: 142 MMOL/L (ref 133–144)
SOURCE: ABNORMAL
SP GR UR STRIP: 1.02 (ref 1–1.03)
UROBILINOGEN UR STRIP-MCNC: 2 MG/DL (ref 0–2)
WBC # BLD AUTO: 4.9 10E9/L (ref 4–11)
WBC #/AREA URNS AUTO: <1 /HPF (ref 0–5)

## 2019-10-14 PROCEDURE — 83605 ASSAY OF LACTIC ACID: CPT | Performed by: EMERGENCY MEDICINE

## 2019-10-14 PROCEDURE — 80053 COMPREHEN METABOLIC PANEL: CPT | Performed by: EMERGENCY MEDICINE

## 2019-10-14 PROCEDURE — 25000132 ZZH RX MED GY IP 250 OP 250 PS 637: Performed by: EMERGENCY MEDICINE

## 2019-10-14 PROCEDURE — 87086 URINE CULTURE/COLONY COUNT: CPT | Performed by: EMERGENCY MEDICINE

## 2019-10-14 PROCEDURE — 85025 COMPLETE CBC W/AUTO DIFF WBC: CPT | Performed by: EMERGENCY MEDICINE

## 2019-10-14 PROCEDURE — 81001 URINALYSIS AUTO W/SCOPE: CPT | Performed by: EMERGENCY MEDICINE

## 2019-10-14 PROCEDURE — 99285 EMERGENCY DEPT VISIT HI MDM: CPT | Mod: 25

## 2019-10-14 PROCEDURE — 70450 CT HEAD/BRAIN W/O DYE: CPT

## 2019-10-14 PROCEDURE — 71046 X-RAY EXAM CHEST 2 VIEWS: CPT | Mod: TC

## 2019-10-14 PROCEDURE — 99285 EMERGENCY DEPT VISIT HI MDM: CPT | Mod: Z6 | Performed by: EMERGENCY MEDICINE

## 2019-10-14 RX ORDER — OXYCODONE HYDROCHLORIDE 5 MG/1
5 TABLET ORAL ONCE
Status: COMPLETED | OUTPATIENT
Start: 2019-10-14 | End: 2019-10-14

## 2019-10-14 RX ORDER — AZITHROMYCIN 250 MG/1
TABLET, FILM COATED ORAL
Qty: 6 TABLET | Refills: 0 | Status: SHIPPED | OUTPATIENT
Start: 2019-10-14 | End: 2020-01-16

## 2019-10-14 RX ADMIN — OXYCODONE HYDROCHLORIDE 5 MG: 5 TABLET ORAL at 19:17

## 2019-10-14 ASSESSMENT — ENCOUNTER SYMPTOMS
VOMITING: 1
FREQUENCY: 1
TROUBLE SWALLOWING: 1
APPETITE CHANGE: 1
FATIGUE: 1
DIFFICULTY URINATING: 1
ARTHRALGIAS: 1
PSYCHIATRIC NEGATIVE: 1
HEADACHES: 1
DIZZINESS: 1
FEVER: 1
MYALGIAS: 1
SHORTNESS OF BREATH: 1
ABDOMINAL PAIN: 1
COUGH: 1
SPEECH DIFFICULTY: 0
SORE THROAT: 1
NAUSEA: 1
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
## 2019-10-14 NOTE — ED AVS SNAPSHOT
[facility] Emergency Department  [address] [place] [address]  Phone:  [phone]  Fax:  [phone]                                    Tommy Rsos   MRN: [id number]    Department:  [facility] Emergency Department   Date of Visit:  10/14/2019           After Visit Summary Signature Page    I have received my discharge instructions, and my questions have been answered. I have discussed any challenges I see with this plan with the nurse or doctor.    ..........................................................................................................................................  Patient/Patient Representative Signature      ..........................................................................................................................................  Patient Representative Print Name and Relationship to Patient    ..................................................               ................................................  Date                                   Time    ..........................................................................................................................................  Reviewed by Signature/Title    ...................................................              ..............................................  Date                                               Time          22EPIC Rev 08/18

## 2019-10-14 NOTE — TELEPHONE ENCOUNTER
S: Tommy calling asking for direction. He has a list of concerns. Most of them started a week ago.  Night sweats, fever 103 (better today), more swollen lymph nodes in right armpit, groin, neck, generalized weakness and fatigue, Floaters in eyes, headache behind eys that sometimes gets behind ears. Also has feelings of pins and needles from knees down. He also mentions that its hard to urinate (hard to start) and having more breathing issues lately.  Left upper quadrant pain that is intermittent, goes to arm/shoulder at times.     B: Tommy was being cared for by [facility] for HIV.  He was put on hospice care and recently discharged due to lack of decline.  Tommy has since established care with DR Fernandes and is seeking direction today.      A: Fever, fatigue, breathing concerns, LUQ pain  R: Advised Tommy to be evaluated in the ED. Will have Dr Fernandes see you later in the week or early next week for follow up.             Additional Information    Drinking very little and dehydration suspected (e.g., no urine > 12 hours, very dry mouth, very lightheaded)    Protocols used: WEAKNESS (GENERALIZED) AND FATIGUE-A-OH

## 2019-10-15 LAB
BACTERIA SPEC CULT: NORMAL
Lab: NORMAL
SPECIMEN SOURCE: NORMAL

## 2019-10-15 NOTE — DISCHARGE INSTRUCTIONS
Take new antibiotics as prescribed  Follow-up with Dr. Fernandes as scheduled  Continue regular medications as previously prescribed  Return to ER if any worsening symptoms, any new or concerning symptoms

## 2019-10-15 NOTE — RESULT ENCOUNTER NOTE
Emergency Dept/Urgent Care discharge antibiotic (if prescribed): RIAN ALVAREZ  Date of Rx (if applicable):  10/14/19  No changes in treatment per Urine culture protocol.

## 2019-10-15 NOTE — TELEPHONE ENCOUNTER
Reason for Call:  Other appointment    Detailed comments: ED Follow up Eye Problem Per Dr. Masters    Phone Number Patient can be reached at: Cell number on file:    Telephone Information:   Mobile [phone]       Best Time: Any    Can we leave a detailed message on this number? YES    Call taken on 10/14/2019 at 8:59 PM by Lisa Potts

## 2019-10-16 NOTE — RESULT ENCOUNTER NOTE
Final urine culture report is NEGATIVE per [facility] Lab Result protocol.    If NEGATIVE result, no change in treatment, per [facility] Lab Result protocol.

## 2019-10-16 NOTE — TELEPHONE ENCOUNTER
Patient needs to be seen by his infectious disease doctor. Dr. Fernandes does not treat HIV. Dr. Fernandes had agreed to be this patient's doctor to help get him into hospice and manage orders from hospice. However, it was found that the patient did not qualify for hospice anymore. Patient is a complicated case and needs to be seen by his specialist for care not Family Practice so that he can receive the care that he needs. Called patient to give message but received VM. Left message. Please relay message and close encounter.   Aaliyah Mittal on 10/16/2019 at 9:52 AM

## 2019-10-17 NOTE — TELEPHONE ENCOUNTER
Pt needs to go back to [place] where he was going.  [facility] does not treat HIV pts.  He needs to call his infectious disease doc.  KH

## 2019-10-17 NOTE — TELEPHONE ENCOUNTER
Called pt and LM for him that he needs to contact the infectious disease doc for his plan of care at this point.  KH

## 2019-10-26 ENCOUNTER — NURSE TRIAGE (OUTPATIENT)
Dept: NURSING | Facility: CLINIC | Age: 49
End: 2019-10-26

## 2019-10-26 NOTE — TELEPHONE ENCOUNTER
Reason for call; Pt called and asked if Endocet was reordered and advised that no new order seen on Epic , last prescribed was 9/30/19 .   Recommendation / teaching ; Pt states , he  will contact clinic on Monday 10/28/19 about refill .      Caller Verbalizes understanding and denies further questions and will call back if  symptoms to triage or questions  .  Trisha López RN  - [place] Nurse Advisor

## 2019-11-06 ENCOUNTER — TELEPHONE (OUTPATIENT)
Dept: FAMILY MEDICINE | Facility: CLINIC | Age: 49
End: 2019-11-06

## 2019-11-07 NOTE — TELEPHONE ENCOUNTER
Attempted to call Dr. Brizuela back. I was given two different numbers to two different facilities and was unable to reach him. We will try again later.   Aaliyah Mittal on 11/7/2019 at 4:14 PM

## 2019-11-12 DIAGNOSIS — G89.29 OTHER CHRONIC PAIN: ICD-10-CM

## 2019-11-12 NOTE — TELEPHONE ENCOUNTER
Pending Prescriptions:                       Disp   Refills    morphine (MS CONTIN) 15 MG CR tablet [Phar*30 tab*0        Sig: TAKE ONE TABLET BY MOUTH DAILY    ENDOCET  MG per tablet [Pharmacy Med*120 ta*0        Sig: TAKE ONE TABLET BY MOUTH FOUR TIMES A DAY      Routing refill request to provider for review/approval because:  Drug not on the [facility] refill protocol       Shaina Walden RN BSN

## 2019-11-13 RX ORDER — MORPHINE SULFATE 15 MG/1
TABLET, FILM COATED, EXTENDED RELEASE ORAL
Qty: 30 TABLET | Refills: 0 | Status: SHIPPED | OUTPATIENT
Start: 2019-11-13 | End: 2020-01-02

## 2019-11-13 RX ORDER — OXYCODONE HCL/ACETAMINOPHEN 10MG-325MG
TABLET ORAL
Qty: 120 TABLET | Refills: 0 | Status: SHIPPED | OUTPATIENT
Start: 2019-11-13 | End: 2019-12-13

## 2019-11-13 NOTE — TELEPHONE ENCOUNTER
The pharmacy sends this 1 week early due to patient receiving the meds through the mail.     Janna Lynn MA 11/13/2019

## 2019-11-14 ENCOUNTER — NURSE TRIAGE (OUTPATIENT)
Dept: FAMILY MEDICINE | Facility: CLINIC | Age: 49
End: 2019-11-14

## 2019-11-14 NOTE — TELEPHONE ENCOUNTER
"Reason for call:  Patient reporting a symptom    Symptom or request: Tommy calls stating he thinks he may be having issues with his thyroid.  Tommy states he has been having night sweats, low temperatures and major fatigue.  He states he has swollen lymph nodes in his neck, arm pits and groin.    Tommy states his son took him to [facility] the other day and he was so fatigued he almost passed out.      Duration (how long have symptoms been present): \"A while\" but much worse last 2 weeks    Have you been treated for this before? No    Additional comments: Wondering if he can have labs ordered to check his Thyroid or whatever else Dr Fernandes would suggest.  Tommy is aware Dr Fernandes is not in this week and is requesting another provider address his lab order in his absence.    Phone Number patient can be reached at:  Home number on file [phone] (home)    Best Time:  any    Can we leave a detailed message on this number:  YES    Call taken on 11/14/2019 at 9:57 AM by Alannah Gutierrez    "

## 2019-11-14 NOTE — TELEPHONE ENCOUNTER
Patient states that he was on Hospice, but then was taken off due to weight gain.  He states he is having some new symptoms that he believes may be an infection of the sinuses, or thyroid, etc.  Patient states he would just like to be seen to rule out any new diagnosis' and to treat any infections that may be present.  Patient states he is HIV A and has multiple symptoms on a daily basis, but these are new symptoms.  Advised patient to seek emergency care for worsening symptoms.  Patient states understanding.  Patient states he is willing to see any provider available at any clinic.  Transferred patient to scheduling    Additional Information    Negative: Severe difficulty breathing (e.g., struggling for each breath, speaks in single words)    Negative: Shock suspected (e.g., cold/pale/clammy skin, too weak to stand, low BP, rapid pulse)    Negative: Difficult to awaken or acting confused (e.g., disoriented, slurred speech)    Negative: Fainted > 15 minutes ago and still feels too weak or dizzy to stand    Negative: SEVERE weakness (i.e., unable to walk or barely able to walk, requires support) and new onset or worsening    Negative: Sounds like a life-threatening emergency to the triager    Weakness of the face, arm or leg on one side of the body    Negative: Difficult to awaken or acting confused (e.g., disoriented, slurred speech)    Negative: New neurologic deficit that is present NOW, sudden onset of ANY of the following: * Weakness of the face, arm, or leg on one side of the body * Numbness of the face, arm, or leg on one side of the body * Loss of speech or garbled speech    Negative: Sounds like a life-threatening emergency to the triager    Negative: Confusion, disorientation, or hallucinations is the main symptom    Negative: Dizziness is the main symptom    Negative: Followed a head injury within last 3 days    Negative: Headache (with neurologic deficit)    Negative: Unable to urinate (or only a few  "drops) and bladder feels very full    Negative: Loss of control of bowel or bladder (i.e., incontinence) of new onset    Negative: Back pain with numbness (loss of sensation) in groin or rectal area    Negative: Patient sounds very sick or weak to the triager    Negative: Neurologic deficit that was brief (now gone), ANY of the following: * Weakness of the face, arm, or leg on one side of the body * Numbness of the face, arm, or leg on one side of the body * Loss of speech or garbled speech    Negative: Neurologic deficit of gradual onset, ANY of the following: * Weakness of the face, arm, or leg on one side of the body * Numbness of the face, arm, or leg on one side of the body * Loss of speech or garbled speech    Negative: [place] palsy suspected (i.e., weakness only one side of the face, developing over hours to days, no other symptoms)    Negative: Tingling (e.g., pins and needles) of the face, arm or leg on one side of the body, that is  present now    Weakness of arm or leg is a chronic symptom (recurrent or ongoing problem lasting > 4 weeks)    Answer Assessment - Initial Assessment Questions  1. DESCRIPTION: \"Describe how you are feeling.\"      Very tired, no fever, swollen lymph nodes in neck arm pits and groin, sensitive to cold, heat flashes, joint and muscle pain  2. SEVERITY: \"How bad is it?\"  \"Can you stand and walk?\"    - MILD - Feels weak or tired, but does not interfere with work, school or normal activities    - MODERATE - Able to stand and walk; weakness interferes with work, school, or normal activities    - SEVERE - Unable to stand or walk      Last couple days moderate to severe  3. ONSET:  \"When did the weakness begin?\"      About 2 weeks and getting worse  4. CAUSE: \"What do you think is causing the weakness?\"      Thyroid, other diagnosed disease per snap shot  5. MEDICINES: \"Have you recently started a new medicine or had a change in the amount of a medicine?\"      No  6. OTHER SYMPTOMS: \"Do " "you have any other symptoms?\" (e.g., chest pain, fever, cough, SOB, vomiting, diarrhea, bleeding, other areas of pain)      Stated above  7. PREGNANCY: \"Is there any chance you are pregnant?\" \"When was your last menstrual period?\"      NA    Protocols used: WEAKNESS (GENERALIZED) AND FATIGUE-A-OH, NEUROLOGIC DEFICIT-A-OH  Dyan Xiong RN    "

## 2019-11-18 DIAGNOSIS — R29.898 WEAKNESS OF BOTH LOWER EXTREMITIES: Primary | ICD-10-CM

## 2019-11-18 NOTE — TELEPHONE ENCOUNTER
Reason for Call: Request for an order or referral:    Order or referral being requested: Requesting an order for a wheel chair due to his legs getting so weak and going out on him.    Tommy states he will need a wheelchair this Wednesday for a  he needs to attend and is wondering if there is a way to get one that quickly or is there one from clinic he can use or somewhere he can borrow one for that day.    Date needed: as soon as possible    Has the patient been seen by the PCP for this problem? YES    Additional comments: Tommy states he is only able to stand for about 5 minutes until his legs give out.    Phone number Patient can be reached at:  Home number on file [phone] (home)    Best Time:  any    Can we leave a detailed message on this number?  YES    Call taken on 2019 at 11:47 AM by Alannah Gutierrez

## 2019-11-22 ENCOUNTER — OFFICE VISIT (OUTPATIENT)
Dept: URGENT CARE | Facility: RETAIL CLINIC | Age: 49
End: 2019-11-22
Payer: COMMERCIAL

## 2019-11-22 VITALS
RESPIRATION RATE: 16 BRPM | SYSTOLIC BLOOD PRESSURE: 108 MMHG | TEMPERATURE: 98.2 F | OXYGEN SATURATION: 96 % | DIASTOLIC BLOOD PRESSURE: 72 MMHG | HEART RATE: 99 BPM

## 2019-11-22 DIAGNOSIS — B37.0 ORAL THRUSH: Primary | ICD-10-CM

## 2019-11-22 PROCEDURE — 99213 OFFICE O/P EST LOW 20 MIN: CPT | Performed by: PHYSICIAN ASSISTANT

## 2019-11-22 RX ORDER — FLUCONAZOLE 200 MG/1
200 TABLET ORAL DAILY
Qty: 7 TABLET | Refills: 0 | Status: SHIPPED | OUTPATIENT
Start: 2019-11-22 | End: 2019-12-13
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
## 2019-11-25 DIAGNOSIS — G43.809 OTHER MIGRAINE WITHOUT STATUS MIGRAINOSUS, NOT INTRACTABLE: Primary | ICD-10-CM

## 2019-11-26 RX ORDER — SUMATRIPTAN 50 MG/1
TABLET, FILM COATED ORAL
Qty: 30 TABLET | Refills: 1 | Status: SHIPPED | OUTPATIENT
Start: 2019-11-26 | End: 2020-07-15

## 2019-11-26 RX ORDER — FLUOXETINE 40 MG/1
40 CAPSULE ORAL DAILY
Qty: 90 CAPSULE | Refills: 3 | Status: SHIPPED | OUTPATIENT
Start: 2019-11-26 | End: 2020-01-27

## 2019-11-26 NOTE — TELEPHONE ENCOUNTER
Routing refill request to provider for review/approval because:  Drug interaction/ allergy warning  Medication is reported/historical    Lois Melvin RN on 11/26/2019 at 11:20 AM

## 2019-11-26 NOTE — TELEPHONE ENCOUNTER
"Requested Prescriptions   Pending Prescriptions Disp Refills     SUMAtriptan (IMITREX) 50 MG tablet 30 tablet 1     Sig: TAKE 1 TABLET BY MOUTH AT HEADACH ONSET FOR MIGRAINE   Last Written Prescription Date:  6/10/19  Last Fill Quantity: 30,  # refills: 1   Last office visit: 8/9/2019 with prescribing provider:  Dom Mane Office Visit:   Next 5 appointments (look out 90 days)    Nov 29, 2019  8:40 AM CST  Office Visit with Faustino Fernandes MD  [address] [id number] [phone]             Serotonin Agonists Failed - 11/25/2019  5:48 PM        Failed - Serotonin Agonist request needs review.     Please review patient's record. If patient has had 8 or more treatments in the past month, please forward to provider.          Passed - Blood pressure under 140/90 in past 12 months     BP Readings from Last 3 Encounters:   11/22/19 108/72   10/14/19 117/84   08/15/19 124/78           Passed - Recent (12 mo) or future (30 days) visit within the authorizing provider's specialty     Patient has had an office visit with the authorizing provider or a provider within the authorizing providers department within the previous 12 mos or has a future within next 30 days. See \"Patient Info\" tab in inbasket, or \"Choose Columns\" in Meds & Orders section of the refill encounter.              Passed - Medication is active on med list        Passed - Patient is age 18 or older        FLUoxetine (PROZAC) 20 MG capsule 90 capsule 3     Sig: Take 1 capsule (20 mg) by mouth daily Take along with 40 mg.   Last Written Prescription Date:  NA  Last Fill Quantity: NA,  # refills: NA   Last office visit: 8/9/2019 with prescribing provider:  Dom Mane Office Visit:   Next 5 appointments (look out 90 days)    Nov 29, 2019  8:40 AM CST  Office Visit with Faustino Fernandes MD  [facility] ([address]" "[facility] [id number]  [phone]             SSRIs Protocol Passed - 11/25/2019  5:48 PM        Passed - Recent (12 mo) or future (30 days) visit within the authorizing provider's specialty     Patient has had an office visit with the authorizing provider or a provider within the authorizing providers department within the previous 12 mos or has a future within next 30 days. See \"Patient Info\" tab in inbasket, or \"Choose Columns\" in Meds & Orders section of the refill encounter.              Passed - Medication is active on med list        Passed - Patient is age 18 or older        FLUoxetine (PROZAC) 40 MG capsule 90 capsule 3     Sig: Take 1 capsule (40 mg) by mouth daily Take with 20 mg fluoxetine.   Last Written Prescription Date:  Na  Last Fill Quantity: NA,  # refills: NA   Last office visit: 8/9/2019 with prescribing provider:  Dom   Future Office Visit:   Next 5 appointments (look out 90 days)    Nov 29, 2019  8:40 AM CST  [facility] Visit [facility]ust[facility] [address] [place] [id number]  [phone]             SSRIs Protocol Passed - 11/25/2019  5:48 PM        Passed - Recent (12 mo) or future (30 days) visit within the authorizing provider's specialty     Patient has had an office visit with the authorizing provider or a provider within the authorizing providers department within the previous 12 mos or has a future within next 30 days. See \"Patient Info\" tab in inbasket, or \"Choose Columns\" in Meds & Orders section of the refill encounter.              Passed - Medication is active on med list        Passed - Patient is age 18 or older          "

## 2019-11-29 ENCOUNTER — OFFICE VISIT (OUTPATIENT)
Dept: FAMILY MEDICINE | Facility: CLINIC | Age: 49
End: 2019-11-29
Payer: COMMERCIAL

## 2019-11-29 VITALS
TEMPERATURE: 98 F | HEIGHT: 68 IN | HEART RATE: 117 BPM | BODY MASS INDEX: 18.11 KG/M2 | DIASTOLIC BLOOD PRESSURE: 68 MMHG | SYSTOLIC BLOOD PRESSURE: 100 MMHG | OXYGEN SATURATION: 96 % | RESPIRATION RATE: 16 BRPM | WEIGHT: 119.5 LBS

## 2019-11-29 DIAGNOSIS — B20 AIDS (H): ICD-10-CM

## 2019-11-29 DIAGNOSIS — B37.0 THRUSH: Primary | ICD-10-CM

## 2019-11-29 PROCEDURE — 99214 OFFICE O/P EST MOD 30 MIN: CPT | Performed by: FAMILY MEDICINE

## 2019-11-29 RX ORDER — FLUCONAZOLE 100 MG/1
100 TABLET ORAL DAILY
Qty: 15 TABLET | Refills: 0 | Status: SHIPPED | OUTPATIENT
Start: 2019-11-29 | End: 2019-12-13

## 2019-11-29 ASSESSMENT — PATIENT HEALTH QUESTIONNAIRE - PHQ9
SUM OF ALL RESPONSES TO PHQ QUESTIONS 1-9: 1
5. POOR APPETITE OR OVEREATING: NOT AT ALL

## 2019-11-29 ASSESSMENT — ANXIETY QUESTIONNAIRES
GAD7 TOTAL SCORE: 1
7. FEELING AFRAID AS IF SOMETHING AWFUL MIGHT HAPPEN: NOT AT ALL
5. BEING SO RESTLESS THAT IT IS HARD TO SIT STILL: NOT AT ALL
6. BECOMING EASILY ANNOYED OR IRRITABLE: SEVERAL DAYS
2. NOT BEING ABLE TO STOP OR CONTROL WORRYING: NOT AT ALL
IF YOU CHECKED OFF ANY PROBLEMS ON THIS QUESTIONNAIRE, HOW DIFFICULT HAVE THESE PROBLEMS MADE IT FOR YOU TO DO YOUR WORK, TAKE CARE OF THINGS AT HOME, OR GET ALONG WITH OTHER PEOPLE: NOT DIFFICULT AT ALL
3. WORRYING TOO MUCH ABOUT DIFFERENT THINGS: NOT AT ALL
1. FEELING NERVOUS, ANXIOUS, OR ON EDGE: NOT AT ALL

## 2019-11-29 ASSESSMENT — MIFFLIN-ST. JEOR: SCORE: 1376.55
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
## 2019-11-30 ASSESSMENT — ASTHMA QUESTIONNAIRES: ACT_TOTALSCORE: 19

## 2019-11-30 ASSESSMENT — ANXIETY QUESTIONNAIRES: GAD7 TOTAL SCORE: 1

## 2019-12-06 ENCOUNTER — TELEPHONE (OUTPATIENT)
Dept: FAMILY MEDICINE | Facility: CLINIC | Age: 49
End: 2019-12-06

## 2019-12-06 DIAGNOSIS — M19.90 ARTHRITIS: Primary | ICD-10-CM

## 2019-12-06 NOTE — TELEPHONE ENCOUNTER
Reason for Call:  Other call back    Detailed comments: Patient states his pain clinic told him he needs a referral to see sepcialsist for arthritis and to ask his PCP for one.      Phone Number Patient can be reached at: Cell number on file:    Telephone Information:   Mobile [phone]       Best Time: any     Can we leave a detailed message on this number? YES    Call taken on 12/6/2019 at 9:01 AM by Cristiane Dennis

## 2019-12-09 NOTE — TELEPHONE ENCOUNTER
Order in and I left number on Tommy's phone.  He can schedule with Dr. Hogan in [place] or [place].

## 2019-12-12 ENCOUNTER — TELEPHONE (OUTPATIENT)
Dept: FAMILY MEDICINE | Facility: CLINIC | Age: 49
End: 2019-12-12

## 2019-12-12 NOTE — TELEPHONE ENCOUNTER
Patient needs to be seen sooner with Dr. Hogan for Septic Arthritis. Needs sooner appointment than February 2019. He can be reached at [phone].

## 2019-12-13 ENCOUNTER — NURSE TRIAGE (OUTPATIENT)
Dept: FAMILY MEDICINE | Facility: CLINIC | Age: 49
End: 2019-12-13

## 2019-12-13 ENCOUNTER — OFFICE VISIT (OUTPATIENT)
Dept: FAMILY MEDICINE | Facility: CLINIC | Age: 49
End: 2019-12-13
Payer: COMMERCIAL

## 2019-12-13 DIAGNOSIS — M19.90 ARTHRITIS: ICD-10-CM

## 2019-12-13 DIAGNOSIS — B37.0 THRUSH: ICD-10-CM

## 2019-12-13 DIAGNOSIS — J98.01 ACUTE BRONCHOSPASM: ICD-10-CM

## 2019-12-13 DIAGNOSIS — B20 AIDS (H): ICD-10-CM

## 2019-12-13 DIAGNOSIS — B37.0 ORAL THRUSH: ICD-10-CM

## 2019-12-13 DIAGNOSIS — B35.3 TINEA PEDIS OF BOTH FEET: ICD-10-CM

## 2019-12-13 DIAGNOSIS — B02.9 SHINGLES: Primary | ICD-10-CM

## 2019-12-13 DIAGNOSIS — G89.29 OTHER CHRONIC PAIN: ICD-10-CM

## 2019-12-13 DIAGNOSIS — J40 BRONCHITIS: Primary | ICD-10-CM

## 2019-12-13 LAB
BASOPHILS # BLD AUTO: 0 10E9/L (ref 0–0.2)
BASOPHILS NFR BLD AUTO: 0.1 %
CD19 CELLS # BLD: 78 CELLS/UL (ref 107–698)
CD19 CELLS NFR BLD: 3 % (ref 6–27)
CD3 CELLS # BLD: 2183 CELLS/UL (ref 603–2990)
CD3 CELLS NFR BLD: 91 % (ref 49–84)
CD3+CD4+ CELLS # BLD: 190 CELLS/UL (ref 441–2156)
CD3+CD4+ CELLS NFR BLD: 8 % (ref 28–63)
CD3+CD4+ CELLS/CD3+CD8+ CLL BLD: 0.1 % (ref 1.4–2.6)
CD3+CD8+ CELLS # BLD: 1974 CELLS/UL (ref 125–1312)
CD3+CD8+ CELLS NFR BLD: 82 % (ref 10–40)
CD3-CD16+CD56+ CELLS # BLD: 128 CELLS/UL (ref 95–640)
CD3-CD16+CD56+ CELLS NFR BLD: 5 % (ref 4–25)
DIFFERENTIAL METHOD BLD: ABNORMAL
EOSINOPHIL NFR BLD AUTO: 0.2 %
ERYTHROCYTE [DISTWIDTH] IN BLOOD BY AUTOMATED COUNT: 14 % (ref 10–15)
HCT VFR BLD AUTO: 47.3 % (ref 40–53)
HGB BLD-MCNC: 15.7 G/DL (ref 13.3–17.7)
IFC SPECIMEN: ABNORMAL
IMM GRANULOCYTES # BLD: 0 10E9/L (ref 0–0.4)
IMM GRANULOCYTES NFR BLD: 0.1 %
LYMPHOCYTES # BLD AUTO: 2.4 10E9/L (ref 0.8–5.3)
LYMPHOCYTES NFR BLD AUTO: 29.7 %
MCH RBC QN AUTO: 27.7 PG (ref 26.5–33)
MCHC RBC AUTO-ENTMCNC: 33.2 G/DL (ref 31.5–36.5)
MCV RBC AUTO: 84 FL (ref 78–100)
MONOCYTES # BLD AUTO: 0.5 10E9/L (ref 0–1.3)
MONOCYTES NFR BLD AUTO: 6.4 %
NEUTROPHILS # BLD AUTO: 5.1 10E9/L (ref 1.6–8.3)
NEUTROPHILS NFR BLD AUTO: 63.5 %
NRBC # BLD AUTO: 0 10*3/UL
NRBC BLD AUTO-RTO: 0 /100
PLATELET # BLD AUTO: 142 10E9/L (ref 150–450)
RBC # BLD AUTO: 5.66 10E12/L (ref 4.4–5.9)
WBC # BLD AUTO: 8 10E9/L (ref 4–11)

## 2019-12-13 PROCEDURE — 36415 COLL VENOUS BLD VENIPUNCTURE: CPT | Performed by: FAMILY MEDICINE

## 2019-12-13 PROCEDURE — 86357 NK CELLS TOTAL COUNT: CPT | Performed by: FAMILY MEDICINE

## 2019-12-13 PROCEDURE — 86431 RHEUMATOID FACTOR QUANT: CPT | Performed by: FAMILY MEDICINE

## 2019-12-13 PROCEDURE — 86360 T CELL ABSOLUTE COUNT/RATIO: CPT | Performed by: FAMILY MEDICINE

## 2019-12-13 PROCEDURE — 86359 T CELLS TOTAL COUNT: CPT | Performed by: FAMILY MEDICINE

## 2019-12-13 PROCEDURE — 85025 COMPLETE CBC W/AUTO DIFF WBC: CPT | Performed by: FAMILY MEDICINE

## 2019-12-13 PROCEDURE — 99214 OFFICE O/P EST MOD 30 MIN: CPT | Performed by: FAMILY MEDICINE

## 2019-12-13 PROCEDURE — 86355 B CELLS TOTAL COUNT: CPT | Performed by: FAMILY MEDICINE

## 2019-12-13 RX ORDER — OXYCODONE HCL/ACETAMINOPHEN 10MG-325MG
TABLET ORAL
Qty: 150 TABLET | Refills: 0 | Status: SHIPPED | OUTPATIENT
Start: 2019-12-13 | End: 2020-01-06

## 2019-12-13 RX ORDER — AZITHROMYCIN 250 MG/1
TABLET, FILM COATED ORAL
Qty: 6 TABLET | Refills: 0 | Status: SHIPPED | OUTPATIENT
Start: 2019-12-13 | End: 2020-01-16

## 2019-12-13 RX ORDER — FLUCONAZOLE 100 MG/1
100 TABLET ORAL DAILY
Qty: 15 TABLET | Refills: 0 | Status: SHIPPED | OUTPATIENT
Start: 2019-12-13 | End: 2020-01-06

## 2019-12-13 NOTE — TELEPHONE ENCOUNTER
Called patient, informed him per Dr Fernandes to be seen at an urgent care facility or ED. Patient was relayed this and has decided to wait and see how he feels, will drink more water, and would like to let clinic know early next week how he is doing. Patient instructed to seek care sooner at urgent care or ED over the weekend if symptoms are not better. He states understanding. Dr Fernandes has been informed also of patient's decision. [place]/MA

## 2019-12-13 NOTE — TELEPHONE ENCOUNTER
Routing refill request to provider for review/approval because:  A break in medication. Had appt today.  Dee Fernandes RN

## 2019-12-13 NOTE — TELEPHONE ENCOUNTER
"fluconazole  Last Written Prescription Date:  10/12/2017  Last Fill Quantity: 16 g,  # refills: 8   Last office visit: No previous visit found with prescribing provider:      Future Office Visit:      Requested Prescriptions   Pending Prescriptions Disp Refills     fluconazole (DIFLUCAN) 100 MG tablet [Pharmacy Med Name: FLUCONAZOLE 100 MG TABLET 100 TAB] 15 tablet 0     Sig: TAKE 1 TABLET (100 MG) BY MOUTH DAILY FOR 15 DAYS       Antifungal Agents Failed - 12/13/2019 10:32 AM        Failed - Not Fluconazole or Terconazole      If oral Fluconazole or Terconazole, may refill if indicated in progress notes.           Passed - Recent (12 mo) or future (30 days) visit within the authorizing provider's specialty     Patient has had an office visit with the authorizing provider or a provider within the authorizing providers department within the previous 12 mos or has a future within next 30 days. See \"Patient Info\" tab in inbasket, or \"Choose Columns\" in Meds & Orders section of the refill encounter.              Passed - Medication is active on med list          Dee Fernandes RN on 12/13/2019 at 11:02 AM    "
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
## 2019-12-13 NOTE — TELEPHONE ENCOUNTER
Called patient and advised him of the message below. He reports there was speculation that he might have septic arthritis. He saw his PCP today who is running some blood tests. Offered him our soonest NP opening which was in a few months however patient would like to be seen sooner and is going to discuss with his PCP.    Que Rodriguez RN....12/13/2019 2:06 PM

## 2019-12-13 NOTE — TELEPHONE ENCOUNTER
S-(situation): Tommy calling stating that he just urinated and had a large red drop of urine come out at the end of his stream.     B-(background): Tommy was here today for appt. Hx HIV,thrombocytompenia    A-(assessment): Tommy does state that he has had cloudy orange urine for quite some time and he has had left sided pain up by his ribs for a couple months    R-(recommendations): WIll send to PCP to review and advise.      Additional Information    Negative: Recent back or abdominal injury    Negative: Recent genital injury    Negative: Unable to urinate (or only a few drops) > 4 hours and bladder feels very full (e.g., palpable bladder or strong urge to urinate)    Negative: Passing pure blood or large blood clots (i.e., larger than a dime or grape)    Negative: Urinary catheter, questions about    Negative: Shock suspected (e.g., cold/pale/clammy skin, too weak to stand, low BP, rapid pulse)    Negative: Sounds like a life-threatening emergency to the triager    Negative: Fever > 100.5 F (38.1 C)    Negative: Patient sounds very sick or weak to the triager    Negative: Known sickle cell disease    Negative: Taking Coumadin (warfarin) or other strong blood thinner, or known bleeding disorder (e.g., thrombocytopenia)    Protocols used: URINE - BLOOD IN-A-OH

## 2019-12-16 ENCOUNTER — TELEPHONE (OUTPATIENT)
Dept: PALLIATIVE MEDICINE | Facility: CLINIC | Age: 49
End: 2019-12-16

## 2019-12-16 LAB — RHEUMATOID FACT SER NEPH-ACNC: <20 IU/ML (ref 0–20)

## 2019-12-16 RX ORDER — LIDOCAINE 50 MG/G
1 PATCH TOPICAL EVERY 24 HOURS
Qty: 30 PATCH | Refills: 0 | Status: SHIPPED | OUTPATIENT
Start: 2019-12-16 | End: 2020-01-16

## 2019-12-16 RX ORDER — ALBUTEROL SULFATE 90 UG/1
AEROSOL, METERED RESPIRATORY (INHALATION)
Qty: 18 G | Refills: 0 | Status: SHIPPED | OUTPATIENT
Start: 2019-12-16 | End: 2020-01-06

## 2019-12-16 RX ORDER — KETOCONAZOLE 20 MG/G
CREAM TOPICAL 2 TIMES DAILY
Qty: 60 G | Refills: 1 | Status: SHIPPED | OUTPATIENT
Start: 2019-12-16 | End: 2020-01-29

## 2019-12-16 NOTE — TELEPHONE ENCOUNTER
WEN for pt to schedule New Eval.    Manasa GONZALEZ    [facility]

## 2019-12-16 NOTE — TELEPHONE ENCOUNTER
"Requested Prescriptions   Pending Prescriptions Disp Refills     ketoconazole (NIZORAL) 2 % external cream 60 g 1     Sig: Apply topically 2 times daily   Last Written Prescription Date:  8/9/2019  Last Fill Quantity: 60 g,  # refills: 1   Last office visit: 12/13/2019  Future Office Visit:        Antifungal Agents Passed - 12/13/2019  2:24 PM        Passed - Recent (12 mo) or future (30 days) visit within the authorizing provider's specialty     Patient has had an office visit with the authorizing provider or a provider within the authorizing providers department within the previous 12 mos or has a future within next 30 days. See \"Patient Info\" tab in inbasket, or \"Choose Columns\" in Meds & Orders section of the refill encounter.            Passed - Not Fluconazole or Terconazole      If oral Fluconazole or Terconazole, may refill if indicated in progress notes.           Passed - Medication is active on med list   Prescription approved per [facility] Refill Protocol.       lidocaine (LIDODERM) 5 % patch 30 patch 0     Sig: Place 1 patch onto the skin every 24 hours To prevent lidocaine toxicity, patient should be patch free for 12 hrs daily.   Last Written Prescription Date:  7/7/2019  Last Fill Quantity: 30,  # refills: 0   Last office visit: 12/13/2019  Future Office Visit:        Topical Anesthetic Agents Passed - 12/13/2019  2:24 PM        Passed - Recent (12 mo) or future (30 days) visit within the authorizing provider's specialty     Patient has had an office visit with the authorizing provider or a provider within the authorizing providers department within the previous 12 mos or has a future within next 30 days. See \"Patient Info\" tab in inbasket, or \"Choose Columns\" in Meds & Orders section of the refill encounter.            Passed - Medication is active on med list        Passed - Patient is age 2 or older   Routing refill request to provider for review/approval        albuterol (VENTOLIN HFA) 108 (90 Base) " "MCG/ACT inhaler 18 g 0     Sig: INHALE 2 PUFFS INTO THE LUNGS EVERY 4 HOURS AS NEEDED FOR SHORTNESS OF BREATH/DYSPNEA OR WHEEZING. **NEEDS TO MAKE APPT FOR FURTHER REFILLS*   Last Written Prescription Date:  7/10/2019  Last Fill Quantity: 18 g,  # refills: 0   Last office visit: 12/13/2019  Future Office Visit:        Asthma Maintenance Inhalers - Anticholinergics Failed - 12/13/2019  2:24 PM        Failed - Asthma control assessment score within normal limits in last 6 months     Please review ACT score.   ACT Total Scores 7/25/2017 2/19/2018 11/29/2019   ACT TOTAL SCORE (Goal Greater than or Equal to 20) 19 9 19   In the past 12 months, how many times did you visit the emergency room for your asthma without being admitted to the hospital? 3 3 0   In the past 12 months, how many times were you hospitalized overnight because of your asthma? 3 3 0             Passed - Patient is age 12 years or older        Passed - Medication is active on med list        Passed - Recent (6 mo) or future (30 days) visit within the authorizing provider's specialty     Patient had office visit in the last 6 months or has a visit in the next 30 days with authorizing provider or within the authorizing provider's specialty.  See \"Patient Info\" tab in inbasket, or \"Choose Columns\" in Meds & Orders section of the refill encounter.          Routing refill request to provider for review/approval  Dyan Xiong RN        "

## 2019-12-16 NOTE — TELEPHONE ENCOUNTER
lidocaine (LIDODERM) 5 % patch 30 patch 0    Sig: Place 1 patch onto the skin every 24 hours To prevent lidocaine toxicity, patient should be patch free for 12 hrs daily.   Routing refill request to provider for review/approval because:  Medication given in  ER Dept for shingles      albuterol (VENTOLIN HFA) 108 (90 Base) MCG/ACT inhaler 18 g 0    Sig: INHALE 2 PUFFS INTO THE LUNGS EVERY 4 HOURS AS NEEDED FOR SHORTNESS OF BREATH/DYSPNEA OR WHEEZING.    Routing refill request to provider for review/approval because:  ACT score 19 on 11/29/2019  T'd up 1 refill for provider review.      Dyan Xiong, RN

## 2019-12-17 ENCOUNTER — TELEPHONE (OUTPATIENT)
Dept: FAMILY MEDICINE | Facility: CLINIC | Age: 49
End: 2019-12-17

## 2019-12-17 NOTE — TELEPHONE ENCOUNTER
Reason for Call: Request for an order or referral:    Order or referral being requested: labs     Date needed: at your convenience    Has the patient been seen by the PCP for this problem? YES    Additional comments: Pt is wondering if he can get labs done to check for pancreatitis? And imaging to check for kidney stone? He has been have L side pain. Please advise. Would like a covering provider to address this today. Please see triage encounter from 12/13. He was told to be seen in there ER, but did not go. Please advise.     Phone number Patient can be reached at:  Home number on file [phone] (home)    Best Time:  Any     Can we leave a detailed message on this number?  YES    Call taken on 12/17/2019 at 10:18 AM by Ara Alan

## 2019-12-24 ENCOUNTER — TELEPHONE (OUTPATIENT)
Dept: INFECTIOUS DISEASES | Facility: CLINIC | Age: 49
End: 2019-12-24

## 2019-12-24 NOTE — TELEPHONE ENCOUNTER
Called pt and LVM to ask if he is wanting help with scheduling to see one of our ID doctors? Or if he is planning to see someone closer to him in [facility]??. Asked pt to call the clinic if he needs further assistance [phone].  Alexandra Downs RN

## 2019-12-24 NOTE — TELEPHONE ENCOUNTER
----- Message from OKSANA White sent at 12/20/2019  3:33 PM CST -----  Please call this pt to triage his current medical status, he left me a message asking for us to considering offering him an appt. You and we have tried in the past without much luck and I think he was encouraged to use medical resources in [facility] .

## 2019-12-27 NOTE — TELEPHONE ENCOUNTER
Left message to call back.  See if he is getting this done with infectious disease at [facility].  If not then he should ask them to do this and if it appropriate.

## 2019-12-30 ENCOUNTER — TELEPHONE (OUTPATIENT)
Dept: INFECTIOUS DISEASES | Facility: CLINIC | Age: 49
End: 2019-12-30

## 2019-12-30 NOTE — TELEPHONE ENCOUNTER
Attempted to call pt two more times through out the afternoon, with no answer. Called pt's wife and discussed a possible apt on 1/10. Pt's wife reports it takes 8-10 days advanced notice for pt to get ride set up for apt's. Pt's wife reports she and her  live in separate houses, however she will speak with him tonight and see if 1/10 would work for him to come for apt with Dr Lay. Gave this writers personal phone number for her to call back.  Alexandra Downs RN

## 2019-12-30 NOTE — TELEPHONE ENCOUNTER
Called pt to offer him an apt on 1/3/20 with Dr Lay who pt has seen in the past but not since 2015. Pt reports this apt date will not work as 5 days notice is not enough time to set up a ride. Pt reports he does not remember who is medical transportation is with but that his wife would know. Pt reports he has been intubated 8 times due to taking [employer] but now has decided to stop taking it. Pt reports this has been a very tough year on him. He says he ended up on life support 8 times, he was taken off hospice, and then he had rehab. When asked what his rehab was for, pt became angry and hung up the phone. This writer will set up apt in 2 weeks instead of 1 week so pt has enough time to set up his transportation. Apt date and time has been mailed to pt's home address.  Alexandra Downs RN

## 2019-12-30 NOTE — TELEPHONE ENCOUNTER
Called pt back and left a voice message for him to apologize for sounding condescending during our earlier conversation. Explained to pt that I am happy to help him get an apt with Dr Lay a week later so that he would have time to set up medical transportation. This writer left patient her personal phone number asking him to call back to let us know if 1/10/20 would work for him, and if there is anything else we can do for him.  Alexandra Downs RN

## 2020-01-02 ENCOUNTER — TELEPHONE (OUTPATIENT)
Dept: INFECTIOUS DISEASES | Facility: CLINIC | Age: 50
End: 2020-01-02

## 2020-01-02 DIAGNOSIS — B37.0 THRUSH: ICD-10-CM

## 2020-01-02 DIAGNOSIS — B35.3 TINEA PEDIS OF BOTH FEET: ICD-10-CM

## 2020-01-02 DIAGNOSIS — G89.29 OTHER CHRONIC PAIN: ICD-10-CM

## 2020-01-02 DIAGNOSIS — J98.01 ACUTE BRONCHOSPASM: ICD-10-CM

## 2020-01-02 RX ORDER — MORPHINE SULFATE 15 MG/1
TABLET, FILM COATED, EXTENDED RELEASE ORAL
Qty: 30 TABLET | Refills: 0 | Status: SHIPPED | OUTPATIENT
Start: 2020-01-02 | End: 2020-01-29

## 2020-01-02 RX ORDER — CLOTRIMAZOLE 1 %
CREAM (GRAM) TOPICAL
Qty: 15 G | Refills: 0 | Status: SHIPPED | OUTPATIENT
Start: 2020-01-02 | End: 2020-03-16

## 2020-01-02 NOTE — TELEPHONE ENCOUNTER
Clotrimazole 1 %      Last Written Prescription Date:  3/5/19  Last Fill Quantity: 15 g,   # refills: 0  Last Office Visit: 12/13/19  Future Office visit:       Routing refill request to provider for review/approval because:  Drug not on the [facility], [facility] or [facility] refill protocol or controlled substance

## 2020-01-02 NOTE — TELEPHONE ENCOUNTER
Pt's wife has not called the clinic back yet to let us know if 1/10 apt would work for pt so today a note was put in the mail, asking pt if he would like an apt with Dr Lay on 1/10 at 1:30 pm. Also asked pt to please call the clinic to let us know if we should hold this apt for him or if he would again prefer a different date. (Pt requires 7-10 days notice in order to set up Medical Transportation for apt's)  Alexandra Downs RN

## 2020-01-02 NOTE — TELEPHONE ENCOUNTER
Morphine 15 MG       Last Written Prescription Date:  11/13/19  Last Fill Quantity: 30,   # refills: 0  Last Office Visit: 12/13/19  Future Office visit:       Routing refill request to provider for review/approval because:  Drug not on the [facility], [facility] or [facility] refill protocol or controlled substance

## 2020-01-06 ENCOUNTER — MYC MEDICAL ADVICE (OUTPATIENT)
Dept: FAMILY MEDICINE | Facility: CLINIC | Age: 50
End: 2020-01-06

## 2020-01-06 RX ORDER — ALBUTEROL SULFATE 90 UG/1
AEROSOL, METERED RESPIRATORY (INHALATION)
Qty: 18 G | Refills: 0 | Status: SHIPPED | OUTPATIENT
Start: 2020-01-06 | End: 2020-01-29

## 2020-01-06 RX ORDER — FLUCONAZOLE 100 MG/1
100 TABLET ORAL DAILY
Qty: 15 TABLET | Refills: 0 | Status: SHIPPED | OUTPATIENT
Start: 2020-01-06 | End: 2020-01-29

## 2020-01-06 RX ORDER — OXYCODONE HCL/ACETAMINOPHEN 10MG-325MG
TABLET ORAL
Qty: 150 TABLET | Refills: 0 | Status: SHIPPED | OUTPATIENT
Start: 2020-01-06 | End: 2020-01-27

## 2020-01-06 NOTE — TELEPHONE ENCOUNTER
ENDOCET  MG per tablet [Pharmacy Med Name: ENDOCET 10-325MG TAB  TAB] 150 tablet 0    Sig: TAKE ONE TABLET BY MOUTH UP TO 5 TIMES A DAY      There is no refill protocol information for this order   Last Written Prescription Date:  12/13/2019  Last Fill Quantity: 150,  # refills: 0   Last office visit: 12/13/2019 with prescribing provider:     Future Office Visit:    Other chronic pain [G89.29]     Routing refill request to provider for review/approval because:  Drug not on the [facility] refill protocol         fluconazole (DIFLUCAN) 100 MG tablet [Pharmacy Med Name: FLUCONAZOLE 100 MG TABLET 100 TAB] 15 tablet 0    Sig: TAKE 1 TABLET (100 MG) BY MOUTH DAILY FOR 15 DAYS   Routing refill request to provider for review/approval because:  End date for Thrush prescription of Diflucan was 12/28/2019    Dyan Xiong RN

## 2020-01-06 NOTE — TELEPHONE ENCOUNTER
"Requested Prescriptions   Pending Prescriptions Disp Refills     ENDOCET  MG per tablet [Pharmacy Med Name: ENDOCET 10-325MG TAB  TAB] 150 tablet 0     Sig: TAKE ONE TABLET BY MOUTH UP TO 5 TIMES A DAY       There is no refill protocol information for this order   Routing refill request to provider for review/approval          VENTOLIN  (90 Base) MCG/ACT inhaler [Pharmacy Med Name: VENTOLIN HFA  (90 BAS AERO] 18 g 0     Sig: INHALE 2 PUFFS INTO THE LUNGS EVERY 4 HOURS AS NEEDED FOR SHORTNESS OF BREATH/DYSPNEA OR WHEEZING.    Last Written Prescription Date:  12/16/2019  Last Fill Quantity: 18 g,  # refills: 0   Last office visit: 12/13/2019 with prescribing provider:     Future Office Visit:        Asthma Maintenance Inhalers - Anticholinergics Failed - 1/2/2020 11:21 AM        Failed - Asthma control assessment score within normal limits in last 6 months     Please review ACT score.   ACT Total Scores 7/25/2017 2/19/2018 11/29/2019   ACT TOTAL SCORE (Goal Greater than or Equal to 20) 19 9 19   In the past 12 months, how many times did you visit the emergency room for your asthma without being admitted to the hospital? 3 3 0   In the past 12 months, how many times were you hospitalized overnight because of your asthma? 3 3 0             Passed - Patient is age 12 years or older        Passed - Medication is active on med list        Passed - Recent (6 mo) or future (30 days) visit within the authorizing provider's specialty     Patient had office visit in the last 6 months or has a visit in the next 30 days with authorizing provider or within the authorizing provider's specialty.  See \"Patient Info\" tab in inbasket, or \"Choose Columns\" in Meds & Orders section of the refill encounter.       Medication is being filled for 1 time refill only due to:  ACT needed to be repeated       fluconazole (DIFLUCAN) 100 MG tablet [Pharmacy Med Name: FLUCONAZOLE 100 MG TABLET 100 TAB] 15 tablet 0     Sig: " "TAKE 1 TABLET (100 MG) BY MOUTH DAILY FOR 15 DAYS   Last Written Prescription Date:  12/13/2019  Last Fill Quantity: 15,  # refills: 0   Last office visit: 12/13/2019 with prescribing provider:     Future Office Visit:      Thrush [B37.0]     Antifungal Agents Failed - 1/2/2020 11:21 AM        Failed - Not Fluconazole or Terconazole      If oral Fluconazole or Terconazole, may refill if indicated in progress notes.           Passed - Recent (12 mo) or future (30 days) visit within the authorizing provider's specialty     Patient has had an office visit with the authorizing provider or a provider within the authorizing providers department within the previous 12 mos or has a future within next 30 days. See \"Patient Info\" tab in inbasket, or \"Choose Columns\" in Meds & Orders section of the refill encounter.              Passed - Medication is active on med list      Routing refill request to provider for review/approval  Dyan Xiong RN    "

## 2020-01-09 ENCOUNTER — DOCUMENTATION ONLY (OUTPATIENT)
Dept: CARE COORDINATION | Facility: CLINIC | Age: 50
End: 2020-01-09

## 2020-01-09 ENCOUNTER — ANCILLARY PROCEDURE (OUTPATIENT)
Dept: GENERAL RADIOLOGY | Facility: CLINIC | Age: 50
End: 2020-01-09
Attending: INTERNAL MEDICINE
Payer: COMMERCIAL

## 2020-01-09 ENCOUNTER — OFFICE VISIT (OUTPATIENT)
Dept: RHEUMATOLOGY | Facility: CLINIC | Age: 50
End: 2020-01-09
Attending: FAMILY MEDICINE
Payer: COMMERCIAL

## 2020-01-09 VITALS
HEIGHT: 68 IN | SYSTOLIC BLOOD PRESSURE: 134 MMHG | BODY MASS INDEX: 16.79 KG/M2 | HEART RATE: 92 BPM | DIASTOLIC BLOOD PRESSURE: 72 MMHG | WEIGHT: 110.8 LBS | OXYGEN SATURATION: 97 %

## 2020-01-09 DIAGNOSIS — R21 RASH: ICD-10-CM

## 2020-01-09 DIAGNOSIS — M25.50 MULTIPLE JOINT PAIN: ICD-10-CM

## 2020-01-09 DIAGNOSIS — M25.50 MULTIPLE JOINT PAIN: Primary | ICD-10-CM

## 2020-01-09 DIAGNOSIS — R53.83 FATIGUE, UNSPECIFIED TYPE: ICD-10-CM

## 2020-01-09 LAB
ALBUMIN SERPL-MCNC: 3.9 G/DL (ref 3.4–5)
ALP SERPL-CCNC: 115 U/L (ref 40–150)
ALT SERPL W P-5'-P-CCNC: 12 U/L (ref 0–70)
AST SERPL W P-5'-P-CCNC: 16 U/L (ref 0–45)
BASOPHILS # BLD AUTO: 0 10E9/L (ref 0–0.2)
BASOPHILS NFR BLD AUTO: 0.1 %
BILIRUB DIRECT SERPL-MCNC: 0.2 MG/DL (ref 0–0.2)
BILIRUB SERPL-MCNC: 0.8 MG/DL (ref 0.2–1.3)
CRP SERPL-MCNC: 7.6 MG/L (ref 0–8)
DIFFERENTIAL METHOD BLD: ABNORMAL
EOSINOPHIL # BLD AUTO: 0 10E9/L (ref 0–0.7)
EOSINOPHIL NFR BLD AUTO: 0.1 %
ERYTHROCYTE [DISTWIDTH] IN BLOOD BY AUTOMATED COUNT: 15.1 % (ref 10–15)
ERYTHROCYTE [SEDIMENTATION RATE] IN BLOOD BY WESTERGREN METHOD: 9 MM/H (ref 0–20)
FERRITIN SERPL-MCNC: 206 NG/ML (ref 26–388)
HCT VFR BLD AUTO: 46.5 % (ref 40–53)
HGB BLD-MCNC: 15.9 G/DL (ref 13.3–17.7)
LDH SERPL L TO P-CCNC: 170 U/L (ref 85–227)
LYMPHOCYTES # BLD AUTO: 2.4 10E9/L (ref 0.8–5.3)
LYMPHOCYTES NFR BLD AUTO: 33.2 %
MCH RBC QN AUTO: 27.4 PG (ref 26.5–33)
MCHC RBC AUTO-ENTMCNC: 34.2 G/DL (ref 31.5–36.5)
MCV RBC AUTO: 80 FL (ref 78–100)
MONOCYTES # BLD AUTO: 0.4 10E9/L (ref 0–1.3)
MONOCYTES NFR BLD AUTO: 5.9 %
NEUTROPHILS # BLD AUTO: 4.4 10E9/L (ref 1.6–8.3)
NEUTROPHILS NFR BLD AUTO: 60.7 %
PLATELET # BLD AUTO: 122 10E9/L (ref 150–450)
PROT SERPL-MCNC: 8.3 G/DL (ref 6.8–8.8)
RBC # BLD AUTO: 5.8 10E12/L (ref 4.4–5.9)
TSH SERPL DL<=0.005 MIU/L-ACNC: 1.02 MU/L (ref 0.4–4)
WBC # BLD AUTO: 7.2 10E9/L (ref 4–11)

## 2020-01-09 PROCEDURE — 73630 X-RAY EXAM OF FOOT: CPT | Mod: LT

## 2020-01-09 PROCEDURE — 99204 OFFICE O/P NEW MOD 45 MIN: CPT | Performed by: INTERNAL MEDICINE

## 2020-01-09 PROCEDURE — 83615 LACTATE (LD) (LDH) ENZYME: CPT | Performed by: INTERNAL MEDICINE

## 2020-01-09 PROCEDURE — 73130 X-RAY EXAM OF HAND: CPT | Mod: LT

## 2020-01-09 PROCEDURE — 36415 COLL VENOUS BLD VENIPUNCTURE: CPT | Performed by: INTERNAL MEDICINE

## 2020-01-09 PROCEDURE — 83520 IMMUNOASSAY QUANT NOS NONAB: CPT | Performed by: INTERNAL MEDICINE

## 2020-01-09 PROCEDURE — 86200 CCP ANTIBODY: CPT | Performed by: INTERNAL MEDICINE

## 2020-01-09 PROCEDURE — 85025 COMPLETE CBC W/AUTO DIFF WBC: CPT | Performed by: INTERNAL MEDICINE

## 2020-01-09 PROCEDURE — 82728 ASSAY OF FERRITIN: CPT | Performed by: INTERNAL MEDICINE

## 2020-01-09 PROCEDURE — 86038 ANTINUCLEAR ANTIBODIES: CPT | Performed by: INTERNAL MEDICINE

## 2020-01-09 PROCEDURE — 84443 ASSAY THYROID STIM HORMONE: CPT | Performed by: INTERNAL MEDICINE

## 2020-01-09 PROCEDURE — 86140 C-REACTIVE PROTEIN: CPT | Performed by: INTERNAL MEDICINE

## 2020-01-09 PROCEDURE — 85652 RBC SED RATE AUTOMATED: CPT | Performed by: INTERNAL MEDICINE

## 2020-01-09 PROCEDURE — 80076 HEPATIC FUNCTION PANEL: CPT | Performed by: INTERNAL MEDICINE

## 2020-01-09 ASSESSMENT — MIFFLIN-ST. JEOR: SCORE: 1337.09
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
## 2020-01-09 NOTE — TELEPHONE ENCOUNTER
Patient is following up with Rheumatology and is having labs done to further investigate the rashes and fatigue.  Aaliyah Mittal on 1/9/2020 at 1:06 PM

## 2020-01-09 NOTE — NURSING NOTE
RAPID3 (0-30) Cumulative Score  23.3          RAPID3 Weighted Score (divide #4 by 3 and that is the weighted score)  7.7       Starla Fernandes [facility] Rheumatology  1/9/2020 9:08 AM

## 2020-01-09 NOTE — PATIENT INSTRUCTIONS
Rheumatology    Dr. Devonte Hogan       [facility] in [place]   (Monday)  [address]  [place] [address]       [facility] in [place]   (Tuesday)  [address] [place] [address]    [facility] in [place]   (Wed., Thurs., and Friday)  [address] [place] [address]    Phone number: [phone]  Thank you for choosing [facility].  Starla Fernandes CMA

## 2020-01-10 LAB
ANA SER QL IF: NEGATIVE
CCP AB SER IA-ACNC: 3 U/ML
TRYPTASE SERPL-MCNC: 5 UG/L

## 2020-01-10 NOTE — RESULT ENCOUNTER NOTE
"[employer] message sent:  \"Mr. Ross,    Foot x-rays are normal    Hand x-rays show an irregularity of the left scaphoid that is likely due to an old injury (this is a bone in your wrist).  There were other degenerative changes seen of the left hand/wrist as well.     Sincerely,  Devonte Hogan MD  1/9/2020 11:47 PM\""

## 2020-01-13 NOTE — RESULT ENCOUNTER NOTE
"[employer] message sent:  \"Mr. Ross,    Labs showed a reduced platelet count.  Other labs were normal.    Sincerely,  Devonte Hogan MD  1/13/2020 5:14 PM\""

## 2020-01-15 DIAGNOSIS — K59.00 CONSTIPATION, UNSPECIFIED CONSTIPATION TYPE: Primary | ICD-10-CM

## 2020-01-15 RX ORDER — BISACODYL 5 MG/1
5 TABLET, DELAYED RELEASE ORAL DAILY PRN
Qty: 90 TABLET | Refills: 3 | Status: SHIPPED | OUTPATIENT
Start: 2020-01-15 | End: 2020-03-16

## 2020-01-15 NOTE — TELEPHONE ENCOUNTER
Bisacodyl        Last Written Prescription Date:    Last Fill Quantity: ,   # refills:   Last Office Visit: 12/13/19  Future Office visit:    Next 5 appointments (look out 90 days)    Jan 16, 2020  2:00 PM CST  Office Visit with Faustino Fernandes MD  [facility] ([facility] [address] [place] [id number]  [phone]   Feb 05, 2020  2:00 PM CST  Return Visit with Devonte Hogan MD  [facility] ([facility]) [address] [place] [id number]  [phone]           Routing refill request to provider for review/approval because:  Drug not active on patient's medication list

## 2020-01-16 ENCOUNTER — OFFICE VISIT (OUTPATIENT)
Dept: FAMILY MEDICINE | Facility: CLINIC | Age: 50
End: 2020-01-16
Payer: COMMERCIAL

## 2020-01-16 VITALS
WEIGHT: 112.4 LBS | SYSTOLIC BLOOD PRESSURE: 98 MMHG | RESPIRATION RATE: 14 BRPM | OXYGEN SATURATION: 97 % | DIASTOLIC BLOOD PRESSURE: 62 MMHG | TEMPERATURE: 96.7 F | HEART RATE: 117 BPM | BODY MASS INDEX: 17.09 KG/M2

## 2020-01-16 DIAGNOSIS — B02.9 HERPES ZOSTER WITHOUT COMPLICATION: ICD-10-CM

## 2020-01-16 DIAGNOSIS — M25.512 PAIN AND SWELLING OF LEFT SHOULDER: ICD-10-CM

## 2020-01-16 DIAGNOSIS — K64.4 EXTERNAL HEMORRHOIDS: ICD-10-CM

## 2020-01-16 DIAGNOSIS — M25.412 PAIN AND SWELLING OF LEFT SHOULDER: ICD-10-CM

## 2020-01-16 DIAGNOSIS — R07.89 CHEST WALL PAIN: Primary | ICD-10-CM

## 2020-01-16 PROCEDURE — 99214 OFFICE O/P EST MOD 30 MIN: CPT | Performed by: FAMILY MEDICINE

## 2020-01-16 RX ORDER — LIDOCAINE 50 MG/G
1 PATCH TOPICAL EVERY 24 HOURS
Qty: 30 PATCH | Refills: 0 | Status: SHIPPED | OUTPATIENT
Start: 2020-01-16 | End: 2020-01-27

## 2020-01-16 RX ORDER — LIDOCAINE 50 MG/G
OINTMENT TOPICAL DAILY
Qty: 30 G | Refills: 0 | Status: SHIPPED | OUTPATIENT
Start: 2020-01-16 | End: 2020-01-27

## 2020-01-16 ASSESSMENT — PAIN SCALES - GENERAL: PAINLEVEL: SEVERE PAIN (6)
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
## 2020-01-16 NOTE — LETTER
My Asthma Action Plan    Name: Tommy Ross   YOB: 1970  Date: 1/16/2020   My doctor: Faustino Fernandes MD   My clinic: [facility]        My Control Medicine:   My Rescue Medicine: Albuterol (Proair/Ventolin/Proventil HFA) 2-4 puffs EVERY 4 HOURS as needed. Use a spacer if recommended by your provider.   My Asthma Severity:   Moderate Persistent  Know your asthma triggers: allergens               GREEN ZONE   Good Control    I feel good    No cough or wheeze    Can work, sleep and play without asthma symptoms       Take your asthma control medicine every day.     1. If exercise triggers your asthma, take your rescue medication    15 minutes before exercise or sports, and    During exercise if you have asthma symptoms  2. Spacer to use with inhaler: If you have a spacer, make sure to use it with your inhaler             YELLOW ZONE Getting Worse  I have ANY of these:    I do not feel good    Cough or wheeze    Chest feels tight    Wake up at night   1. Keep taking your Green Zone medications  2. Start taking your rescue medicine:    every 20 minutes for up to 1 hour. Then every 4 hours for 24-48 hours.  3. If you stay in the Yellow Zone for more than 12-24 hours, contact your doctor.  4. If you do not return to the Green Zone in 12-24 hours or you get worse, start taking your oral steroid medicine if prescribed by your provider.           RED ZONE Medical Alert - Get Help  I have ANY of these:    I feel awful    Medicine is not helping    Breathing getting harder    Trouble walking or talking    Nose opens wide to breathe       1. Take your rescue medicine NOW  2. If your provider has prescribed an oral steroid medicine, start taking it NOW  3. Call your doctor NOW  4. If you are still in the Red Zone after 20 minutes and you have not reached your doctor:    Take your rescue medicine again and    Call 911 or go to the emergency room right away    See your regular doctor within 2 weeks  of an Emergency Room or Urgent Care visit for follow-up treatment.          Annual Reminders:  Meet with Asthma Educator,  Flu Shot in the Fall, consider Pneumonia Vaccination for patients with asthma (aged 19 and older).    Pharmacy:    [facility] - [place]  [id number] - [place], [place] - [address] [facility]  [id number] - [place], [place] - [address] [facility] [address]  [id number] - [place], [place] - [address]  [id number] - [place], [place] - [address] [facility] - [place], [place] - [address]  [facility] - [place], [place] - [address]    Electronically signed by Faustino Fernandes MD   Date: 01/16/20                      Asthma Triggers  How To Control Things That Make Your Asthma Worse    Triggers are things that make your asthma worse.  Look at the list below to help you find your triggers and what you can do about them.  You can help prevent asthma flare-ups by staying away from your triggers.      Trigger                                                          What you can do   Cigarette Smoke  Tobacco smoke can make asthma worse. Do not allow smoking in your home, car or around you.  Be sure no one smokes at a child s day care or school.  If you smoke, ask your health care provider for ways to help you quit.  Ask family members to quit too.  Ask your health care provider for a referral to Quit Plan to help you quit smoking, or call 2-069-736-PLAN.     Colds, Flu, Bronchitis  These are common triggers of asthma. Wash your hands often.  Don t touch your eyes, nose or mouth.  Get a flu shot every year.     Dust Mites  These are tiny bugs that live in cloth or carpet. They are too small to see. Wash sheets and blankets in hot water every week.   Encase pillows and mattress in dust mite proof covers.  Avoid having carpet if you can. If  you have carpet, vacuum weekly.   Use a dust mask and HEPA vacuum.   Pollen and Outdoor Mold  Some people are allergic to trees, grass, or weed pollen, or molds. Try to keep your windows closed.  Limit time out doors when pollen count is high.   Ask you health care provider about taking medicine during allergy season.     Animal Dander  Some people are allergic to skin flakes, urine or saliva from pets with fur or feathers. Keep pets with fur or feathers out of your home.    If you can t keep the pet outdoors, then keep the pet out of your bedroom.  Keep the bedroom door closed.  Keep pets off cloth furniture and away from stuffed toys.     Mice, Rats, and Cockroaches   Some people are allergic to the waste from these pests.   Cover food and garbage.  Clean up spills and food crumbs.  Store grease in the refrigerator.   Keep food out of the bedroom.   Indoor Mold  This can be a trigger if your home has high moisture. Fix leaking faucets, pipes, or other sources of water.   Clean moldy surfaces.  Dehumidify basement if it is damp and smelly.   Smoke, Strong Odors, and Sprays  These can reduce air quality. Stay away from strong odors and sprays, such as perfume, powder, hair spray, paints, smoke incense, paint, cleaning products, candles and new carpet.   Exercise or Sports  Some people with asthma have this trigger. Be active!  Ask your doctor about taking medicine before sports or exercise to prevent symptoms.    Warm up for 5-10 minutes before and after sports or exercise.     Other Triggers of Asthma  Cold air:  Cover your nose and mouth with a scarf.  Sometimes laughing or crying can be a trigger.  Some medicines and food can trigger asthma.

## 2020-01-17 ENCOUNTER — OFFICE VISIT (OUTPATIENT)
Dept: INFECTIOUS DISEASES | Facility: CLINIC | Age: 50
End: 2020-01-17
Attending: INTERNAL MEDICINE
Payer: COMMERCIAL

## 2020-01-17 ENCOUNTER — TELEPHONE (OUTPATIENT)
Dept: INFECTIOUS DISEASES | Facility: CLINIC | Age: 50
End: 2020-01-17

## 2020-01-17 ENCOUNTER — ANCILLARY PROCEDURE (OUTPATIENT)
Dept: GENERAL RADIOLOGY | Facility: CLINIC | Age: 50
End: 2020-01-17
Attending: INTERNAL MEDICINE
Payer: MEDICAID

## 2020-01-17 ENCOUNTER — MYC MEDICAL ADVICE (OUTPATIENT)
Dept: INFECTIOUS DISEASES | Facility: CLINIC | Age: 50
End: 2020-01-17

## 2020-01-17 VITALS
TEMPERATURE: 98 F | WEIGHT: 117 LBS | SYSTOLIC BLOOD PRESSURE: 115 MMHG | DIASTOLIC BLOOD PRESSURE: 70 MMHG | OXYGEN SATURATION: 96 % | HEART RATE: 85 BPM | BODY MASS INDEX: 17.79 KG/M2

## 2020-01-17 DIAGNOSIS — B20 SYMPTOMATIC HIV INFECTION (H): ICD-10-CM

## 2020-01-17 DIAGNOSIS — R41.0 CONFUSION: ICD-10-CM

## 2020-01-17 DIAGNOSIS — B20 AIDS (ACQUIRED IMMUNE DEFICIENCY SYNDROME) (H): ICD-10-CM

## 2020-01-17 DIAGNOSIS — Z28.39 INCOMPLETE IMMUNIZATION STATUS: ICD-10-CM

## 2020-01-17 DIAGNOSIS — R41.82 ALTERED MENTAL STATUS, UNSPECIFIED ALTERED MENTAL STATUS TYPE: ICD-10-CM

## 2020-01-17 DIAGNOSIS — R06.02 SHORTNESS OF BREATH: ICD-10-CM

## 2020-01-17 DIAGNOSIS — B20 SYMPTOMATIC HIV INFECTION (H): Primary | ICD-10-CM

## 2020-01-17 LAB
ALBUMIN SERPL-MCNC: 3.6 G/DL (ref 3.4–5)
ALBUMIN UR-MCNC: NEGATIVE MG/DL
ALP SERPL-CCNC: 103 U/L (ref 40–150)
ALT SERPL W P-5'-P-CCNC: 15 U/L (ref 0–70)
AMYLASE SERPL-CCNC: 56 U/L (ref 30–110)
ANION GAP SERPL CALCULATED.3IONS-SCNC: 5 MMOL/L (ref 3–14)
APPEARANCE UR: ABNORMAL
AST SERPL W P-5'-P-CCNC: 14 U/L (ref 0–45)
BACTERIA #/AREA URNS HPF: ABNORMAL /HPF
BASOPHILS # BLD AUTO: 0 10E9/L (ref 0–0.2)
BASOPHILS NFR BLD AUTO: 0.2 %
BILIRUB SERPL-MCNC: 0.8 MG/DL (ref 0.2–1.3)
BILIRUB UR QL STRIP: NEGATIVE
BUN SERPL-MCNC: 16 MG/DL (ref 7–30)
CALCIUM SERPL-MCNC: 9 MG/DL (ref 8.5–10.1)
CD3 CELLS # BLD: 2334 CELLS/UL (ref 603–2990)
CD3 CELLS NFR BLD: 91 % (ref 49–84)
CD3+CD4+ CELLS # BLD: 243 CELLS/UL (ref 441–2156)
CD3+CD4+ CELLS NFR BLD: 10 % (ref 28–63)
CD3+CD4+ CELLS/CD3+CD8+ CLL BLD: 0.12 % (ref 1.4–2.6)
CD3+CD8+ CELLS # BLD: 2086 CELLS/UL (ref 125–1312)
CD3+CD8+ CELLS NFR BLD: 82 % (ref 10–40)
CHLORIDE SERPL-SCNC: 107 MMOL/L (ref 94–109)
CO2 SERPL-SCNC: 28 MMOL/L (ref 20–32)
COLOR UR AUTO: ABNORMAL
CREAT SERPL-MCNC: 0.9 MG/DL (ref 0.66–1.25)
DIFFERENTIAL METHOD BLD: ABNORMAL
EOSINOPHIL # BLD AUTO: 0 10E9/L (ref 0–0.7)
EOSINOPHIL NFR BLD AUTO: 0.3 %
ERYTHROCYTE [DISTWIDTH] IN BLOOD BY AUTOMATED COUNT: 14.4 % (ref 10–15)
GFR SERPL CREATININE-BSD FRML MDRD: >90 ML/MIN/{1.73_M2}
GLUCOSE SERPL-MCNC: 94 MG/DL (ref 70–99)
GLUCOSE UR STRIP-MCNC: NEGATIVE MG/DL
HCT VFR BLD AUTO: 44.1 % (ref 40–53)
HGB BLD-MCNC: 14.4 G/DL (ref 13.3–17.7)
HGB UR QL STRIP: NEGATIVE
IFC SPECIMEN: ABNORMAL
IMM GRANULOCYTES # BLD: 0 10E9/L (ref 0–0.4)
IMM GRANULOCYTES NFR BLD: 0.2 %
KETONES UR STRIP-MCNC: 5 MG/DL
LEUKOCYTE ESTERASE UR QL STRIP: NEGATIVE
LIPASE SERPL-CCNC: 31 U/L (ref 73–393)
LYMPHOCYTES # BLD AUTO: 2.8 10E9/L (ref 0.8–5.3)
LYMPHOCYTES NFR BLD AUTO: 45.9 %
MCH RBC QN AUTO: 27.5 PG (ref 26.5–33)
MCHC RBC AUTO-ENTMCNC: 32.7 G/DL (ref 31.5–36.5)
MCV RBC AUTO: 84 FL (ref 78–100)
MONOCYTES # BLD AUTO: 0.4 10E9/L (ref 0–1.3)
MONOCYTES NFR BLD AUTO: 6.8 %
MUCOUS THREADS #/AREA URNS LPF: PRESENT /LPF
NEUTROPHILS # BLD AUTO: 2.8 10E9/L (ref 1.6–8.3)
NEUTROPHILS NFR BLD AUTO: 46.6 %
NITRATE UR QL: NEGATIVE
NRBC # BLD AUTO: 0 10*3/UL
NRBC BLD AUTO-RTO: 0 /100
PH UR STRIP: 5 PH (ref 5–7)
PLATELET # BLD AUTO: 106 10E9/L (ref 150–450)
POTASSIUM SERPL-SCNC: 3.9 MMOL/L (ref 3.4–5.3)
PROT SERPL-MCNC: 7.7 G/DL (ref 6.8–8.8)
RBC # BLD AUTO: 5.23 10E12/L (ref 4.4–5.9)
RBC #/AREA URNS AUTO: 4 /HPF (ref 0–2)
SODIUM SERPL-SCNC: 140 MMOL/L (ref 133–144)
SOURCE: ABNORMAL
SP GR UR STRIP: 1.03 (ref 1–1.03)
UROBILINOGEN UR STRIP-MCNC: 0 MG/DL (ref 0–2)
WBC # BLD AUTO: 6 10E9/L (ref 4–11)
WBC #/AREA URNS AUTO: 1 /HPF (ref 0–5)

## 2020-01-17 PROCEDURE — 87906 NFCT AGT GNTYP ALYS HIV1: CPT | Performed by: INTERNAL MEDICINE

## 2020-01-17 PROCEDURE — 87901 NFCT AGT GNTYP ALYS HIV1 REV: CPT | Performed by: INTERNAL MEDICINE

## 2020-01-17 PROCEDURE — G0463 HOSPITAL OUTPT CLINIC VISIT: HCPCS

## 2020-01-17 PROCEDURE — 87389 HIV-1 AG W/HIV-1&-2 AB AG IA: CPT | Mod: 59 | Performed by: INTERNAL MEDICINE

## 2020-01-17 PROCEDURE — 86701 HIV-1ANTIBODY: CPT | Mod: 59 | Performed by: INTERNAL MEDICINE

## 2020-01-17 PROCEDURE — 86481 TB AG RESPONSE T-CELL SUSP: CPT | Performed by: INTERNAL MEDICINE

## 2020-01-17 PROCEDURE — 87903 PHENOTYPE DNA HIV W/CULTURE: CPT | Performed by: INTERNAL MEDICINE

## 2020-01-17 PROCEDURE — 86803 HEPATITIS C AB TEST: CPT | Performed by: INTERNAL MEDICINE

## 2020-01-17 PROCEDURE — 87904 PHENOTYPE DNA HIV W/CLT ADD: CPT | Performed by: INTERNAL MEDICINE

## 2020-01-17 PROCEDURE — 87900 PHENOTYPE INFECT AGENT DRUG: CPT | Performed by: INTERNAL MEDICINE

## 2020-01-17 PROCEDURE — 81001 URINALYSIS AUTO W/SCOPE: CPT | Performed by: INTERNAL MEDICINE

## 2020-01-17 PROCEDURE — 81381 HLA I TYPING 1 ALLELE HR: CPT | Performed by: INTERNAL MEDICINE

## 2020-01-17 PROCEDURE — 86359 T CELLS TOTAL COUNT: CPT | Mod: 59 | Performed by: INTERNAL MEDICINE

## 2020-01-17 PROCEDURE — 86702 HIV-2 ANTIBODY: CPT | Performed by: INTERNAL MEDICINE

## 2020-01-17 PROCEDURE — 86777 TOXOPLASMA ANTIBODY: CPT | Performed by: INTERNAL MEDICINE

## 2020-01-17 PROCEDURE — 85025 COMPLETE CBC W/AUTO DIFF WBC: CPT | Performed by: INTERNAL MEDICINE

## 2020-01-17 PROCEDURE — 36415 COLL VENOUS BLD VENIPUNCTURE: CPT | Performed by: INTERNAL MEDICINE

## 2020-01-17 PROCEDURE — 86780 TREPONEMA PALLIDUM: CPT | Performed by: INTERNAL MEDICINE

## 2020-01-17 PROCEDURE — 86360 T CELL ABSOLUTE COUNT/RATIO: CPT | Performed by: INTERNAL MEDICINE

## 2020-01-17 PROCEDURE — 82150 ASSAY OF AMYLASE: CPT | Performed by: INTERNAL MEDICINE

## 2020-01-17 PROCEDURE — 80053 COMPREHEN METABOLIC PANEL: CPT | Performed by: INTERNAL MEDICINE

## 2020-01-17 PROCEDURE — 87536 HIV-1 QUANT&REVRSE TRNSCRPJ: CPT | Performed by: INTERNAL MEDICINE

## 2020-01-17 PROCEDURE — 86644 CMV ANTIBODY: CPT | Performed by: INTERNAL MEDICINE

## 2020-01-17 PROCEDURE — 86708 HEPATITIS A ANTIBODY: CPT | Performed by: INTERNAL MEDICINE

## 2020-01-17 PROCEDURE — 83690 ASSAY OF LIPASE: CPT | Performed by: INTERNAL MEDICINE

## 2020-01-17 ASSESSMENT — PAIN SCALES - GENERAL: PAINLEVEL: SEVERE PAIN (6)

## 2020-01-17 NOTE — NURSING NOTE
Chief Complaint   Patient presents with     RECHECK     b20     Blood pressure 115/70, pulse 85, temperature 98  F (36.7  C), temperature source Oral, weight 53.1 kg (117 lb), SpO2 96 %.    Brandy Miller/JOEL  January 17, 2020 10:54 AM

## 2020-01-17 NOTE — TELEPHONE ENCOUNTER
[facility] Call Center    Phone Message    May a detailed message be left on voicemail: yes    Reason for Call: Form or Letter   Type or form/letter needing completion: Pt requesting a letter addressed to whom it may concern, and that describes his current health and diagnosis. It also needs to demonstrate that he is too sick to be in a court. He is requesting this because upon getting home after his appt today he was notified that he will need to do 4 weeks of  jury duty starting next week. Pt stated that the letter can be mailed to him, or emailed. Pt stated he needs this letter by 1/21/20. Please call to notify if it will be mailed. Thank you.  Provider: Alireza  Date form needed: befoer 1/21/20  Once completed: Mail form to Name: , at Address: [address] [facility] [address] [address]      Action Taken: Message routed to:  [facility] ID
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
## 2020-01-18 LAB
CMV IGG SERPL QL IA: >8 AI (ref 0–0.8)
HAV IGG SER QL IA: REACTIVE
HCV AB SERPL QL IA: NONREACTIVE
T PALLIDUM AB SER QL: NONREACTIVE

## 2020-01-20 ENCOUNTER — HOSPITAL ENCOUNTER (OUTPATIENT)
Dept: CT IMAGING | Facility: CLINIC | Age: 50
Discharge: HOME | End: 2020-01-20
Attending: FAMILY MEDICINE | Admitting: FAMILY MEDICINE
Payer: COMMERCIAL

## 2020-01-20 ENCOUNTER — HOSPITAL ENCOUNTER (OUTPATIENT)
Dept: MRI IMAGING | Facility: CLINIC | Age: 50
End: 2020-01-20
Attending: INTERNAL MEDICINE
Payer: COMMERCIAL

## 2020-01-20 DIAGNOSIS — R41.0 CONFUSION: ICD-10-CM

## 2020-01-20 DIAGNOSIS — R07.89 CHEST WALL PAIN: ICD-10-CM

## 2020-01-20 DIAGNOSIS — R41.82 ALTERED MENTAL STATUS, UNSPECIFIED ALTERED MENTAL STATUS TYPE: ICD-10-CM

## 2020-01-20 LAB
GAMMA INTERFERON BACKGROUND BLD IA-ACNC: 0.09 IU/ML
HIV 1 & 2 AB SERPL IA.RAPID: NONREACTIVE
HIV 1 & 2 AB SERPL IA.RAPID: REACTIVE
HIV 1+2 AB+HIV1 P24 AG SERPL QL IA: REACTIVE
HIV 1+2 AB+HIV1P24 AG SERPLBLD IA.RAPID: ABNORMAL
M TB IFN-G BLD-IMP: NEGATIVE
M TB IFN-G CD4+ BCKGRND COR BLD-ACNC: 6.25 IU/ML
MITOGEN IGNF BCKGRD COR BLD-ACNC: 0 IU/ML
MITOGEN IGNF BCKGRD COR BLD-ACNC: 0.01 IU/ML
T GONDII IGG SER QL: 5 IU/ML (ref 0–7.1)

## 2020-01-20 PROCEDURE — 25000128 H RX IP 250 OP 636: Performed by: FAMILY MEDICINE

## 2020-01-20 PROCEDURE — 25500064 ZZH RX 255 OP 636: Performed by: INTERNAL MEDICINE

## 2020-01-20 PROCEDURE — A9585 GADOBUTROL INJECTION: HCPCS | Performed by: INTERNAL MEDICINE

## 2020-01-20 PROCEDURE — 25000125 ZZHC RX 250: Performed by: FAMILY MEDICINE

## 2020-01-20 PROCEDURE — 70553 MRI BRAIN STEM W/O & W/DYE: CPT

## 2020-01-20 PROCEDURE — 71260 CT THORAX DX C+: CPT

## 2020-01-20 RX ORDER — GADOBUTROL 604.72 MG/ML
7.5 INJECTION INTRAVENOUS ONCE
Status: COMPLETED | OUTPATIENT
Start: 2020-01-20 | End: 2020-01-20

## 2020-01-20 RX ORDER — IOPAMIDOL 755 MG/ML
500 INJECTION, SOLUTION INTRAVASCULAR ONCE
Status: COMPLETED | OUTPATIENT
Start: 2020-01-20 | End: 2020-01-20

## 2020-01-20 RX ADMIN — GADOBUTROL 5.5 ML: 604.72 INJECTION INTRAVENOUS at 16:03

## 2020-01-20 RX ADMIN — IOPAMIDOL 75 ML: 755 INJECTION, SOLUTION INTRAVENOUS at 14:39

## 2020-01-20 RX ADMIN — SODIUM CHLORIDE 75 ML: 9 INJECTION, SOLUTION INTRAVENOUS at 14:39

## 2020-01-21 LAB
HIV1 RNA # PLAS NAA DL=20: ABNORMAL {COPIES}/ML
HIV1 RNA SERPL NAA+PROBE-LOG#: 4.7 {LOG_COPIES}/ML

## 2020-01-22 ENCOUNTER — TELEPHONE (OUTPATIENT)
Dept: INFECTIOUS DISEASES | Facility: CLINIC | Age: 50
End: 2020-01-22

## 2020-01-22 LAB
HLA TYPE SA METHOD: NORMAL
HLA TYPE SA RESULT: NORMAL

## 2020-01-22 NOTE — TELEPHONE ENCOUNTER
Patient contacted and reminded of upcoming appointment.  Patient confirmed they will be attending.  Patient instructed to bring updated medications list to appointment.      Rosemarie James CMA    1/22/2020 4:29 PM

## 2020-01-24 ENCOUNTER — NURSE TRIAGE (OUTPATIENT)
Dept: FAMILY MEDICINE | Facility: CLINIC | Age: 50
End: 2020-01-24

## 2020-01-24 ENCOUNTER — OFFICE VISIT (OUTPATIENT)
Dept: INFECTIOUS DISEASES | Facility: CLINIC | Age: 50
End: 2020-01-24
Attending: INTERNAL MEDICINE
Payer: COMMERCIAL

## 2020-01-24 VITALS
BODY MASS INDEX: 17.46 KG/M2 | WEIGHT: 115.2 LBS | OXYGEN SATURATION: 99 % | HEART RATE: 77 BPM | HEIGHT: 68 IN | SYSTOLIC BLOOD PRESSURE: 111 MMHG | DIASTOLIC BLOOD PRESSURE: 71 MMHG | TEMPERATURE: 98.6 F

## 2020-01-24 DIAGNOSIS — R22.0 FACIAL SWELLING: ICD-10-CM

## 2020-01-24 DIAGNOSIS — B20 AIDS (ACQUIRED IMMUNE DEFICIENCY SYNDROME) (H): ICD-10-CM

## 2020-01-24 DIAGNOSIS — B20 SYMPTOMATIC HIV INFECTION (H): Primary | ICD-10-CM

## 2020-01-24 DIAGNOSIS — Z51.81 MEDICATION MONITORING ENCOUNTER: ICD-10-CM

## 2020-01-24 DIAGNOSIS — R06.02 SHORTNESS OF BREATH: ICD-10-CM

## 2020-01-24 PROCEDURE — G0463 HOSPITAL OUTPT CLINIC VISIT: HCPCS | Mod: ZF

## 2020-01-24 ASSESSMENT — PAIN SCALES - GENERAL: PAINLEVEL: NO PAIN (0)

## 2020-01-24 ASSESSMENT — MIFFLIN-ST. JEOR: SCORE: 1357.04
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
## 2020-01-24 NOTE — TELEPHONE ENCOUNTER
Per patient he was told by [facility] infection control that he might have pneumonia.  He was not having trouble breathing when he was in his providers office.  He states he has inflammation.    He has trouble breathing when he walks now.  He had a fever of 99.5 now  He wants a letter to get out of court Wednesday    Patient does not want to go to the ER.     He wants to wait until he is seen on Thursday.  Please advise.    Lois Melvin RN on 1/24/2020 at 2:04 PM      Reason for Disposition    Difficulty breathing    Additional Information    Negative: Bluish (or gray) lips or face    Negative: Severe difficulty breathing (e.g., struggling for each breath, speaks in single words)    Negative: Rapid onset of cough and has hives    Negative: Coughing started suddenly after medicine, an allergic food or bee sting    Negative: Difficulty breathing after exposure to flames, smoke, or fumes    Negative: Sounds like a life-threatening emergency to the triager    Negative: Previous asthma attacks and this feels like asthma attack    Negative: Chest pain present when not coughing    Protocols used: COUGH-A-OH
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
## 2020-01-27 ENCOUNTER — MYC MEDICAL ADVICE (OUTPATIENT)
Dept: FAMILY MEDICINE | Facility: CLINIC | Age: 50
End: 2020-01-27

## 2020-01-27 ENCOUNTER — HOSPITAL ENCOUNTER (EMERGENCY)
Facility: CLINIC | Age: 50
Discharge: HOME | End: 2020-01-27
Attending: EMERGENCY MEDICINE | Admitting: EMERGENCY MEDICINE
Payer: COMMERCIAL

## 2020-01-27 ENCOUNTER — DOCUMENTATION ONLY (OUTPATIENT)
Dept: INFECTIOUS DISEASES | Facility: CLINIC | Age: 50
End: 2020-01-27

## 2020-01-27 VITALS
OXYGEN SATURATION: 96 % | BODY MASS INDEX: 16.73 KG/M2 | SYSTOLIC BLOOD PRESSURE: 120 MMHG | RESPIRATION RATE: 16 BRPM | WEIGHT: 110 LBS | TEMPERATURE: 98.4 F | HEART RATE: 102 BPM | DIASTOLIC BLOOD PRESSURE: 88 MMHG

## 2020-01-27 DIAGNOSIS — R10.9 FLANK PAIN: ICD-10-CM

## 2020-01-27 DIAGNOSIS — B20 AIDS (H): ICD-10-CM

## 2020-01-27 DIAGNOSIS — I89.9 LYMPH NODE DISORDER: ICD-10-CM

## 2020-01-27 DIAGNOSIS — K59.00 CONSTIPATION, UNSPECIFIED CONSTIPATION TYPE: Primary | ICD-10-CM

## 2020-01-27 LAB
ALBUMIN SERPL-MCNC: 3.5 G/DL (ref 3.4–5)
ALBUMIN UR-MCNC: NEGATIVE MG/DL
ALP SERPL-CCNC: 101 U/L (ref 40–150)
ALT SERPL W P-5'-P-CCNC: 11 U/L (ref 0–70)
ANION GAP SERPL CALCULATED.3IONS-SCNC: 5 MMOL/L (ref 3–14)
APPEARANCE UR: CLEAR
AST SERPL W P-5'-P-CCNC: 13 U/L (ref 0–45)
BASOPHILS # BLD AUTO: 0 10E9/L (ref 0–0.2)
BASOPHILS NFR BLD AUTO: 0.2 %
BILIRUB SERPL-MCNC: 0.6 MG/DL (ref 0.2–1.3)
BILIRUB UR QL STRIP: NEGATIVE
BUN SERPL-MCNC: 13 MG/DL (ref 7–30)
CALCIUM SERPL-MCNC: 8.6 MG/DL (ref 8.5–10.1)
CHLORIDE SERPL-SCNC: 109 MMOL/L (ref 94–109)
CO2 SERPL-SCNC: 26 MMOL/L (ref 20–32)
COLOR UR AUTO: YELLOW
CREAT SERPL-MCNC: 0.91 MG/DL (ref 0.66–1.25)
DIFFERENTIAL METHOD BLD: ABNORMAL
EOSINOPHIL NFR BLD AUTO: 0.2 %
ERYTHROCYTE [DISTWIDTH] IN BLOOD BY AUTOMATED COUNT: 14.2 % (ref 10–15)
ERYTHROCYTE [SEDIMENTATION RATE] IN BLOOD BY WESTERGREN METHOD: 11 MM/H (ref 0–20)
GFR SERPL CREATININE-BSD FRML MDRD: >90 ML/MIN/{1.73_M2}
GLUCOSE SERPL-MCNC: 80 MG/DL (ref 70–99)
GLUCOSE UR STRIP-MCNC: NEGATIVE MG/DL
HCT VFR BLD AUTO: 45.1 % (ref 40–53)
HGB BLD-MCNC: 15.2 G/DL (ref 13.3–17.7)
HGB UR QL STRIP: NEGATIVE
IMM GRANULOCYTES # BLD: 0 10E9/L (ref 0–0.4)
IMM GRANULOCYTES NFR BLD: 0.2 %
KETONES UR STRIP-MCNC: NEGATIVE MG/DL
LEUKOCYTE ESTERASE UR QL STRIP: NEGATIVE
LIPASE SERPL-CCNC: 42 U/L (ref 73–393)
LYMPHOCYTES # BLD AUTO: 2.7 10E9/L (ref 0.8–5.3)
LYMPHOCYTES NFR BLD AUTO: 54.6 %
MCH RBC QN AUTO: 28 PG (ref 26.5–33)
MCHC RBC AUTO-ENTMCNC: 33.7 G/DL (ref 31.5–36.5)
MCV RBC AUTO: 83 FL (ref 78–100)
MONOCYTES # BLD AUTO: 0.3 10E9/L (ref 0–1.3)
MONOCYTES NFR BLD AUTO: 6.2 %
NEUTROPHILS # BLD AUTO: 1.9 10E9/L (ref 1.6–8.3)
NEUTROPHILS NFR BLD AUTO: 38.6 %
NITRATE UR QL: NEGATIVE
NRBC # BLD AUTO: 0 10*3/UL
NRBC BLD AUTO-RTO: 0 /100
PH UR STRIP: 6 PH (ref 5–7)
PLATELET # BLD AUTO: 128 10E9/L (ref 150–450)
POTASSIUM SERPL-SCNC: 3.8 MMOL/L (ref 3.4–5.3)
PROT SERPL-MCNC: 7.5 G/DL (ref 6.8–8.8)
RBC # BLD AUTO: 5.42 10E12/L (ref 4.4–5.9)
SODIUM SERPL-SCNC: 140 MMOL/L (ref 133–144)
SOURCE: NORMAL
SP GR UR STRIP: 1.01 (ref 1–1.03)
UROBILINOGEN UR STRIP-MCNC: 0 MG/DL (ref 0–2)
WBC # BLD AUTO: 4.9 10E9/L (ref 4–11)

## 2020-01-27 PROCEDURE — 85652 RBC SED RATE AUTOMATED: CPT | Performed by: EMERGENCY MEDICINE

## 2020-01-27 PROCEDURE — 80053 COMPREHEN METABOLIC PANEL: CPT | Performed by: EMERGENCY MEDICINE

## 2020-01-27 PROCEDURE — 81003 URINALYSIS AUTO W/O SCOPE: CPT | Performed by: EMERGENCY MEDICINE

## 2020-01-27 PROCEDURE — 99283 EMERGENCY DEPT VISIT LOW MDM: CPT | Performed by: EMERGENCY MEDICINE

## 2020-01-27 PROCEDURE — 85025 COMPLETE CBC W/AUTO DIFF WBC: CPT | Performed by: EMERGENCY MEDICINE

## 2020-01-27 PROCEDURE — 83690 ASSAY OF LIPASE: CPT | Performed by: EMERGENCY MEDICINE

## 2020-01-27 PROCEDURE — 99284 EMERGENCY DEPT VISIT MOD MDM: CPT | Mod: Z6 | Performed by: EMERGENCY MEDICINE

## 2020-01-27 NOTE — PROGRESS NOTES
Here is a my chart note I sent today to the patient:    I do not think you are allergic to truvada.  You took it for years which indicates you do not have an allergy.  The medications in truvada are not the same as those in Biktarvy.  I think Biktarvy would be safe for you.  I am not a primary care doctor.  I am an HIV expert.  I am willing to work with your primary doctor to help you with your many medial issues.  From the point of view of your HIV, I feel it is most important for you to get you back on HIV medications.

## 2020-01-27 NOTE — ED TRIAGE NOTES
"Presents to ED with bilateral flank pain. Was told by infectious disease at the  yesterday that there's \"also a lot of things going on in my lungs.\" Also complains of bilateral knee swelling. Told him to follow up with primary. Was supposed to go inpatient at the  last week but patient refused admission.  "

## 2020-01-27 NOTE — DISCHARGE INSTRUCTIONS
Return to the ER if you develop new or worsening symptoms.  Your lab tests were reassuring.  I am not sure what the cause of your flank pain is.  I hope you get better quickly.
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
## 2020-01-27 NOTE — ED AVS SNAPSHOT
[facility] Emergency Department  [address]   [place] [address]  Phone:  [phone]  Fax:  [phone]                                    Tommy Ross   MRN: [id number]    Department:  [facility] Emergency Department   Date of Visit:  1/27/2020           After Visit Summary Signature Page    I have received my discharge instructions, and my questions have been answered. I have discussed any challenges I see with this plan with the nurse or doctor.    ..........................................................................................................................................  Patient/Patient Representative Signature      ..........................................................................................................................................  Patient Representative Print Name and Relationship to Patient    ..................................................               ................................................  Date                                   Time    ..........................................................................................................................................  Reviewed by Signature/Title    ...................................................              ..............................................  Date                                               Time          22EPIC Rev 08/18

## 2020-01-28 ENCOUNTER — MYC MEDICAL ADVICE (OUTPATIENT)
Dept: INFECTIOUS DISEASES | Facility: CLINIC | Age: 50
End: 2020-01-28

## 2020-01-28 DIAGNOSIS — G89.29 OTHER CHRONIC PAIN: ICD-10-CM

## 2020-01-28 NOTE — TELEPHONE ENCOUNTER
MS Contin      Last Written Prescription Date:  1/2/2020  Last Fill Quantity: 30,   # refills: 0  Last Office Visit: 1/16/2020  Future Office visit:    Next 5 appointments (look out 90 days)    Feb 05, 2020  2:00 PM CST  Return Visit with Devonte Hogan MD  [facility] ([facility] [address] [address]  [phone]           Routing refill request to provider for review/approval because:  Drug not on the [facility], [facility] or  Health refill protocol or controlled substance    Lois Melvin RN on 1/28/2020 at 1:40 PM

## 2020-01-29 DIAGNOSIS — B35.3 TINEA PEDIS OF BOTH FEET: ICD-10-CM

## 2020-01-29 DIAGNOSIS — B37.0 THRUSH: ICD-10-CM

## 2020-01-29 DIAGNOSIS — J98.01 ACUTE BRONCHOSPASM: ICD-10-CM

## 2020-01-29 RX ORDER — FLUCONAZOLE 100 MG/1
100 TABLET ORAL DAILY
Qty: 15 TABLET | Refills: 0 | Status: SHIPPED | OUTPATIENT
Start: 2020-01-29 | End: 2020-04-23

## 2020-01-29 RX ORDER — MORPHINE SULFATE 15 MG/1
TABLET, FILM COATED, EXTENDED RELEASE ORAL
Qty: 30 TABLET | Refills: 0 | Status: SHIPPED | OUTPATIENT
Start: 2020-01-31 | End: 2020-03-09

## 2020-01-29 RX ORDER — KETOCONAZOLE 20 MG/G
CREAM TOPICAL 2 TIMES DAILY
Qty: 60 G | Refills: 1 | Status: SHIPPED | OUTPATIENT
Start: 2020-01-29 | End: 2020-03-16

## 2020-01-29 RX ORDER — ALBUTEROL SULFATE 90 UG/1
AEROSOL, METERED RESPIRATORY (INHALATION)
Qty: 18 G | Refills: 0 | Status: SHIPPED | OUTPATIENT
Start: 2020-01-29 | End: 2020-03-16

## 2020-01-29 NOTE — TELEPHONE ENCOUNTER
Routing refill request to provider for review/approval because:  Labs out of range:  act   Failed - Not Fluconazole or Terconazole       Lois Melvin RN on 1/29/2020 at 1:17 PM

## 2020-01-29 NOTE — TELEPHONE ENCOUNTER
He already has a order in for pulmonology but I have to get him set up.  Please sign the other 2 orders and I can set him up for those.  Also add the diagnosis to what I already have and route back to me.

## 2020-01-29 NOTE — TELEPHONE ENCOUNTER
"Requested Prescriptions   Pending Prescriptions Disp Refills     fluconazole (DIFLUCAN) 100 MG tablet [Pharmacy Med Name: FLUCONAZOLE 100 MG TABLET 100 TAB] 15 tablet 0     Sig: TAKE 1 TABLET (100 MG) BY MOUTH DAILY FOR 15 DAYS   Last Written Prescription Date:  1/6/20  Last Fill Quantity: 15,  # refills: 0   Last office visit: 1/16/2020 with prescribing provider:  Dom Mane Office Visit:   Next 5 appointments (look out 90 days)    Feb 05, 2020  2:00 PM CST  Return Visit with Devonte Hogan MD  [facility] ([address]  [place] [address]  [phone]             Antifungal Agents Failed - 1/29/2020 10:43 AM        Failed - Not Fluconazole or Terconazole      If oral Fluconazole or Terconazole, may refill if indicated in progress notes.           Passed - Recent (12 mo) or future (30 days) visit within the authorizing provider's specialty     Patient has had an office visit with the authorizing provider or a provider within the authorizing providers department within the previous 12 mos or has a future within next 30 days. See \"Patient Info\" tab in inbasket, or \"Choose Columns\" in Meds & Orders section of the refill encounter.              Passed - Medication is active on med list        VENTOLIN  (90 Base) MCG/ACT inhaler [Pharmacy Med Name: VENTOLIN HFA  (90 BAS AERO] 18 g 0   .rxd     Last Written Prescription Date:  1/6/20  Last Fill Quantity: 18 g,  # refills: 0   Last office visit: 1/16/2020 with prescribing provider:  Dom Mane Office Visit:   Next 5 appointments (look out 90 days)    Feb 05, 2020  2:00 PM CST  Return Visit with Devonte Hogan MD  [facility] ([address]  [place] [address]  [phone]             Asthma Maintenance Inhalers - Anticholinergics Failed - 1/29/2020 10:43 AM        Failed - Asthma control assessment score within normal limits in last 6 months     " "Please review ACT score.   ACT Total Scores 7/25/2017 2/19/2018 11/29/2019   ACT TOTAL SCORE (Goal Greater than or Equal to 20) 19 9 19   In the past 12 months, how many times did you visit the emergency room for your asthma without being admitted to the hospital? 3 3 0   In the past 12 months, how many times were you hospitalized overnight because of your asthma? 3 3 0             Passed - Patient is age 12 years or older        Passed - Medication is active on med list        Passed - Recent (6 mo) or future (30 days) visit within the authorizing provider's specialty     Patient had office visit in the last 6 months or has a visit in the next 30 days with authorizing provider or within the authorizing provider's specialty.  See \"Patient Info\" tab in inbasket, or \"Choose Columns\" in Meds & Orders section of the refill encounter.            ketoconazole (NIZORAL) 2 % external cream [Pharmacy Med Name: KETOCONAZOLE 2PCT CRE 2 CRM] 60 g 1     Sig: APPLY TOPICALLY 2 TIMES DAILY   Last Written Prescription Date:  12/16/19  Last Fill Quantity: 60 g,  # refills: 1   Last office visit: 1/16/2020 with prescribing provider:  Dom   Future Office Visit:   Next 5 appointments (look out 90 days)    Feb 05, 2020  2:00 PM CST  Return Visit with Devonte Hogan MD  [facility] ([facility]) [address] [place] [id number]  [phone]             Antifungal Agents Failed - 1/29/2020 10:43 AM        Failed - Not Fluconazole or Terconazole      If oral Fluconazole or Terconazole, may refill if indicated in progress notes.           Passed - Recent (12 mo) or future (30 days) visit within the authorizing provider's specialty     Patient has had an office visit with the authorizing provider or a provider within the authorizing providers department within the previous 12 mos or has a future within next 30 days. See \"Patient Info\" tab in inbasket, or \"Choose Columns\" in Meds & Orders section of " the refill encounter.              Passed - Medication is active on med list

## 2020-01-30 ENCOUNTER — MYC MEDICAL ADVICE (OUTPATIENT)
Dept: FAMILY MEDICINE | Facility: CLINIC | Age: 50
End: 2020-01-30

## 2020-02-05 ENCOUNTER — OFFICE VISIT (OUTPATIENT)
Dept: RHEUMATOLOGY | Facility: CLINIC | Age: 50
End: 2020-02-05
Payer: COMMERCIAL

## 2020-02-05 VITALS
HEART RATE: 87 BPM | WEIGHT: 111 LBS | SYSTOLIC BLOOD PRESSURE: 117 MMHG | OXYGEN SATURATION: 98 % | BODY MASS INDEX: 16.88 KG/M2 | DIASTOLIC BLOOD PRESSURE: 84 MMHG

## 2020-02-05 DIAGNOSIS — B20 AIDS (ACQUIRED IMMUNE DEFICIENCY SYNDROME) (H): ICD-10-CM

## 2020-02-05 DIAGNOSIS — R06.02 SHORTNESS OF BREATH: ICD-10-CM

## 2020-02-05 DIAGNOSIS — R22.0 FACIAL SWELLING: ICD-10-CM

## 2020-02-05 DIAGNOSIS — Z51.81 MEDICATION MONITORING ENCOUNTER: ICD-10-CM

## 2020-02-05 LAB
ALBUMIN SERPL-MCNC: 3.8 G/DL (ref 3.4–5)
ALP SERPL-CCNC: 118 U/L (ref 40–150)
ALT SERPL W P-5'-P-CCNC: 16 U/L (ref 0–70)
AMYLASE SERPL-CCNC: 75 U/L (ref 30–110)
ANION GAP SERPL CALCULATED.3IONS-SCNC: 4 MMOL/L (ref 3–14)
AST SERPL W P-5'-P-CCNC: 15 U/L (ref 0–45)
BASOPHILS # BLD AUTO: 0 10E9/L (ref 0–0.2)
BASOPHILS NFR BLD AUTO: 0 %
BILIRUB SERPL-MCNC: 0.7 MG/DL (ref 0.2–1.3)
BUN SERPL-MCNC: 11 MG/DL (ref 7–30)
CALCIUM SERPL-MCNC: 9.4 MG/DL (ref 8.5–10.1)
CHLORIDE SERPL-SCNC: 110 MMOL/L (ref 94–109)
CO2 SERPL-SCNC: 26 MMOL/L (ref 20–32)
CREAT SERPL-MCNC: 0.92 MG/DL (ref 0.66–1.25)
DIFFERENTIAL METHOD BLD: ABNORMAL
EOSINOPHIL # BLD AUTO: 0 10E9/L (ref 0–0.7)
EOSINOPHIL NFR BLD AUTO: 0.2 %
ERYTHROCYTE [DISTWIDTH] IN BLOOD BY AUTOMATED COUNT: 15.6 % (ref 10–15)
GFR SERPL CREATININE-BSD FRML MDRD: >90 ML/MIN/{1.73_M2}
GLUCOSE SERPL-MCNC: 89 MG/DL (ref 70–99)
HCT VFR BLD AUTO: 48.5 % (ref 40–53)
HGB BLD-MCNC: 16.5 G/DL (ref 13.3–17.7)
LIPASE SERPL-CCNC: 61 U/L (ref 73–393)
LYMPHOCYTES # BLD AUTO: 2.5 10E9/L (ref 0.8–5.3)
LYMPHOCYTES NFR BLD AUTO: 49.2 %
MCH RBC QN AUTO: 27.9 PG (ref 26.5–33)
MCHC RBC AUTO-ENTMCNC: 34 G/DL (ref 31.5–36.5)
MCV RBC AUTO: 82 FL (ref 78–100)
MONOCYTES # BLD AUTO: 0.4 10E9/L (ref 0–1.3)
MONOCYTES NFR BLD AUTO: 7.2 %
NEUTROPHILS # BLD AUTO: 2.2 10E9/L (ref 1.6–8.3)
NEUTROPHILS NFR BLD AUTO: 43.4 %
PLATELET # BLD AUTO: 134 10E9/L (ref 150–450)
POTASSIUM SERPL-SCNC: 4.3 MMOL/L (ref 3.4–5.3)
PROCALCITONIN SERPL-MCNC: <0.05 NG/ML
PROT SERPL-MCNC: 8.7 G/DL (ref 6.8–8.8)
RBC # BLD AUTO: 5.92 10E12/L (ref 4.4–5.9)
SODIUM SERPL-SCNC: 140 MMOL/L (ref 133–144)
WBC # BLD AUTO: 5.2 10E9/L (ref 4–11)

## 2020-02-05 PROCEDURE — 86360 T CELL ABSOLUTE COUNT/RATIO: CPT | Performed by: INTERNAL MEDICINE

## 2020-02-05 PROCEDURE — 86160 COMPLEMENT ANTIGEN: CPT | Mod: 90 | Performed by: INTERNAL MEDICINE

## 2020-02-05 PROCEDURE — 82150 ASSAY OF AMYLASE: CPT | Performed by: INTERNAL MEDICINE

## 2020-02-05 PROCEDURE — 86161 COMPLEMENT/FUNCTION ACTIVITY: CPT | Mod: 90 | Performed by: INTERNAL MEDICINE

## 2020-02-05 PROCEDURE — 87536 HIV-1 QUANT&REVRSE TRNSCRPJ: CPT | Performed by: INTERNAL MEDICINE

## 2020-02-05 PROCEDURE — 85025 COMPLETE CBC W/AUTO DIFF WBC: CPT | Performed by: INTERNAL MEDICINE

## 2020-02-05 PROCEDURE — 36415 COLL VENOUS BLD VENIPUNCTURE: CPT | Performed by: INTERNAL MEDICINE

## 2020-02-05 PROCEDURE — 80053 COMPREHEN METABOLIC PANEL: CPT | Performed by: INTERNAL MEDICINE

## 2020-02-05 PROCEDURE — 86160 COMPLEMENT ANTIGEN: CPT | Performed by: INTERNAL MEDICINE

## 2020-02-05 PROCEDURE — 99000 SPECIMEN HANDLING OFFICE-LAB: CPT | Performed by: INTERNAL MEDICINE

## 2020-02-05 PROCEDURE — 86359 T CELLS TOTAL COUNT: CPT | Performed by: INTERNAL MEDICINE

## 2020-02-05 PROCEDURE — 99213 OFFICE O/P EST LOW 20 MIN: CPT | Performed by: INTERNAL MEDICINE

## 2020-02-05 PROCEDURE — 84145 PROCALCITONIN (PCT): CPT | Performed by: INTERNAL MEDICINE

## 2020-02-05 PROCEDURE — 83690 ASSAY OF LIPASE: CPT | Performed by: INTERNAL MEDICINE

## 2020-02-05 NOTE — PROGRESS NOTES
RAPID3 (0-30) Cumulative Score  24.7          RAPID3 Weighted Score (divide #4 by 3 and that is the weighted score)  6.2       Didi Sapp, CMA  2/5/2020  2:02 PM
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
## 2020-02-06 LAB
C4 SERPL-MCNC: 29 MG/DL (ref 13–39)
CD3 CELLS # BLD: 2313 CELLS/UL (ref 603–2990)
CD3 CELLS NFR BLD: 94 % (ref 49–84)
CD3+CD4+ CELLS # BLD: 230 CELLS/UL (ref 441–2156)
CD3+CD4+ CELLS NFR BLD: 9 % (ref 28–63)
CD3+CD4+ CELLS/CD3+CD8+ CLL BLD: 0.11 % (ref 1.4–2.6)
CD3+CD8+ CELLS # BLD: 2063 CELLS/UL (ref 125–1312)
CD3+CD8+ CELLS NFR BLD: 84 % (ref 10–40)
IFC SPECIMEN: ABNORMAL

## 2020-02-07 ENCOUNTER — TELEPHONE (OUTPATIENT)
Dept: ONCOLOGY | Facility: CLINIC | Age: 50
End: 2020-02-07

## 2020-02-07 LAB
HIV1 RNA # PLAS NAA DL=20: ABNORMAL {COPIES}/ML
HIV1 RNA SERPL NAA+PROBE-LOG#: 4.9 {LOG_COPIES}/ML

## 2020-02-07 NOTE — TELEPHONE ENCOUNTER
Tanvir Ochoa, Pt calling and would like to know if he can see Dr. Navarro for hematology. Thank You Marta

## 2020-02-07 NOTE — TELEPHONE ENCOUNTER
"Returned call to patient for details.  Patient is requesting to see an Oncologist or hematologist to test for cancer.  No referral.  Patient's platelets and lipase have been low and he wants it evaluated.  Patient also reporting enlarged lymph nodes and night sweats.  Recommended going back to PCP for referral and/or further evaluation.  Patient reports that he has \"full blown aids and everything is wrong and I want to get to the bottom of it\".  Reviewed rationale for evaluation to get appropriate diagnosis so he is seeing the appropriate specialists.  Patient reports he is seeing a Pulmonologist for a lung nodule and Gastro for low lipase.  Chart review and patient scheduled for lung concern for Pulmonology and constipation for Gastro.  Again reassured patient that PCP would evaluate and get him to the appropriate specialists.  Patient reports that he has an appointment coming up with his PCP and will talk to him at that time.    Don Peng RN, BSN, OCN  2/7/2020, 2:31 PM      "

## 2020-02-09 LAB
C1INH FUNCTIONAL/C1INH TOTAL MFR SERPL: 92 %
C1INH SERPL-MCNC: 43 MG/DL (ref 21–39)

## 2020-02-14 LAB — LAB SCANNED RESULT: NORMAL

## 2020-02-21 ENCOUNTER — OFFICE VISIT (OUTPATIENT)
Dept: PULMONOLOGY | Facility: CLINIC | Age: 50
End: 2020-02-21
Payer: COMMERCIAL

## 2020-02-21 VITALS
WEIGHT: 111 LBS | RESPIRATION RATE: 22 BRPM | HEIGHT: 68 IN | DIASTOLIC BLOOD PRESSURE: 80 MMHG | SYSTOLIC BLOOD PRESSURE: 118 MMHG | BODY MASS INDEX: 16.82 KG/M2 | OXYGEN SATURATION: 93 % | HEART RATE: 83 BPM

## 2020-02-21 DIAGNOSIS — B20 SYMPTOMATIC HIV INFECTION (H): Primary | ICD-10-CM

## 2020-02-21 PROCEDURE — 99214 OFFICE O/P EST MOD 30 MIN: CPT | Performed by: INTERNAL MEDICINE

## 2020-02-21 RX ORDER — SULFAMETHOXAZOLE/TRIMETHOPRIM 800-160 MG
1 TABLET ORAL DAILY
Qty: 30 TABLET | Refills: 11 | Status: SHIPPED | OUTPATIENT
Start: 2020-02-21

## 2020-02-21 RX ORDER — SULFAMETHOXAZOLE/TRIMETHOPRIM 800-160 MG
1 TABLET ORAL 2 TIMES DAILY
Qty: 28 TABLET | Refills: 0 | Status: SHIPPED | OUTPATIENT
Start: 2020-02-21 | End: 2020-04-16

## 2020-02-21 ASSESSMENT — MIFFLIN-ST. JEOR: SCORE: 1337.99
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
## 2020-02-21 NOTE — NURSING NOTE
Does Tommy have home oxygen?  Yes         HOME OXYGEN    nasal cannula    [facility] [phone]   [email or web address]

## 2020-02-23 ENCOUNTER — HEALTH MAINTENANCE LETTER (OUTPATIENT)
Age: 50
End: 2020-02-23

## 2020-02-23 ENCOUNTER — MYC REFILL (OUTPATIENT)
Dept: FAMILY MEDICINE | Facility: CLINIC | Age: 50
End: 2020-02-23

## 2020-02-23 DIAGNOSIS — G89.29 OTHER CHRONIC PAIN: ICD-10-CM

## 2020-02-24 RX ORDER — OXYCODONE AND ACETAMINOPHEN 10; 325 MG/1; MG/1
TABLET ORAL
Qty: 150 TABLET | Refills: 0 | Status: SHIPPED | OUTPATIENT
Start: 2020-02-24 | End: 2020-03-16

## 2020-02-24 NOTE — TELEPHONE ENCOUNTER
Please review as PCP is out of the office.    ENDOCET  MG per tablet [Faustino Fernandes MD]       Last Written Prescription Date:  2/04/20  Last Fill Quantity: 150,   # refills: 0  Last Office Visit: 1/16/20  Future Office visit:       Routing refill request to provider for review/approval because:  Drug not on the [facility], [facility] or [facility] refill protocol or controlled substance

## 2020-02-25 ENCOUNTER — MYC MEDICAL ADVICE (OUTPATIENT)
Dept: FAMILY MEDICINE | Facility: CLINIC | Age: 50
End: 2020-02-25

## 2020-02-25 DIAGNOSIS — G89.29 OTHER CHRONIC PAIN: ICD-10-CM

## 2020-02-26 LAB — LAB SCANNED RESULT: ABNORMAL

## 2020-03-07 DIAGNOSIS — G89.29 OTHER CHRONIC PAIN: ICD-10-CM

## 2020-03-07 DIAGNOSIS — F41.9 ANXIETY: ICD-10-CM

## 2020-03-09 ENCOUNTER — MYC MEDICAL ADVICE (OUTPATIENT)
Dept: FAMILY MEDICINE | Facility: CLINIC | Age: 50
End: 2020-03-09

## 2020-03-09 RX ORDER — ALPRAZOLAM 0.5 MG
TABLET ORAL
Qty: 14 TABLET | Refills: 0 | Status: SHIPPED | OUTPATIENT
Start: 2020-03-09 | End: 2020-03-16

## 2020-03-09 RX ORDER — MORPHINE SULFATE 15 MG/1
TABLET, FILM COATED, EXTENDED RELEASE ORAL
Qty: 30 TABLET | Refills: 0 | Status: SHIPPED | OUTPATIENT
Start: 2020-03-09 | End: 2020-03-16

## 2020-03-09 NOTE — TELEPHONE ENCOUNTER
ALPRAZolam (XANAX) 0.5 MG tablet      Last Written Prescription Date:  7/10/19  Last Fill Quantity: 15,   # refills: 0  Last Office Visit: 1/16/2020  Future Office visit:       Routing refill request to provider for review/approval because:  Drug not on the [facility], [facility] or [facility] refill protocol or controlled substance

## 2020-03-09 NOTE — TELEPHONE ENCOUNTER
Requested Prescriptions   Pending Prescriptions Disp Refills     ALPRAZolam (XANAX) 0.5 MG tablet [Pharmacy Med Name: ALPRAZOLAM 0.5 MG TABLET 0.5 Tablet] 14 tablet 0     Sig: TAKE ONE TO TWO TABLET(S) BY MOUTH DAILY AS NEEDED FOR ANXIETY       There is no refill protocol information for this order

## 2020-03-09 NOTE — TELEPHONE ENCOUNTER
Morphine      Last Written Prescription Date:  1/31/2020  Last Fill Quantity: 30,   # refills: 0  Last Office Visit: 1/16/2020  Future Office visit:       Routing refill request to provider for review/approval because:  Drug not on the [facility], [facility] or [facility] refill protocol or controlled substance

## 2020-03-10 ENCOUNTER — OFFICE VISIT (OUTPATIENT)
Dept: GASTROENTEROLOGY | Facility: CLINIC | Age: 50
End: 2020-03-10
Attending: FAMILY MEDICINE
Payer: COMMERCIAL

## 2020-03-10 ENCOUNTER — HOSPITAL ENCOUNTER (OUTPATIENT)
Facility: CLINIC | Age: 50
End: 2020-03-10
Attending: INTERNAL MEDICINE | Admitting: INTERNAL MEDICINE
Payer: COMMERCIAL

## 2020-03-10 VITALS
BODY MASS INDEX: 17.05 KG/M2 | OXYGEN SATURATION: 98 % | RESPIRATION RATE: 18 BRPM | SYSTOLIC BLOOD PRESSURE: 124 MMHG | HEIGHT: 68 IN | DIASTOLIC BLOOD PRESSURE: 60 MMHG | WEIGHT: 112.5 LBS | HEART RATE: 92 BPM

## 2020-03-10 DIAGNOSIS — R10.12 LUQ ABDOMINAL PAIN: Primary | ICD-10-CM

## 2020-03-10 DIAGNOSIS — R10.12 LUQ ABDOMINAL PAIN: ICD-10-CM

## 2020-03-10 DIAGNOSIS — R93.3 ABNORMAL CT SCAN, ESOPHAGUS: ICD-10-CM

## 2020-03-10 DIAGNOSIS — R63.4 WEIGHT LOSS: ICD-10-CM

## 2020-03-10 DIAGNOSIS — R63.0 ANOREXIA: ICD-10-CM

## 2020-03-10 PROCEDURE — 99203 OFFICE O/P NEW LOW 30 MIN: CPT | Performed by: INTERNAL MEDICINE

## 2020-03-10 ASSESSMENT — MIFFLIN-ST. JEOR: SCORE: 1344.8
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
## 2020-03-16 ENCOUNTER — TELEPHONE (OUTPATIENT)
Dept: GASTROENTEROLOGY | Facility: CLINIC | Age: 50
End: 2020-03-16

## 2020-03-16 ENCOUNTER — MYC MEDICAL ADVICE (OUTPATIENT)
Dept: FAMILY MEDICINE | Facility: CLINIC | Age: 50
End: 2020-03-16

## 2020-03-16 DIAGNOSIS — J98.01 ACUTE BRONCHOSPASM: ICD-10-CM

## 2020-03-16 DIAGNOSIS — F41.9 ANXIETY: ICD-10-CM

## 2020-03-16 DIAGNOSIS — K64.4 EXTERNAL HEMORRHOIDS: ICD-10-CM

## 2020-03-16 DIAGNOSIS — B35.3 TINEA PEDIS OF BOTH FEET: ICD-10-CM

## 2020-03-16 DIAGNOSIS — K59.00 CONSTIPATION, UNSPECIFIED CONSTIPATION TYPE: ICD-10-CM

## 2020-03-16 DIAGNOSIS — G89.29 OTHER CHRONIC PAIN: ICD-10-CM

## 2020-03-16 RX ORDER — OXYCODONE AND ACETAMINOPHEN 10; 325 MG/1; MG/1
TABLET ORAL
Qty: 150 TABLET | Refills: 0 | Status: SHIPPED | OUTPATIENT
Start: 2020-03-25 | End: 2020-03-27

## 2020-03-16 RX ORDER — LIDOCAINE 40 MG/G
CREAM TOPICAL
Status: CANCELLED | OUTPATIENT
Start: 2020-03-16

## 2020-03-16 RX ORDER — BISACODYL 5 MG/1
5 TABLET, DELAYED RELEASE ORAL DAILY PRN
Qty: 90 TABLET | Refills: 3 | Status: SHIPPED | OUTPATIENT
Start: 2020-03-16 | End: 2021-03-24

## 2020-03-16 RX ORDER — ONDANSETRON 2 MG/ML
4 INJECTION INTRAMUSCULAR; INTRAVENOUS
Status: CANCELLED | OUTPATIENT
Start: 2020-03-16

## 2020-03-16 RX ORDER — KETOCONAZOLE 20 MG/G
CREAM TOPICAL 2 TIMES DAILY
Qty: 60 G | Refills: 1 | Status: SHIPPED | OUTPATIENT
Start: 2020-03-16

## 2020-03-16 RX ORDER — CLOTRIMAZOLE 1 %
CREAM (GRAM) TOPICAL
Qty: 15 G | Refills: 0 | Status: SHIPPED | OUTPATIENT
Start: 2020-03-16 | End: 2020-04-22

## 2020-03-16 RX ORDER — MORPHINE SULFATE 15 MG/1
15 TABLET, FILM COATED, EXTENDED RELEASE ORAL DAILY
Qty: 30 TABLET | Refills: 0 | Status: SHIPPED | OUTPATIENT
Start: 2020-04-08 | End: 2020-05-27

## 2020-03-16 RX ORDER — ALPRAZOLAM 0.5 MG
TABLET ORAL
Qty: 14 TABLET | Refills: 0 | Status: SHIPPED | OUTPATIENT
Start: 2020-04-08 | End: 2020-04-16

## 2020-03-16 RX ORDER — ALBUTEROL SULFATE 90 UG/1
AEROSOL, METERED RESPIRATORY (INHALATION)
Qty: 18 G | Refills: 0 | Status: SHIPPED | OUTPATIENT
Start: 2020-03-16 | End: 2020-04-16

## 2020-03-16 NOTE — TELEPHONE ENCOUNTER
Reason for Call:  Other appointment    Detailed comments: Patient needs to reschedule his scope. Please call him    Phone Number Patient can be reached at: Home number on file [phone] (home)    Best Time: any    Can we leave a detailed message on this number? YES    Call taken on 3/16/2020 at 11:22 AM by Olga Hu

## 2020-03-16 NOTE — TELEPHONE ENCOUNTER
Per Dr. Fernandes patient can have the refills without being seen. Medications have been T'd up for refill.  Aaliyah Mittal on 3/16/2020 at 12:51 PM

## 2020-03-19 ENCOUNTER — MYC MEDICAL ADVICE (OUTPATIENT)
Dept: FAMILY MEDICINE | Facility: CLINIC | Age: 50
End: 2020-03-19

## 2020-03-20 LAB — LAB SCANNED RESULT: NORMAL

## 2020-03-21 ENCOUNTER — TRANSFERRED RECORDS (OUTPATIENT)
Dept: HEALTH INFORMATION MANAGEMENT | Facility: CLINIC | Age: 50
End: 2020-03-21

## 2020-03-24 ENCOUNTER — MYC MEDICAL ADVICE (OUTPATIENT)
Dept: INFECTIOUS DISEASES | Facility: CLINIC | Age: 50
End: 2020-03-24

## 2020-03-27 DIAGNOSIS — G89.29 OTHER CHRONIC PAIN: Primary | ICD-10-CM

## 2020-03-27 RX ORDER — OXYCODONE HCL/ACETAMINOPHEN 10MG-325MG
TABLET ORAL
Qty: 150 TABLET | Refills: 0 | Status: SHIPPED | OUTPATIENT
Start: 2020-03-27 | End: 2020-05-07

## 2020-03-27 NOTE — TELEPHONE ENCOUNTER
Fax from [facility] states the following:    Will you please put a GENESIS 1 on Tommy's Endocet prescription. He must have the brand name and we lose a lot of money. Need GENESIS 1.

## 2020-04-03 ENCOUNTER — MYC MEDICAL ADVICE (OUTPATIENT)
Dept: FAMILY MEDICINE | Facility: CLINIC | Age: 50
End: 2020-04-03

## 2020-04-03 ENCOUNTER — TELEPHONE (OUTPATIENT)
Dept: FAMILY MEDICINE | Facility: CLINIC | Age: 50
End: 2020-04-03

## 2020-04-03 DIAGNOSIS — G89.29 OTHER CHRONIC PAIN: Primary | ICD-10-CM

## 2020-04-03 RX ORDER — FENTANYL 25 UG/1
1 PATCH TRANSDERMAL
Qty: 5 PATCH | Refills: 0 | Status: SHIPPED | OUTPATIENT
Start: 2020-04-03 | End: 2020-04-15

## 2020-04-03 NOTE — TELEPHONE ENCOUNTER
Reason for Call:  Other call back    Detailed comments: Patient is calling stating he is in pain and he got an injection, and that didn't help, mostly lower back and in joints. Looking for medication or going to the er soon. Please ad\vise     Phone Number Patient can be reached at: Home number on file [phone] (home)    Best Time: Anytime     Can we leave a detailed message on this number? YES    Call taken on 4/3/2020 at 8:46 AM by Miranda Seipiel Vaccari

## 2020-04-03 NOTE — TELEPHONE ENCOUNTER
Pt calling and states he would like this medication sent to [facility] pharmacy in [place], this time. Please advise.

## 2020-04-03 NOTE — TELEPHONE ENCOUNTER
Pharmacy calling states this Rx is being flagged for total morphine equivalent usage. Please call to advise.

## 2020-04-03 NOTE — TELEPHONE ENCOUNTER
Per Dr. Fernandes:    Tell pharmacy I am aware and to tell Tood not to take over 5 Endocet a day while on the patch.     [facility]

## 2020-04-05 ENCOUNTER — MYC MEDICAL ADVICE (OUTPATIENT)
Dept: FAMILY MEDICINE | Facility: CLINIC | Age: 50
End: 2020-04-05

## 2020-04-06 ENCOUNTER — MYC MEDICAL ADVICE (OUTPATIENT)
Dept: FAMILY MEDICINE | Facility: CLINIC | Age: 50
End: 2020-04-06

## 2020-04-08 ENCOUNTER — MYC MEDICAL ADVICE (OUTPATIENT)
Dept: FAMILY MEDICINE | Facility: CLINIC | Age: 50
End: 2020-04-08

## 2020-04-08 ENCOUNTER — MYC MEDICAL ADVICE (OUTPATIENT)
Dept: RHEUMATOLOGY | Facility: CLINIC | Age: 50
End: 2020-04-08

## 2020-04-08 ENCOUNTER — TELEPHONE (OUTPATIENT)
Dept: FAMILY MEDICINE | Facility: CLINIC | Age: 50
End: 2020-04-08

## 2020-04-08 ENCOUNTER — HOSPITAL ENCOUNTER (EMERGENCY)
Facility: CLINIC | Age: 50
Discharge: HOME | End: 2020-04-08
Attending: EMERGENCY MEDICINE | Admitting: EMERGENCY MEDICINE
Payer: COMMERCIAL

## 2020-04-08 VITALS
OXYGEN SATURATION: 99 % | DIASTOLIC BLOOD PRESSURE: 76 MMHG | HEART RATE: 79 BPM | SYSTOLIC BLOOD PRESSURE: 141 MMHG | TEMPERATURE: 97.4 F | RESPIRATION RATE: 14 BRPM

## 2020-04-08 DIAGNOSIS — B02.9 HERPES ZOSTER WITHOUT COMPLICATION: ICD-10-CM

## 2020-04-08 DIAGNOSIS — G89.4 CHRONIC PAIN SYNDROME: ICD-10-CM

## 2020-04-08 DIAGNOSIS — B02.9 SHINGLES: Primary | ICD-10-CM

## 2020-04-08 DIAGNOSIS — B02.8 HERPES ZOSTER WITH OTHER COMPLICATION: ICD-10-CM

## 2020-04-08 PROCEDURE — 96372 THER/PROPH/DIAG INJ SC/IM: CPT | Performed by: EMERGENCY MEDICINE

## 2020-04-08 PROCEDURE — 99285 EMERGENCY DEPT VISIT HI MDM: CPT | Performed by: EMERGENCY MEDICINE

## 2020-04-08 PROCEDURE — 25000128 H RX IP 250 OP 636: Performed by: EMERGENCY MEDICINE

## 2020-04-08 PROCEDURE — 99284 EMERGENCY DEPT VISIT MOD MDM: CPT | Mod: Z6 | Performed by: EMERGENCY MEDICINE

## 2020-04-08 RX ORDER — VALACYCLOVIR HYDROCHLORIDE 1 G/1
1000 TABLET, FILM COATED ORAL 3 TIMES DAILY
Qty: 21 TABLET | Refills: 0 | Status: SHIPPED | OUTPATIENT
Start: 2020-04-08 | End: 2020-04-15

## 2020-04-08 RX ADMIN — HYDROMORPHONE HYDROCHLORIDE 1 MG: 1 INJECTION, SOLUTION INTRAMUSCULAR; INTRAVENOUS; SUBCUTANEOUS at 19:48

## 2020-04-08 NOTE — ED AVS SNAPSHOT
[facility] Emergency Department  [address]   [place] [address]  Phone:  [phone]  Fax:  [phone]                                    Tommy Ross   MRN: [id number]    Department:  [facility] Emergency Department   Date of Visit:  4/8/2020           After Visit Summary Signature Page    I have received my discharge instructions, and my questions have been answered. I have discussed any challenges I see with this plan with the nurse or doctor.    ..........................................................................................................................................  Patient/Patient Representative Signature      ..........................................................................................................................................  Patient Representative Print Name and Relationship to Patient    ..................................................               ................................................  Date                                   Time    ..........................................................................................................................................  Reviewed by Signature/Title    ...................................................              ..............................................  Date                                               Time          22EPIC Rev 08/18

## 2020-04-08 NOTE — TELEPHONE ENCOUNTER
"Reason for Call:  Other call back    Detailed comments: Patient is calling about shingles, he was \"wondering why he was in such excruciation pain and jumped in the shower and realized I have shingles again and im going to need something stronger to get rid of them. Please advise    Phone Number Patient can be reached at: Cell number on file:    Telephone Information:   Mobile [phone]       Best Time: Anytime     Can we leave a detailed message on this number? YES    Call taken on 4/8/2020 at 1:52 PM by Miranda Seipiel Vaccari      "

## 2020-04-08 NOTE — TELEPHONE ENCOUNTER
Called to let pt know that he will give him valtrex 1000 mg  Tid for 7 days #21.  He is also requesting Fast acting Fentanyl and asking if  would up his Endocet temporarily.  MONICA FritzA

## 2020-04-08 NOTE — ED TRIAGE NOTES
Pt arrives with shingles to his left lower back and buttocks, fever and fatigue. Since yesterday.Has a script for Voltarex but he is here for something for pain. His pain clinic shut down..

## 2020-04-08 NOTE — TELEPHONE ENCOUNTER
Called and spoke with patient who feels like the lupus flare he mentioned previously in his mychart message is actually shingles. Patient sent a message to his PCP about this and is going to wait to hear back from their office.    Que Rodriguez RN....4/8/2020 2:26 PM

## 2020-04-09 ENCOUNTER — MYC MEDICAL ADVICE (OUTPATIENT)
Dept: FAMILY MEDICINE | Facility: CLINIC | Age: 50
End: 2020-04-09
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
## 2020-04-09 NOTE — DISCHARGE INSTRUCTIONS
For decisions on your chronic pain and appropriate medications you need to talk with Norm Fernandes MD your primary care provider or reach out and talk to your chronic pain clinic.  I recommend calling them tomorrow.  We are not able to provide refills or make adjustments to your chronic pain medication/narcotics.  The rash on the left buttock area is concerning for shingles.  I would highly recommend that you fill your prescription for Valtrex.

## 2020-04-14 ENCOUNTER — TRANSFERRED RECORDS (OUTPATIENT)
Dept: HEALTH INFORMATION MANAGEMENT | Facility: CLINIC | Age: 50
End: 2020-04-14

## 2020-04-14 ENCOUNTER — MYC MEDICAL ADVICE (OUTPATIENT)
Dept: INFECTIOUS DISEASES | Facility: CLINIC | Age: 50
End: 2020-04-14

## 2020-04-14 ENCOUNTER — MYC MEDICAL ADVICE (OUTPATIENT)
Dept: RHEUMATOLOGY | Facility: CLINIC | Age: 50
End: 2020-04-14

## 2020-04-14 ENCOUNTER — TELEPHONE (OUTPATIENT)
Dept: INFECTIOUS DISEASES | Facility: CLINIC | Age: 50
End: 2020-04-14

## 2020-04-14 DIAGNOSIS — G89.29 OTHER CHRONIC PAIN: Primary | ICD-10-CM

## 2020-04-14 DIAGNOSIS — F41.9 ANXIETY: ICD-10-CM

## 2020-04-14 DIAGNOSIS — J98.01 ACUTE BRONCHOSPASM: ICD-10-CM

## 2020-04-14 NOTE — TELEPHONE ENCOUNTER
Patient received a 5 day supply from [facility] pharmacy on 4/3/2020.    Fentanyl        Last Written Prescription Date:  4/3/2020  Last Fill Quantity: 5,   # refills: 0  Last Office Visit: 1/16/2020  Future Office visit:       Routing refill request to provider for review/approval because:  Drug not on the [facility], [facility] or [facility] refill protocol or controlled substance

## 2020-04-14 NOTE — TELEPHONE ENCOUNTER
Sent MyChart note to pt asking if he would like a Telephone Apt with Dr Lay on Fri 4/17 since he hasn't seen him since January. Will await answer back.  Alexandra Downs RN     Healthcare)  Pre-existing DNR Comfort Care/DNR Arrest/DNI Order: No  Healthcare Directive: No, patient does not have an advance directive for healthcare treatment  Information on Healthcare Directives Requested: No  Patient Requests Assistance: Yes, referral made to   Advance Directives: Pt. not interested at this time              [address]    per SNF    Home Health/Skilled Nursing   Home care at home? No    LOC: Long term care  Name of [address] [place] [address]  Phone of Facility:  [phone]  Fax of Facility:  [phone]    Therapy Consults  PT evaluation needed?: Yes (Comment)  OT Evalulation Needed?: Yes (Comment)  SLP evaluation needed?: Yes (Comment)    Pharmacy: per SNF  Potential Assistance Purchasing Medications:  No  Does patient want to participate in local refill/meds to beds program?: No    Discharge Plan   Patient expects to be discharged to[de-identified] [facility]         Barriers to discharge:   IVF, heparin gtt    Role of Case Management discussed with patient/family/designee.      Judit Pereyra RN, BSN  [facility]  Case Management Department  Ph:  [phone]

## 2020-04-14 NOTE — TELEPHONE ENCOUNTER
[facility] Call Center    Phone Message    May a detailed message be left on voicemail: yes     Reason for Call: Other: Pt requesting for a Total Blood Count Lab Draw. Pt is having a lupus flare up right now. Pt has swollen gums/eye lids, ankle pain, fatigue, low temp, etc...     Pt would like order to be placed and pt will go to [facility] to get this done. Pt would like this to be done today. Pt says that he has a televisit this afternoon so please notify pt before 1230 or after 130p today.     Please call back.     Action Taken: Other: id    Travel Screening: Not Applicable

## 2020-04-15 ENCOUNTER — MYC MEDICAL ADVICE (OUTPATIENT)
Dept: FAMILY MEDICINE | Facility: CLINIC | Age: 50
End: 2020-04-15

## 2020-04-15 RX ORDER — FENTANYL 25 UG/1
1 PATCH TRANSDERMAL
Qty: 5 PATCH | Refills: 0 | Status: SHIPPED | OUTPATIENT
Start: 2020-04-15 | End: 2020-05-07

## 2020-04-15 NOTE — TELEPHONE ENCOUNTER
"Requested Prescriptions   Pending Prescriptions Disp Refills     ALPRAZolam (XANAX) 0.5 MG tablet [Pharmacy Med Name: ALPRAZOLAM 0.5 MG TABS 0.5 Tablet] 14 tablet 0     Sig: TAKE ONE TO TWO TABLET(S) BY MOUTH DAILY AS NEEDED FOR ANXIETY       There is no refill protocol information for this order   Routing refill request to provider for review/approval        VENTOLIN  (90 Base) MCG/ACT inhaler [Pharmacy Med Name: VENTOLIN HFA  (90 BAS Aerosol] 18 g 0     Sig: INHALE 2 PUFFS INTO THE LUNGS EVERY 4 HOURS AS NEEDED FOR SHORTNESS OF BREATH/DYSPNEA OR WHEEZING.   Last Written Prescription Date:  3/16/2020  Last Fill Quantity: 18 g,  # refills: 0   Last office visit: 1/16/2020 with prescribing provider:     Future Office Visit:        Asthma Maintenance Inhalers - Anticholinergics Failed - 4/14/2020  2:22 PM        Failed - Asthma control assessment score within normal limits in last 6 months     Please review ACT score.   ACT Total Scores 7/25/2017 2/19/2018 11/29/2019   ACT TOTAL SCORE (Goal Greater than or Equal to 20) 19 9 19   In the past 12 months, how many times did you visit the emergency room for your asthma without being admitted to the hospital? 3 3 0   In the past 12 months, how many times were you hospitalized overnight because of your asthma? 3 3 0             Passed - Patient is age 12 years or older        Passed - Medication is active on med list        Passed - Recent (6 mo) or future (30 days) visit within the authorizing provider's specialty     Patient had office visit in the last 6 months or has a visit in the next 30 days with authorizing provider or within the authorizing provider's specialty.  See \"Patient Info\" tab in inbasket, or \"Choose Columns\" in Meds & Orders section of the refill encounter.           Short-Acting Beta Agonist Inhalers Protocol  Failed - 4/14/2020  2:22 PM        Failed - Asthma control assessment score within normal limits in last 6 months     Please review " "ACT score.           Passed - Patient is age 12 or older        Passed - Medication is active on med list        Passed - Recent (6 mo) or future (30 days) visit within the authorizing provider's specialty     Patient had office visit in the last 6 months or has a visit in the next 30 days with authorizing provider or within the authorizing provider's specialty.  See \"Patient Info\" tab in inbasket, or \"Choose Columns\" in Meds & Orders section of the refill encounter.             Routing refill request to provider for review/approval  Dyan Xiong RN        "

## 2020-04-15 NOTE — TELEPHONE ENCOUNTER
ALPRAZolam (XANAX) 0.5 MG tablet [Pharmacy Med Name: ALPRAZOLAM 0.5 MG TABS 0.5 Tablet] 14 tablet 0    Sig: TAKE ONE TO TWO TABLET(S) BY MOUTH DAILY AS NEEDED FOR ANXIETY      There is no refill protocol information for this order   Last Written Prescription Date:  4/8/2020  Last Fill Quantity: 14,  # refills: 0   Last office visit: 1/16/2020 with prescribing provider:     Future Office Visit:    Routing refill request to provider for review/approval because:  Drug not on the [facility] refill protocol           VENTOLIN  (90 Base) MCG/ACT inhaler [Pharmacy Med Name: VENTOLIN HFA  (90 BAS Aerosol] 18 g 0    Sig: INHALE 2 PUFFS INTO THE LUNGS EVERY 4 HOURS AS NEEDED FOR SHORTNESS OF BREATH/DYSPNEA OR WHEEZING.   Routing refill request to provider for review/approval because:  ACT score not in range  ACT Total Scores 7/25/2017 2/19/2018 11/29/2019   ACT TOTAL SCORE (Goal Greater than or Equal to 20) 19 9 19   In the past 12 months, how many times did you visit the emergency room for your asthma without being admitted to the hospital? 3 3 0   In the past 12 months, how many times were you hospitalized overnight because of your asthma? 3 3 0   T'd up 1 month for provider review.    Dyan Xiong RN

## 2020-04-15 NOTE — TELEPHONE ENCOUNTER
RN: severe pain so he needs to be seen ASAP by his PCP / urgent care / emergency department. No labs to order at this time.    Devonte Hogan MD  4/15/2020 6:03 PM

## 2020-04-16 ENCOUNTER — MYC MEDICAL ADVICE (OUTPATIENT)
Dept: FAMILY MEDICINE | Facility: CLINIC | Age: 50
End: 2020-04-16

## 2020-04-16 ENCOUNTER — VIRTUAL VISIT (OUTPATIENT)
Dept: INTERNAL MEDICINE | Facility: CLINIC | Age: 50
End: 2020-04-16
Payer: COMMERCIAL

## 2020-04-16 DIAGNOSIS — R05.9 COUGH: ICD-10-CM

## 2020-04-16 DIAGNOSIS — R21 RASH: Primary | ICD-10-CM

## 2020-04-16 DIAGNOSIS — G89.29 OTHER CHRONIC PAIN: ICD-10-CM

## 2020-04-16 DIAGNOSIS — J06.9 UPPER RESPIRATORY TRACT INFECTION, UNSPECIFIED TYPE: ICD-10-CM

## 2020-04-16 DIAGNOSIS — B20 AIDS (H): Primary | ICD-10-CM

## 2020-04-16 DIAGNOSIS — B20 SYMPTOMATIC HIV INFECTION (H): ICD-10-CM

## 2020-04-16 PROCEDURE — 99214 OFFICE O/P EST MOD 30 MIN: CPT | Mod: 95 | Performed by: INTERNAL MEDICINE

## 2020-04-16 RX ORDER — ALPRAZOLAM 0.5 MG
TABLET ORAL
Qty: 14 TABLET | Refills: 0 | Status: SHIPPED | OUTPATIENT
Start: 2020-04-16 | End: 2020-05-07

## 2020-04-16 RX ORDER — ALBUTEROL SULFATE 90 UG/1
AEROSOL, METERED RESPIRATORY (INHALATION)
Qty: 18 G | Refills: 0 | Status: SHIPPED | OUTPATIENT
Start: 2020-04-16 | End: 2020-05-07

## 2020-04-16 NOTE — TELEPHONE ENCOUNTER
Patient is informed that if he would like to be seen, he should call to get an appointment.  Will wait for a response.  DEVONTE AbernathyN, RN

## 2020-04-16 NOTE — TELEPHONE ENCOUNTER
Patient is scheduled for a phone visit with Dr. Huertas to discuss his concerns and symptoms.  See MyChart conversation.  Closing this encounter.  DEVONTE AbernathyN, RN

## 2020-04-16 NOTE — TELEPHONE ENCOUNTER
Please advise in the absence of PCP.     Nubia Roldan CMA ([facility]) 4/16/2020
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
## 2020-04-17 ENCOUNTER — MYC MEDICAL ADVICE (OUTPATIENT)
Dept: FAMILY MEDICINE | Facility: CLINIC | Age: 50
End: 2020-04-17

## 2020-04-17 DIAGNOSIS — B20 AIDS (ACQUIRED IMMUNE DEFICIENCY SYNDROME) (H): ICD-10-CM

## 2020-04-17 DIAGNOSIS — R63.0 LOSS OF APPETITE: Primary | ICD-10-CM

## 2020-04-17 DIAGNOSIS — Z21 HIV (HUMAN IMMUNODEFICIENCY VIRUS INFECTION) (H): ICD-10-CM

## 2020-04-17 RX ORDER — DRONABINOL 5 MG/1
CAPSULE ORAL
Qty: 90 CAPSULE | Refills: 0 | Status: SHIPPED | OUTPATIENT
Start: 2020-04-17 | End: 2020-05-08

## 2020-04-17 RX ORDER — DRONABINOL 5 MG/1
5 CAPSULE ORAL
Qty: 10 CAPSULE | Refills: 0 | Status: SHIPPED | OUTPATIENT
Start: 2020-04-17 | End: 2020-05-08

## 2020-04-17 RX ORDER — CEFUROXIME AXETIL 500 MG/1
500 TABLET ORAL 2 TIMES DAILY
Qty: 20 TABLET | Refills: 0 | Status: SHIPPED | OUTPATIENT
Start: 2020-04-17

## 2020-04-17 NOTE — TELEPHONE ENCOUNTER
Dronabinol 5 MG       Last Written Prescription Date:    Last Fill Quantity: ,   # refills:   Last Office Visit: 4/17/2020  Future Office visit:       Routing refill request to provider for review/approval because:  Drug not on the [facility] or [facility] refill protocol or controlled substance  Drug not active on patient's medication list

## 2020-04-19 ENCOUNTER — MYC MEDICAL ADVICE (OUTPATIENT)
Dept: FAMILY MEDICINE | Facility: CLINIC | Age: 50
End: 2020-04-19

## 2020-04-19 ENCOUNTER — TELEPHONE (OUTPATIENT)
Dept: FAMILY MEDICINE | Facility: CLINIC | Age: 50
End: 2020-04-19

## 2020-04-19 ENCOUNTER — NURSE TRIAGE (OUTPATIENT)
Dept: NURSING | Facility: CLINIC | Age: 50
End: 2020-04-19

## 2020-04-19 NOTE — TELEPHONE ENCOUNTER
Clinic Action Needed: PA    [facility] Triage Call  Presenting Problem:    Tommy believes he had CoVid in January.  He thinks that he has it again.    He is requesting a message be sent to Dr. Lay, his Infectious Disease doctor.    He reports that he has Lupus symptoms and that is exactly what the symptoms of CoVid are.    He reports symptoms starting a week and a half ago:  - Severe joint pain - comes and goes, mostly at night  - Rash on buttocks  - Feet. severe burning and severe itching  - Knees burning and throbbing  - Lower back burning and throbbing  - Shortness of breath that comes and goes, mainly at night    Symptoms starting about 4 days ago:  - Sores in the mouth  - Swelling of the gums    He is certain that he had CoVid and that he has it again. He believes this would be valuable information to know that people could get the illness multiple times.     Notified that message would be sent to Infectious Disease dept.    Nora Sorto RN  [facility]    Routed to:

## 2020-04-19 NOTE — TELEPHONE ENCOUNTER
Tommy believes he had CoVid in January.  He thinks that he has it again.    He is requesting a message be sent to Dr. Lay, his Infectious Disease doctor.    He reports that he has Lupus symptoms and that is exactly what the symptoms of CoVid are.    He reports symptoms starting a week and a half ago:  - Severe joint pain - comes and goes, mostly at night  - Rash on buttocks  - Feet. severe burning and severe itching  - Knees burning and throbbing  - Lower back burning and throbbing  - Shortness of breath that comes and goes, mainly at night    Symptoms starting about 4 days ago:  - Sores in the mouth  - Swelling of the gums    He is certain that he had CoVid and that he has it again. He believes this would be valuable information to know that people could get the illness multiple times.     Notified that message would be sent to Infectious Disease dept.    Nora Sorto RN  [facility]    COVID 19 Nurse Triage Plan/Patient Instructions    Please be aware that novel coronavirus (COVID-19) may be circulating in the community. If you develop symptoms such as fever, cough, or SOB or if you have concerns about the presence of another infection including coronavirus (COVID-19), please contact your health care provider or visit [email or web address].     Disposition/Instructions    Additional COVID19 information to add for patients.     Additional General Information About COVID-19    COVID-19 - General Information:  Regardless of if you have been tested or not:  Patient who have symptoms (cough, fever, or shortness of breath), need to isolate for 7 days from when symptoms started AND 72 hours after fever resolves (without fever reducing medications) AND improvement of respiratory symptoms (whichever is longer).      Isolate yourself at home (in own room/own bathroom if possible)    Do Not allow any visitors    Do Not go to work or school    Do Not go to [employer],  centers, shopping, or other public  places.    Do Not shake hands.    Avoid close and intimate contact with others (hugging, kissing).    Follow CDC recommendations for household cleaning of frequently touched services.     After the initial 7 days, continue to isolate yourself from household members as much as possible. To continue decrease the risk of community spread and exposure, you and any members of your household should limit activities in public for 14 days after starting home isolation.     You can reference the following CDC link for helpful home isolation/care tips:  https://www.cdc.gov/coronavirus/2019-ncov/downloads/10Things.pdf    COVID-19 - Symptoms:     The COVID-19 can cause a respiratory illness, such as bronchitis or pneumonia.    The most common symptoms are: cough, fever, and shortness of breath.     Other symptoms are: body aches, chills, diarrhea, fatigue, headache, runny nose, and sore throat     COVID-19 - Exposure Risk Factors:    Exposure to a person who has been diagnosed with COVID-19 .    Travel from an area with recent local transmission of COVID-19 .    The CDC (www.cdc.gov) has the most up-to-date list of where the COVID-19 outbreak is occurring.    COVID-19 - Spreading:     The virus likely spreads through respiratory droplets produced when a person coughs or sneezes. These respiratory droplets can travel approximately 6 feet and can remain on surfaces.  Common disinfectants will kill the virus.    The CDC currently does not recommend healthy people wearing masks.    COVID-19 - Protect Yourself:     Avoid close contact with people known to have this new COVID-19 infection.    Wash hands often with soap and water or alcohol-based hand .    Avoid touching the eyes, nose or mouth.       Thank you for limiting contact with others, wearing a simple mask to cover your cough, practice good hand hygiene habits and accessing our "[employer] services where possible to limit the spread of this virus.    For more information  about COVID19 and options for caring for yourself at home, please visit the CDC website at https://www.cdc.gov/coronavirus/2019-ncov/about/steps-when-sick.html  For more options for care at [facility], please visit our website at [email or web address]    For more information, please use the [place] Department of Health ([facility]) COVID-19 Hotlines (Interpreters available):     Health questions: Phone Number: [phone] or [phone] and Hours: 7 a.m. to 7 p.m.    Schools and  questions: Phone Number: [phone] or [phone] and Hours 7 a.m. to 7 p.m.    Nora Sorto RN  [facility]    Reason for Disposition    [1] Caller requesting NON-URGENT health information AND [2] PCP's office is the best resource    Protocols used: INFORMATION ONLY CALL-A-AH

## 2020-04-20 DIAGNOSIS — B37.0 THRUSH: ICD-10-CM

## 2020-04-20 DIAGNOSIS — B35.3 TINEA PEDIS OF BOTH FEET: ICD-10-CM

## 2020-04-20 NOTE — TELEPHONE ENCOUNTER
This is a Dr Fernandes patient. I am not sure why he addresses Dr Huertas in his note. He routed it to Dr Fernandes.

## 2020-04-22 RX ORDER — CLOTRIMAZOLE 1 %
CREAM (GRAM) TOPICAL
Qty: 15 G | Refills: 0 | Status: SHIPPED | OUTPATIENT
Start: 2020-04-22 | End: 2020-05-07

## 2020-04-22 NOTE — TELEPHONE ENCOUNTER
Routing refill request to provider for review/approval because:  Drug not active on patient's medication list  Dee Fernandes RN

## 2020-04-23 RX ORDER — FLUCONAZOLE 100 MG/1
100 TABLET ORAL DAILY
Qty: 15 TABLET | Refills: 0 | Status: SHIPPED | OUTPATIENT
Start: 2020-04-23 | End: 2020-05-07

## 2020-04-24 ENCOUNTER — MYC MEDICAL ADVICE (OUTPATIENT)
Dept: FAMILY MEDICINE | Facility: CLINIC | Age: 50
End: 2020-04-24

## 2020-04-27 DIAGNOSIS — J98.01 ACUTE BRONCHOSPASM: Primary | ICD-10-CM

## 2020-04-27 NOTE — TELEPHONE ENCOUNTER
Patient would like to request a prescription for Incruse inhaler. The pharmacy has not filled this medication for the patient. Do you want to fill it for the patient?

## 2020-04-29 DIAGNOSIS — B20 AIDS (ACQUIRED IMMUNE DEFICIENCY SYNDROME) (H): ICD-10-CM

## 2020-04-29 DIAGNOSIS — B20 SYMPTOMATIC HIV INFECTION (H): ICD-10-CM

## 2020-04-30 ENCOUNTER — MYC MEDICAL ADVICE (OUTPATIENT)
Dept: FAMILY MEDICINE | Facility: CLINIC | Age: 50
End: 2020-04-30

## 2020-04-30 DIAGNOSIS — B37.0 THRUSH: Primary | ICD-10-CM

## 2020-04-30 NOTE — TELEPHONE ENCOUNTER
Attempted to call pt 2 times on 4/29/2020 and once today 4/30, when pt calls back we would like to schedule his procedure for 5/18/2020 with Dr. Gibbs. Thank You Marta  EGD diagnostic ordered by Sai  I did document on the spread sheet as well.

## 2020-05-01 RX ORDER — ITRACONAZOLE 100 MG/1
200 CAPSULE ORAL DAILY
Qty: 42 CAPSULE | Refills: 0 | Status: SHIPPED | OUTPATIENT
Start: 2020-05-01 | End: 2020-05-13

## 2020-05-04 ENCOUNTER — TELEPHONE (OUTPATIENT)
Dept: FAMILY MEDICINE | Facility: CLINIC | Age: 50
End: 2020-05-04

## 2020-05-04 DIAGNOSIS — B35.3 TINEA PEDIS OF BOTH FEET: ICD-10-CM

## 2020-05-04 DIAGNOSIS — G89.29 OTHER CHRONIC PAIN: ICD-10-CM

## 2020-05-04 DIAGNOSIS — B37.0 THRUSH: ICD-10-CM

## 2020-05-04 DIAGNOSIS — K64.4 EXTERNAL HEMORRHOIDS: ICD-10-CM

## 2020-05-04 DIAGNOSIS — B20 SYMPTOMATIC HIV INFECTION (H): Primary | ICD-10-CM

## 2020-05-04 DIAGNOSIS — F41.9 ANXIETY: ICD-10-CM

## 2020-05-04 DIAGNOSIS — B20 AIDS (ACQUIRED IMMUNE DEFICIENCY SYNDROME) (H): ICD-10-CM

## 2020-05-04 DIAGNOSIS — J98.01 ACUTE BRONCHOSPASM: ICD-10-CM

## 2020-05-04 RX ORDER — BICTEGRAVIR SODIUM, EMTRICITABINE, AND TENOFOVIR ALAFENAMIDE FUMARATE 50; 200; 25 MG/1; MG/1; MG/1
1 TABLET ORAL DAILY
Qty: 30 TABLET | Refills: 3 | Status: SHIPPED | OUTPATIENT
Start: 2020-05-04 | End: 2020-08-20

## 2020-05-04 NOTE — TELEPHONE ENCOUNTER
Called to schedule EGD for 5/18 per notes below. No answer and VM is full.    x3 attempts to reschedule.

## 2020-05-04 NOTE — TELEPHONE ENCOUNTER
Fax from [facility] states the following:    We have not been able to get ahold of Dr. Lay. Can you ok this for patient?

## 2020-05-05 ENCOUNTER — MYC MEDICAL ADVICE (OUTPATIENT)
Dept: FAMILY MEDICINE | Facility: CLINIC | Age: 50
End: 2020-05-05

## 2020-05-05 DIAGNOSIS — B37.0 THRUSH: Primary | ICD-10-CM

## 2020-05-06 RX ORDER — DIPHENHYDRAMINE HYDROCHLORIDE AND LIDOCAINE HYDROCHLORIDE AND ALUMINUM HYDROXIDE AND MAGNESIUM HYDRO
5-10 KIT EVERY 6 HOURS PRN
Qty: 237 ML | Refills: 0 | Status: SHIPPED | OUTPATIENT
Start: 2020-05-06 | End: 2020-07-17

## 2020-05-06 NOTE — TELEPHONE ENCOUNTER
Fentanyl Patch  Last Written Prescription Date:  4/15/20  Last Fill Quantity: 5,  # refills: 0   Last office visit: 1/16/2020 with prescribing provider: 4/16/20 Dr. Huertas virtual visit  Future Office Visit:  NONE    Ventolin HFA  Last Written Prescription Date:  4/16/20  Last Fill Quantity: 18g,  # refills: 0   Last office visit: 1/16/2020 with prescribing provider:  4/16/20 Dr. Huertas virtual visit     Future Office Visit: NONE    XANAX  Last Written Prescription Date:   4/16/20   Last Fill Quantity: 14 ( 1- 2 daily),  # refills: 0   Last office visit: 1/16/2020 with prescribing provider:   4/16/20 Dr. Huertas virtual visit     Future Office Visit:  NONE  Routing refill request to provider for review/approval because:  Drug not on the [facility] refill protocol   DELTA Mendiola

## 2020-05-06 NOTE — TELEPHONE ENCOUNTER
Endocet  Last Written Prescription Date:  3/27/20  Last Fill Quantity: 150 ( one tab up to 5x/d) ,  # refills: 0   Last office visit: 1/16/2020 with prescribing provider:  Virtual visit 4/16/20 with MILAGRO Fernandes MD   Future Office Visit:  NONE  DX: Chronic pain    Clotrimazole 1%  Last Written Prescription Date:  4/22/20  Last Fill Quantity: 15g ( apply twice a day),  # refills: 0   Last office visit: 1/16/2020 with prescribing provider: Virtual visit 4/16/20 with MILAGRO Fernandes MD.    Future Office Visit:  NONE    Diflucan  Last Written Prescription Date:  4/23/20  Last Fill Quantity: 15 ( take one daily - good thru 5/7/20.,  # refills: 0   Last office visit: 1/16/2020 with prescribing provider:   Virtual visit 4/16/20 with MILAGRO Fernandes MD.    Future Office Visit:  NONE    Hydrocortisone ( Anusol)  2.5%  Last Written Prescription Date:  3/16/20  Last Fill Quantity: 30g,  # refills: 0   Last office visit: 1/16/2020 with prescribing provider:  Virtual visit 4/16/20 with MILAGRO Fernandes MD.     Future Office Visit:  NONE  Routing refill request to provider for review/approval because:  Drug not on the [facility] refill protocol  And some were just refilled. Need provider approval.   DELTA Mendiola

## 2020-05-07 ENCOUNTER — TELEPHONE (OUTPATIENT)
Dept: GASTROENTEROLOGY | Facility: CLINIC | Age: 50
End: 2020-05-07

## 2020-05-07 ENCOUNTER — TELEPHONE (OUTPATIENT)
Dept: FAMILY MEDICINE | Facility: CLINIC | Age: 50
End: 2020-05-07

## 2020-05-07 DIAGNOSIS — B20 AIDS (H): ICD-10-CM

## 2020-05-07 DIAGNOSIS — R63.0 LOSS OF APPETITE: ICD-10-CM

## 2020-05-07 RX ORDER — HYDROCORTISONE 25 MG/G
CREAM TOPICAL
Qty: 30 G | Refills: 0 | Status: SHIPPED | OUTPATIENT
Start: 2020-05-07 | End: 2020-05-26

## 2020-05-07 RX ORDER — FENTANYL 25 UG/1
1 PATCH TRANSDERMAL
Qty: 5 PATCH | Refills: 0 | Status: SHIPPED | OUTPATIENT
Start: 2020-05-07 | End: 2020-05-22

## 2020-05-07 RX ORDER — OXYCODONE HCL/ACETAMINOPHEN 10MG-325MG
TABLET ORAL
Qty: 150 TABLET | Refills: 0 | Status: SHIPPED | OUTPATIENT
Start: 2020-05-07 | End: 2020-05-27

## 2020-05-07 RX ORDER — FLUCONAZOLE 100 MG/1
100 TABLET ORAL DAILY
Qty: 15 TABLET | Refills: 0 | Status: SHIPPED | OUTPATIENT
Start: 2020-05-07 | End: 2020-05-13

## 2020-05-07 RX ORDER — BICTEGRAVIR SODIUM, EMTRICITABINE, AND TENOFOVIR ALAFENAMIDE FUMARATE 50; 200; 25 MG/1; MG/1; MG/1
TABLET ORAL
Qty: 30 TABLET | Refills: 3 | OUTPATIENT
Start: 2020-05-07

## 2020-05-07 RX ORDER — ALBUTEROL SULFATE 90 UG/1
AEROSOL, METERED RESPIRATORY (INHALATION)
Qty: 18 G | Refills: 3 | Status: SHIPPED | OUTPATIENT
Start: 2020-05-07 | End: 2020-10-09

## 2020-05-07 RX ORDER — CLOTRIMAZOLE 1 %
CREAM (GRAM) TOPICAL
Qty: 15 G | Refills: 0 | Status: SHIPPED | OUTPATIENT
Start: 2020-05-07 | End: 2020-10-30

## 2020-05-07 RX ORDER — ALPRAZOLAM 0.5 MG
TABLET ORAL
Qty: 14 TABLET | Refills: 0 | Status: SHIPPED | OUTPATIENT
Start: 2020-05-07 | End: 2020-06-12

## 2020-05-07 NOTE — TELEPHONE ENCOUNTER
Pharmacy would like to know clarification on what you like for formula on the mouthwash?    She states that there are different formula's. She can make up her own or do you have a preference?    Please advise.

## 2020-05-07 NOTE — TELEPHONE ENCOUNTER
Called patient to schedule scope. Patient states that he has a cold and since he has HIV he was told not to come in the building right now. He will call back when he is feeling better.

## 2020-05-08 ENCOUNTER — MYC MEDICAL ADVICE (OUTPATIENT)
Dept: GASTROENTEROLOGY | Facility: CLINIC | Age: 50
End: 2020-05-08

## 2020-05-08 RX ORDER — DRONABINOL 5 MG/1
5 CAPSULE ORAL
Qty: 10 CAPSULE | Refills: 0 | Status: SHIPPED | OUTPATIENT
Start: 2020-05-08 | End: 2020-10-07

## 2020-05-08 RX ORDER — DRONABINOL 5 MG/1
CAPSULE ORAL
Qty: 90 CAPSULE | Refills: 0 | Status: SHIPPED | OUTPATIENT
Start: 2020-05-08 | End: 2020-06-09

## 2020-05-08 NOTE — TELEPHONE ENCOUNTER
Dronabinol        Last Written Prescription Date:  4/17/2020  Last Fill Quantity: 90,   # refills: 0  Last Office Visit: 4/16/2020  Future Office visit:       Routing refill request to provider for review/approval because:  Drug not on the [facility], P or [facility] refill protocol or controlled substance

## 2020-05-08 NOTE — TELEPHONE ENCOUNTER
Dronabinol        Last Written Prescription Date:  4/17/2020  Last Fill Quantity: 10,   # refills: 0  Last Office Visit: 4/16/2020  Future Office visit:       Routing refill request to provider for review/approval because:  Drug not on the [facility], P or [facility] refill protocol or controlled substance

## 2020-05-13 ENCOUNTER — TELEPHONE (OUTPATIENT)
Dept: FAMILY MEDICINE | Facility: CLINIC | Age: 50
End: 2020-05-13

## 2020-05-13 ENCOUNTER — MYC MEDICAL ADVICE (OUTPATIENT)
Dept: FAMILY MEDICINE | Facility: CLINIC | Age: 50
End: 2020-05-13

## 2020-05-13 DIAGNOSIS — B37.0 THRUSH: Primary | ICD-10-CM

## 2020-05-13 RX ORDER — NYSTATIN 100000/ML
500000 SUSPENSION, ORAL (FINAL DOSE FORM) ORAL 4 TIMES DAILY
Qty: 400 ML | Refills: 0 | Status: SHIPPED | OUTPATIENT
Start: 2020-05-13 | End: 2020-06-10

## 2020-05-13 RX ORDER — NYSTATIN 100000/ML
SUSPENSION, ORAL (FINAL DOSE FORM) ORAL
Qty: 473 ML | Refills: 1 | Status: SHIPPED | OUTPATIENT
Start: 2020-05-13

## 2020-05-13 NOTE — TELEPHONE ENCOUNTER
Fluconazole is probably the best but he can add nystatin swish and swallow.l sent a script for him.

## 2020-05-13 NOTE — TELEPHONE ENCOUNTER
Annetta from [facility] calling, would like you to change script to Nystanin, the other ketoconazole is not available to order/direct number [phone]

## 2020-05-13 NOTE — TELEPHONE ENCOUNTER
Reason for Call:  Other prescription    Detailed comments: states he needs something stronger for thrush. He is not improving any.  Is taking fluconazole.  Aware Dr Fernandes is out, and team member will address.   [id number] - [place], [place] - [address] [place] [address]     Phone Number Patient can be reached at: Cell number on file:    Telephone Information:   Mobile [phone]       Best Time: any time    Can we leave a detailed message on this number? YES    Call taken on 5/13/2020 at 3:07 PM by Love Menendez

## 2020-05-13 NOTE — TELEPHONE ENCOUNTER
"Given the patient's underlying disease, he has already exhausted the \"very good\" antifungal medication.  At this time I would like to try ketoconazole suspension.  He should take 2 teaspoons of this 4 times daily swish in his mouth for at least a minute and then spit it out.  He should not rinse his mouth out after this.    He should probably discontinue the Flonase for the time being.      Medication sent to [facility] in [place].  Gayle  "

## 2020-05-13 NOTE — TELEPHONE ENCOUNTER
Prior Authorization Retail Medication Request-COVER MY MEDS Key: [id number]      Medication/Dose: ALPRAZolam (XANAX) 0.5 MG tablet  ICD code (if different than what is on RX):    Previously Tried and Failed:    Rationale:      Insurance Name:  [employer]  Insurance ID:  [id number]      Pharmacy Information (if different than what is on RX)  Name:    Phone:

## 2020-05-19 DIAGNOSIS — Z11.59 ENCOUNTER FOR SCREENING FOR OTHER VIRAL DISEASES: Primary | ICD-10-CM

## 2020-05-20 NOTE — TELEPHONE ENCOUNTER
Central Prior Authorization Team   Phone: [phone]      PA Initiation    Medication: ALPRAZolam (XANAX) 0.5 MG tablet-Initiated  Insurance Company: [employer] - Phone [phone] Fax [phone]  Pharmacy Filling the Rx: [id number] - [place], [place] - [address]  Filling Pharmacy Phone: [phone]  Filling Pharmacy Fax:    Start Date: 5/20/2020

## 2020-05-20 NOTE — TELEPHONE ENCOUNTER
Prior Authorization Approval    Authorization Effective Date: 5/20/2020  Authorization Expiration Date: 12/31/2020  Medication: ALPRAZolam (XANAX) 0.5 MG tablet-APPROVED  Approved Dose/Quantity:   Reference #:     Insurance Company: [employer] - Phone [phone] Fax [phone]  Expected CoPay:       CoPay Card Available:      Foundation Assistance Needed:    Which Pharmacy is filling the prescription (Not needed for infusion/clinic administered): [id number] - [place], [place] - [address] [place] [address]  Pharmacy Notified: Yes  Patient Notified: No    Pharmacy will notify patient when medication is ready.

## 2020-05-21 DIAGNOSIS — G89.29 OTHER CHRONIC PAIN: ICD-10-CM

## 2020-05-22 DIAGNOSIS — G89.29 OTHER CHRONIC PAIN: ICD-10-CM

## 2020-05-22 RX ORDER — FENTANYL 25 UG/1
1 PATCH TRANSDERMAL
Qty: 5 PATCH | Refills: 0 | Status: SHIPPED | OUTPATIENT
Start: 2020-05-22 | End: 2020-06-08

## 2020-05-22 NOTE — TELEPHONE ENCOUNTER
endocet        Last Written Prescription Date:  5/7/2020  Last Fill Quantity: 150,   # refills: 0  Last Office Visit: 4/16/2020  Future Office visit:       Routing refill request to provider for review/approval because:  Drug not on the [facility], [facility] or [facility] refill protocol or controlled substance    Morphine        Last Written Prescription Date:  4/8/2020  Last Fill Quantity: 30,   # refills: 0  Last Office Visit: 4/16/2020  Future Office visit:       Routing refill request to provider for review/approval because:  Drug not on the [facility], [facility] or [facility] refill protocol or controlled substance

## 2020-05-22 NOTE — TELEPHONE ENCOUNTER
Fentanyl Patch      Last Written Prescription Date:  5/07/2020  Last Fill Quantity: 5,   # refills: 0  Last Office Visit: 1/16/2020  Future Office visit:       Routing refill request to provider for review/approval because:  Drug not on the [facility], P or [facility] refill protocol or controlled substance

## 2020-05-23 DIAGNOSIS — B37.0 THRUSH: ICD-10-CM

## 2020-05-23 DIAGNOSIS — K64.4 EXTERNAL HEMORRHOIDS: ICD-10-CM

## 2020-05-26 RX ORDER — HYDROCORTISONE 25 MG/G
CREAM TOPICAL
Qty: 30 G | Refills: 0 | Status: SHIPPED | OUTPATIENT
Start: 2020-05-26 | End: 2020-08-03

## 2020-05-26 NOTE — TELEPHONE ENCOUNTER
Diflucan:  Routing refill request to provider for review/approval because:  Drug not active on patient's medication list.  Was discontinued on 5/13/2020.  Was a 14 day supply.    Hydrocortisone cream:  Rx refilled per RN protocol.  DEVONTE AbernathyN, RN

## 2020-05-27 ENCOUNTER — MYC MEDICAL ADVICE (OUTPATIENT)
Dept: FAMILY MEDICINE | Facility: CLINIC | Age: 50
End: 2020-05-27

## 2020-05-27 ENCOUNTER — MYC REFILL (OUTPATIENT)
Dept: FAMILY MEDICINE | Facility: CLINIC | Age: 50
End: 2020-05-27

## 2020-05-27 ENCOUNTER — MYC MEDICAL ADVICE (OUTPATIENT)
Dept: INTERNAL MEDICINE | Facility: CLINIC | Age: 50
End: 2020-05-27

## 2020-05-27 DIAGNOSIS — Z11.59 ENCOUNTER FOR SCREENING FOR OTHER VIRAL DISEASES: ICD-10-CM

## 2020-05-27 DIAGNOSIS — G89.29 OTHER CHRONIC PAIN: ICD-10-CM

## 2020-05-27 PROCEDURE — 99207 ZZC NO CHARGE LOS: CPT

## 2020-05-27 PROCEDURE — 87635 SARS-COV-2 COVID-19 AMP PRB: CPT | Performed by: INTERNAL MEDICINE

## 2020-05-27 PROCEDURE — 99000 SPECIMEN HANDLING OFFICE-LAB: CPT | Performed by: INTERNAL MEDICINE

## 2020-05-27 RX ORDER — OXYCODONE HCL/ACETAMINOPHEN 10MG-325MG
TABLET ORAL
Qty: 150 TABLET | Refills: 0 | OUTPATIENT
Start: 2020-05-27

## 2020-05-27 RX ORDER — MORPHINE SULFATE 15 MG/1
TABLET, FILM COATED, EXTENDED RELEASE ORAL
Qty: 30 TABLET | Refills: 0 | Status: SHIPPED | OUTPATIENT
Start: 2020-05-27 | End: 2020-06-12

## 2020-05-27 RX ORDER — FLUCONAZOLE 100 MG/1
100 TABLET ORAL DAILY
Qty: 15 TABLET | Refills: 0 | Status: SHIPPED | OUTPATIENT
Start: 2020-05-27 | End: 2020-06-12

## 2020-05-27 RX ORDER — OXYCODONE HCL/ACETAMINOPHEN 10MG-325MG
TABLET ORAL
Qty: 150 TABLET | Refills: 0 | Status: SHIPPED | OUTPATIENT
Start: 2020-06-04 | End: 2020-06-09

## 2020-05-27 NOTE — TELEPHONE ENCOUNTER
Endocet  mg      Last Written Prescription Date:  5/7/2020  Last Fill Quantity: 150,   # refills: 0  Last Office Visit: 4/16/2020- virtual with Dr. Huertas  Future Office visit:       Routing refill request to provider for review/approval because:  Drug not on the [facility], [facility] or [facility] refill protocol or controlled substance

## 2020-05-28 LAB
SARS-COV-2 RNA SPEC QL NAA+PROBE: NOT DETECTED
SPECIMEN SOURCE: NORMAL

## 2020-05-29 ENCOUNTER — SURGERY (OUTPATIENT)
Age: 50
End: 2020-05-29
Payer: COMMERCIAL

## 2020-05-29 ENCOUNTER — HOSPITAL ENCOUNTER (OUTPATIENT)
Facility: CLINIC | Age: 50
Discharge: HOME | End: 2020-05-29
Attending: INTERNAL MEDICINE | Admitting: INTERNAL MEDICINE
Payer: COMMERCIAL

## 2020-05-29 VITALS
OXYGEN SATURATION: 98 % | DIASTOLIC BLOOD PRESSURE: 66 MMHG | HEART RATE: 77 BPM | SYSTOLIC BLOOD PRESSURE: 106 MMHG | RESPIRATION RATE: 16 BRPM

## 2020-05-29 LAB — UPPER GI ENDOSCOPY: NORMAL

## 2020-05-29 PROCEDURE — 43235 EGD DIAGNOSTIC BRUSH WASH: CPT | Performed by: INTERNAL MEDICINE

## 2020-05-29 PROCEDURE — 88305 TISSUE EXAM BY PATHOLOGIST: CPT | Mod: 26 | Performed by: INTERNAL MEDICINE

## 2020-05-29 PROCEDURE — 25000125 ZZHC RX 250: Performed by: INTERNAL MEDICINE

## 2020-05-29 PROCEDURE — 25000128 H RX IP 250 OP 636: Performed by: INTERNAL MEDICINE

## 2020-05-29 PROCEDURE — 43239 EGD BIOPSY SINGLE/MULTIPLE: CPT | Performed by: INTERNAL MEDICINE

## 2020-05-29 PROCEDURE — 88305 TISSUE EXAM BY PATHOLOGIST: CPT | Performed by: INTERNAL MEDICINE

## 2020-05-29 PROCEDURE — G0500 MOD SEDAT ENDO SERVICE >5YRS: HCPCS | Performed by: INTERNAL MEDICINE

## 2020-05-29 PROCEDURE — 40000296 ZZH STATISTIC ENDO RECOVERY CLASS 1:2 FIRST HOUR: Performed by: INTERNAL MEDICINE

## 2020-05-29 RX ORDER — LIDOCAINE 40 MG/G
CREAM TOPICAL
Status: DISCONTINUED | OUTPATIENT
Start: 2020-05-29 | End: 2020-05-29 | Stop reason: HOSPADM

## 2020-05-29 RX ORDER — ONDANSETRON 4 MG/1
4 TABLET, ORALLY DISINTEGRATING ORAL EVERY 6 HOURS PRN
Status: DISCONTINUED | OUTPATIENT
Start: 2020-05-29 | End: 2020-05-29 | Stop reason: HOSPADM

## 2020-05-29 RX ORDER — ONDANSETRON 2 MG/ML
4 INJECTION INTRAMUSCULAR; INTRAVENOUS EVERY 6 HOURS PRN
Status: DISCONTINUED | OUTPATIENT
Start: 2020-05-29 | End: 2020-05-29 | Stop reason: HOSPADM

## 2020-05-29 RX ORDER — FENTANYL CITRATE 50 UG/ML
INJECTION, SOLUTION INTRAMUSCULAR; INTRAVENOUS PRN
Status: DISCONTINUED | OUTPATIENT
Start: 2020-05-29 | End: 2020-05-29 | Stop reason: HOSPADM

## 2020-05-29 RX ORDER — LIDOCAINE HYDROCHLORIDE 20 MG/ML
SOLUTION OROPHARYNGEAL PRN
Status: DISCONTINUED | OUTPATIENT
Start: 2020-05-29 | End: 2020-05-29 | Stop reason: HOSPADM

## 2020-05-29 RX ORDER — ONDANSETRON 2 MG/ML
4 INJECTION INTRAMUSCULAR; INTRAVENOUS
Status: DISCONTINUED | OUTPATIENT
Start: 2020-05-29 | End: 2020-05-29 | Stop reason: ALTCHOICE

## 2020-05-29 RX ORDER — FLUMAZENIL 0.1 MG/ML
0.2 INJECTION, SOLUTION INTRAVENOUS
Status: DISCONTINUED | OUTPATIENT
Start: 2020-05-29 | End: 2020-05-29 | Stop reason: HOSPADM

## 2020-05-29 RX ORDER — NALOXONE HYDROCHLORIDE 0.4 MG/ML
.1-.4 INJECTION, SOLUTION INTRAMUSCULAR; INTRAVENOUS; SUBCUTANEOUS
Status: DISCONTINUED | OUTPATIENT
Start: 2020-05-29 | End: 2020-05-29 | Stop reason: HOSPADM

## 2020-05-29 RX ADMIN — LIDOCAINE HYDROCHLORIDE 5 ML: 20 SOLUTION ORAL; TOPICAL at 08:48

## 2020-05-29 RX ADMIN — MIDAZOLAM 1 MG: 1 INJECTION INTRAMUSCULAR; INTRAVENOUS at 08:50

## 2020-05-29 RX ADMIN — MIDAZOLAM 1 MG: 1 INJECTION INTRAMUSCULAR; INTRAVENOUS at 08:52

## 2020-05-29 RX ADMIN — MIDAZOLAM 1 MG: 1 INJECTION INTRAMUSCULAR; INTRAVENOUS at 08:55

## 2020-05-29 RX ADMIN — FENTANYL CITRATE 50 MCG: 50 INJECTION, SOLUTION INTRAMUSCULAR; INTRAVENOUS at 08:53

## 2020-05-29 RX ADMIN — FENTANYL CITRATE 50 MCG: 50 INJECTION, SOLUTION INTRAMUSCULAR; INTRAVENOUS at 08:48

## 2020-05-29 RX ADMIN — MIDAZOLAM 2 MG: 1 INJECTION INTRAMUSCULAR; INTRAVENOUS at 08:48

## 2020-05-29 RX ADMIN — MIDAZOLAM 2 MG: 1 INJECTION INTRAMUSCULAR; INTRAVENOUS at 08:54

## 2020-05-29 RX ADMIN — MIDAZOLAM 1 MG: 1 INJECTION INTRAMUSCULAR; INTRAVENOUS at 08:49

## 2020-05-29 NOTE — LETTER
"     [address] [address]                                                                                                 Tel [phone]      Tommy Ross  PO   [facility] [address]  June 2, 2020            Dear Tommy,     This letter is to inform you of the results of your pathology report on your biopsies.  The esophagus, stomach and intestine were all normal.    I suspect your left sided abdominal and chest pain is arising from the muscles and ribs rather than internally.  The endoscopy, your CT scan, and the blood work did not show any other worrisome findings.  The \"thickening\" of the lower esophagus proved be be a false finding.    If you have any questions or concerns please do not hesitate to call my office at [phone].    Sincerely,         Faustino Gibbs MD  Gastroenterology    "
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
## 2020-05-30 ENCOUNTER — MYC MEDICAL ADVICE (OUTPATIENT)
Dept: INTERNAL MEDICINE | Facility: CLINIC | Age: 50
End: 2020-05-30

## 2020-05-31 ENCOUNTER — MYC MEDICAL ADVICE (OUTPATIENT)
Dept: GASTROENTEROLOGY | Facility: CLINIC | Age: 50
End: 2020-05-31

## 2020-06-01 NOTE — TELEPHONE ENCOUNTER
I spoke with Rossy and since he had surgery on Friday Dr. Fernandes ok'd this to be filled today.

## 2020-06-01 NOTE — TELEPHONE ENCOUNTER
Writing nurse called patient to see how his stomach  pain was doing. Patient reported to being unable to eat for a few days after his procedure, but he is eating today. Writer went over foods he should try and to eat small frequent meals rather than large ones. Patient agreed to these suggestions. Patient reported having a hx of muscle wasting, chronic, thrush, and enlarged lymph nodes. Patient reported to being in pain from all these things combined. Patient reported to having pain medication on hand. Patient was instructed to contact clinic with any questions or concerns. Patient verbalized understanding.   Hiwot Chang RN on 6/1/2020 at 1:21 PM

## 2020-06-02 ENCOUNTER — MYC MEDICAL ADVICE (OUTPATIENT)
Dept: FAMILY MEDICINE | Facility: CLINIC | Age: 50
End: 2020-06-02

## 2020-06-02 ENCOUNTER — MYC MEDICAL ADVICE (OUTPATIENT)
Dept: GASTROENTEROLOGY | Facility: CLINIC | Age: 50
End: 2020-06-02

## 2020-06-02 LAB — COPATH REPORT: NORMAL

## 2020-06-07 ENCOUNTER — MYC MEDICAL ADVICE (OUTPATIENT)
Dept: FAMILY MEDICINE | Facility: CLINIC | Age: 50
End: 2020-06-07

## 2020-06-08 ENCOUNTER — TELEPHONE (OUTPATIENT)
Dept: FAMILY MEDICINE | Facility: CLINIC | Age: 50
End: 2020-06-08

## 2020-06-08 DIAGNOSIS — G89.29 OTHER CHRONIC PAIN: ICD-10-CM

## 2020-06-08 DIAGNOSIS — F11.90 CHRONIC, CONTINUOUS USE OF OPIOIDS: ICD-10-CM

## 2020-06-08 RX ORDER — FENTANYL 25 UG/1
1 PATCH TRANSDERMAL
Qty: 5 PATCH | Refills: 0 | Status: SHIPPED | OUTPATIENT
Start: 2020-06-08 | End: 2020-06-19

## 2020-06-08 NOTE — TELEPHONE ENCOUNTER
Fentanyl      Last Written Prescription Date:  5/22/2020  Last Fill Quantity: 5,   # refills: 0  Last Office Visit: 4/16/2020  Future Office visit:       Routing refill request to provider for review/approval because:  Drug not on the [facility], [facility] or [facility] refill protocol or controlled substance

## 2020-06-09 DIAGNOSIS — B35.3 TINEA PEDIS OF BOTH FEET: ICD-10-CM

## 2020-06-09 DIAGNOSIS — R63.0 LOSS OF APPETITE: ICD-10-CM

## 2020-06-09 PROBLEM — F11.90 CHRONIC, CONTINUOUS USE OF OPIOIDS: Status: ACTIVE | Noted: 2020-06-09

## 2020-06-09 RX ORDER — DRONABINOL 5 MG/1
CAPSULE ORAL
Qty: 90 CAPSULE | Refills: 0 | Status: SHIPPED | OUTPATIENT
Start: 2020-06-09 | End: 2020-07-02

## 2020-06-09 RX ORDER — OXYCODONE HCL/ACETAMINOPHEN 10MG-325MG
TABLET ORAL
Qty: 150 TABLET | Refills: 0 | Status: SHIPPED | OUTPATIENT
Start: 2020-07-01 | End: 2020-06-25

## 2020-06-09 RX ORDER — PRENATAL VIT 91/IRON/FOLIC/DHA 28-975-200
COMBINATION PACKAGE (EA) ORAL 2 TIMES DAILY
Qty: 42 G | Refills: 1 | Status: SHIPPED | OUTPATIENT
Start: 2020-06-09

## 2020-06-09 NOTE — TELEPHONE ENCOUNTER
Requested Prescriptions   Pending Prescriptions Disp Refills     ENDOCET  MG per tablet [Pharmacy Med Name: ENDOCET 10-325MG TAB  Tablet] 150 tablet 0     Sig: TAKE 1 TABLET BY MOUTH UP TO FIVE TIMES DAILY.     Last Written Prescription Date:  06/04/2020  Last Fill Quantity: 150,   # refills: 0  Last Office Visit: 01/16/2020  Future Office visit:       Routing refill request to provider for review/approval because:  Drug not on the [facility] or [facility] refill protocol or controlled substance    Please deny script was just filled on 6/4/2020    Candice Howard MA

## 2020-06-09 NOTE — TELEPHONE ENCOUNTER
Requested Prescriptions   Pending Prescriptions Disp Refills     dronabinol (MARINOL) 5 MG capsule [Pharmacy Med Name: DRONABINOL 5 MG CAPS 5 Capsule] 90 capsule 0     Sig: TAKE ONE CAPSULE BY MOUTH THREE TIMES A DAY     Last Written Prescription Date:  05/08/2020  Last Fill Quantity: 10,   # refills: 0  Last Office Visit: 01/16/2020  Future Office visit:       Routing refill request to provider for review/approval because:  Drug not on the [facility], [facility] or [facility] refill protocol or controlled substance    Candice Howard MA

## 2020-06-09 NOTE — TELEPHONE ENCOUNTER
Requested Prescriptions   Pending Prescriptions Disp Refills     terbinafine (LAMISIL AT) 1 % external cream 42 g 1     Sig: Apply topically 2 times daily To effected areas on feet and toes for two weeks.     Last Written Prescription Date:  02/19/2018  Last Fill Quantity: 42g,   # refills: 1  Last Office Visit: 01/16/2020  Future Office visit:       Routing refill request to provider for review/approval because:  Drug not on the [facility], [facility] or [facility] refill protocol or controlled substance    Candice Howard MA

## 2020-06-09 NOTE — TELEPHONE ENCOUNTER
Chart reviewed for chronic narcotic refill for Faustino Fernandes   Dx: Chronic low back pain.   Chronic continuous opioid not on PL.  Last clinic visit 1/16/20 with Faustino Fernandes. Referral to [facility] with video consult 4/3/2020. Virtual visit with Dr. Huertas 4/16/20. Pain controlled at that time.  CSA out dated  UDS overdue.  [place] reviewed and appropriate with combination narcotics and benzodiazapine.  Last refill 6/1/20 should last 30 days. No documentation of recent dosing change  Will OK 1 month refill for Faustino Fernandes at previous dosing.    Signed Prescriptions:                        Disp   Refills    ENDOCET  MG per tablet               150 ta*0        Sig: TAKE 1 TABLET BY MOUTH UP TO FIVE TIMES DAILY.  Authorizing Provider: MARCO VALENZUELA    Patient will need follow up virtual visit with Faustino Fernandes in 2 weeks to address dosing change and plan of care.  Electronically signed by Marco Valenzuela MD

## 2020-06-10 ENCOUNTER — NURSE TRIAGE (OUTPATIENT)
Dept: FAMILY MEDICINE | Facility: CLINIC | Age: 50
End: 2020-06-10

## 2020-06-10 ENCOUNTER — TELEPHONE (OUTPATIENT)
Dept: FAMILY MEDICINE | Facility: CLINIC | Age: 50
End: 2020-06-10

## 2020-06-10 DIAGNOSIS — B37.0 THRUSH: ICD-10-CM

## 2020-06-10 DIAGNOSIS — G89.29 OTHER CHRONIC PAIN: ICD-10-CM

## 2020-06-10 DIAGNOSIS — F41.9 ANXIETY: ICD-10-CM

## 2020-06-10 DIAGNOSIS — J98.01 ACUTE BRONCHOSPASM: ICD-10-CM

## 2020-06-10 RX ORDER — NYSTATIN 100000/ML
500000 SUSPENSION, ORAL (FINAL DOSE FORM) ORAL 4 TIMES DAILY
Qty: 400 ML | Refills: 0 | Status: SHIPPED | OUTPATIENT
Start: 2020-06-10 | End: 2020-07-01

## 2020-06-10 NOTE — TELEPHONE ENCOUNTER
Reason for call:  Patient reporting a symptom    Symptom or request: lump on buttocks    Duration (how long have symptoms been present): 1 to 2 weeks    Have you been treated for this before? No    Additional comments: Tommy spoke to a triage nurse earlier today (unable to addend encounter) he forgot to mention that he has a lump on his buttocks and is wondering if this could be related to his arm pain. He said the lump has grown to at least triple the size in the last few days.    Phone Number patient can be reached at:  Home number on file [phone] (home)    Best Time:      Can we leave a detailed message on this number:  YES    Call taken on 6/10/2020 at 1:40 PM by Julia Chahal

## 2020-06-10 NOTE — TELEPHONE ENCOUNTER
": 1970  PHONE #'s: [phone] (home)     PRESENTING PROBLEM:  He forgot to mention to me earlier that he has a pea sized lump on his R buttock. He has had it for about a week. Wondering what to do about it?    NURSING ASSESSMENT  Description:   He tried popping it or squeezing it a couple days ago. \" Nothing came out except blood. Now it is twice the original size.  \"  Onset/duration:  1 week.   Precip. factors:  Etiology unknown  Assoc. Sx:  No fever, no drainage noted.  Doesn't mention it being painful.   Improves/worsens Sx:   Just bigger in size since trying to pop it himself.   Pain scale (1-10)   NA  Sx specific meds:  Is NOT on an antibiotic.  Last exam/Tx:  Has NOT been seen for this.     RECOMMENDED DISPOSITION:  Home care advice - do NOT pick at it. Doing so causes infection as your first line of defense is your skin. When you break the skin , you introduce bacteria in it and then you might develop an infection. Soak your buttock in warm water, if you have a bathtub, otherwise, use warm compresses to the spot for 20 to 30 minutes every 2 hours while awake to bring healing to the lesion. Notify us if you develop a fever, increased pain, swelling, redness, discharge to the area of concern. IF you do, you will probably need to be seen. Just keep watch on it for now.   Will comply with recommendation: YES  If further questions/concerns or if Sx do not improve, worsen or new Sx develop, call your PCP or [facility] as soon as possible.    NOTES:  Disposition was determined by the first positive assessment question, therefore all previous assessment questions were negative.  Informed to check provider manual or call insurance company to assure coverage.    Guideline used:SKin Lesions: Lumps, Bumps, Sores  Telephone Triage Protocols for Nurses, Fifth Edition, Mariel Martinez RN  Nehal 10, 2020    "

## 2020-06-10 NOTE — TELEPHONE ENCOUNTER
"S-(situation): C/O Right arm pain  That radiates down to his fingers. \" I can't even hold onto a cup.\"     B-(background): denies injury to arm. Said he has lost muscle mass.     A-(assessment):  Pain of unknown etiology     R-(recommendations):  Should be seen within 3 days. Offered appt with family practice provider, would rather see ortho, if available the next couple of days.  He was  [facility] assistance in setting up appt.   DELTA Mendiola      Additional Information    MODERATE pain (e.g. interferes with normal activities) and present > 3 days    Answer Assessment - Initial Assessment Questions  1. ONSET: \"When did the pain start?\"      The pain started in his R arm about a week ago. Its like the outside of the armpit and hurts on inside of bicep into lower arm and into his index finger. Gets pins and needles in fingertips. \" I can't hold a coffee cup it hurts so bad. \"   2. LOCATION: \"Where is the pain located?\"       As listed above. It is like a throbbing.   3. PAIN: \"How bad is the pain?\" (Scale 1-10; or mild, moderate, severe)    - MILD (1-3): doesn't interfere with normal activities    - MODERATE (4-7): interferes with normal activities (e.g., work or school) or awakens from sleep    - SEVERE (8-10): excruciating pain, unable to do any normal activities, unable to hold a cup of water      It rates it severe. I have to take Morphine to deal with pain.   4. WORK OR EXERCISE: \"Has there been any recent work or exercise that involved this part of the body?\"      He does NOT recall any injury to his arm.   5. CAUSE: \"What do you think is causing the arm pain?\"      NO hx of heart problems.  Etiology unknown. \" I have to get tested for Leukemia.  I do have AIDS\"   6. OTHER SYMPTOMS: \"Do you have any other symptoms?\" (e.g., neck pain, swelling, rash, fever, numbness, weakness)       I have swelling in my face, hands and feet. He has swollen lymph nodes; groin area, armpits, neck. " "  7. PREGNANCY: \"Is there any chance you are pregnant?\" \"When was your last menstrual period?\"      NA    Protocols used: ARM PAIN-A-OH    "

## 2020-06-10 NOTE — TELEPHONE ENCOUNTER
Please call patient. Refill of prior dosing approved for Faustino Fernandes. Please schedule patient for virtual visit with Faustino Fernandes in 2 weeks.   Electronically signed by Marco Krause MD     Spoke with patient and informed of message below. Appointment made.     Candice Howard MA

## 2020-06-10 NOTE — TELEPHONE ENCOUNTER
Patient states for a week he has been experiencing some intense Right arm pain that starts at shoulder and moves to down to his fingers. He said the pain and pressure is so bad that he feels like his finger nails will rip off.     Please triage patient.     Candice Howard MA

## 2020-06-10 NOTE — TELEPHONE ENCOUNTER
Tommy calls back to inquire if the directions on his new refill of Endocet are correct.  Tommy states it should take 2 every 6 hours for pain.

## 2020-06-10 NOTE — TELEPHONE ENCOUNTER
Nystatin      Last Written Prescription Date:  5/13/2020  Last Fill Quantity: 400 mL,   # refills: 0  Last Office Visit: 1/16/20220  Future Office visit:       Routing refill request to provider for review/approval because:  Drug not on the [facility], [facility] or [facility] refill protocol or controlled substance

## 2020-06-11 DIAGNOSIS — B37.0 THRUSH: Primary | ICD-10-CM

## 2020-06-11 RX ORDER — CALCIUM CARB/VITAMIN D3/VIT K1 500-100-40
TABLET,CHEWABLE ORAL 2 TIMES DAILY
Qty: 1 EACH | Refills: 1 | Status: SHIPPED | OUTPATIENT
Start: 2020-06-11 | End: 2020-06-25

## 2020-06-11 NOTE — TELEPHONE ENCOUNTER
Please see chart review at refill request. No documentation of change in dosing by Faustino Fernandes or specialist. Continue current dosing and follow up with Faustino Fernandes by virtual visit on 6/19/20.  Electronically signed by Marco Krause MD

## 2020-06-11 NOTE — TELEPHONE ENCOUNTER
Per [facility] pharmacy, patient is requesting spray instead of cream. Janey Taylor CMA ([facility])

## 2020-06-11 NOTE — TELEPHONE ENCOUNTER
Called and notified him of this. He will have this addressed at his next office visit.  Aaliyah Mittal on 6/11/2020 at 8:56 AM

## 2020-06-12 ENCOUNTER — TELEPHONE (OUTPATIENT)
Dept: FAMILY MEDICINE | Facility: CLINIC | Age: 50
End: 2020-06-12

## 2020-06-12 ENCOUNTER — ANCILLARY PROCEDURE (OUTPATIENT)
Dept: GENERAL RADIOLOGY | Facility: CLINIC | Age: 50
End: 2020-06-12
Attending: PHYSICIAN ASSISTANT
Payer: COMMERCIAL

## 2020-06-12 ENCOUNTER — OFFICE VISIT (OUTPATIENT)
Dept: ORTHOPEDICS | Facility: CLINIC | Age: 50
End: 2020-06-12
Payer: COMMERCIAL

## 2020-06-12 ENCOUNTER — MYC MEDICAL ADVICE (OUTPATIENT)
Dept: FAMILY MEDICINE | Facility: CLINIC | Age: 50
End: 2020-06-12

## 2020-06-12 VITALS
BODY MASS INDEX: 17.43 KG/M2 | SYSTOLIC BLOOD PRESSURE: 108 MMHG | TEMPERATURE: 98 F | WEIGHT: 115 LBS | HEIGHT: 68 IN | DIASTOLIC BLOOD PRESSURE: 74 MMHG

## 2020-06-12 DIAGNOSIS — M25.529 ELBOW PAIN: ICD-10-CM

## 2020-06-12 DIAGNOSIS — M25.519 SHOULDER PAIN: ICD-10-CM

## 2020-06-12 DIAGNOSIS — M54.10 RADICULOPATHY OF ARM: Primary | ICD-10-CM

## 2020-06-12 PROCEDURE — 73030 X-RAY EXAM OF SHOULDER: CPT | Mod: TC

## 2020-06-12 PROCEDURE — 99214 OFFICE O/P EST MOD 30 MIN: CPT | Performed by: PHYSICIAN ASSISTANT

## 2020-06-12 PROCEDURE — 73080 X-RAY EXAM OF ELBOW: CPT | Mod: TC

## 2020-06-12 RX ORDER — FLUCONAZOLE 100 MG/1
100 TABLET ORAL DAILY
Qty: 15 TABLET | Refills: 0 | Status: SHIPPED | OUTPATIENT
Start: 2020-06-12 | End: 2020-07-01

## 2020-06-12 RX ORDER — MORPHINE SULFATE 15 MG/1
TABLET, FILM COATED, EXTENDED RELEASE ORAL
Qty: 15 TABLET | Refills: 0 | Status: SHIPPED | OUTPATIENT
Start: 2020-06-12

## 2020-06-12 RX ORDER — ALPRAZOLAM 0.5 MG
TABLET ORAL
Qty: 14 TABLET | Refills: 0 | Status: SHIPPED | OUTPATIENT
Start: 2020-06-12 | End: 2020-07-01

## 2020-06-12 ASSESSMENT — MIFFLIN-ST. JEOR: SCORE: 1351.14

## 2020-06-12 ASSESSMENT — PAIN SCALES - GENERAL: PAINLEVEL: EXTREME PAIN (8)

## 2020-06-12 NOTE — TELEPHONE ENCOUNTER
Is this something you will order or would you like to have a video visit with him?  He saw you last on 4/16 for a virtual visit so I thought I would ask you.

## 2020-06-12 NOTE — TELEPHONE ENCOUNTER
Infectious disease should be ordering and following his labs. If he is sick then he should have a visit but I can't just order those labs for no reason today. Can wait for Dr Fernandes next week.

## 2020-06-12 NOTE — TELEPHONE ENCOUNTER
Reason for Call: Request for an order or referral:    Order or referral being requested: Requesting lab work. He want a T Cell count and Lipase, Amylase, CBC, CMP, CMV Antibody test and Rheumatoid factor and check to see if he has sepsis. He says he is due. He is coming into the clinic today and would like to not have to come back a different day for this. Hoping it can get taken care of today. Please call him back to discuss. Is there a covering provider that can assist with this due to PCP being out of the office?    Date needed: as soon as possible    Has the patient been seen by the PCP for this problem? YES    Additional comments:     Phone number Patient can be reached at:  Home number on file [phone] (home)    Best Time:  any    Can we leave a detailed message on this number?  YES    Call taken on 6/12/2020 at 8:55 AM by Cristiane Krause CNA

## 2020-06-12 NOTE — TELEPHONE ENCOUNTER
"Alprazolam   Last Written Prescription Date:  5/7/20  Last Fill Quantity: 14,  # refills: 0   Last office visit: 1/16/2020 with prescribing provider:  Carlos Enrique Mane Office Visit:      Incruse Ellipta   Last Written Prescription Date:  4/28/20  Last Fill Quantity: 1,  # refills: 1   Last office visit: 1/16/2020 with prescribing provider:  Faustino Mane Office Visit:      Morphine  Last Written Prescription Date:  5/27/20  Last Fill Quantity: 30,  # refills: 0   Last office visit: 1/16/2020 with prescribing provider:  Faustino Mane Office Visit:      Fluconazole   Last Written Prescription Date:  6/11/20  Last Fill Quantity: 15,  # refills: 0   Last office visit: 1/16/2020 with prescribing provider:  Faustino Mane Office Visit:      Requested Prescriptions   Pending Prescriptions Disp Refills     fluconazole (DIFLUCAN) 100 MG tablet [Pharmacy Med Name: FLUCONAZOLE 100 MG TABLET 100 Tablet] 15 tablet 0     Sig: TAKE 1 TABLET (100 MG) BY MOUTH DAILY FOR 15 DAYS       Antifungal Agents Failed - 6/10/2020 10:35 AM        Failed - Not Fluconazole or Terconazole      If oral Fluconazole or Terconazole, may refill if indicated in progress notes.           Passed - Recent (12 mo) or future (30 days) visit within the authorizing provider's specialty     Patient has had an office visit with the authorizing provider or a provider within the authorizing providers department within the previous 12 mos or has a future within next 30 days. See \"Patient Info\" tab in inbasket, or \"Choose Columns\" in Meds & Orders section of the refill encounter.              Passed - Medication is active on med list           morphine (MS CONTIN) 15 MG CR tablet [Pharmacy Med Name: MORPHINE SUL 15MG ER TAB 15 Tablet] 30 tablet 0     Sig: TAKE 1 TABLET (15 MG) BY MOUTH DAILY. FILL DATE: 04/08/20       There is no refill protocol information for this order        INCRUSE ELLIPTA 62.5 MCG/INH Inhaler [Pharmacy Med " "Name: INCRUSE ELLIPTA 62.5 MCG IN 62.5 Aerosol] 30 each 1     Sig: INHALE 1 PUFF INTO THE LUNGS DAILY       Asthma Maintenance Inhalers - Anticholinergics Failed - 6/10/2020 10:35 AM        Failed - Asthma control assessment score within normal limits in last 6 months     Please review ACT score.           Passed - Patient is age 12 years or older        Passed - Medication is active on med list        Passed - Recent (6 mo) or future (30 days) visit within the authorizing provider's specialty     Patient had office visit in the last 6 months or has a visit in the next 30 days with authorizing provider or within the authorizing provider's specialty.  See \"Patient Info\" tab in inbasket, or \"Choose Columns\" in Meds & Orders section of the refill encounter.               ALPRAZolam (XANAX) 0.5 MG tablet [Pharmacy Med Name: ALPRAZOLAM 0.5 MG TABS 0.5 Tablet] 14 tablet 0     Sig: TAKE ONE TO TWO TABLET(S) BY MOUTH DAILY AS NEEDED FOR ANXIETY       There is no refill protocol information for this order        ACT Total Scores 7/25/2017 2/19/2018 11/29/2019   ACT TOTAL SCORE (Goal Greater than or Equal to 20) 19 9 19   In the past 12 months, how many times did you visit the emergency room for your asthma without being admitted to the hospital? 3 3 0   In the past 12 months, how many times were you hospitalized overnight because of your asthma? 3 3 0         Routing refill request to provider for review/approval because:  Drug not on the [facility] refill protocol   Labs not current:  ACT    Marylu Jeffers RN on 6/12/2020 at 12:33 PM            "
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
## 2020-06-12 NOTE — TELEPHONE ENCOUNTER
Called and left a  for return call. Please give message from Dr. Huertas.   Aaliyah Mittal on 6/12/2020 at 10:28 AM

## 2020-06-15 ENCOUNTER — HOSPITAL ENCOUNTER (EMERGENCY)
Facility: CLINIC | Age: 50
Discharge: HOME | End: 2020-06-15
Attending: NURSE PRACTITIONER | Admitting: NURSE PRACTITIONER
Payer: COMMERCIAL

## 2020-06-15 ENCOUNTER — APPOINTMENT (OUTPATIENT)
Dept: CT IMAGING | Facility: CLINIC | Age: 50
End: 2020-06-15
Attending: NURSE PRACTITIONER
Payer: COMMERCIAL

## 2020-06-15 ENCOUNTER — APPOINTMENT (OUTPATIENT)
Dept: GENERAL RADIOLOGY | Facility: CLINIC | Age: 50
End: 2020-06-15
Attending: NURSE PRACTITIONER
Payer: COMMERCIAL

## 2020-06-15 ENCOUNTER — TELEPHONE (OUTPATIENT)
Dept: FAMILY MEDICINE | Facility: CLINIC | Age: 50
End: 2020-06-15

## 2020-06-15 VITALS
OXYGEN SATURATION: 97 % | RESPIRATION RATE: 12 BRPM | DIASTOLIC BLOOD PRESSURE: 79 MMHG | HEART RATE: 99 BPM | SYSTOLIC BLOOD PRESSURE: 117 MMHG | TEMPERATURE: 98.5 F

## 2020-06-15 DIAGNOSIS — Z20.822 SUSPECTED 2019 NOVEL CORONAVIRUS INFECTION: ICD-10-CM

## 2020-06-15 DIAGNOSIS — Z21 HIV INFECTION, UNSPECIFIED SYMPTOM STATUS (H): ICD-10-CM

## 2020-06-15 DIAGNOSIS — R91.8 GROUND GLASS OPACITY PRESENT ON IMAGING OF LUNG: ICD-10-CM

## 2020-06-15 LAB
ALBUMIN SERPL-MCNC: 3.8 G/DL (ref 3.4–5)
ALP SERPL-CCNC: 132 U/L (ref 40–150)
ALT SERPL W P-5'-P-CCNC: 24 U/L (ref 0–70)
ANION GAP SERPL CALCULATED.3IONS-SCNC: 6 MMOL/L (ref 3–14)
AST SERPL W P-5'-P-CCNC: 28 U/L (ref 0–45)
BASOPHILS # BLD AUTO: 0 10E9/L (ref 0–0.2)
BASOPHILS NFR BLD AUTO: 0.2 %
BILIRUB SERPL-MCNC: 0.7 MG/DL (ref 0.2–1.3)
BUN SERPL-MCNC: 19 MG/DL (ref 7–30)
CALCIUM SERPL-MCNC: 8.5 MG/DL (ref 8.5–10.1)
CHLORIDE SERPL-SCNC: 104 MMOL/L (ref 94–109)
CO2 SERPL-SCNC: 28 MMOL/L (ref 20–32)
CREAT SERPL-MCNC: 0.82 MG/DL (ref 0.66–1.25)
D DIMER PPP FEU-MCNC: 0.6 UG/ML FEU (ref 0–0.5)
DIFFERENTIAL METHOD BLD: ABNORMAL
EOSINOPHIL NFR BLD AUTO: 0.2 %
ERYTHROCYTE [DISTWIDTH] IN BLOOD BY AUTOMATED COUNT: 14 % (ref 10–15)
GFR SERPL CREATININE-BSD FRML MDRD: >90 ML/MIN/{1.73_M2}
GLUCOSE SERPL-MCNC: 92 MG/DL (ref 70–99)
HCT VFR BLD AUTO: 48.4 % (ref 40–53)
HGB BLD-MCNC: 16.9 G/DL (ref 13.3–17.7)
IMM GRANULOCYTES # BLD: 0 10E9/L (ref 0–0.4)
IMM GRANULOCYTES NFR BLD: 0.2 %
LACTATE BLD-SCNC: 0.9 MMOL/L (ref 0.7–2)
LYMPHOCYTES # BLD AUTO: 2.9 10E9/L (ref 0.8–5.3)
LYMPHOCYTES NFR BLD AUTO: 46.6 %
MCH RBC QN AUTO: 29.2 PG (ref 26.5–33)
MCHC RBC AUTO-ENTMCNC: 34.9 G/DL (ref 31.5–36.5)
MCV RBC AUTO: 84 FL (ref 78–100)
MONOCYTES # BLD AUTO: 0.5 10E9/L (ref 0–1.3)
MONOCYTES NFR BLD AUTO: 8 %
NEUTROPHILS # BLD AUTO: 2.8 10E9/L (ref 1.6–8.3)
NEUTROPHILS NFR BLD AUTO: 44.8 %
NRBC # BLD AUTO: 0 10*3/UL
NRBC BLD AUTO-RTO: 0 /100
PLATELET # BLD AUTO: 140 10E9/L (ref 150–450)
POTASSIUM SERPL-SCNC: 4.1 MMOL/L (ref 3.4–5.3)
PROT SERPL-MCNC: 8.4 G/DL (ref 6.8–8.8)
RBC # BLD AUTO: 5.78 10E12/L (ref 4.4–5.9)
SODIUM SERPL-SCNC: 138 MMOL/L (ref 133–144)
WBC # BLD AUTO: 6.2 10E9/L (ref 4–11)

## 2020-06-15 PROCEDURE — 25000128 H RX IP 250 OP 636: Performed by: NURSE PRACTITIONER

## 2020-06-15 PROCEDURE — 71045 X-RAY EXAM CHEST 1 VIEW: CPT | Mod: TC

## 2020-06-15 PROCEDURE — 96361 HYDRATE IV INFUSION ADD-ON: CPT | Performed by: NURSE PRACTITIONER

## 2020-06-15 PROCEDURE — 71275 CT ANGIOGRAPHY CHEST: CPT

## 2020-06-15 PROCEDURE — 86359 T CELLS TOTAL COUNT: CPT | Performed by: NURSE PRACTITIONER

## 2020-06-15 PROCEDURE — 85379 FIBRIN DEGRADATION QUANT: CPT | Performed by: NURSE PRACTITIONER

## 2020-06-15 PROCEDURE — 86360 T CELL ABSOLUTE COUNT/RATIO: CPT | Performed by: NURSE PRACTITIONER

## 2020-06-15 PROCEDURE — 96374 THER/PROPH/DIAG INJ IV PUSH: CPT | Mod: 59 | Performed by: NURSE PRACTITIONER

## 2020-06-15 PROCEDURE — 25000132 ZZH RX MED GY IP 250 OP 250 PS 637: Performed by: NURSE PRACTITIONER

## 2020-06-15 PROCEDURE — 85025 COMPLETE CBC W/AUTO DIFF WBC: CPT | Performed by: NURSE PRACTITIONER

## 2020-06-15 PROCEDURE — 80053 COMPREHEN METABOLIC PANEL: CPT | Performed by: NURSE PRACTITIONER

## 2020-06-15 PROCEDURE — U0003 INFECTIOUS AGENT DETECTION BY NUCLEIC ACID (DNA OR RNA); SEVERE ACUTE RESPIRATORY SYNDROME CORONAVIRUS 2 (SARS-COV-2) (CORONAVIRUS DISEASE [COVID-19]), AMPLIFIED PROBE TECHNIQUE, MAKING USE OF HIGH THROUGHPUT TECHNOLOGIES AS DESCRIBED BY CMS-2020-01-R: HCPCS | Performed by: NURSE PRACTITIONER

## 2020-06-15 PROCEDURE — 25800030 ZZH RX IP 258 OP 636: Performed by: NURSE PRACTITIONER

## 2020-06-15 PROCEDURE — 96360 HYDRATION IV INFUSION INIT: CPT | Performed by: NURSE PRACTITIONER

## 2020-06-15 PROCEDURE — C9803 HOPD COVID-19 SPEC COLLECT: HCPCS | Performed by: NURSE PRACTITIONER

## 2020-06-15 PROCEDURE — 83605 ASSAY OF LACTIC ACID: CPT | Performed by: NURSE PRACTITIONER

## 2020-06-15 PROCEDURE — 87040 BLOOD CULTURE FOR BACTERIA: CPT | Performed by: NURSE PRACTITIONER

## 2020-06-15 PROCEDURE — 99284 EMERGENCY DEPT VISIT MOD MDM: CPT | Mod: Z6 | Performed by: NURSE PRACTITIONER

## 2020-06-15 PROCEDURE — 99285 EMERGENCY DEPT VISIT HI MDM: CPT | Mod: 25 | Performed by: NURSE PRACTITIONER

## 2020-06-15 RX ORDER — LOPERAMIDE HCL 2 MG
2 CAPSULE ORAL ONCE
Status: COMPLETED | OUTPATIENT
Start: 2020-06-15 | End: 2020-06-15

## 2020-06-15 RX ORDER — ONDANSETRON 2 MG/ML
4 INJECTION INTRAMUSCULAR; INTRAVENOUS EVERY 30 MIN PRN
Status: DISCONTINUED | OUTPATIENT
Start: 2020-06-15 | End: 2020-06-15 | Stop reason: HOSPADM

## 2020-06-15 RX ORDER — IOPAMIDOL 755 MG/ML
500 INJECTION, SOLUTION INTRAVASCULAR ONCE
Status: COMPLETED | OUTPATIENT
Start: 2020-06-15 | End: 2020-06-15

## 2020-06-15 RX ORDER — AZITHROMYCIN 250 MG/1
TABLET, FILM COATED ORAL
Qty: 6 TABLET | Refills: 0 | Status: SHIPPED | OUTPATIENT
Start: 2020-06-15 | End: 2020-06-20

## 2020-06-15 RX ORDER — OXYCODONE AND ACETAMINOPHEN 5; 325 MG/1; MG/1
2 TABLET ORAL ONCE
Status: COMPLETED | OUTPATIENT
Start: 2020-06-15 | End: 2020-06-15

## 2020-06-15 RX ADMIN — IOPAMIDOL 65 ML: 755 INJECTION, SOLUTION INTRAVENOUS at 16:28

## 2020-06-15 RX ADMIN — ONDANSETRON 4 MG: 2 INJECTION INTRAMUSCULAR; INTRAVENOUS at 15:27

## 2020-06-15 RX ADMIN — LOPERAMIDE HYDROCHLORIDE 2 MG: 2 CAPSULE ORAL at 16:03

## 2020-06-15 RX ADMIN — SODIUM CHLORIDE 1000 ML: 9 INJECTION, SOLUTION INTRAVENOUS at 15:11

## 2020-06-15 RX ADMIN — OXYCODONE HYDROCHLORIDE AND ACETAMINOPHEN 2 TABLET: 5; 325 TABLET ORAL at 15:26
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
## 2020-06-15 NOTE — TELEPHONE ENCOUNTER
Reason for call:  Patient reporting a symptom    Symptom or request: Was short of breath yesterday but his 02sats were at 97%.  Painful when he breaths in, not as bad today as yesterday.  Fever of 100.00 yesterday and today.  Rt shoulder pain off and on.    Duration (how long have symptoms been present): 2 days - noticed a little on Saturday    Have you been treated for this before? No    Additional comments: Made an appointment for Tommy with urgent care online     Phone Number patient can be reached at:  Home number on file [phone] (home)    Best Time:  any    Can we leave a detailed message on this number:  YES    Call taken on 6/15/2020 at 11:35 AM by Alannah Gutierrez

## 2020-06-15 NOTE — DISCHARGE INSTRUCTIONS
Tommy, please start the Zithromax today and take as directed.  You can continue using her nebulizers and inhalers as needed if you feel short of breath.  I am concerned you may have a COVID infection especially with your HIV history as you know you are more prone to infections and I want you to make sure you are staying self quarantined until your COVID test returns negative which may be in the next 48 hours or so.  If you remain symptomatic you should stay quarantined for at least the next 7 to 10 days.  Please work on quitting smoking and follow-up with your infectious disease team in the next couple of weeks.  I would like you to be rechecked in clinic in the next week to ensure you are doing okay.  Stay well-hydrated.  You can take Imodium per bottle instructions as needed for diarrhea  Return here with any worsening symptoms or new concerns.

## 2020-06-15 NOTE — ED AVS SNAPSHOT
[facility] Emergency Department  [address] [place] [address]  Phone:  [phone]  Fax:  [phone]                                    Tommy Ross   MRN: [id number]    Department:  [facility] Emergency Department   Date of Visit:  6/15/2020           After Visit Summary Signature Page    I have received my discharge instructions, and my questions have been answered. I have discussed any challenges I see with this plan with the nurse or doctor.    ..........................................................................................................................................  Patient/Patient Representative Signature      ..........................................................................................................................................  Patient Representative Print Name and Relationship to Patient    ..................................................               ................................................  Date                                   Time    ..........................................................................................................................................  Reviewed by Signature/Title    ...................................................              ..............................................  Date                                               Time          22EPIC Rev 08/18

## 2020-06-15 NOTE — LETTER
June 16, 2020        Tommy Ross  [address]   [place] [address]    This letter provides a written record that you were tested for COVID-19 on 6/15/20.       Your result was negative. This means that we didn t find the virus that causes COVID-19 in your sample. A test may show negative when you do actually have the virus. This can happen when the virus is in the early stages of infection, before you feel illness symptoms.    If you have symptoms   Stay home and away from others (self-isolate) until you meet ALL of the guidelines below:    You ve had no fever--and no medicine that reduces fever--for 3 full days (72 hours). And      Your other symptoms have gotten better. For example, your cough or breathing has improved. And     At least 10 days have passed since your symptoms started.    During this time:    Stay home. Don t go to work, school or anywhere else.     Stay in your own room, including for meals. Use your own bathroom if you can.    Stay away from others in your home. No hugging, kissing or shaking hands. No visitors.    Clean  high touch  surfaces often (doorknobs, counters, handles, etc.). Use a household cleaning spray or wipes. You can find a full list on the EPA website at www.epa.gov/pesticide-registration/list-n-disinfectants-use-against-sars-cov-2.    Cover your mouth and nose with a mask, tissue or washcloth to avoid spreading germs.    Wash your hands and face often with soap and water.    Going back to work  Check with your employer for any guidelines to follow for going back to work.    Employers: This document serves as formal notice that your employee tested negative for COVID-19, as of the testing date shown above.

## 2020-06-16 LAB
CD3 CELLS # BLD: 2484 CELLS/UL (ref 603–2990)
CD3 CELLS NFR BLD: 93 % (ref 49–84)
CD3+CD4+ CELLS # BLD: 188 CELLS/UL (ref 441–2156)
CD3+CD4+ CELLS NFR BLD: 7 % (ref 28–63)
CD3+CD4+ CELLS/CD3+CD8+ CLL BLD: 0.08 % (ref 1.4–2.6)
CD3+CD8+ CELLS # BLD: 2268 CELLS/UL (ref 125–1312)
CD3+CD8+ CELLS NFR BLD: 85 % (ref 10–40)
IFC SPECIMEN: ABNORMAL
SARS-COV-2 RNA SPEC QL NAA+PROBE: NOT DETECTED
SPECIMEN SOURCE: NORMAL

## 2020-06-16 NOTE — RESULT ENCOUNTER NOTE
Resulted after Emergency Dept visit on 9/15/20  Routed to Infectious Disease Specialist, Carlos Enrique Lay MD Erich Halberg, RN  [employer] [facility]  Emergency Dept Lab Result RN  Ph# [phone]

## 2020-06-18 ENCOUNTER — TRANSFERRED RECORDS (OUTPATIENT)
Dept: HEALTH INFORMATION MANAGEMENT | Facility: CLINIC | Age: 50
End: 2020-06-18

## 2020-06-18 ENCOUNTER — MYC MEDICAL ADVICE (OUTPATIENT)
Dept: INFECTIOUS DISEASES | Facility: CLINIC | Age: 50
End: 2020-06-18

## 2020-06-19 ENCOUNTER — MYC MEDICAL ADVICE (OUTPATIENT)
Dept: FAMILY MEDICINE | Facility: CLINIC | Age: 50
End: 2020-06-19

## 2020-06-19 ENCOUNTER — VIRTUAL VISIT (OUTPATIENT)
Dept: FAMILY MEDICINE | Facility: CLINIC | Age: 50
End: 2020-06-19
Payer: COMMERCIAL

## 2020-06-19 DIAGNOSIS — J45.909 ASTHMATIC BRONCHITIS, UNSPECIFIED ASTHMA SEVERITY, UNCOMPLICATED: ICD-10-CM

## 2020-06-19 DIAGNOSIS — M25.512 PAIN AND SWELLING OF LEFT SHOULDER: Primary | ICD-10-CM

## 2020-06-19 DIAGNOSIS — G89.29 OTHER CHRONIC PAIN: ICD-10-CM

## 2020-06-19 DIAGNOSIS — M25.412 PAIN AND SWELLING OF LEFT SHOULDER: Primary | ICD-10-CM

## 2020-06-19 DIAGNOSIS — R13.10 SWALLOWING DYSFUNCTION: Primary | ICD-10-CM

## 2020-06-19 DIAGNOSIS — B20 SYMPTOMATIC HIV INFECTION (H): ICD-10-CM

## 2020-06-19 DIAGNOSIS — F11.90 CHRONIC, CONTINUOUS USE OF OPIOIDS: ICD-10-CM

## 2020-06-19 DIAGNOSIS — J98.01 ACUTE BRONCHOSPASM: ICD-10-CM

## 2020-06-19 DIAGNOSIS — R93.89 ABNORMAL CHEST CT: ICD-10-CM

## 2020-06-19 PROCEDURE — 99214 OFFICE O/P EST MOD 30 MIN: CPT | Mod: 95 | Performed by: FAMILY MEDICINE

## 2020-06-19 RX ORDER — FENTANYL 25 UG/1
1 PATCH TRANSDERMAL
Qty: 5 PATCH | Refills: 0 | Status: SHIPPED | OUTPATIENT
Start: 2020-06-19 | End: 2020-07-01

## 2020-06-19 RX ORDER — IPRATROPIUM BROMIDE AND ALBUTEROL SULFATE 2.5; .5 MG/3ML; MG/3ML
3 SOLUTION RESPIRATORY (INHALATION) EVERY 6 HOURS PRN
Qty: 1 BOX | Refills: 1 | Status: SHIPPED | OUTPATIENT
Start: 2020-06-19
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
## 2020-06-19 NOTE — PROGRESS NOTES
#1, Pt desiring treatment to reduce AM hyperemia. Referral for pulmonology and video swallow test placed. Patient has been informed referrals are placed. Patient was busy and wanted to call to schedule appointments at his convenience. Phone number to specialty was sent through [employer] to patient.    Amanda Aleman [facility]

## 2020-06-19 NOTE — TELEPHONE ENCOUNTER
fentaNYL (DURAGESIC) 25 mcg/hr 72 hr patch   Last Written Prescription Date:  06/08/2020  Last Fill Quantity: 5,   # refills: 0  Last Office Visit: 01/16/2020  Future Office visit:       Routing refill request to provider for review/approval because:  Drug not on the [facility], [facility] or [facility] refill protocol or controlled substance

## 2020-06-19 NOTE — TELEPHONE ENCOUNTER
Tried to schedule patient for pulmonology but was on hold for long time. Patient was sent [employer] message with phone number and he states he will call on Monday to schedule appointment.    Amanda Aleman, [facility]

## 2020-06-19 NOTE — TELEPHONE ENCOUNTER
Per Pharmacy patient wants to increase his endocet directions. Would you want to try Oxycodone 10mg (extra tylenol in endocet is not good. He would probably have the same therapeutic effect of Oxycodone without having the tylenol which is probably bad on his liver. Per Pharmacy just a thought and lisandra would be okay with this as they discussed his tylenol use.     Patient is also on ranitidine and is no longer available, please switch medication to Cimetidine instead.     In regards to Bisacodyl per pharmacy patient is using at least 2 per day. He would like his directions increased and new script sent.     Patient uses [facility] in [facility]       Please advise.     Candice Howard MA

## 2020-06-21 LAB
BACTERIA SPEC CULT: NO GROWTH
Lab: NORMAL
SPECIMEN SOURCE: NORMAL

## 2020-06-22 ENCOUNTER — MYC MEDICAL ADVICE (OUTPATIENT)
Dept: FAMILY MEDICINE | Facility: CLINIC | Age: 50
End: 2020-06-22

## 2020-06-22 ENCOUNTER — TELEPHONE (OUTPATIENT)
Dept: FAMILY MEDICINE | Facility: CLINIC | Age: 50
End: 2020-06-22

## 2020-06-22 NOTE — TELEPHONE ENCOUNTER
----- Message from Alba Coronado sent at 6/22/2020 12:48 PM CDT -----  Regarding: Speech therapy order  Hello,  We received an order for the above patient for therapy. Per order requirements, therapy orders are required to be signed by a physician or non-physician practitioners. This order is currently pending signature and until it is signed we are unable to accept it.   Please sign the therapy order, as soon as possible and we can get your patient scheduled for therapy.  Thank you for your help!  Sincerely, the [facility] Scheduling Team

## 2020-06-24 NOTE — TELEPHONE ENCOUNTER
Tommy calls back about his Endocet prescription and he cannot wait until 07/01 to have this filled.  He will be out of his Endocet on Friday.    Tommy states the pharmacy has been requesting a change to increase his dosage and so has he.  Tommy states Dr Fernandes told him this should be corrected but it has not been yet.  Tommy states he has a lot of anxiety about running out of this medication.

## 2020-06-25 ENCOUNTER — MYC MEDICAL ADVICE (OUTPATIENT)
Dept: FAMILY MEDICINE | Facility: CLINIC | Age: 50
End: 2020-06-25

## 2020-06-25 RX ORDER — OXYCODONE HYDROCHLORIDE 10 MG/1
10 TABLET ORAL
Qty: 150 TABLET | Refills: 0 | Status: SHIPPED | OUTPATIENT
Start: 2020-06-25 | End: 2020-07-09

## 2020-06-25 NOTE — TELEPHONE ENCOUNTER
Pharmacist was this message addressed please  MP/MA      Per Pharmacy patient wants to increase his endocet directions. Would you want to try Oxycodone 10mg (extra tylenol in endocet is not good. He would probably have the same therapeutic effect of Oxycodone without having the tylenol which is probably bad on his liver. Per Pharmacy just a thought and lisandra would be okay with this as they discussed his tylenol use.      Patient is also on ranitidine and is no longer available, please switch medication to Cimetidine instead.      In regards to Bisacodyl per pharmacy patient is using at least 2 per day. He would like his directions increased and new script sent.      Patient uses [facility] in [facility]

## 2020-06-25 NOTE — TELEPHONE ENCOUNTER
Dr. Fernandes did switch the Endocet to Oxycodone like the pharmacist recommended.    Rx was sent.  Pharmacy was notified  MP/MA

## 2020-06-25 NOTE — TELEPHONE ENCOUNTER
Patients wife calling trying to see if the Endocet without tylenol in it can be ordered?? Pharmacy is trying to work with the provider?    Call or mychart message Tommy or Peri with an update.    Urgent** as he is running out of this med.    Kiesha Angelo RN  [facility] Nurse Advisor

## 2020-06-25 NOTE — TELEPHONE ENCOUNTER
See patients message. Requesting his Endocet, note covering provider approved with fill date of 7/1/20. Suellen Saul LPN

## 2020-06-26 ENCOUNTER — VIRTUAL VISIT (OUTPATIENT)
Dept: INFECTIOUS DISEASES | Facility: CLINIC | Age: 50
End: 2020-06-26
Attending: INTERNAL MEDICINE
Payer: COMMERCIAL

## 2020-06-26 DIAGNOSIS — Z21 HIV INFECTION, UNSPECIFIED SYMPTOM STATUS (H): Primary | ICD-10-CM

## 2020-06-26 NOTE — PROGRESS NOTES
Have not been able to get in touch with patient and mailbox is full so I was not able to leave a voicemail either.  Bel Riggs, CMA on 6/26/2020 at 1:39 PM
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
## 2020-06-29 ENCOUNTER — MYC MEDICAL ADVICE (OUTPATIENT)
Dept: FAMILY MEDICINE | Facility: CLINIC | Age: 50
End: 2020-06-29

## 2020-06-29 DIAGNOSIS — Z87.828 HISTORY OF MOTOR VEHICLE ACCIDENT: ICD-10-CM

## 2020-06-29 DIAGNOSIS — G89.29 OTHER CHRONIC PAIN: ICD-10-CM

## 2020-06-29 DIAGNOSIS — R93.89 ABNORMAL CHEST CT: Primary | ICD-10-CM

## 2020-06-29 DIAGNOSIS — M54.50 LOW BACK PAIN: ICD-10-CM

## 2020-06-29 NOTE — TELEPHONE ENCOUNTER
CT chest, cervical, thoracic and lumbar spine orders placed and pended. Not sure what to order for arms?

## 2020-07-01 ENCOUNTER — MYC MEDICAL ADVICE (OUTPATIENT)
Dept: FAMILY MEDICINE | Facility: CLINIC | Age: 50
End: 2020-07-01

## 2020-07-01 DIAGNOSIS — B37.0 THRUSH: ICD-10-CM

## 2020-07-01 DIAGNOSIS — G89.29 OTHER CHRONIC PAIN: ICD-10-CM

## 2020-07-01 DIAGNOSIS — F41.9 ANXIETY: ICD-10-CM

## 2020-07-01 RX ORDER — FLUCONAZOLE 100 MG/1
100 TABLET ORAL DAILY
Qty: 15 TABLET | Refills: 0 | Status: SHIPPED | OUTPATIENT
Start: 2020-07-01 | End: 2020-08-03

## 2020-07-01 RX ORDER — FENTANYL 25 UG/1
1 PATCH TRANSDERMAL
Qty: 5 PATCH | Refills: 0 | Status: SHIPPED | OUTPATIENT
Start: 2020-07-01

## 2020-07-01 RX ORDER — NYSTATIN 100000/ML
500000 SUSPENSION, ORAL (FINAL DOSE FORM) ORAL 4 TIMES DAILY
Qty: 400 ML | Refills: 0 | Status: SHIPPED | OUTPATIENT
Start: 2020-07-01 | End: 2020-08-03

## 2020-07-01 RX ORDER — ALPRAZOLAM 0.5 MG
TABLET ORAL
Qty: 14 TABLET | Refills: 0 | Status: SHIPPED | OUTPATIENT
Start: 2020-07-01 | End: 2020-08-03

## 2020-07-01 NOTE — TELEPHONE ENCOUNTER
Routing refill request to provider for review/approval because:  Drug not on the [facility] refill protocol   Dee Fernandes RN

## 2020-07-01 NOTE — TELEPHONE ENCOUNTER
Fentanyl      Last Written Prescription Date:  6/19/2020  Last Fill Quantity: 5,   # refills: 0  Last Office Visit: 6/19/2020  Future Office visit:       Routing refill request to provider for review/approval because:  Drug not on the [facility], [facility] or [facility] refill protocol or controlled substance    Nystatin      Last Written Prescription Date:  6/10/2020  Last Fill Quantity: 400 mL,   # refills: 0  Last Office Visit: 6/19/2020  Future Office visit:       Routing refill request to provider for review/approval because:  Drug not on the [facility], [facility] or [facility] refill protocol or controlled substance

## 2020-07-02 DIAGNOSIS — M25.512 PAIN AND SWELLING OF LEFT SHOULDER: ICD-10-CM

## 2020-07-02 DIAGNOSIS — R63.0 LOSS OF APPETITE: ICD-10-CM

## 2020-07-02 DIAGNOSIS — M25.412 PAIN AND SWELLING OF LEFT SHOULDER: ICD-10-CM

## 2020-07-02 RX ORDER — DRONABINOL 5 MG/1
CAPSULE ORAL
Qty: 90 CAPSULE | Refills: 0 | Status: SHIPPED | OUTPATIENT
Start: 2020-07-02 | End: 2020-08-11

## 2020-07-02 NOTE — TELEPHONE ENCOUNTER
Patient was informed via [place] yesterday, 7/1/2020, that Dr. Fernandes would be out of clinic until Monday, 7/6/2020.  Patient had a change in medication of his Endocet to regular Oxycodone without Tylenol.  He believes it can then be raised to 2 Oxy every 4 hours to account for absorption issues and tolerance (See MyChart from 7/1/2020).  Last fill:  oxyCODONE IR (ROXICODONE) 10 MG tablet  150 tablet  0  6/25/2020   --    Sig - Route: Take 1 tablet (10 mg) by mouth 5 x daily PRN for severe pain - Oral    Sent to pharmacy as: oxyCODONE HCl 10 MG Oral Tablet (ROXICODONE)    Class: E-Prescribe    Earliest Fill Date: 6/25/2020    Order: [id number]      Olga Yanez, DEVONTEN, RN

## 2020-07-02 NOTE — TELEPHONE ENCOUNTER
Routing refill request to provider for review/approval because:  Drug not on the [facility] refill protocol   Last Written Prescription Date:  6/9/2020  Last Fill Quantity: 90,  # refills: 0   Last office visit: 1/16/2020 with prescribing provider:  6/19/2020 - virtual with PCP.   Future Office Visit:      DEVONTE AbernathyN, RN

## 2020-07-06 ENCOUNTER — TELEPHONE (OUTPATIENT)
Dept: FAMILY MEDICINE | Facility: CLINIC | Age: 50
End: 2020-07-06

## 2020-07-06 NOTE — TELEPHONE ENCOUNTER
Please get more information from the patient.  If thrush is not gone then needs a visit virtual or in plerson.

## 2020-07-06 NOTE — TELEPHONE ENCOUNTER
The pharmacist called and said Tommy needs something stronger for his thrush and recommend to change it to something with Nystatin in it.  Can you change this Dr. Huertas?

## 2020-07-07 ENCOUNTER — MYC MEDICAL ADVICE (OUTPATIENT)
Dept: INFECTIOUS DISEASES | Facility: CLINIC | Age: 50
End: 2020-07-07

## 2020-07-07 NOTE — TELEPHONE ENCOUNTER
Tommy thinks he has malabsorption and that is why the thrush won't go away.  He wants a video visit with you this week.  Can you work him in?

## 2020-07-09 ENCOUNTER — MYC MEDICAL ADVICE (OUTPATIENT)
Dept: FAMILY MEDICINE | Facility: CLINIC | Age: 50
End: 2020-07-09

## 2020-07-09 DIAGNOSIS — G89.29 OTHER CHRONIC PAIN: Primary | ICD-10-CM

## 2020-07-09 DIAGNOSIS — M25.412 PAIN AND SWELLING OF LEFT SHOULDER: ICD-10-CM

## 2020-07-09 DIAGNOSIS — Z87.828 HISTORY OF MOTOR VEHICLE ACCIDENT: ICD-10-CM

## 2020-07-09 DIAGNOSIS — B20 SYMPTOMATIC HIV INFECTION (H): ICD-10-CM

## 2020-07-09 DIAGNOSIS — F11.90 CHRONIC, CONTINUOUS USE OF OPIOIDS: ICD-10-CM

## 2020-07-09 DIAGNOSIS — M25.512 PAIN AND SWELLING OF LEFT SHOULDER: ICD-10-CM

## 2020-07-09 RX ORDER — OXYCODONE HYDROCHLORIDE 10 MG/1
TABLET ORAL
Qty: 180 TABLET | Refills: 0 | Status: SHIPPED | OUTPATIENT
Start: 2020-07-09

## 2020-07-09 RX ORDER — OXYCODONE HYDROCHLORIDE 10 MG/1
TABLET ORAL
Qty: 150 TABLET | Refills: 0 | OUTPATIENT
Start: 2020-07-09

## 2020-07-09 NOTE — TELEPHONE ENCOUNTER
Requesting to take 12   10 mg oxycodone a day t2 every 4 hours.  He states we talked about this and a virtual visit I did not agree to something like that I have never prescribed that higher dose to anybody.  We can allow him to go to up to 6 a day for a total of 60 mg.  Needs new pain clinic referral.

## 2020-07-10 ENCOUNTER — HOSPITAL ENCOUNTER (OUTPATIENT)
Dept: CT IMAGING | Facility: CLINIC | Age: 50
End: 2020-07-10
Attending: FAMILY MEDICINE
Payer: COMMERCIAL

## 2020-07-10 DIAGNOSIS — Z87.828 HISTORY OF MOTOR VEHICLE ACCIDENT: ICD-10-CM

## 2020-07-10 DIAGNOSIS — G89.29 OTHER CHRONIC PAIN: ICD-10-CM

## 2020-07-10 DIAGNOSIS — R93.89 ABNORMAL CHEST CT: ICD-10-CM

## 2020-07-10 DIAGNOSIS — Z21 HIV INFECTION, UNSPECIFIED SYMPTOM STATUS (H): ICD-10-CM

## 2020-07-10 LAB
ALBUMIN SERPL-MCNC: 3.3 G/DL (ref 3.4–5)
ALP SERPL-CCNC: 99 U/L (ref 40–150)
ALT SERPL W P-5'-P-CCNC: 13 U/L (ref 0–70)
ANION GAP SERPL CALCULATED.3IONS-SCNC: 5 MMOL/L (ref 3–14)
AST SERPL W P-5'-P-CCNC: 13 U/L (ref 0–45)
BASOPHILS # BLD AUTO: 0 10E9/L (ref 0–0.2)
BASOPHILS NFR BLD AUTO: 0.2 %
BILIRUB SERPL-MCNC: 0.6 MG/DL (ref 0.2–1.3)
BUN SERPL-MCNC: 13 MG/DL (ref 7–30)
CALCIUM SERPL-MCNC: 8.3 MG/DL (ref 8.5–10.1)
CHLORIDE SERPL-SCNC: 109 MMOL/L (ref 94–109)
CO2 SERPL-SCNC: 27 MMOL/L (ref 20–32)
CREAT SERPL-MCNC: 0.91 MG/DL (ref 0.66–1.25)
DIFFERENTIAL METHOD BLD: ABNORMAL
EOSINOPHIL NFR BLD AUTO: 0.4 %
ERYTHROCYTE [DISTWIDTH] IN BLOOD BY AUTOMATED COUNT: 13.4 % (ref 10–15)
GFR SERPL CREATININE-BSD FRML MDRD: >90 ML/MIN/{1.73_M2}
GLUCOSE SERPL-MCNC: 91 MG/DL (ref 70–99)
HCT VFR BLD AUTO: 43.4 % (ref 40–53)
HGB BLD-MCNC: 14.5 G/DL (ref 13.3–17.7)
IMM GRANULOCYTES # BLD: 0 10E9/L (ref 0–0.4)
IMM GRANULOCYTES NFR BLD: 0.4 %
LYMPHOCYTES # BLD AUTO: 2.3 10E9/L (ref 0.8–5.3)
LYMPHOCYTES NFR BLD AUTO: 43.2 %
MCH RBC QN AUTO: 28.8 PG (ref 26.5–33)
MCHC RBC AUTO-ENTMCNC: 33.4 G/DL (ref 31.5–36.5)
MCV RBC AUTO: 86 FL (ref 78–100)
MONOCYTES # BLD AUTO: 0.3 10E9/L (ref 0–1.3)
MONOCYTES NFR BLD AUTO: 5.3 %
NEUTROPHILS # BLD AUTO: 2.7 10E9/L (ref 1.6–8.3)
NEUTROPHILS NFR BLD AUTO: 50.5 %
NRBC # BLD AUTO: 0 10*3/UL
NRBC BLD AUTO-RTO: 0 /100
PLATELET # BLD AUTO: 101 10E9/L (ref 150–450)
PLATELET # BLD EST: ABNORMAL 10*3/UL
POTASSIUM SERPL-SCNC: 3.5 MMOL/L (ref 3.4–5.3)
PROT SERPL-MCNC: 7.4 G/DL (ref 6.8–8.8)
RBC # BLD AUTO: 5.03 10E12/L (ref 4.4–5.9)
RBC MORPH BLD: ABNORMAL
SODIUM SERPL-SCNC: 141 MMOL/L (ref 133–144)
WBC # BLD AUTO: 5.3 10E9/L (ref 4–11)

## 2020-07-10 PROCEDURE — 25000128 H RX IP 250 OP 636: Performed by: RADIOLOGY

## 2020-07-10 PROCEDURE — 87536 HIV-1 QUANT&REVRSE TRNSCRPJ: CPT | Performed by: INTERNAL MEDICINE

## 2020-07-10 PROCEDURE — 72131 CT LUMBAR SPINE W/O DYE: CPT

## 2020-07-10 PROCEDURE — 80053 COMPREHEN METABOLIC PANEL: CPT | Performed by: INTERNAL MEDICINE

## 2020-07-10 PROCEDURE — 71260 CT THORAX DX C+: CPT

## 2020-07-10 PROCEDURE — 86360 T CELL ABSOLUTE COUNT/RATIO: CPT | Performed by: INTERNAL MEDICINE

## 2020-07-10 PROCEDURE — 25000125 ZZHC RX 250: Performed by: RADIOLOGY

## 2020-07-10 PROCEDURE — 86359 T CELLS TOTAL COUNT: CPT | Performed by: INTERNAL MEDICINE

## 2020-07-10 PROCEDURE — 36415 COLL VENOUS BLD VENIPUNCTURE: CPT | Performed by: INTERNAL MEDICINE

## 2020-07-10 PROCEDURE — 72128 CT CHEST SPINE W/O DYE: CPT

## 2020-07-10 PROCEDURE — 72125 CT NECK SPINE W/O DYE: CPT

## 2020-07-10 PROCEDURE — 85025 COMPLETE CBC W/AUTO DIFF WBC: CPT | Performed by: INTERNAL MEDICINE

## 2020-07-10 RX ORDER — IOPAMIDOL 755 MG/ML
500 INJECTION, SOLUTION INTRAVASCULAR ONCE
Status: COMPLETED | OUTPATIENT
Start: 2020-07-10 | End: 2020-07-10

## 2020-07-10 RX ADMIN — IOPAMIDOL 75 ML: 755 INJECTION, SOLUTION INTRAVENOUS at 12:51

## 2020-07-10 RX ADMIN — SODIUM CHLORIDE 75 ML: 9 INJECTION, SOLUTION INTRAVENOUS at 12:51

## 2020-07-11 LAB
CD3 CELLS # BLD: 1792 CELLS/UL (ref 603–2990)
CD3 CELLS NFR BLD: 95 % (ref 49–84)
CD3+CD4+ CELLS # BLD: 166 CELLS/UL (ref 441–2156)
CD3+CD4+ CELLS NFR BLD: 9 % (ref 28–63)
CD3+CD4+ CELLS/CD3+CD8+ CLL BLD: 0.1 % (ref 1.4–2.6)
CD3+CD8+ CELLS # BLD: 1623 CELLS/UL (ref 125–1312)
CD3+CD8+ CELLS NFR BLD: 86 % (ref 10–40)
IFC SPECIMEN: ABNORMAL

## 2020-07-13 ENCOUNTER — MYC MEDICAL ADVICE (OUTPATIENT)
Dept: FAMILY MEDICINE | Facility: CLINIC | Age: 50
End: 2020-07-13

## 2020-07-13 DIAGNOSIS — M25.412 PAIN AND SWELLING OF LEFT SHOULDER: ICD-10-CM

## 2020-07-13 DIAGNOSIS — M25.512 PAIN AND SWELLING OF LEFT SHOULDER: ICD-10-CM

## 2020-07-13 DIAGNOSIS — G89.29 OTHER CHRONIC PAIN: ICD-10-CM

## 2020-07-13 DIAGNOSIS — G43.809 OTHER MIGRAINE WITHOUT STATUS MIGRAINOSUS, NOT INTRACTABLE: ICD-10-CM

## 2020-07-13 RX ORDER — OXYCODONE HYDROCHLORIDE 10 MG/1
TABLET ORAL
Qty: 180 TABLET | Refills: 0 | Status: CANCELLED | OUTPATIENT
Start: 2020-07-13

## 2020-07-14 ENCOUNTER — TELEPHONE (OUTPATIENT)
Dept: FAMILY MEDICINE | Facility: CLINIC | Age: 50
End: 2020-07-14

## 2020-07-14 NOTE — TELEPHONE ENCOUNTER
Reason for Call:  Other Pain Clinic    Detailed comments: Dr Carlton Melendez from [facility] would like to speak to Dr Fernandes regarding mutual patient Tommy, Dr Melendez is aware Dr Fernandes is out of clinic until 7/15/20.     Phone Number Patient can be reached at: [phone] - fdkn    Best Time:     Can we leave a detailed message on this number? YES    Call taken on 7/14/2020 at 10:01 AM by Julia Chahal

## 2020-07-14 NOTE — TELEPHONE ENCOUNTER
Routing refill request to provider for review/approval because:  Drug not on the [facility] refill protocol   Serotonin agonist failed  Dee Fernandes RN

## 2020-07-15 ENCOUNTER — MYC MEDICAL ADVICE (OUTPATIENT)
Dept: FAMILY MEDICINE | Facility: CLINIC | Age: 50
End: 2020-07-15

## 2020-07-15 ENCOUNTER — TELEPHONE (OUTPATIENT)
Dept: INFECTIOUS DISEASES | Facility: CLINIC | Age: 50
End: 2020-07-15

## 2020-07-15 ENCOUNTER — TRANSFERRED RECORDS (OUTPATIENT)
Dept: HEALTH INFORMATION MANAGEMENT | Facility: CLINIC | Age: 50
End: 2020-07-15

## 2020-07-15 LAB
HIV1 RNA # PLAS NAA DL=20: ABNORMAL {COPIES}/ML
HIV1 RNA SERPL NAA+PROBE-LOG#: 4.8 {LOG_COPIES}/ML

## 2020-07-15 RX ORDER — SUMATRIPTAN 50 MG/1
TABLET, FILM COATED ORAL
Qty: 6 TABLET | Refills: 1 | Status: SHIPPED | OUTPATIENT
Start: 2020-07-15 | End: 2020-10-09

## 2020-07-15 RX ORDER — FENTANYL 25 UG/1
1 PATCH TRANSDERMAL
Qty: 5 PATCH | Refills: 0 | OUTPATIENT
Start: 2020-07-15

## 2020-07-15 NOTE — TELEPHONE ENCOUNTER
[facility]    Phone Message    May a detailed message be left on voicemail: yes     Reason for Call: Other: Dr. Nora christian [facility] called looking to speak to Alexandra in regards to this pt. He did not leave a message just requested a call back to juan jose this pt. Please follow up     Action Taken: Message routed to:  [facility] ID    Travel Screening: Not Applicable

## 2020-07-15 NOTE — TELEPHONE ENCOUNTER
Patient is not going to start seeing Dr. Melendez at [facility] for all of his controlled substances.  Dr. Fernandes will no longer prescribe.

## 2020-07-16 NOTE — TELEPHONE ENCOUNTER
Spoke with Dr Melendez pertaining to pt and his need for pain medication. Dr Melendez wondering if pt would be considered terminal.  Answered all Dr Melendez's questions.  Alexandra Downs RN

## 2020-07-17 DIAGNOSIS — B37.0 THRUSH: ICD-10-CM

## 2020-07-17 RX ORDER — DIPHENHYDRAMINE HYDROCHLORIDE AND LIDOCAINE HYDROCHLORIDE AND ALUMINUM HYDROXIDE AND MAGNESIUM HYDRO
5-10 KIT EVERY 6 HOURS PRN
Qty: 237 ML | Refills: 0 | Status: SHIPPED | OUTPATIENT
Start: 2020-07-17 | End: 2020-10-07

## 2020-07-17 RX ORDER — NYSTATIN 100000/ML
500000 SUSPENSION, ORAL (FINAL DOSE FORM) ORAL 4 TIMES DAILY
Qty: 400 ML | Refills: 0 | OUTPATIENT
Start: 2020-07-17

## 2020-07-17 NOTE — TELEPHONE ENCOUNTER
Reason for Call:  Medication or medication refill:    Do you use a [facility]?  Name of the pharmacy and phone number for the current request:  [facility] - [phone]    Name of the medication requested: magic mouthwash suspension, diphenhydrAMINE, lidocaine, aluminum-magnesium & simethicone, (FIRST-MOUTHWASH BLM) compounding kit    Other request: Pt would like refill asap.     Can we leave a detailed message on this number? YES    Phone number patient can be reached at: Home number on file [phone] (home)    Best Time:     Call taken on 7/17/2020 at 1:31 PM by Juani Ramirez

## 2020-07-17 NOTE — TELEPHONE ENCOUNTER
Prescription was sent 7/1/20 for #400 ml with 0 refills.  Pharmacy notified via E-Prescribe refusal.     Lois Melvin RN on 7/17/2020 at 3:16 PM

## 2020-07-17 NOTE — TELEPHONE ENCOUNTER
magic mouthwash suspension, diphenhydrAMINE, lidocaine, aluminum-magnesium & simethicone, (FIRST-MOUTHWASH BLM) compounding kit       Last Written Prescription Date:  5/6/20  Last Fill Quantity: 237mL,   # refills: 0  Last Office Visit: 6/19/20  Future Office visit:       Routing refill request to provider for review/approval because:  Drug not on the [facility], [facility] or [facility] refill protocol or controlled substance    Monique Mittal LPN........7/17/2020 1:35 PM

## 2020-07-24 ENCOUNTER — HOSPITAL ENCOUNTER (OUTPATIENT)
Dept: SPEECH THERAPY | Facility: CLINIC | Age: 50
Discharge: HOME | End: 2020-07-24
Attending: FAMILY MEDICINE | Admitting: FAMILY MEDICINE
Payer: COMMERCIAL

## 2020-07-24 ENCOUNTER — HOSPITAL ENCOUNTER (OUTPATIENT)
Dept: GENERAL RADIOLOGY | Facility: CLINIC | Age: 50
End: 2020-07-24
Attending: FAMILY MEDICINE
Payer: COMMERCIAL

## 2020-07-24 DIAGNOSIS — R13.10 PROBLEMS WITH SWALLOWING: ICD-10-CM

## 2020-07-24 DIAGNOSIS — R13.10 SWALLOWING DYSFUNCTION: ICD-10-CM

## 2020-07-24 PROCEDURE — 92610 EVALUATE SWALLOWING FUNCTION: CPT | Mod: GN | Performed by: SPEECH-LANGUAGE PATHOLOGIST

## 2020-07-24 PROCEDURE — 92611 MOTION FLUOROSCOPY/SWALLOW: CPT | Mod: GN | Performed by: SPEECH-LANGUAGE PATHOLOGIST

## 2020-07-24 PROCEDURE — 74230 X-RAY XM SWLNG FUNCJ C+: CPT | Mod: TC

## 2020-07-24 RX ORDER — BARIUM SULFATE 400 MG/ML
1 SUSPENSION ORAL ONCE
Status: COMPLETED | OUTPATIENT
Start: 2020-07-24 | End: 2020-07-24

## 2020-07-24 RX ADMIN — BARIUM SULFATE 1 ML: 400 SUSPENSION ORAL at 10:32

## 2020-07-24 NOTE — PROGRESS NOTES
"Video Swallow Study     07/24/20 1100       Present No   Comments English   General Information   Type Of Visit Initial   Start Of Care Date 07/24/20   Referring Physician Faustino Fernandes MD   Orders Evaluate And Treat   Medical Diagnosis Dysphagia   Onset Of Illness/injury Or Date Of Surgery 01/26/20   Precautions/limitations Swallowing Precautions;Immunosuppressed;Fall Precautions   Hearing WFLs   Pertinent History of Current Problem/OT: Additional Occupational Profile Info Patient is a 50 year old gentlemen who is referred for a VFSS due to ongoing swallowing problems for the last 7 months.  Patient reports that has pain when he swallows and pills get stuck and when he swallows, he sometimes feels that food and drink are going into his lungs.  Patient reports numerous bouts of pneumonia but not able to recall time of most recent episode.  A medical chart review indicates diagnosis of dementia, HIV, and immu-compromise.  Patient reports that he feels he has large bumps bilaterally in his throat area and is wondering if this could be the cause of his swallowing difficulties.  Patient also reports chronic thrush and the loss of 75 pounds over the last 1-3 years.  He reports that he also throws up during meals and after meals.  A swallow study is being completed at this time to assess the function of the patient's swallowing mechanism and to determine safest oral diet to minimize risk of aspiration and pneumonia.     Respiratory Status Room air   Prior Level Of Function Swallowing   Prior Level Of Function Comment 75 pounds lost last 1-3 years   Patient Role/employment History Disabled   Living Environment Apartment/condo   General Observations Patient was anxious for this evaluation and cried several times prior to the start.  Proceeded at a slower than normal pace.     Patient/family Goals \"I just want to get this done.\"   Pain Assessment   Pain Reported Yes   Pain Location neck, bilateral "   Pain Scale 4/10   Pain Comments Patient frequently asking reagrding bumps on throat and SLP deferred him to his physician   Fall Risk Screen   Fall screen completed by SLP   Have you fallen 2 or more times in the past year? Yes   Is patient a fall risk? Yes;Department fall risk interventions implemented   Clinical Swallow Evaluation   Oral Musculature generally intact   Structural Abnormalities none present   Dentition present and adequate   Mucosal Quality good   Mandibular Strength and Mobility intact   Oral Labial Strength and Mobility WFL   Lingual Strength and Mobility WFL   Velar Elevation intact   Buccal Strength and Mobility intact   Laryngeal Function Cough;Throat clear;Swallow;Voicing initiated;Dry swallow palpated   Oral Musculature Comments Mild reduction in range, rate, strength, and coordination although WFLs.    Additional evaluation(s) completed today Yes;Recommended   Rationale for completing additional evaluation to assess function of the swallowing mechanism and determine safest diet level for reducing risk of aspiration and pneumonia.    VFSS Evaluation   VFSS Additional Documentation Yes   VFSS Eval: Radiology   Radiologist Dr. Barak Arenas   Views Taken left lateral   Physical Location of Procedure [facility]   VFSS Eval: Thin Liquid Texture Trial   Mode of Presentation, Thin Liquid cup;straw;self-fed   Order of Presentation 1, 2, 3, 4   Preparatory Phase WFL   Oral Phase, Thin Liquid WFL   Pharyngeal Phase, Thin Liquid WFL;Residue in valleculae;Pharyngeal wall coating;Residue in pyriform sinus   Rosenbek's Penetration Aspiration Scale: Thin Liquid Trial Results 2 - contrast enters airway, remains above the vocal cords, no residue remains (penetration)   Successful Strategies Trialed During Procedure, Thin Liquid   (small bolus size, decreased rate of intake)   Diagnostic Statement Flash penetration one time with thin liquid taken in oonsecutive sips via  straw   VFSS  Eval: Nectar Thick Liquid Texture Trial   Mode of Presentation, Nectar cup;self-fed   Order of Presentation 5   Preparatory Phase WFL   Oral Phase, Nectar WFL   Pharyngeal Phase, [place] WFL   Rosenbek's Penetration Aspiration Scale: Nectar-Thick Liquid Trial Results 1 - no aspiration, contrast does not enter airway   Diagnostic Statement no difficulties noted   VFSS Eval: Puree Solid Texture Trial   Mode of Presentation, Puree spoon;self-fed   Order of Presentation 6, 7   Preparatory Phase WFL   Oral Phase, Puree Residue in oral cavity   Pharyngeal Phase, Puree Delayed swallow reflex;WFL;Pharyngeal wall coating;Residue in valleculae;Residue in pyriform sinus   Rosenbek's Penetration Aspiration Scale: Puree Food Trial Results 1 - no aspiration, contrast does not enter airway   Diagnostic Statement no significant difficulties noted;  mild oral and pharyngeal residual    VFSS Eval: Semisolid Texture Trial   Mode of Presentation, Semisolid spoon;self-fed   Order of Presentation 8, 9   Preparatory Phase WFL   Oral Phase, Semisolid WFL;Residue in oral cavity   Pharyngeal Phase, Semisolid WFL;Residue in valleculae;Pharyngeal wall coating   Rosenbek's Penetration Aspiration Scale: Semisolid Food Trial Results 1 - no aspiration, contrast does not enter airway   Diagnostic Statement no significant difficulties noted;  mild oral and pharyngeal residual    VFSS Eval: Solid Food Texture Trial   Mode of Presentation, Solid spoon;self-fed   Order of Presentation 10, 11   Preparatory Phase WFL   Oral Phase, Solid WFL;Residue in oral cavity   Pharyngeal Phase, Solid WFL;Pharyngeal wall coating;Residue in valleculae   Rosenbek's Penetration Aspiration Scale: Solid Food Trial Results 1 - no aspiration, contrast does not enter airway   Diagnostic Statement no significant difficulties noted;  mild oral and pharyngeal residual    Educational Assessment   Barriers to Learning Cognitive;Emotional   Preferred Learning Style Pictures/video    Esophageal Phase of Swallow   Patient reports or presents with symptoms of esophageal dysphagia No   Esophageal sweep performed during today s vidofluoroscopic exam  Please refer to radiologist's report for details   Swallow Eval: Clinical Impressions   Skilled Criteria for Therapy Intervention No problems identified which require skilled intervention   Functional Assessment Scale (FAS) 6   Dysphagia Outcome Severity Scale (BOLA) Level 6 - BOLA   Diet texture recommendations Dysphagia diet level 3;Thin liquids   Recommended Feeding/Eating Techniques alternate between small bites and sips of food/liquid;hard swallow w/ each bite or sip;no straws;maintain upright posture during/after eating for 30 mins   Risks and Benefits of Treatment have been explained. Yes   Patient, family and/or staff in agreement with Plan of Care Yes   Clinical Impression Comments Completed VFSS and patient presents with mild oropharyngeal dysphagia characterized by reduced bolus control and mild pharyngeal residual.  Flash penetration noted 1x when patient was taking consecutive sips via straw.  Mild pharyngeal noted in oral cavity resulting in 2 swallows per bite.  Also noted mild pharyngeal throughout pharyngeal area, which cleared with second swallows and liquid wash.  Recommend diet of soft solids-avoiding known choking hazards, thin liquids, and pills whole and in a food carrier.  Recommend that patient be upright for all oral intake, complete oral cares after each intake, and remain sitting up for 30 minutes following meals to ensure pharyngeal clearance.  Do not recommend skilled intervention at this time.  Educated patient regarding the recommendations and patient indicated good understanding via Q&As.  Thank you for this referral.     Total Session Time   SLP Eval: oral/pharyngeal swallow function, clinical minutes (37521) 20   SLP Eval: VideoFluoroscopic Swallow function Minutes (46230) 21   Total Evaluation Time 42

## 2020-07-28 ENCOUNTER — TELEPHONE (OUTPATIENT)
Dept: FAMILY MEDICINE | Facility: CLINIC | Age: 50
End: 2020-07-28

## 2020-07-28 NOTE — RESULT ENCOUNTER NOTE
CT scans of the back and neck showed just some minimal arthritic changes nothing significant no bone abnormalities.  CT scan of the chest was stable compared to last time no significant changes no infiltrates.  The video swallowing study showed no aspiration into the lungs.

## 2020-07-28 NOTE — TELEPHONE ENCOUNTER
Attempted to reach patient, no VM set up. Will have staff try again later. Please relay results below to patient.     Candice Howard MA

## 2020-07-28 NOTE — TELEPHONE ENCOUNTER
----- Message from Faustino Fernandes MD sent at 7/28/2020 11:17 AM CDT -----  CT scans of the back and neck showed just some minimal arthritic changes nothing significant no bone abnormalities.  CT scan of the chest was stable compared to last time no significant changes no infiltrates.  The video swallowing study showed no aspiration into the lungs.

## 2020-07-31 ENCOUNTER — MYC MEDICAL ADVICE (OUTPATIENT)
Dept: FAMILY MEDICINE | Facility: CLINIC | Age: 50
End: 2020-07-31

## 2020-08-01 DIAGNOSIS — F41.9 ANXIETY: ICD-10-CM

## 2020-08-01 DIAGNOSIS — K64.4 EXTERNAL HEMORRHOIDS: ICD-10-CM

## 2020-08-01 DIAGNOSIS — B37.0 THRUSH: ICD-10-CM

## 2020-08-03 RX ORDER — NYSTATIN 100000/ML
500000 SUSPENSION, ORAL (FINAL DOSE FORM) ORAL 4 TIMES DAILY
Qty: 400 ML | Refills: 0 | Status: SHIPPED | OUTPATIENT
Start: 2020-08-03 | End: 2020-09-16

## 2020-08-03 RX ORDER — HYDROCORTISONE 25 MG/G
CREAM TOPICAL
Qty: 30 G | Refills: 0 | Status: SHIPPED | OUTPATIENT
Start: 2020-08-03 | End: 2020-09-14

## 2020-08-03 RX ORDER — ALPRAZOLAM 0.5 MG
TABLET ORAL
Qty: 14 TABLET | Refills: 0 | Status: SHIPPED | OUTPATIENT
Start: 2020-08-03 | End: 2020-09-16

## 2020-08-03 RX ORDER — FLUCONAZOLE 100 MG/1
100 TABLET ORAL DAILY
Qty: 15 TABLET | Refills: 0 | Status: SHIPPED | OUTPATIENT
Start: 2020-08-03 | End: 2020-09-14

## 2020-08-03 NOTE — TELEPHONE ENCOUNTER
"Routing refill request to provider for review/approval because:  Drug not on the [facility] refill protocol     Requested Prescriptions   Pending Prescriptions Disp Refills     nystatin (MYCOSTATIN) 726092 UNIT/ML suspension [Pharmacy Med Name: NYSTATIN 100,000 UNIT/ML CHUN 969023 PERRY] 400 mL 0     Sig: TAKE 5 MLS (500,000 UNITS) BY MOUTH 4 TIMES DAILY       There is no refill protocol information for this order   Last Written Prescription Date:  7/1/2020  Last Fill Quantity: 400ml,  # refills: 0   Last office visit: 6/19/2020 with prescribing provider:     Future Office Visit:           ALPRAZolam (XANAX) 0.5 MG tablet [Pharmacy Med Name: ALPRAZOLAM 0.5 MG TABS 0.5 Tablet] 14 tablet 0     Sig: TAKE 1 TO 2 TABLETS BY MOUTH DAILY AS NEEDED FOR ANXIETY.       There is no refill protocol information for this order   Last Written Prescription Date:  7/1/2020  Last Fill Quantity: 14,  # refills: 0   Last office visit: 6/19/2020 with prescribing provider:     Future Office Visit:           hydrocortisone (Perianal) (ANUSOL-HC) 2.5 % cream [Pharmacy Med Name: HYDROCORTISONE 2.5 % CREA 2.5 Cream] 30 g 0     Sig: PLACE RECTALLY 2 TIMES DAILY   Last Written Prescription Date:  5/26/2020  Last Fill Quantity: 30g,  # refills: 0   Last office visit: 6/19/2020 with prescribing provider:     Future Office Visit:      Miscellaneous Gastrointestinal Agents Passed - 8/1/2020 11:10 AM        Passed - Recent (12 mo) or future (30 days) visit within the authorizing provider's specialty     Patient has had an office visit with the authorizing provider or a provider within the authorizing providers department within the previous 12 mos or has a future within next 30 days. See \"Patient Info\" tab in inbasket, or \"Choose Columns\" in Meds & Orders section of the refill encounter.            Passed - Medication is active on med list        Passed - Patient is 18 years of age or older         Dyan Xiong RN      "

## 2020-08-03 NOTE — TELEPHONE ENCOUNTER
"Routing refill request to provider for review/approval because:  Drug not active on patient's medication list      Requested Prescriptions   Pending Prescriptions Disp Refills     fluconazole (DIFLUCAN) 100 MG tablet [Pharmacy Med Name: FLUCONAZOLE 100 MG TABLET 100 Tablet] 15 tablet 0     Sig: TAKE 1 TABLET (100 MG) BY MOUTH DAILY FOR 15 DAYS   Last Written Prescription Date:  NA  Last Fill Quantity: NA,  # refills: NA   Last office visit: 6/19/2020 with prescribing provider:     Future Office Visit:        Antifungal Agents Failed - 8/1/2020 11:09 AM        Failed - Not Fluconazole or Terconazole      If oral Fluconazole or Terconazole, may refill if indicated in progress notes.           Failed - Medication is active on med list        Passed - Recent (12 mo) or future (30 days) visit within the authorizing provider's specialty     Patient has had an office visit with the authorizing provider or a provider within the authorizing providers department within the previous 12 mos or has a future within next 30 days. See \"Patient Info\" tab in inbasket, or \"Choose Columns\" in Meds & Orders section of the refill encounter.               Dyan Xiong RN      "

## 2020-08-07 ENCOUNTER — TRANSFERRED RECORDS (OUTPATIENT)
Dept: HEALTH INFORMATION MANAGEMENT | Facility: CLINIC | Age: 50
End: 2020-08-07

## 2020-08-10 DIAGNOSIS — R63.0 LOSS OF APPETITE: ICD-10-CM

## 2020-08-11 ENCOUNTER — TRANSFERRED RECORDS (OUTPATIENT)
Dept: HEALTH INFORMATION MANAGEMENT | Facility: CLINIC | Age: 50
End: 2020-08-11

## 2020-08-11 DIAGNOSIS — B37.0 THRUSH: ICD-10-CM

## 2020-08-11 RX ORDER — DIPHENHYDRAMINE HYDROCHLORIDE AND LIDOCAINE HYDROCHLORIDE AND ALUMINUM HYDROXIDE AND MAGNESIUM HYDRO
5-10 KIT EVERY 6 HOURS PRN
Qty: 237 ML | Refills: 0 | Status: CANCELLED | OUTPATIENT
Start: 2020-08-11

## 2020-08-11 RX ORDER — DRONABINOL 5 MG/1
CAPSULE ORAL
Qty: 90 CAPSULE | Refills: 0 | Status: SHIPPED | OUTPATIENT
Start: 2020-08-11 | End: 2020-09-14

## 2020-08-11 NOTE — TELEPHONE ENCOUNTER
Requested Prescriptions   Pending Prescriptions Disp Refills     dronabinol (MARINOL) 5 MG capsule [Pharmacy Med Name: DRONABINOL 5 MG CAPS 5 Capsule] 90 capsule 0     Sig: TAKE ONE CAPSULE BY MOUTH THREE TIMES A DAY     Last Written Prescription Date:  07/02/2020  Last Fill Quantity: 90,   # refills: 0  Last Office Visit: 06/19/2020  Future Office visit:       Routing refill request to provider for review/approval because:  Drug not on the [facility], [facility] or [facility] refill protocol or controlled substance    Candice Howard MA

## 2020-08-11 NOTE — TELEPHONE ENCOUNTER
Pt sent e mail asking for Magic Mouth Wash. 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 Updated pt via email (which he requested due to his wife reading and answering his MyChart notes for him, pt requested private way to communicate).  Alexandra Downs RN

## 2020-08-13 DIAGNOSIS — Z71.89 COUNSELING AND COORDINATION OF CARE: Primary | ICD-10-CM

## 2020-08-14 ENCOUNTER — PATIENT OUTREACH (OUTPATIENT)
Dept: CARE COORDINATION | Facility: CLINIC | Age: 50
End: 2020-08-14

## 2020-08-14 NOTE — LETTER
[place] CARE COORDINATION  [address] [address]    August 20, 2020    Tommy Ross  [address]   [facility] [address]      Dear Tommy,    I am a clinic community health worker who works with Faustino Fernandes MD at [facility] in [place]. I have been trying to reach you recently to introduce Clinic Care Coordination and to see if there was anything I could assist you with.  Below is a description of clinic care coordination and how I can further assist you.      The clinic care coordination team is made up of a registered nurse,  and community health worker who understand the health care system. The goal of clinic care coordination is to help you manage your health and improve access to the health care system in the most efficient manner. The team can assist you in meeting your health care goals by providing education, coordinating services, strengthening the communication among your providers and supporting you with any resource needs.    Please feel free to contact me at [phone] with any questions or concerns. We are focused on providing you with the highest-quality healthcare experience possible and that all starts with you.     Sincerely,     Desiree Hawk  [facility] Health Worker   [facility]  [email or web address]   Office: [phone]  Fax: [phone]

## 2020-08-14 NOTE — PROGRESS NOTES
Clinic Care Coordination Contact  [facility]/Voicemail       Clinical Data: CHW Outreach  Outreach attempted x 1. Left message on patient's voicemail with call back information and requested return call.    Plan: CHW will try to reach patient again in 1-2 business days.    Desiree Hawk  [facility] Health Worker   [facility]  [email or web address]   Office: [phone]  Fax: [phone]

## 2020-08-17 ENCOUNTER — NURSE TRIAGE (OUTPATIENT)
Dept: FAMILY MEDICINE | Facility: CLINIC | Age: 50
End: 2020-08-17

## 2020-08-17 ENCOUNTER — APPOINTMENT (OUTPATIENT)
Dept: CT IMAGING | Facility: CLINIC | Age: 50
End: 2020-08-17
Attending: EMERGENCY MEDICINE
Payer: COMMERCIAL

## 2020-08-17 ENCOUNTER — HOSPITAL ENCOUNTER (EMERGENCY)
Facility: CLINIC | Age: 50
Discharge: HOME | End: 2020-08-17
Attending: EMERGENCY MEDICINE | Admitting: EMERGENCY MEDICINE
Payer: COMMERCIAL

## 2020-08-17 VITALS
RESPIRATION RATE: 17 BRPM | OXYGEN SATURATION: 100 % | DIASTOLIC BLOOD PRESSURE: 93 MMHG | TEMPERATURE: 98 F | SYSTOLIC BLOOD PRESSURE: 143 MMHG

## 2020-08-17 DIAGNOSIS — S06.0X0A CONCUSSION WITHOUT LOSS OF CONSCIOUSNESS, INITIAL ENCOUNTER: ICD-10-CM

## 2020-08-17 LAB
ALBUMIN SERPL-MCNC: 4.3 G/DL (ref 3.4–5)
ALBUMIN UR-MCNC: NEGATIVE MG/DL
ALP SERPL-CCNC: 142 U/L (ref 40–150)
ALT SERPL W P-5'-P-CCNC: 19 U/L (ref 0–70)
AMPHETAMINES UR QL: NOT DETECTED NG/ML
ANION GAP SERPL CALCULATED.3IONS-SCNC: 3 MMOL/L (ref 3–14)
APPEARANCE UR: CLEAR
AST SERPL W P-5'-P-CCNC: 13 U/L (ref 0–45)
BARBITURATES UR QL SCN: NOT DETECTED NG/ML
BASOPHILS # BLD AUTO: 0 10E9/L (ref 0–0.2)
BASOPHILS NFR BLD AUTO: 0.4 %
BENZODIAZ UR QL SCN: NOT DETECTED NG/ML
BILIRUB SERPL-MCNC: 0.7 MG/DL (ref 0.2–1.3)
BILIRUB UR QL STRIP: NEGATIVE
BUN SERPL-MCNC: 18 MG/DL (ref 7–30)
BUPRENORPHINE UR QL: NOT DETECTED NG/ML
CALCIUM SERPL-MCNC: 9.2 MG/DL (ref 8.5–10.1)
CANNABINOIDS UR QL: ABNORMAL NG/ML
CHLORIDE SERPL-SCNC: 104 MMOL/L (ref 94–109)
CO2 SERPL-SCNC: 32 MMOL/L (ref 20–32)
COCAINE UR QL SCN: NOT DETECTED NG/ML
COLOR UR AUTO: YELLOW
CREAT SERPL-MCNC: 1 MG/DL (ref 0.66–1.25)
D-METHAMPHET UR QL: NOT DETECTED NG/ML
DIFFERENTIAL METHOD BLD: ABNORMAL
EOSINOPHIL NFR BLD AUTO: 0.6 %
ERYTHROCYTE [DISTWIDTH] IN BLOOD BY AUTOMATED COUNT: 14.6 % (ref 10–15)
GFR SERPL CREATININE-BSD FRML MDRD: 87 ML/MIN/{1.73_M2}
GLUCOSE SERPL-MCNC: 76 MG/DL (ref 70–99)
GLUCOSE UR STRIP-MCNC: NEGATIVE MG/DL
HCT VFR BLD AUTO: 52.9 % (ref 40–53)
HGB BLD-MCNC: 17.9 G/DL (ref 13.3–17.7)
HGB UR QL STRIP: NEGATIVE
IMM GRANULOCYTES # BLD: 0 10E9/L (ref 0–0.4)
IMM GRANULOCYTES NFR BLD: 0.1 %
INR PPP: 0.96 (ref 0.86–1.14)
KETONES UR STRIP-MCNC: NEGATIVE MG/DL
LACTATE BLD-SCNC: 1 MMOL/L (ref 0.7–2)
LEUKOCYTE ESTERASE UR QL STRIP: NEGATIVE
LYMPHOCYTES # BLD AUTO: 3 10E9/L (ref 0.8–5.3)
LYMPHOCYTES NFR BLD AUTO: 44.2 %
MCH RBC QN AUTO: 29.3 PG (ref 26.5–33)
MCHC RBC AUTO-ENTMCNC: 33.8 G/DL (ref 31.5–36.5)
MCV RBC AUTO: 87 FL (ref 78–100)
METHADONE UR QL SCN: NOT DETECTED NG/ML
MONOCYTES # BLD AUTO: 0.5 10E9/L (ref 0–1.3)
MONOCYTES NFR BLD AUTO: 6.8 %
NEUTROPHILS # BLD AUTO: 3.2 10E9/L (ref 1.6–8.3)
NEUTROPHILS NFR BLD AUTO: 47.9 %
NITRATE UR QL: NEGATIVE
NRBC # BLD AUTO: 0 10*3/UL
NRBC BLD AUTO-RTO: 0 /100
OPIATES UR QL SCN: ABNORMAL NG/ML
OXYCODONE UR QL SCN: ABNORMAL NG/ML
PCP UR QL SCN: NOT DETECTED NG/ML
PH UR STRIP: 6 PH (ref 5–7)
PLATELET # BLD AUTO: 118 10E9/L (ref 150–450)
POTASSIUM SERPL-SCNC: 3.6 MMOL/L (ref 3.4–5.3)
PROPOXYPH UR QL: NOT DETECTED NG/ML
PROT SERPL-MCNC: 9 G/DL (ref 6.8–8.8)
RBC # BLD AUTO: 6.1 10E12/L (ref 4.4–5.9)
SODIUM SERPL-SCNC: 139 MMOL/L (ref 133–144)
SOURCE: NORMAL
SP GR UR STRIP: 1.03 (ref 1–1.03)
TRICYCLICS UR QL SCN: NOT DETECTED NG/ML
UROBILINOGEN UR STRIP-MCNC: 2 MG/DL (ref 0–2)
WBC # BLD AUTO: 6.8 10E9/L (ref 4–11)

## 2020-08-17 PROCEDURE — 85025 COMPLETE CBC W/AUTO DIFF WBC: CPT | Performed by: EMERGENCY MEDICINE

## 2020-08-17 PROCEDURE — 99284 EMERGENCY DEPT VISIT MOD MDM: CPT | Mod: 25 | Performed by: EMERGENCY MEDICINE

## 2020-08-17 PROCEDURE — 99284 EMERGENCY DEPT VISIT MOD MDM: CPT | Mod: Z6 | Performed by: EMERGENCY MEDICINE

## 2020-08-17 PROCEDURE — 80306 DRUG TEST PRSMV INSTRMNT: CPT | Performed by: EMERGENCY MEDICINE

## 2020-08-17 PROCEDURE — 70450 CT HEAD/BRAIN W/O DYE: CPT

## 2020-08-17 PROCEDURE — 83605 ASSAY OF LACTIC ACID: CPT | Performed by: EMERGENCY MEDICINE

## 2020-08-17 PROCEDURE — 81003 URINALYSIS AUTO W/O SCOPE: CPT | Performed by: EMERGENCY MEDICINE

## 2020-08-17 PROCEDURE — 80053 COMPREHEN METABOLIC PANEL: CPT | Performed by: EMERGENCY MEDICINE

## 2020-08-17 PROCEDURE — 85610 PROTHROMBIN TIME: CPT | Mod: GZ | Performed by: EMERGENCY MEDICINE

## 2020-08-17 RX ORDER — SODIUM CHLORIDE 9 MG/ML
INJECTION, SOLUTION INTRAVENOUS CONTINUOUS
Status: DISCONTINUED | OUTPATIENT
Start: 2020-08-17 | End: 2020-08-17 | Stop reason: HOSPADM

## 2020-08-17 NOTE — ED AVS SNAPSHOT
[facility] Emergency Department  [address]   [place] [address]  Phone:  [phone]  Fax:  [phone]                                    Tommy Ross   MRN: [id number]    Department:  [facility] Department   Date of Visit:  8/17/2020           After Visit Summary Signature Page    I have received my discharge instructions, and my questions have been answered. I have discussed any challenges I see with this plan with the nurse or doctor.    ..........................................................................................................................................  Patient/Patient Representative Signature      ..........................................................................................................................................  Patient Representative Print Name and Relationship to Patient    ..................................................               ................................................  Date                                   Time    ..........................................................................................................................................  Reviewed by Signature/Title    ...................................................              ..............................................  Date                                               Time          22EPIC Rev 08/18

## 2020-08-17 NOTE — TELEPHONE ENCOUNTER
"Patient states he was at [facility] on 8/12/2020 for a head injury/ fall to the back of the head.  Patient states symptoms are getting worse.  Patient states he is nauseous all day, a throbbing headache that comes and go without relief from OTC medication, has a neck ache and is now feeling more nauseous.  Advised patient to seek emergency care for the worsening symptoms he is experiencing.  Patient states he would like to wait to get a ride from his wife when she gets home.  Advised patient to call 911 if symptoms are worsening (per protocol) and not to wait for his wife.  Patient stated understanding.      Additional Information    Negative: ACUTE NEUROLOGIC SYMPTOM and symptom present now    Negative: Knocked out (unconscious) > 1 minute    Negative: Seizure (convulsion) occurred (Exception: prior history of seizures and now alert and without Acute Neurologic Symptoms)    Negative: Neck pain after dangerous injury (e.g., MVA, diving, trampoline, contact sports, fall > 10 feet or 3 meters) (Exception: neck pain began > 1 hour after injury)    Negative: Major bleeding (actively dripping or spurting) that can't be stopped    Negative: Penetrating head injury (e.g., knife, gunshot wound, metal object)    Negative: Sounds like a life-threatening emergency to the triager    Recently examined and diagnosed with a concussion by a healthcare provider and has questions about concussion symptoms    SEVERE headache (e.g., excruciating, pain scale 8-10) and not improved after pain medications    Knocked out (unconscious) > 1 minute and no head CT Scan or MRI has been performed    Answer Assessment - Initial Assessment Questions  1. MECHANISM: \"How did the injury happen?\" For falls, ask: \"What height did you fall from?\" and \"What surface did you fall against?\"       Using a swimming pool bathroom at [facility], fell from water on the floor of bathroom    2. ONSET: \"When did the injury happen?\" (Minutes or hours ago)     " "  2-3 days ago    3. NEUROLOGIC SYMPTOMS: \"Was there any loss of consciousness?\" \"Are there any other neurological symptoms?\"       Saw stars, hit head    4. MENTAL STATUS: \"Does the person know who he is, who you are, and where he is?\"       Yes    5. LOCATION: \"What part of the head was hit?\"       Back of head above the right ear    6. SCALP APPEARANCE: \"What does the scalp look like? Is it bleeding now?\" If so, ask: \"Is it difficult to stop?\"       No    7. SIZE: For cuts, bruises, or swelling, ask: \"How large is it?\" (e.g., inches or centimeters)       No    8. PAIN: \"Is there any pain?\" If so, ask: \"How bad is it?\"  (e.g., Scale 1-10; or mild, moderate, severe)      Headache, mild to moderate    9. TETANUS: For any breaks in the skin, ask: \"When was the last tetanus booster?\"      Unknown    10. OTHER SYMPTOMS: \"Do you have any other symptoms?\" (e.g., neck pain, vomiting)        Dizzy next day, nausea    11. PREGNANCY: \"Is there any chance you are pregnant?\" \"When was your last menstrual period?\"        NA    Protocols used: HEAD INJURY-A-[place], CONCUSSION (MTBI) LESS THAN 14 DAYS AGO FOLLOW-UP CALL-A-[place]  Dyan Xiong RN    "
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
## 2020-08-18 NOTE — ED TRIAGE NOTES
"Pt presents with concerns post fall.  Pt states that he fell hit his head last Tuesday.  Pt hit the back of his head.  Pt denies LOC, states \"saw fireworks\". Pt states that he is having headaches and dizziness. Pt speaking slow and needs repeat instructions at times.  Wife reports increased confusion from pt.  Patient's airway, breathing, and circulation intact/WDL during triage.    "

## 2020-08-19 ENCOUNTER — VIRTUAL VISIT (OUTPATIENT)
Dept: PHYSICAL MEDICINE AND REHAB | Facility: CLINIC | Age: 50
End: 2020-08-19
Payer: MEDICAID

## 2020-08-19 DIAGNOSIS — S16.1XXA STRAIN OF NECK MUSCLE, INITIAL ENCOUNTER: ICD-10-CM

## 2020-08-19 DIAGNOSIS — Z21 HISTORY OF HIV INFECTION (H): ICD-10-CM

## 2020-08-19 DIAGNOSIS — R11.0 NAUSEA: ICD-10-CM
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
## 2020-08-19 DIAGNOSIS — S06.0X0A CONCUSSION WITHOUT LOSS OF CONSCIOUSNESS, INITIAL ENCOUNTER: ICD-10-CM

## 2020-08-19 DIAGNOSIS — B20 SYMPTOMATIC HIV INFECTION (H): ICD-10-CM

## 2020-08-19 DIAGNOSIS — B20 AIDS (ACQUIRED IMMUNE DEFICIENCY SYNDROME) (H): ICD-10-CM

## 2020-08-19 RX ORDER — TRIMETHOBENZAMIDE HYDROCHLORIDE 300 MG/1
300 CAPSULE ORAL DAILY PRN
Qty: 30 CAPSULE | Refills: 0 | Status: SHIPPED | OUTPATIENT
Start: 2020-08-19 | End: 2020-09-18
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
## 2020-08-19 NOTE — TELEPHONE ENCOUNTER
"Routing refill request to provider for review/approval because:  Labs out of range:  HIQT, ACD4    Biktarvy  Last Written Prescription Date:  5/4/2020  Last Fill Quantity: 30,  # refills: 3   Last office visit: 6/19/2020 with prescribing provider:  Dom   Future Office Visit:      Requested Prescriptions   Pending Prescriptions Disp Refills     BIKTARVY -25 MG per tablet [Pharmacy Med Name: BIKTARVY -25 MG TABS -25 Tablet] 30 tablet 3     Sig: TAKE 1 TABLET BY MOUTH DAILY       Antiretroviral Agents Failed - 8/19/2020  6:04 PM        Failed - Refills for this medication group require provider approval        Failed - CD4 count greater than 300 on file in past 3 mos.     Recent Labs   Lab Test 07/10/20  1251   ACD4 166*             Passed - Patient is age 6 or older        Passed - AST less than 55 on file in past 3 mos     Recent Labs   Lab Test 08/17/20 2027   AST 13             Passed - Recent (3 mo) or future (30 days) visit within the authorizing provider's specialty     Patient had office visit in the last 3 months or has a visit in the next 30 days with authorizing provider or within the authorizing provider's specialty.  See \"Patient Info\" tab in inbasket, or \"Choose Columns\" in Meds & Orders section of the refill encounter.                Passed - Medication is active on med list        Passed - Serum HIV less than 200 on file in past 3 mos.     Recent Labs   Lab Test 07/10/20  1251   HIQT 64,551*             Passed - ALT less than 90 on file in past 3 mos.     Recent Labs   Lab Test 08/17/20 2027   ALT 19                Maribell Mar RN on 8/19/2020 at 6:12 PM    "
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
## 2020-08-19 NOTE — NURSING NOTE
Chief Complaint   Patient presents with     Head Injury     concussion -  slip/falling and hitting his head on 8/11/20       CONCUSSION SYMPTOMS ASSESSMENT 8/19/2020   Headache or Pressure In Head 5 - severe   Upset Stomach or Throwing Up 5 - severe   Problems with Balance 4 - moderate to severe   Feeling Dizzy 5 - severe   Sensitivity to Light 4 - moderate to severe   Sensitivity to Noise 5 - severe   Mood Changes 3 - moderate   Feeling sluggish, hazy, or foggy 6 - excruciating   Trouble Concentrating, Lack of Focus 6 - excruciating   Motion Sickness 4 - moderate to severe   Vision Changes 5 - severe   Memory Problems 0 - none   Feeling Confused 5 - severe   Neck Pain 5 - severe   Trouble Sleeping 6 - excruciating   Total Number of Symptoms 14   Symptom Severity Score 68     PHQ-2 Score:     PHQ-2 ( 1999 [employer]) 8/19/2020 8/24/2016   Q1: Little interest or pleasure in doing things 0 0   Q2: Feeling down, depressed or hopeless 0 0   PHQ-2 Score 0 0

## 2020-08-20 ENCOUNTER — TELEPHONE (OUTPATIENT)
Dept: PHYSICAL MEDICINE AND REHAB | Facility: CLINIC | Age: 50
End: 2020-08-20

## 2020-08-20 ENCOUNTER — HOSPITAL ENCOUNTER (OUTPATIENT)
Dept: MRI IMAGING | Facility: CLINIC | Age: 50
Discharge: HOME | End: 2020-08-20
Attending: EMERGENCY MEDICINE | Admitting: EMERGENCY MEDICINE
Payer: COMMERCIAL

## 2020-08-20 DIAGNOSIS — S06.0X0A CONCUSSION WITHOUT LOSS OF CONSCIOUSNESS, INITIAL ENCOUNTER: ICD-10-CM

## 2020-08-20 PROCEDURE — 70551 MRI BRAIN STEM W/O DYE: CPT

## 2020-08-20 RX ORDER — BICTEGRAVIR SODIUM, EMTRICITABINE, AND TENOFOVIR ALAFENAMIDE FUMARATE 50; 200; 25 MG/1; MG/1; MG/1
1 TABLET ORAL DAILY
Qty: 30 TABLET | Refills: 3 | Status: SHIPPED | OUTPATIENT
Start: 2020-08-20

## 2020-08-20 NOTE — PROGRESS NOTES
Clinic Care Coordination Contact  [facility]/Voicemail       Clinical Data: CHW Outreach  Outreach attempted x 2. The patients voicemail box is full and cant leave a message at this time.     Plan: CHW will send unable to contact letter with care coordinator contact information via [employer]. CHW will do no further outreaches at this time.    Desiree Hawk  [facility] Worker   [facility]  [email or web address]   Office: [phone]  Fax: [phone]

## 2020-08-20 NOTE — TELEPHONE ENCOUNTER
Notified patient of F/U Concussion appointment.States he continues to struggle with headaches and nausea. He also had a emesis this am. He has an appointment for a MRI of brain this michael and would also like to Have a MRI of the cervical spine due to severe neck pain since the Injury.

## 2020-08-26 ENCOUNTER — TELEPHONE (OUTPATIENT)
Dept: PHYSICAL MEDICINE AND REHAB | Facility: CLINIC | Age: 50
End: 2020-08-26

## 2020-08-27 ENCOUNTER — MYC MEDICAL ADVICE (OUTPATIENT)
Dept: FAMILY MEDICINE | Facility: CLINIC | Age: 50
End: 2020-08-27

## 2020-08-27 ENCOUNTER — HOSPITAL ENCOUNTER (EMERGENCY)
Facility: CLINIC | Age: 50
Discharge: HOME | End: 2020-08-27
Attending: PHYSICIAN ASSISTANT | Admitting: PHYSICIAN ASSISTANT
Payer: COMMERCIAL

## 2020-08-27 VITALS
HEART RATE: 88 BPM | OXYGEN SATURATION: 98 % | SYSTOLIC BLOOD PRESSURE: 124 MMHG | WEIGHT: 125 LBS | DIASTOLIC BLOOD PRESSURE: 85 MMHG | BODY MASS INDEX: 19.01 KG/M2 | RESPIRATION RATE: 20 BRPM | TEMPERATURE: 98.9 F

## 2020-08-27 DIAGNOSIS — S81.811A LACERATION OF LOWER LEG, RIGHT, INITIAL ENCOUNTER: ICD-10-CM

## 2020-08-27 DIAGNOSIS — K64.4 EXTERNAL HEMORRHOIDS: Primary | ICD-10-CM

## 2020-08-27 PROCEDURE — 25000125 ZZHC RX 250

## 2020-08-27 PROCEDURE — 99283 EMERGENCY DEPT VISIT LOW MDM: CPT | Mod: 25 | Performed by: PHYSICIAN ASSISTANT

## 2020-08-27 PROCEDURE — 12002 RPR S/N/AX/GEN/TRNK2.6-7.5CM: CPT | Mod: Z6 | Performed by: PHYSICIAN ASSISTANT

## 2020-08-27 PROCEDURE — 90471 IMMUNIZATION ADMIN: CPT

## 2020-08-27 PROCEDURE — 90715 TDAP VACCINE 7 YRS/> IM: CPT | Performed by: PHYSICIAN ASSISTANT

## 2020-08-27 PROCEDURE — 25000128 H RX IP 250 OP 636: Performed by: PHYSICIAN ASSISTANT

## 2020-08-27 PROCEDURE — 25000125 ZZHC RX 250: Performed by: PHYSICIAN ASSISTANT

## 2020-08-27 PROCEDURE — 12002 RPR S/N/AX/GEN/TRNK2.6-7.5CM: CPT | Performed by: PHYSICIAN ASSISTANT

## 2020-08-27 RX ORDER — LIDOCAINE HYDROCHLORIDE 20 MG/ML
JELLY TOPICAL PRN
Qty: 30 ML | Refills: 0 | Status: SHIPPED | OUTPATIENT
Start: 2020-08-27

## 2020-08-27 RX ORDER — LIDOCAINE HYDROCHLORIDE AND EPINEPHRINE 10; 10 MG/ML; UG/ML
1 INJECTION, SOLUTION INFILTRATION; PERINEURAL ONCE
Status: COMPLETED | OUTPATIENT
Start: 2020-08-27 | End: 2020-08-27

## 2020-08-27 RX ORDER — GINSENG 100 MG
CAPSULE ORAL
Status: COMPLETED
Start: 2020-08-27 | End: 2020-08-27

## 2020-08-27 RX ADMIN — LIDOCAINE HYDROCHLORIDE,EPINEPHRINE BITARTRATE 1 ML: 10; .01 INJECTION, SOLUTION INFILTRATION; PERINEURAL at 13:58

## 2020-08-27 RX ADMIN — BACITRACIN: 500 OINTMENT TOPICAL at 13:52

## 2020-08-27 RX ADMIN — CLOSTRIDIUM TETANI TOXOID ANTIGEN (FORMALDEHYDE INACTIVATED), CORYNEBACTERIUM DIPHTHERIAE TOXOID ANTIGEN (FORMALDEHYDE INACTIVATED), BORDETELLA PERTUSSIS TOXOID ANTIGEN (GLUTARALDEHYDE INACTIVATED), BORDETELLA PERTUSSIS FILAMENTOUS HEMAGGLUTININ ANTIGEN (FORMALDEHYDE INACTIVATED), BORDETELLA PERTUSSIS PERTACTIN ANTIGEN, AND BORDETELLA PERTUSSIS FIMBRIAE 2/3 ANTIGEN 0.5 ML: 5; 2; 2.5; 5; 3; 5 INJECTION, SUSPENSION INTRAMUSCULAR at 13:50
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
## 2020-08-27 NOTE — ED AVS SNAPSHOT
[facility]  [address]   [place] [address]  Phone:  [phone]  Fax:  [phone]                                    Tommy Ross   MRN: [id number]    Department:  [facility]   Date of Visit:  8/27/2020           After Visit Summary Signature Page    I have received my discharge instructions, and my questions have been answered. I have discussed any challenges I see with this plan with the nurse or doctor.    ..........................................................................................................................................  Patient/Patient Representative Signature      ..........................................................................................................................................  Patient Representative Print Name and Relationship to Patient    ..................................................               ................................................  Date                                   Time    ..........................................................................................................................................  Reviewed by Signature/Title    ...................................................              ..............................................  Date                                               Time          22EPIC Rev 08/18

## 2020-08-27 NOTE — DISCHARGE INSTRUCTIONS
Please keep the wound covered with a bandage and antibiotic ointment.  You can wash with warm soapy water in the shower but do not submerge under water like in a bathtub or swimming until stitches are removed.  Watch for signs of infection or if you have any other worsening concerns please return to the emergency department.    Thank you for choosing [facility] Emergency [facility]. It was a pleasure taking care of you today. If you have any questions, please call [phone].    Lisa Stovall PA-C

## 2020-08-27 NOTE — ED TRIAGE NOTES
Patient has laceration to right lower leg from glass falling from a picture frame. Patient's airway, breathing, circulation, and disability/mental status (ABCDs) intact/WDL during triage.

## 2020-09-01 ENCOUNTER — TELEPHONE (OUTPATIENT)
Dept: PHYSICAL MEDICINE AND REHAB | Facility: CLINIC | Age: 50
End: 2020-09-01

## 2020-09-02 ENCOUNTER — MYC MEDICAL ADVICE (OUTPATIENT)
Dept: FAMILY MEDICINE | Facility: CLINIC | Age: 50
End: 2020-09-02

## 2020-09-08 ENCOUNTER — TELEPHONE (OUTPATIENT)
Dept: FAMILY MEDICINE | Facility: CLINIC | Age: 50
End: 2020-09-08

## 2020-09-08 ENCOUNTER — ALLIED HEALTH/NURSE VISIT (OUTPATIENT)
Dept: FAMILY MEDICINE | Facility: CLINIC | Age: 50
End: 2020-09-08
Payer: COMMERCIAL

## 2020-09-08 DIAGNOSIS — Z48.02 ENCOUNTER FOR REMOVAL OF SUTURES: Primary | ICD-10-CM

## 2020-09-08 PROCEDURE — 99207 ZZC NO CHARGE NURSE ONLY: CPT

## 2020-09-08 NOTE — NURSING NOTE
Tommy Ross presents to the clinic today for removal of sutures.  The patient has had the sutures in place for 12 days.  There has been no history of infection or drainage.  4 sutures are seen located on the right shin.  The wound is healing well with no signs of infection.  Tetanus status is up to date.   All sutures were easily removed today.  Routine wound care discussed.  The patient will follow up as needed.    Steri-strips were added for extra support for the next few days.  Closing this encounter.  Olga Yanez, DEVONTEN, RN

## 2020-09-08 NOTE — TELEPHONE ENCOUNTER
Reason for Call:  Same Day Appointment, Requested Provider:  Faustino Fernandes MD    PCP: Faustino Fernandes    Reason for visit: video visit    Duration of symptoms:     Have you been treated for this in the past? Yes    Additional comments: Tommy would like to have a video visit this week to discuss a referral to a surgeon for his back and to manage his pain medication. Tommy want's to have surgery so he can get off his pain medication, he is not happy going to the pain clinic. Patient is requesting that Dr Fernandes does not contact [facility], he feels this issue is between Tommy and his PCP.    Can we leave a detailed message on this number? YES    Phone number patient can be reached at: Home number on file [phone] (home)    Best Time: anytime    Call taken on 9/8/2020 at 3:06 PM by Julia Chahal

## 2020-09-10 ENCOUNTER — HOSPITAL ENCOUNTER (OUTPATIENT)
Dept: MRI IMAGING | Facility: CLINIC | Age: 50
Discharge: HOME | End: 2020-09-10
Attending: PHYSICAL MEDICINE & REHABILITATION | Admitting: PHYSICAL MEDICINE & REHABILITATION
Payer: COMMERCIAL

## 2020-09-10 DIAGNOSIS — S16.1XXA STRAIN OF NECK MUSCLE, INITIAL ENCOUNTER: ICD-10-CM

## 2020-09-10 PROCEDURE — 72141 MRI NECK SPINE W/O DYE: CPT

## 2020-09-11 ENCOUNTER — MYC MEDICAL ADVICE (OUTPATIENT)
Dept: FAMILY MEDICINE | Facility: CLINIC | Age: 50
End: 2020-09-11

## 2020-09-11 DIAGNOSIS — M19.90 ARTHRITIS: ICD-10-CM

## 2020-09-11 DIAGNOSIS — B20 AIDS (ACQUIRED IMMUNE DEFICIENCY SYNDROME) (H): Primary | ICD-10-CM

## 2020-09-12 DIAGNOSIS — J98.01 ACUTE BRONCHOSPASM: ICD-10-CM

## 2020-09-12 DIAGNOSIS — B37.0 THRUSH: ICD-10-CM

## 2020-09-12 DIAGNOSIS — K64.4 EXTERNAL HEMORRHOIDS: ICD-10-CM

## 2020-09-12 DIAGNOSIS — F41.9 ANXIETY: ICD-10-CM

## 2020-09-14 ENCOUNTER — TRANSFERRED RECORDS (OUTPATIENT)
Dept: HEALTH INFORMATION MANAGEMENT | Facility: CLINIC | Age: 50
End: 2020-09-14

## 2020-09-14 DIAGNOSIS — R63.0 LOSS OF APPETITE: ICD-10-CM

## 2020-09-14 RX ORDER — HYDROCORTISONE 25 MG/G
CREAM TOPICAL
Qty: 30 G | Refills: 1 | Status: SHIPPED | OUTPATIENT
Start: 2020-09-14

## 2020-09-14 RX ORDER — FLUCONAZOLE 100 MG/1
100 TABLET ORAL DAILY
Qty: 15 TABLET | Refills: 0 | Status: SHIPPED | OUTPATIENT
Start: 2020-09-14 | End: 2020-10-09

## 2020-09-14 NOTE — TELEPHONE ENCOUNTER
Routing refill request to provider for review/approval because:  Drug not on the [facility] refill protocol     JERE Tobias, RN  [facility]

## 2020-09-14 NOTE — TELEPHONE ENCOUNTER
Routing refill request to provider for review/approval because:  Labs out of range:  ACT  Labs not current:  ACT  ACT Total Scores 7/25/2017 2/19/2018 11/29/2019   ACT TOTAL SCORE (Goal Greater than or Equal to 20) 19 9 19   In the past 12 months, how many times did you visit the emergency room for your asthma without being admitted to the hospital? 3 3 0   In the past 12 months, how many times were you hospitalized overnight because of your asthma? 3 3 0     Last Written Prescription Date:  6/12/2020  Last Fill Quantity: 30,  # refills: 1   Last office visit: 1/16/2020 with prescribing provider:  6/19/2020 - virtual   Future Office Visit:      DEVONTE AbernathyN, RN

## 2020-09-14 NOTE — TELEPHONE ENCOUNTER
ALPRAZolam (XANAX) 0.5 MG tablet [Pharmacy Med Name: ALPRAZOLAM 0.5 MG TABS 0.5 Tablet] 14 tablet 0    Sig: TAKE 1 TO 2 TABLETS BY MOUTH DAILY AS NEEDED FOR ANXIETY.   Last Written Prescription Date:  8/3/2020  Last Fill Quantity: 14,  # refills: 0   Last office visit: 6/19/2020 with prescribing provider:     Future Office Visit:    Routing refill request to provider for review/approval because:  Drug not on the [facility] refill protocol           nystatin (MYCOSTATIN) 673720 UNIT/ML suspension [Pharmacy Med Name: NYSTATIN 100,000 UNIT/ML CHUN 161275 PERRY] 400 mL 0    Sig: TAKE 5 MLS (500,000 UNITS) BY MOUTH 4 TIMES DAILY   Last Written Prescription Date:  8/3/2020  Last Fill Quantity: 400 ml,  # refills: 0   Last office visit: 6/19/2020 with prescribing provider:     Future Office Visit:    Tamara [B37.0]   Routing refill request to provider for review/approval because:  Drug not on the [facility] refill protocol           Dyan Xiong RN

## 2020-09-14 NOTE — TELEPHONE ENCOUNTER
Routing refill request to provider for review/approval because:  Drug not on the [facility] refill protocol     JERE Tobias, RN  [facility]

## 2020-09-16 RX ORDER — DRONABINOL 5 MG/1
CAPSULE ORAL
Qty: 90 CAPSULE | Refills: 0 | Status: SHIPPED | OUTPATIENT
Start: 2020-09-16

## 2020-09-16 RX ORDER — ALPRAZOLAM 0.5 MG
TABLET ORAL
Qty: 14 TABLET | Refills: 0 | Status: SHIPPED | OUTPATIENT
Start: 2020-09-16 | End: 2020-10-07

## 2020-09-16 RX ORDER — NYSTATIN 100000/ML
500000 SUSPENSION, ORAL (FINAL DOSE FORM) ORAL 4 TIMES DAILY
Qty: 400 ML | Refills: 0 | Status: SHIPPED | OUTPATIENT
Start: 2020-09-16 | End: 2020-10-07

## 2020-09-17 ENCOUNTER — MYC MEDICAL ADVICE (OUTPATIENT)
Dept: FAMILY MEDICINE | Facility: CLINIC | Age: 50
End: 2020-09-17

## 2020-09-17 NOTE — TELEPHONE ENCOUNTER
": 1970  PHONE #'s: [phone] (home)     PRESENTING PROBLEM:   No appetite , can't keep food down for past 4 days, he throws it up almost immediately, diarrhea, fatigue.     NURSING ASSESSMENT  Description:  As above.   Onset/duration:   Off and on for 2 weeks. Runs a temp at night. 99.   Precip. factors:  Etiology unknown.  Assoc. Sx:  Sleeping a lot, too. \" I nod off. Can't stay awake. \"  Improves/worsens Sx:  same  Pain scale (1-10)   1/10 to 7/10 for back pains, intermittent, for 2 weeks.   Sx specific meds:  NONE  Last exam/Tx:   Has NOT been seen for this.     RECOMMENDED DISPOSITION:  Home care advice - Given ON CARE number to call  Will comply with recommendation: YES  If further questions/concerns or if Sx do not improve, worsen or new Sx develop, call your PCP or [facility] as soon as possible.    NOTES:  Disposition was determined by the first positive assessment question, therefore all previous assessment questions were negative.  Informed to check provider manual or call insurance company to assure coverage.    Guideline used:  RN gave Tommy the On Care # to call : [phone], as he is wondering if he needs to be tested for COVID??    Kati Martinez RN  2020    "

## 2020-09-21 ENCOUNTER — TELEPHONE (OUTPATIENT)
Dept: FAMILY MEDICINE | Facility: CLINIC | Age: 50
End: 2020-09-21

## 2020-09-21 ENCOUNTER — MYC MEDICAL ADVICE (OUTPATIENT)
Dept: PHYSICAL MEDICINE AND REHAB | Facility: CLINIC | Age: 50
End: 2020-09-21

## 2020-09-21 DIAGNOSIS — K59.00 CONSTIPATION, UNSPECIFIED CONSTIPATION TYPE: ICD-10-CM

## 2020-09-21 NOTE — TELEPHONE ENCOUNTER
Reason for Call: Request for an order or referral:    Order or referral being requested: pt is requesting that  place orders for a covid test, antibody test and CD4     Date needed: as soon as possible    Has the patient been seen by the PCP for this problem? YES    Additional comments: Please call pt when orders have been placed     Phone number Patient can be reached at:  Home number on file [phone] (home)    Best Time:  anytime    Can we leave a detailed message on this number?  YES    Call taken on 9/21/2020 at 4:40 PM by Lala Fernandes

## 2020-09-21 NOTE — TELEPHONE ENCOUNTER
Called patient for more information. He states he is having symptoms such as gastro issues, oxygen levels at 97-92, has a temp that changes highest being 100.4.   Please advise on the need for COVID test, antibody test, and CD4?  Barbra Wiley, CMA

## 2020-09-22 NOTE — TELEPHONE ENCOUNTER
Called patient regarding concerns about cognitive deficits . Stated his memory has been this way for the last two years from his HIV. Asked him about PT or OT and he said he would not go because it wont help. Patient chooses not to follow through with recommendations from his HIV provider as he feels he is allergic to medications and will end up on life support. Patient spoke at length about his symptoms and fears. Encouraged to communicate needs to infectious disease.

## 2020-09-22 NOTE — TELEPHONE ENCOUNTER
Routing refill request to provider for review/approval because:  A break in medication, last prescribed in 2018    DEVONTE TobiasN, RN  [facility]

## 2020-10-01 DIAGNOSIS — R21 RASH AND NONSPECIFIC SKIN ERUPTION: Primary | ICD-10-CM

## 2020-10-01 RX ORDER — TRIAMCINOLONE ACETONIDE 1 MG/G
CREAM TOPICAL 2 TIMES DAILY
Qty: 80 G | Refills: 1 | Status: SHIPPED | OUTPATIENT
Start: 2020-10-01

## 2020-10-03 NOTE — TELEPHONE ENCOUNTER
Patient presents for COVID-19 testing; pt. asymptomatic Routing refill request to provider for review/approval because:  Dali given x1 and patient did not follow up, please advise  Patient needs to be seen because:  Due for follow up    JERE Tobias, RN  [facility]

## 2020-10-06 DIAGNOSIS — B37.0 THRUSH: ICD-10-CM

## 2020-10-06 DIAGNOSIS — F41.9 ANXIETY: ICD-10-CM

## 2020-10-06 DIAGNOSIS — B20 AIDS (H): ICD-10-CM

## 2020-10-06 NOTE — TELEPHONE ENCOUNTER
Requested Prescriptions   Pending Prescriptions Disp Refills     dronabinol (MARINOL) 5 MG capsule [Pharmacy Med Name: DRONABINOL 5 MG CAPS 5 Capsule] 10 capsule 0     Sig: TAKE 1 CAPSULE (5 MG) BY MOUTH 2 TIMES DAILY (BEFORE MEALS)     Last Written Prescription Date:  09/16/2020  Last Fill Quantity: 90,   # refills: 0  Last Office Visit: 01/16/2020  Future Office visit:       Routing refill request to provider for review/approval because:  Drug not on the [facility] or [facility] refill protocol or controlled substance              ALPRAZolam (XANAX) 0.5 MG tablet [Pharmacy Med Name: ALPRAZOLAM 0.5 MG TABS 0.5 Tablet] 14 tablet 0     Sig: TAKE 1 TO 2 TABLETS BY MOUTH DAILY AS NEEDED FOR ANXIETY.     Last Written Prescription Date:  09/16/2020  Last Fill Quantity: 14,   # refills: 0  Last Office Visit: 01/16/2020  Future Office visit:       Routing refill request to provider for review/approval because:  Drug not on the [facility], [facility] or [facility] refill protocol or controlled substance              nystatin (MYCOSTATIN) [id number] UNIT/ML suspension [Pharmacy Med Name: NYSTATIN 100,000 UNIT/ML CHUN 513461 PERRY] 400 mL 0     Sig: TAKE 5 MLS (500,000 UNITS) BY MOUTH 4 TIMES DAILY     Last Written Prescription Date:  09/16/2020  Last Fill Quantity: 400ml,   # refills: 0  Last Office Visit: 01/16/2020  Future Office visit:       Routing refill request to provider for review/approval because:  Drug not on the [facility] or [facility] refill protocol or controlled substance

## 2020-10-07 ENCOUNTER — MYC MEDICAL ADVICE (OUTPATIENT)
Dept: FAMILY MEDICINE | Facility: CLINIC | Age: 50
End: 2020-10-07

## 2020-10-07 RX ORDER — ALPRAZOLAM 0.5 MG
TABLET ORAL
Qty: 14 TABLET | Refills: 0 | Status: SHIPPED | OUTPATIENT
Start: 2020-10-07

## 2020-10-07 RX ORDER — NYSTATIN 100000/ML
500000 SUSPENSION, ORAL (FINAL DOSE FORM) ORAL 4 TIMES DAILY
Qty: 400 ML | Refills: 0 | Status: SHIPPED | OUTPATIENT
Start: 2020-10-07 | End: 2020-11-09

## 2020-10-07 RX ORDER — DRONABINOL 5 MG/1
5 CAPSULE ORAL
Qty: 10 CAPSULE | Refills: 0 | Status: SHIPPED | OUTPATIENT
Start: 2020-10-07 | End: 2020-10-09

## 2020-10-08 ENCOUNTER — MYC MEDICAL ADVICE (OUTPATIENT)
Dept: INFECTIOUS DISEASES | Facility: CLINIC | Age: 50
End: 2020-10-08

## 2020-10-09 DIAGNOSIS — G43.809 OTHER MIGRAINE WITHOUT STATUS MIGRAINOSUS, NOT INTRACTABLE: ICD-10-CM

## 2020-10-09 DIAGNOSIS — B37.0 THRUSH: ICD-10-CM

## 2020-10-09 DIAGNOSIS — J98.01 ACUTE BRONCHOSPASM: ICD-10-CM

## 2020-10-09 DIAGNOSIS — B20 AIDS (H): ICD-10-CM

## 2020-10-09 RX ORDER — FLUCONAZOLE 100 MG/1
100 TABLET ORAL DAILY
Qty: 15 TABLET | Refills: 0 | Status: SHIPPED | OUTPATIENT
Start: 2020-10-09 | End: 2020-11-09

## 2020-10-09 RX ORDER — ALBUTEROL SULFATE 90 UG/1
AEROSOL, METERED RESPIRATORY (INHALATION)
Qty: 18 G | Refills: 0 | Status: SHIPPED | OUTPATIENT
Start: 2020-10-09 | End: 2020-12-09

## 2020-10-09 RX ORDER — SUMATRIPTAN 50 MG/1
TABLET, FILM COATED ORAL
Qty: 6 TABLET | Refills: 1 | Status: SHIPPED | OUTPATIENT
Start: 2020-10-09

## 2020-10-09 RX ORDER — DRONABINOL 5 MG/1
5 CAPSULE ORAL
Qty: 60 CAPSULE | Refills: 0 | Status: SHIPPED | OUTPATIENT
Start: 2020-10-09

## 2020-10-09 NOTE — TELEPHONE ENCOUNTER
fluconazole (DIFLUCAN) 100 MG tablet [Pharmacy Med Name: FLUCONAZOLE 100 MG TABLET 100 Tablet] 15 tablet 0    Sig: TAKE 1 TABLET (100 MG) BY MOUTH DAILY FOR 15 DAYS   Routing refill request to provider for review/approval because:  Drug not active on patient's medication list        albuterol (PROAIR HFA/PROVENTIL HFA/VENTOLIN HFA) 108 (90 Base) MCG/ACT inhaler [Pharmacy Med Name: ALBUTEROL SULFATE  ( 108 (90 BAS Aerosol] 18 g 0    Sig: INHALE 2 PUFFS INTO THE LUNGS EVERY 4 HOURS AS NEEDED FOR SHORTNESS OF BREATH/DYSPNEA OR WHEEZING. **NEEDS TO MAKE APPT FOR FURTHER REFILLS*   Routing refill request to provider for review/approval because:  ACT score not current  ACT score not in goal range  T'd up 18g refill for provider review.    ACT Total Scores 7/25/2017 2/19/2018 11/29/2019   ACT TOTAL SCORE (Goal Greater than or Equal to 20) 19 9 19   In the past 12 months, how many times did you visit the emergency room for your asthma without being admitted to the hospital? 3 3 0   In the past 12 months, how many times were you hospitalized overnight because of your asthma? 3 3 0

## 2020-10-09 NOTE — TELEPHONE ENCOUNTER
Reason for Call:  Other appointment    Detailed comments: [facility] pharmacy calling to see if they can do 60 tablets on the Marinol please call them at [phone]    Phone Number Patient can be reached at: Home number     Best Time: any    Can we leave a detailed message on this number? YES     Call taken on 10/9/2020 at 9:55 AM by Olga Hu

## 2020-10-09 NOTE — TELEPHONE ENCOUNTER
"Requested Prescriptions   Pending Prescriptions Disp Refills     fluconazole (DIFLUCAN) 100 MG tablet [Pharmacy Med Name: FLUCONAZOLE 100 MG TABLET 100 Tablet] 15 tablet 0     Sig: TAKE 1 TABLET (100 MG) BY MOUTH DAILY FOR 15 DAYS   Last Written Prescription Date:  NA  Last Fill Quantity: NA,  # refills: NA   Last office visit: 6/19/2020 with prescribing provider:     Future Office Visit:        Antifungal Agents Failed - 10/9/2020  9:53 AM        Failed - Not Fluconazole or Terconazole      If oral Fluconazole or Terconazole, may refill if indicated in progress notes.           Failed - Medication is active on med list        Passed - Recent (12 mo) or future (30 days) visit within the authorizing provider's specialty     Patient has had an office visit with the authorizing provider or a provider within the authorizing providers department within the previous 12 mos or has a future within next 30 days. See \"Patient Info\" tab in inbasket, or \"Choose Columns\" in Meds & Orders section of the refill encounter.          Routing refill request to provider for review/approval          SUMAtriptan (IMITREX) 50 MG tablet [Pharmacy Med Name: SUMATRIPTAN SUCC 50 MG TAB 50 Tablet] 6 tablet 1     Sig: TAKE 1 TABLET BY MOUTH AT HEADACH ONSET FOR MIGRAINE (MAX 2 TABLETS/DAY)       Serotonin Agonists Failed - 10/9/2020  9:53 AM        Failed - Serotonin Agonist request needs review.     Please review patient's record. If patient has had 8 or more treatments in the past month, please forward to provider.          Passed - Blood pressure under 140/90 in past 12 months     BP Readings from Last 3 Encounters:   08/27/20 124/85   08/17/20 (!) 143/93   06/15/20 117/79           Passed - Recent (12 mo) or future (30 days) visit within the authorizing provider's specialty     Patient has had an office visit with the authorizing provider or a provider within the authorizing providers department within the previous 12 mos or has a future " "within next 30 days. See \"Patient Info\" tab in inbasket, or \"Choose Columns\" in Meds & Orders section of the refill encounter.          Passed - Medication is active on med list        Passed - Patient is age 18 or older      Prescription approved per [facility] Refill Protocol.       albuterol (PROAIR HFA/PROVENTIL HFA/VENTOLIN HFA) 108 (90 Base) MCG/ACT inhaler [Pharmacy Med Name: ALBUTEROL SULFATE  ( 108 (90 BAS Aerosol] 18 g 0     Sig: INHALE 2 PUFFS INTO THE LUNGS EVERY 4 HOURS AS NEEDED FOR SHORTNESS OF BREATH/DYSPNEA OR WHEEZING. **NEEDS TO MAKE APPT FOR FURTHER REFILLS*       Asthma Maintenance Inhalers - Anticholinergics Failed - 10/9/2020  9:53 AM        Failed - Asthma control assessment score within normal limits in last 6 months     Please review ACT score.   ACT Total Scores 7/25/2017 2/19/2018 11/29/2019   ACT TOTAL SCORE (Goal Greater than or Equal to 20) 19 9 19   In the past 12 months, how many times did you visit the emergency room for your asthma without being admitted to the hospital? 3 3 0   In the past 12 months, how many times were you hospitalized overnight because of your asthma? 3 3 0             Passed - Patient is age 12 years or older        Passed - Medication is active on med list        Passed - Recent (6 mo) or future (30 days) visit within the authorizing provider's specialty     Patient had office visit in the last 6 months or has a visit in the next 30 days with authorizing provider or within the authorizing provider's specialty.  See \"Patient Info\" tab in inbasket, or \"Choose Columns\" in Meds & Orders section of the refill encounter.           Short-Acting Beta Agonist Inhalers Protocol  Failed - 10/9/2020  9:53 AM        Failed - Asthma control assessment score within normal limits in last 6 months     Please review ACT score.           Passed - Patient is age 12 or older        Passed - Medication is active on med list        Passed - Recent (6 mo) or future (30 days) visit " "within the authorizing provider's specialty     Patient had office visit in the last 6 months or has a visit in the next 30 days with authorizing provider or within the authorizing provider's specialty.  See \"Patient Info\" tab in inbasket, or \"Choose Columns\" in Meds & Orders section of the refill encounter.                 "

## 2020-10-14 ENCOUNTER — TRANSFERRED RECORDS (OUTPATIENT)
Dept: HEALTH INFORMATION MANAGEMENT | Facility: CLINIC | Age: 50
End: 2020-10-14
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
## 2020-10-20 ENCOUNTER — TELEPHONE (OUTPATIENT)
Dept: FAMILY MEDICINE | Facility: CLINIC | Age: 50
End: 2020-10-20

## 2020-10-20 DIAGNOSIS — B20 AIDS (H): Primary | ICD-10-CM

## 2020-10-20 NOTE — TELEPHONE ENCOUNTER
Reason for Call: Request for an order or referral:    Order or referral being requested: Hospice    Date needed: as soon as possible    Has the patient been seen by the PCP for this problem? YES    Additional comments: Hospice is calling asking for orders to be placed. He has declined quickly. They are needing all health records within the past 6 months. Please call them back to discuss.     Patient is aware that PCP is not in the office and is ok with waiting for their return before hearing anything back.     Phone number Patient can be reached at:  Other phone number:  [phone]    Best Time:  any    Can we leave a detailed message on this number?  YES    Call taken on 10/20/2020 at 2:24 PM by Cristiane Krause CNA

## 2020-10-21 NOTE — TELEPHONE ENCOUNTER
Please sign order and route back to me so I can fax the orders.  They said he is declining quickly

## 2020-10-23 ENCOUNTER — TELEPHONE (OUTPATIENT)
Dept: PHYSICAL MEDICINE AND REHAB | Facility: CLINIC | Age: 50
End: 2020-10-23

## 2020-10-26 DIAGNOSIS — F41.9 ANXIETY: Primary | ICD-10-CM

## 2020-10-26 RX ORDER — FLUOXETINE 40 MG/1
CAPSULE ORAL
COMMUNITY
Start: 2020-10-24 | End: 2020-10-26

## 2020-10-27 RX ORDER — FLUOXETINE 40 MG/1
40 CAPSULE ORAL DAILY
Qty: 30 CAPSULE | Refills: 0 | Status: SHIPPED | OUTPATIENT
Start: 2020-10-27

## 2020-10-27 NOTE — TELEPHONE ENCOUNTER
"Routing refill request to provider for review/approval because:  Medication is reported/historical        Requested Prescriptions   Pending Prescriptions Disp Refills     FLUoxetine (PROZAC) 40 MG capsule 30 capsule 0     Sig: Take 1 capsule (40 mg) by mouth daily   Last Written Prescription Date:  NA  Last Fill Quantity: NA,  # refills: NA   Last office visit: 6/19/220 with prescribing provider:     Future Office Visit:        SSRIs Protocol Passed - 10/26/2020  4:17 PM        Passed - Recent (12 mo) or future (30 days) visit within the authorizing provider's specialty     Patient has had an office visit with the authorizing provider or a provider within the authorizing providers department within the previous 12 mos or has a future within next 30 days. See \"Patient Info\" tab in inbasket, or \"Choose Columns\" in Meds & Orders section of the refill encounter.              Passed - Medication is active on med list        Passed - Patient is age 18 or older                              Dyan Xiong RN    "

## 2020-10-30 ENCOUNTER — TELEPHONE (OUTPATIENT)
Dept: FAMILY MEDICINE | Facility: CLINIC | Age: 50
End: 2020-10-30

## 2020-10-30 DIAGNOSIS — B35.3 TINEA PEDIS OF BOTH FEET: ICD-10-CM

## 2020-10-30 RX ORDER — CLOTRIMAZOLE 1 %
CREAM (GRAM) TOPICAL
Qty: 15 G | Refills: 0 | Status: SHIPPED | OUTPATIENT
Start: 2020-10-30 | End: 2020-12-09

## 2020-10-30 NOTE — TELEPHONE ENCOUNTER
Received a fax from the pharmacy requesting BioFreeze Spray.    Nubia Roldan CMA ([facility]) 10/30/2020

## 2020-10-30 NOTE — TELEPHONE ENCOUNTER
Prior Authorization Retail Medication Request    Medication/Dose: Tinactin 1% Aerosol Powder  ICD code (if different than what is on RX):    Previously Tried and Failed:    Rationale:      Insurance Name:  [employer]    //  Medicaid [place]  Insurance ID:  [id number]     //   [id number]      Pharmacy Information (if different than what is on RX)  Name:    Phone:

## 2020-10-30 NOTE — TELEPHONE ENCOUNTER
Routing refill request to provider for review/approval because:  Drug not on the [facility] refill protocol       Requested Prescriptions   Pending Prescriptions Disp Refills     clotrimazole (LOTRIMIN) 1 % external cream [Pharmacy Med Name: CLOTRIMAZOLE 1 % CREA 1 Cream] 15 g 0     Sig: APPLY TO AFFECTED AREA(S) TWICE DAILY.       There is no refill protocol information for this order      Last Written Prescription Date:  5/7/2020  Last Fill Quantity: 15g,  # refills: 0   Last office visit: 6/19/2020 with prescribing provider:     Future Office Visit:    Tinea pedis of both feet [B35.3]       Dyan Xiong RN

## 2020-11-02 NOTE — TELEPHONE ENCOUNTER
Central Prior Authorization Team   Phone: [phone]      PA Initiation    Medication: Tinactin 1% Aerosol Powder  Insurance Company: [employer] - Phone [phone] Fax [phone]  Pharmacy Filling the Rx: [id number] - [place], [place] - [address]  Filling Pharmacy Phone: [phone]  Filling Pharmacy Fax:    Start Date: 11/2/2020

## 2020-11-02 NOTE — TELEPHONE ENCOUNTER
PRIOR AUTHORIZATION DENIED    Medication: Tinactin 1% Aerosol Powder    Denial Date: 11/2/2020    Denial Rational:  Per insurance, medication is excluded from patient's benefit plan and will not be covered. Review and appeal are not available because of this exclusion.        Appeal Information:  N/A

## 2020-11-03 ENCOUNTER — TELEPHONE (OUTPATIENT)
Dept: FAMILY MEDICINE | Facility: CLINIC | Age: 50
End: 2020-11-03

## 2020-11-03 NOTE — TELEPHONE ENCOUNTER
Central Prior Authorization Team   Phone: [phone]    PA NOT NEEDED  Medication: Lidocaine 5 % patches-PA NOT NEEDED  Insurance Company:    Pharmacy Filling the Rx: [id number] - [place], [place] - [address]  Filling Pharmacy Phone: [phone]  Filling Pharmacy Fax:    Start Date: 11/3/2020    The CMM key brought up an insurance that isn't listed in the patient's chart.  Called to verify, per [facility], the request can be dismissed as they were able to process the patches through the patient's Medicare Part D plan.

## 2020-11-03 NOTE — TELEPHONE ENCOUNTER
Prior Authorization Retail Medication Request    Medication/Dose: Lidocaine 5 % patches    KEY: [id number]  ICD code (if different than what is on RX):    Previously Tried and Failed:    Rationale:      Insurance Name:  [facility] MEDICARE ADVANTAGE (Managed Care)  //     MEDICAID [place] (Medicaid)  Insurance ID:                             [id number]                                                   //       [id number]      Pharmacy Information (if different than what is on RX)  Name:    Phone:

## 2020-11-06 DIAGNOSIS — B37.0 THRUSH: ICD-10-CM

## 2020-11-06 DIAGNOSIS — J98.01 ACUTE BRONCHOSPASM: ICD-10-CM

## 2020-11-09 RX ORDER — FLUCONAZOLE 100 MG/1
TABLET ORAL
Qty: 15 TABLET | Refills: 0 | Status: SHIPPED | OUTPATIENT
Start: 2020-11-09 | End: 2020-11-27

## 2020-11-09 RX ORDER — NYSTATIN 100000/ML
SUSPENSION, ORAL (FINAL DOSE FORM) ORAL
Qty: 400 ML | Refills: 0 | Status: SHIPPED | OUTPATIENT
Start: 2020-11-09 | End: 2020-12-09

## 2020-11-09 NOTE — TELEPHONE ENCOUNTER
"Requested Prescriptions   Pending Prescriptions Disp Refills     fluconazole (DIFLUCAN) 100 MG tablet [Pharmacy Med Name: FLUCONAZOLE 100 MG TABLET 100 Tablet] 15 tablet 0     Sig: TAKE 1 TABLET BY MOUTH ONCE DAILY FOR 15 DAYS   Last Written Prescription Date:  NA  Last Fill Quantity: NA,  # refills: NA   Last office visit: 6/19/2020 with prescribing provider:     Future Office Visit:      Antifungal Agents Failed - 11/6/2020 10:37 AM        Failed - Not Fluconazole or Terconazole      If oral Fluconazole or Terconazole, may refill if indicated in progress notes.           Failed - Medication is active on med list        Passed - Recent (12 mo) or future (30 days) visit within the authorizing provider's specialty     Patient has had an office visit with the authorizing provider or a provider within the authorizing providers department within the previous 12 mos or has a future within next 30 days. See \"Patient Info\" tab in inbasket, or \"Choose Columns\" in Meds & Orders section of the refill encounter.                 nystatin (MYCOSTATIN) 281869 UNIT/ML suspension [Pharmacy Med Name: NYSTATIN 100,000 UNIT/ML CHUN 168037 PERRY] 400 mL 0     Sig: TAKE 5 MLS BY MOUTH 4 TIMES DAILY       There is no refill protocol information for this order        INCRUSE ELLIPTA 62.5 MCG/INH Inhaler [Pharmacy Med Name: INCRUSE ELLIPTA 62.5 MCG IN 62.5 Aerosol] 30 each 1     Sig: INHALE 1 PUFF BY MOUTH ONCE DAILY.       Asthma Maintenance Inhalers - Anticholinergics Failed - 11/6/2020 10:37 AM        Failed - Asthma control assessment score within normal limits in last 6 months     Please review ACT score.   ACT Total Scores 7/25/2017 2/19/2018 11/29/2019   ACT TOTAL SCORE (Goal Greater than or Equal to 20) 19 9 19   In the past 12 months, how many times did you visit the emergency room for your asthma without being admitted to the hospital? 3 3 0   In the past 12 months, how many times were you hospitalized overnight because of your " "asthma? 3 3 0             Passed - Patient is age 12 years or older        Passed - Medication is active on med list        Passed - Recent (6 mo) or future (30 days) visit within the authorizing provider's specialty     Patient had office visit in the last 6 months or has a visit in the next 30 days with authorizing provider or within the authorizing provider's specialty.  See \"Patient Info\" tab in inbasket, or \"Choose Columns\" in Meds & Orders section of the refill encounter.             Routing refill requests to provider for review/approval   Dyan Xiong RN          "

## 2020-11-09 NOTE — TELEPHONE ENCOUNTER
fluconazole (DIFLUCAN) 100 MG tablet [Pharmacy Med Name: FLUCONAZOLE 100 MG TABLET 100 Tablet] 15 tablet 0    Sig: TAKE 1 TABLET BY MOUTH ONCE DAILY FOR 15 DAYS   Routing refill request to provider for review/approval because:  Drug not active on patient's medication list      nystatin (MYCOSTATIN) [id number] UNIT/ML suspension [Pharmacy Med Name: NYSTATIN 100,000 UNIT/ML CHUN [id number] PERRY] 400 mL 0    Sig: TAKE 5 MLS BY MOUTH 4 TIMES DAILY   Last Written Prescription Date:  10/7/2020  Last Fill Quantity: 400ml,  # refills: 0   Last office visit: 6/19/2020 with prescribing provider:     Future Office Visit:    Thrush [B37.0]   Routing refill request to provider for review/approval because:  Drug not on the [facility] refill protocol       INCRUSE ELLIPTA 62.5 MCG/INH Inhaler [Pharmacy Med Name: INCRUSE ELLIPTA 62.5 MCG IN 62.5 Aerosol] 30 each 1    Sig: INHALE 1 PUFF BY MOUTH ONCE DAILY.   Routing refill request to provider for review/approval because:  ACT score not current  ACT score does not meet goal  ACT last taken 11/29/2019 score total 19    Dyan Xiong, RN

## 2020-11-25 DIAGNOSIS — B37.0 THRUSH: ICD-10-CM

## 2020-11-27 RX ORDER — FLUCONAZOLE 100 MG/1
TABLET ORAL
Qty: 15 TABLET | Refills: 0 | Status: SHIPPED | OUTPATIENT
Start: 2020-11-27 | End: 2021-02-12

## 2020-11-27 NOTE — TELEPHONE ENCOUNTER
Diflucan      Last Written Prescription Date:  11/9/2020  Last Fill Quantity: 15,   # refills: 0  Last Office Visit: 6/19/2020  Future Office visit:       Routing refill request to provider for review/approval because:  Drug not on the [facility], [facility] or [facility] refill protocol or controlled substance

## 2020-12-09 DIAGNOSIS — B35.3 TINEA PEDIS OF BOTH FEET: ICD-10-CM

## 2020-12-09 DIAGNOSIS — B37.0 THRUSH: ICD-10-CM

## 2020-12-09 DIAGNOSIS — J98.01 ACUTE BRONCHOSPASM: ICD-10-CM

## 2020-12-09 RX ORDER — NYSTATIN 100000/ML
SUSPENSION, ORAL (FINAL DOSE FORM) ORAL
Qty: 400 ML | Refills: 0 | Status: SHIPPED | OUTPATIENT
Start: 2020-12-09 | End: 2021-02-12

## 2020-12-09 RX ORDER — CLOTRIMAZOLE 1 %
CREAM (GRAM) TOPICAL
Qty: 1 G | Refills: 0 | Status: SHIPPED | OUTPATIENT
Start: 2020-12-09 | End: 2021-02-12

## 2020-12-09 RX ORDER — ALBUTEROL SULFATE 90 UG/1
AEROSOL, METERED RESPIRATORY (INHALATION)
Qty: 18 G | Refills: 3 | Status: SHIPPED | OUTPATIENT
Start: 2020-12-09

## 2020-12-09 NOTE — TELEPHONE ENCOUNTER
Clotrimazole      Last Written Prescription Date:  10/30/2020  Last Fill Quantity: 15 g,   # refills: 0  Last Office Visit: 6/19/2020  Future Office visit:       Routing refill request to provider for review/approval because:  Drug not on the [facility], [facility] or [facility] refill protocol or controlled substance    Routing refill request to provider for review/approval because:  ACT <20 (score of 19)    DEVONTE TobiasN, RN  [facility]

## 2020-12-09 NOTE — TELEPHONE ENCOUNTER
Requested Prescriptions   Pending Prescriptions Disp Refills     nystatin (MYCOSTATIN) 346118 UNIT/ML suspension [Pharmacy Med Name: NYSTATIN 100,000 UNIT/ML CHUN [id number] PERRY] 400 mL 0     Sig: TAKE 5 MLS BY MOUTH 4 TIMES DAILY     Last Written Prescription Date:  11/19/2020  Last Fill Quantity: 400ml,   # refills: 0  Last Office Visit: 01/16/2020  Future Office visit:       Routing refill request to provider for review/approval because:  Drug not on the [facility], [facility] or [facility] refill protocol or controlled substance    Candice Howard MA

## 2020-12-30 DIAGNOSIS — J98.01 ACUTE BRONCHOSPASM: ICD-10-CM

## 2021-01-04 NOTE — TELEPHONE ENCOUNTER
Please contact patient to inform him that before additional medication can be sent out he would need to schedule an appointment to be seen by his PCP (emperatriz) as it appears that it has been close to a year since his last visit.    Jim Castillo PA-C on 1/18/2019 at 7:26 AM     Received request via: Pharmacy    Was the patient seen in the last year in this department? Yes    Does the patient have an active prescription (recently filled or refills available) for medication(s) requested? No

## 2021-01-04 NOTE — TELEPHONE ENCOUNTER
Routing refill request to provider for review/approval because:  ACT <20 (score of 19)    DEVONTE TobiasN, RN  [facility]

## 2021-02-03 ENCOUNTER — TELEPHONE (OUTPATIENT)
Dept: INFECTIOUS DISEASES | Facility: CLINIC | Age: 51
End: 2021-02-03

## 2021-02-10 ENCOUNTER — MYC MEDICAL ADVICE (OUTPATIENT)
Dept: INTERNAL MEDICINE | Facility: CLINIC | Age: 51
End: 2021-02-10

## 2021-02-10 ENCOUNTER — MYC MEDICAL ADVICE (OUTPATIENT)
Dept: INFECTIOUS DISEASES | Facility: CLINIC | Age: 51
End: 2021-02-10
Payer: COMMERCIAL

## 2021-02-10 DIAGNOSIS — B35.3 TINEA PEDIS OF BOTH FEET: ICD-10-CM

## 2021-02-10 DIAGNOSIS — B37.0 THRUSH: ICD-10-CM

## 2021-02-10 DIAGNOSIS — Z21 HIV INFECTION, UNSPECIFIED SYMPTOM STATUS (H): Primary | ICD-10-CM

## 2021-02-11 NOTE — TELEPHONE ENCOUNTER
sasha Kumari, called and asked where she could email this information to I spoke with Dr. Fernandes, he stated that he is not willing to order this medication and that the patient should really follow up with his infectious disease provider as that provider is the one that has been seeing him.  Patient has not seen Dr. Fernandes for over 1 yr.     Lala KANG

## 2021-02-11 NOTE — TELEPHONE ENCOUNTER
Patient will have paperwork/forms faxed over for Dr. Fernandes to fill out so patient will be able to start the trial.    Amanda Aleman, CMA

## 2021-02-12 ENCOUNTER — MYC MEDICAL ADVICE (OUTPATIENT)
Dept: FAMILY MEDICINE | Facility: CLINIC | Age: 51
End: 2021-02-12

## 2021-02-12 RX ORDER — FLUCONAZOLE 100 MG/1
TABLET ORAL
Qty: 3 TABLET | Refills: 0 | Status: SHIPPED | OUTPATIENT
Start: 2021-02-12

## 2021-02-12 RX ORDER — NYSTATIN 100000/ML
SUSPENSION, ORAL (FINAL DOSE FORM) ORAL
Qty: 200 ML | Refills: 0 | Status: SHIPPED | OUTPATIENT
Start: 2021-02-12

## 2021-02-12 RX ORDER — CLOTRIMAZOLE 1 %
CREAM (GRAM) TOPICAL
Qty: 14.2 G | Refills: 0 | Status: SHIPPED | OUTPATIENT
Start: 2021-02-12

## 2021-02-12 NOTE — TELEPHONE ENCOUNTER
Pending Prescriptions:                       Disp   Refills    nystatin (MYCOSTATIN) [id number] UNIT/ML suspe*200 mL 0        Sig: TAKE 5 MLS BY MOUTH 4 TIMES DAILY    fluconazole (DIFLUCAN) 100 MG tablet [Phar*3 tabl*0        Sig: TAKE 1 TABLET BY MOUTH ONCE DAILY FOR 15 DAYS    GNP ATHLETES FOOT 1 % external cream [Phar*14.2 g 0        Sig: APPLY TO AFFECTED AREA(S) TWICE DAILY.      Routing refill request to provider for review/approval because:  Drug not on the [facility] refill protocol     Lois Melvin RN on 2/12/2021 at 1:03 PM

## 2021-03-04 DIAGNOSIS — Z21 HIV INFECTION, UNSPECIFIED SYMPTOM STATUS (H): ICD-10-CM

## 2021-03-04 LAB
ALBUMIN SERPL-MCNC: 3.3 G/DL (ref 3.4–5)
ALBUMIN UR-MCNC: 100 MG/DL
ALP SERPL-CCNC: 86 U/L (ref 40–150)
ALT SERPL W P-5'-P-CCNC: 13 U/L (ref 0–70)
ANION GAP SERPL CALCULATED.3IONS-SCNC: 7 MMOL/L (ref 3–14)
APPEARANCE UR: ABNORMAL
AST SERPL W P-5'-P-CCNC: 10 U/L (ref 0–45)
BASOPHILS # BLD AUTO: 0 10E9/L (ref 0–0.2)
BASOPHILS NFR BLD AUTO: 0.2 %
BILIRUB SERPL-MCNC: 0.7 MG/DL (ref 0.2–1.3)
BILIRUB UR QL STRIP: ABNORMAL
BUN SERPL-MCNC: 16 MG/DL (ref 7–30)
CALCIUM SERPL-MCNC: 8.6 MG/DL (ref 8.5–10.1)
CHLORIDE SERPL-SCNC: 108 MMOL/L (ref 94–109)
CO2 SERPL-SCNC: 28 MMOL/L (ref 20–32)
COLOR UR AUTO: ABNORMAL
CREAT SERPL-MCNC: 0.84 MG/DL (ref 0.66–1.25)
DIFFERENTIAL METHOD BLD: ABNORMAL
EOSINOPHIL NFR BLD AUTO: 0.4 %
ERYTHROCYTE [DISTWIDTH] IN BLOOD BY AUTOMATED COUNT: 14.1 % (ref 10–15)
GFR SERPL CREATININE-BSD FRML MDRD: >90 ML/MIN/{1.73_M2}
GLUCOSE SERPL-MCNC: 92 MG/DL (ref 70–99)
GLUCOSE UR STRIP-MCNC: NEGATIVE MG/DL
HCT VFR BLD AUTO: 43.9 % (ref 40–53)
HGB BLD-MCNC: 14.6 G/DL (ref 13.3–17.7)
HGB UR QL STRIP: NEGATIVE
HYALINE CASTS #/AREA URNS LPF: 3 /LPF (ref 0–2)
IMM GRANULOCYTES # BLD: 0 10E9/L (ref 0–0.4)
IMM GRANULOCYTES NFR BLD: 0.2 %
KETONES UR STRIP-MCNC: 5 MG/DL
LEUKOCYTE ESTERASE UR QL STRIP: NEGATIVE
LYMPHOCYTES # BLD AUTO: 2.3 10E9/L (ref 0.8–5.3)
LYMPHOCYTES NFR BLD AUTO: 42.6 %
MCH RBC QN AUTO: 28.5 PG (ref 26.5–33)
MCHC RBC AUTO-ENTMCNC: 33.3 G/DL (ref 31.5–36.5)
MCV RBC AUTO: 86 FL (ref 78–100)
MONOCYTES # BLD AUTO: 0.4 10E9/L (ref 0–1.3)
MONOCYTES NFR BLD AUTO: 6.6 %
MUCOUS THREADS #/AREA URNS LPF: PRESENT /LPF
NEUTROPHILS # BLD AUTO: 2.8 10E9/L (ref 1.6–8.3)
NEUTROPHILS NFR BLD AUTO: 50 %
NITRATE UR QL: NEGATIVE
NRBC # BLD AUTO: 0 10*3/UL
NRBC BLD AUTO-RTO: 0 /100
PH UR STRIP: 5 PH (ref 5–7)
PLATELET # BLD AUTO: 96 10E9/L (ref 150–450)
POTASSIUM SERPL-SCNC: 4 MMOL/L (ref 3.4–5.3)
PROT SERPL-MCNC: 7.4 G/DL (ref 6.8–8.8)
RBC # BLD AUTO: 5.13 10E12/L (ref 4.4–5.9)
RBC #/AREA URNS AUTO: 7 /HPF (ref 0–2)
SODIUM SERPL-SCNC: 143 MMOL/L (ref 133–144)
SOURCE: ABNORMAL
SP GR UR STRIP: 1.03 (ref 1–1.03)
SPERM #/AREA URNS HPF: PRESENT /HPF
UROBILINOGEN UR STRIP-MCNC: 4 MG/DL (ref 0–2)
WBC # BLD AUTO: 5.5 10E9/L (ref 4–11)
WBC #/AREA URNS AUTO: 3 /HPF (ref 0–5)

## 2021-03-04 PROCEDURE — 86360 T CELL ABSOLUTE COUNT/RATIO: CPT | Performed by: INTERNAL MEDICINE

## 2021-03-04 PROCEDURE — 86359 T CELLS TOTAL COUNT: CPT | Performed by: INTERNAL MEDICINE

## 2021-03-04 PROCEDURE — 85025 COMPLETE CBC W/AUTO DIFF WBC: CPT | Performed by: INTERNAL MEDICINE

## 2021-03-04 PROCEDURE — 36415 COLL VENOUS BLD VENIPUNCTURE: CPT | Performed by: INTERNAL MEDICINE

## 2021-03-04 PROCEDURE — 81001 URINALYSIS AUTO W/SCOPE: CPT | Performed by: INTERNAL MEDICINE

## 2021-03-04 PROCEDURE — 87536 HIV-1 QUANT&REVRSE TRNSCRPJ: CPT | Performed by: INTERNAL MEDICINE

## 2021-03-04 PROCEDURE — 80053 COMPREHEN METABOLIC PANEL: CPT | Performed by: INTERNAL MEDICINE

## 2021-03-05 LAB
CD3 CELLS # BLD: 2163 CELLS/UL (ref 603–2990)
CD3 CELLS NFR BLD: 92 % (ref 49–84)
CD3+CD4+ CELLS # BLD: 145 CELLS/UL (ref 441–2156)
CD3+CD4+ CELLS NFR BLD: 6 % (ref 28–63)
CD3+CD4+ CELLS/CD3+CD8+ CLL BLD: 0.07 % (ref 1.4–2.6)
CD3+CD8+ CELLS # BLD: 2018 CELLS/UL (ref 125–1312)
CD3+CD8+ CELLS NFR BLD: 86 % (ref 10–40)
HIV1 RNA # PLAS NAA DL=20: ABNORMAL {COPIES}/ML
HIV1 RNA SERPL NAA+PROBE-LOG#: 5 {LOG_COPIES}/ML
IFC SPECIMEN: ABNORMAL

## 2021-03-22 DIAGNOSIS — K59.00 CONSTIPATION, UNSPECIFIED CONSTIPATION TYPE: Primary | ICD-10-CM

## 2021-03-24 RX ORDER — BISACODYL 5 MG/1
5 TABLET, DELAYED RELEASE ORAL DAILY PRN
Qty: 90 TABLET | Refills: 1 | Status: SHIPPED | OUTPATIENT
Start: 2021-03-24

## 2021-03-24 NOTE — TELEPHONE ENCOUNTER
Prescription approved per [facility] Refill Protocol.    DEVONTE TobiasN, RN  [facility]

## 2021-04-11 ENCOUNTER — HEALTH MAINTENANCE LETTER (OUTPATIENT)
Age: 51
End: 2021-04-11

## 2021-08-12 ENCOUNTER — TELEPHONE (OUTPATIENT)
Dept: INTERNAL MEDICINE | Facility: CLINIC | Age: 51
End: 2021-08-12

## 2021-08-12 NOTE — TELEPHONE ENCOUNTER
Reason for call:  Other   Patient called regarding (reason for call): call back  Additional comments: Patient is calling because he is in need of help of a care coordinator to help him with appointments and ensuring he is seeing the correct providers, as well as helping him transition from an outside hospice care facility to a [place] hospice care.     Phone number to reach patient:  Home number on file [phone] (home)    Best Time:  Any time    Can we leave a detailed message on this number?  YES    Travel screening: Not Applicable

## 2021-08-13 ENCOUNTER — TELEPHONE (OUTPATIENT)
Dept: INFECTIOUS DISEASES | Facility: CLINIC | Age: 51
End: 2021-08-13

## 2021-08-13 DIAGNOSIS — Z21 HIV INFECTION, UNSPECIFIED SYMPTOM STATUS (H): Primary | ICD-10-CM

## 2021-08-13 NOTE — TELEPHONE ENCOUNTER
Health Call Center    Phone Message    May a detailed message be left on voicemail: yes     Reason for Call: Other: Pt of Dr. Lay calling in asking to speak with Alexandra, pt said he's now on hospice care and wants lab orders and he'd like these today.  Pt is also requesting a new ID DrFranklyn, pt is asking for Alexandra to call him back today     Action Taken: Message routed to:  [facility] ([facility]): ID    Travel Screening: Not Applicable

## 2021-08-23 ENCOUNTER — TELEPHONE (OUTPATIENT)
Dept: FAMILY MEDICINE | Facility: CLINIC | Age: 51
End: 2021-08-23

## 2021-08-23 DIAGNOSIS — B20 AIDS (H): ICD-10-CM

## 2021-08-23 DIAGNOSIS — G89.29 OTHER CHRONIC PAIN: Primary | ICD-10-CM

## 2021-08-23 NOTE — TELEPHONE ENCOUNTER
Patient requested a appt on [facility]. Should he wait until Dr Alcala next available?    I m not sure what to do but my hospice company is not addressing my pain enough , my nurse told me if I went to the ER she would not accept me back , and I asked her for a covid test and she told me that the test kits are only for nurses to gain access to buildings , I m not sure how much I can take

## 2021-08-26 NOTE — TELEPHONE ENCOUNTER
I have started the referral. There are some questions in the referral for PCP to address. Kim Milian, CMA

## 2021-08-26 NOTE — TELEPHONE ENCOUNTER
"Called pt who reports he is on hospice at home. Pt reports he is needing more pain medications but his hospice nurse folded her arms and told him if he goes to the ED for pain meds she will not keep him on her Hospice Service. Pt reports his hospice people say if he continues to need more pain medications consistantly for 2 whole weeks then they will look at possibly getting him some more. Pt reports he is receiving morphine via capsules, but would prefer if he had a port so he could get his morphine intervenously. Pt reports he has lost 95 lbs however we have no recorded recent wt's for him in the [facility] system. Pt reports he needs dronabinol to help stimulate his appetite. Pt reports his lymph nodes are swollen and feels they may be full of cancer. Pt reports he has a possibility of lung damage or cancer from asbestos and/or roundup. 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 Pt reports he and his wife have thought about moving him to [place] so that he could just die peacefully in his sleep, but they \"have too much going on here\". 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 Pt is asking this writer to please put in his chart the diagnosis of osteo Arthritis and asbestos lung damage. Pt reports he cannot take any HIV medications because it caused him to have to be intubated 3 times a year for 5 years. Pt reports when he stopped all HIV meds, he no longer needed to be intubated. Pt would like his HIV doctor to consider letting him go on a trial medication made by [employer] that is an injection for HIV medicine that lasts for 8 or so months. (I believe this is what he was explaining). Helped pt get apt with new doctor since Dr Lay will not be back until later in Oct.  Alexandra Downs RN    "

## 2021-09-02 ENCOUNTER — TELEPHONE (OUTPATIENT)
Dept: FAMILY MEDICINE | Facility: CLINIC | Age: 51
End: 2021-09-02

## 2021-09-02 ENCOUNTER — MYC MEDICAL ADVICE (OUTPATIENT)
Dept: FAMILY MEDICINE | Facility: CLINIC | Age: 51
End: 2021-09-02

## 2021-09-02 NOTE — TELEPHONE ENCOUNTER
Patient sent this request on [employer]. Can you please call him to advise?     I been doing doing vsed  the dying process hurts , I m hoping my hospice nurse adjust my medication. I think a morphine pump where I can pump when needed . Maybe we can talk on Friday .

## 2021-09-02 NOTE — TELEPHONE ENCOUNTER
I sent a message to Tommy that he needs to contact his hospice nurse about this.    Vitamin D is low, a prescription has been sent to his pharmacy, vitamin D3 50,000 units 1 capsule by mouth once a week, this will be a continuous medication. Potassium level is low, this is most likely related to his blood pressure medication, a prescription for potassium 10 mEq 1 by mouth once a day has been sent to his pharmacy.   Rest the labs are normal or within acceptable range

## 2021-09-27 ENCOUNTER — TELEPHONE (OUTPATIENT)
Dept: FAMILY MEDICINE | Facility: CLINIC | Age: 51
End: 2021-09-27

## 2021-09-28 ENCOUNTER — MYC MEDICAL ADVICE (OUTPATIENT)
Dept: INTERNAL MEDICINE | Facility: CLINIC | Age: 51
End: 2021-09-28

## 2021-09-28 ENCOUNTER — TELEPHONE (OUTPATIENT)
Dept: FAMILY MEDICINE | Facility: CLINIC | Age: 51
End: 2021-09-28

## 2021-09-28 NOTE — TELEPHONE ENCOUNTER
Patient sent this request though his mychart. Please advise.    need the cd4 count referral and viral load test referral re done as it

## 2021-09-28 NOTE — TELEPHONE ENCOUNTER
Reason for call:  Order   Order or referral being requested: labs orders   Reason for request: labs  Date needed: as soon as possible  Has the patient been seen by the PCP for this problem? YES    Additional comments: patient needs lab orders sent to [facility] in [place] ordered by Carlos Enrique Lay.     Phone number to reach patient:  Cell number on file:    Telephone Information:   Mobile [phone]       Best Time:  Anytime     Can we leave a detailed message on this number?  YES    Travel screening: Not Applicable

## 2021-09-28 NOTE — TELEPHONE ENCOUNTER
Dr. Fernandes doesn't do his HIV care.  He has to call his infectious disease provider.  Left message for Tommy about this.

## 2021-09-29 NOTE — TELEPHONE ENCOUNTER
I do not know how I can put in orders to [facility] and I do not know what orders to order since I am not treating him for this.  His infectious disease doctor moved up on the left the clinic then they need to contact one of his partners

## 2021-10-07 ENCOUNTER — TELEPHONE (OUTPATIENT)
Dept: INFECTIOUS DISEASES | Facility: CLINIC | Age: 51
End: 2021-10-07

## 2021-10-07 NOTE — TELEPHONE ENCOUNTER
Talked with patient and he informed me that he is taking a lot of morphine right now and he is very frustrated with the health system. Patient stated that he doesn't want to have his visit because he doesn't want to try the new medication and doesn't believe anyone will follow up with him.   I asked him if he wants to have his visit and he said he's too sick for his visit and refused his appointment.  I encouraged him to keep his visit and he said he doesn't want to.  I cancelled the appointment after consulting with Dr. Rodriguez and DELTA Morris.  Bel Riggs CMA on 10/7/2021 at 10:51 AM

## 2022-05-07 ENCOUNTER — HEALTH MAINTENANCE LETTER (OUTPATIENT)
Age: 52
End: 2022-05-07

## 2022-05-26 NOTE — TELEPHONE ENCOUNTER
Pt calling. He thinks he has CMV or mono. He is seeing eye floaters, eye pain, head pain, extreme fatigue, trouble breathing at night, night sweats, falling asleep during the day, pain from knees down, pins and needles pain in lower legs, pain in L arm, diarrhea, sporadic ear pain.   He would like to speak to Dr. Fernandes is discuss ordering X rays. He did not want to schedule, just wanted to speak to Dr. Fernandes first. Please call.     Thank you,  Ara Alan- Patient Representative      none

## 2022-06-04 NOTE — TELEPHONE ENCOUNTER
"Prescription approved per RN refill protocol.  Maribell Mar RN, BSN        Imitrex  Last Written Prescription Date:  4/17/2018  Last Fill Quantity:30,  # refills: 1   Last office visit: 2/19/2018 with prescribing provider:  9/25/2018   Future Office Visit:      Requested Prescriptions   Pending Prescriptions Disp Refills     SUMAtriptan (IMITREX) 50 MG tablet [Pharmacy Med Name: SUMATRIPTAN 50MG TAB 50 TAB] 42 tablet 3     Sig: TAKE 1 TABLET BY MOUTH AT HEADACH ONSET FOR MIGRAINE    Serotonin Agonists Failed - 12/10/2018  5:47 PM       Failed - Serotonin Agonist request needs review.    Please review patient's record. If patient has had 8 or more treatments in the past month, please forward to provider.         Passed - Blood pressure under 140/90 in past 12 months    BP Readings from Last 3 Encounters:   08/17/18 115/74   07/30/18 110/71   07/21/18 120/88                Passed - Recent (12 mo) or future (30 days) visit within the authorizing provider's specialty    Patient had office visit in the last 12 months or has a visit in the next 30 days with authorizing provider or within the authorizing provider's specialty.  See \"Patient Info\" tab in inbasket, or \"Choose Columns\" in Meds & Orders section of the refill encounter.             Passed - Patient is age 18 or older        Maribell Mar RN on 12/12/2018 at 10:06 AM    "
English

## 2023-03-29 ENCOUNTER — LAB REQUISITION (OUTPATIENT)
Dept: LAB | Facility: CLINIC | Age: 53
End: 2023-03-29
Payer: COMMERCIAL

## 2023-03-29 DIAGNOSIS — Z79.899 OTHER LONG TERM (CURRENT) DRUG THERAPY: ICD-10-CM

## 2023-03-30 LAB
Lab: NORMAL
Lab: NORMAL
PERFORMING LABORATORY: NORMAL
PERFORMING LABORATORY: NORMAL
SPECIMEN STATUS: NORMAL
TEST NAME: NORMAL
TEST NAME: NORMAL

## 2023-03-30 PROCEDURE — 84999 UNLISTED CHEMISTRY PROCEDURE: CPT | Mod: ORL

## 2023-03-30 PROCEDURE — 80365 DRUG SCREENING OXYCODONE: CPT | Mod: ORL

## 2023-03-30 PROCEDURE — G0480 DRUG TEST DEF 1-7 CLASSES: HCPCS | Mod: ORL

## 2023-03-30 PROCEDURE — 36415 COLL VENOUS BLD VENIPUNCTURE: CPT | Mod: ORL

## 2023-03-30 PROCEDURE — 80361 OPIATES 1 OR MORE: CPT | Mod: ORL

## 2023-03-30 PROCEDURE — P9603 ONE-WAY ALLOW PRORATED MILES: HCPCS | Mod: ORL

## 2023-04-04 LAB
LABCORP INTERFACED MISCELLANEOUS TEST RESULT: NORMAL
Lab: NORMAL
PERFORMING LABORATORY: NORMAL
SPECIMEN STATUS: NORMAL
TEST NAME: NORMAL

## 2023-04-05 LAB
Lab: 688
PERFORMING LABORATORY: NORMAL
SPECIMEN STATUS: NORMAL
TEST NAME: NORMAL

## 2023-04-10 LAB
LABCORP INTERFACED MISCELLANEOUS TEST RESULT: NORMAL
LABCORP INTERFACED MISCELLANEOUS TEST RESULT: NORMAL
Lab: NORMAL
PERFORMING LABORATORY: NORMAL
SPECIMEN STATUS: NORMAL
TEST NAME: NORMAL

## 2023-04-11 LAB — MORPHINE SERPL-MCNC: 30 NG/ML

## 2023-04-13 LAB — LABCORP INTERFACED MISCELLANEOUS TEST RESULT: NORMAL

## 2023-06-02 ENCOUNTER — HEALTH MAINTENANCE LETTER (OUTPATIENT)
Age: 53
End: 2023-06-02

## 2023-10-23 NOTE — TELEPHONE ENCOUNTER
"Requested Prescriptions   Pending Prescriptions Disp Refills     hydrocortisone (Perianal) (ANUSOL-HC) 2.5 % cream [Pharmacy Med Name: HYDROCORTISONE 2.5 % CREA 2.5 Cream] 30 g 0     Sig: PLACE RECTALLY 2 TIMES DAILY   Last Written Prescription Date:  8/3/2020  Last Fill Quantity: 30g,  # refills: 0   Last office visit: 6/19/2020 with prescribing provider:     Future Office Visit:        Miscellaneous Gastrointestinal Agents Passed - 9/12/2020 10:04 AM        Passed - Recent (12 mo) or future (30 days) visit within the authorizing provider's specialty     Patient has had an office visit with the authorizing provider or a provider within the authorizing providers department within the previous 12 mos or has a future within next 30 days. See \"Patient Info\" tab in inbasket, or \"Choose Columns\" in Meds & Orders section of the refill encounter.              Passed - Medication is active on med list        Passed - Patient is 18 years of age or older      Prescription approved per [facility] Refill Protocol.       ALPRAZolam (XANAX) 0.5 MG tablet [Pharmacy Med Name: ALPRAZOLAM 0.5 MG TABS 0.5 Tablet] 14 tablet 0     Sig: TAKE 1 TO 2 TABLETS BY MOUTH DAILY AS NEEDED FOR ANXIETY.       There is no refill protocol information for this order   Routing refill request to provider for review/approval          nystatin (MYCOSTATIN) [id number] UNIT/ML suspension [Pharmacy Med Name: NYSTATIN 100,000 UNIT/ML CHUN [id number] PERRY] 400 mL 0     Sig: TAKE 5 MLS (500,000 UNITS) BY MOUTH 4 TIMES DAILY       There is no refill protocol information for this order      Routing refill request to provider for review/approval   Dyan Xiong RN      " Elliptical Excision Additional Text (Leave Blank If You Do Not Want): The margin was drawn around the clinically apparent lesion.  An elliptical shape was then drawn on the skin incorporating the lesion and margins.  Incisions were then made along these lines to the appropriate tissue plane and the lesion was extirpated.

## 2024-11-15 NOTE — TELEPHONE ENCOUNTER
He has an appointment on Friday 6/10 with you.    Detail Level: Simple Render Risk Assessment In Note?: no Additional Notes: Referral to podiatry to assist with hyperkeratitic plaque.  Patient notes possible neuropathy in toes.  Hammer deformity present

## (undated) RX ORDER — FENTANYL CITRATE 50 UG/ML
INJECTION, SOLUTION INTRAMUSCULAR; INTRAVENOUS
Status: DISPENSED
Start: 2020-05-29

## (undated) RX ORDER — LIDOCAINE HYDROCHLORIDE 20 MG/ML
SOLUTION OROPHARYNGEAL
Status: DISPENSED
Start: 2020-05-29